# Patient Record
Sex: FEMALE | Race: BLACK OR AFRICAN AMERICAN | Employment: FULL TIME | URBAN - METROPOLITAN AREA
[De-identification: names, ages, dates, MRNs, and addresses within clinical notes are randomized per-mention and may not be internally consistent; named-entity substitution may affect disease eponyms.]

---

## 2017-03-31 ENCOUNTER — OFFICE VISIT (OUTPATIENT)
Dept: CARDIOLOGY CLINIC | Age: 72
End: 2017-03-31

## 2017-03-31 VITALS
SYSTOLIC BLOOD PRESSURE: 124 MMHG | HEIGHT: 69 IN | RESPIRATION RATE: 16 BRPM | HEART RATE: 66 BPM | DIASTOLIC BLOOD PRESSURE: 76 MMHG | WEIGHT: 208 LBS | BODY MASS INDEX: 30.81 KG/M2

## 2017-03-31 DIAGNOSIS — I10 ESSENTIAL HYPERTENSION: ICD-10-CM

## 2017-03-31 DIAGNOSIS — I36.1 NON-RHEUMATIC TRICUSPID VALVE INSUFFICIENCY: Primary | ICD-10-CM

## 2017-03-31 DIAGNOSIS — I34.0 NON-RHEUMATIC MITRAL REGURGITATION: Chronic | ICD-10-CM

## 2017-03-31 DIAGNOSIS — R06.09 DYSPNEA ON EXERTION: ICD-10-CM

## 2017-03-31 RX ORDER — AMOXICILLIN 500 MG/1
4 CAPSULE ORAL AS DIRECTED
Refills: 0 | COMMUNITY
Start: 2017-02-28 | End: 2018-03-28 | Stop reason: ALTCHOICE

## 2017-03-31 NOTE — PROGRESS NOTES
[facility]  ([id number]    HPI: Lynn Spencer is a 73y.o. year-old female who presents for follow up regarding her valve disease. She forgets to take her medication at times, but is trying to work on it. She is feeling pretty good. She is eating salads avoiding white food and fried food, no sugar. She is not exercising and knows that she needs to do that. She did check out [employer]. Not checking it at home. No headache, dyspnea  New PCP did her labs and everything was ok. She was last seen by Dr. Princess Cornejo in December 2015  Her echo 9/16 showed stable TR (moderate - severe) and mild MR, no evidence of MVP, EF 60%   She reports feeling well, dyspnea is much better, no recent LE edema, takes Lasix 20mg daily  Her Hep C is gone now, and LFT were ok but now has HERNANDEZ.  eis seeing Dr. Saskia Poole now. She denies any chest pain or palpitations  Her blood pressure is borderline low today, denies dizziness or lightheadedness  No syncope  Dr. Barbara Camacho  Lipids 7/29/16 - , , LDL 89, HDL 48, LFT ok    Since last visit, office note dated 7/2017 received from hepatology, scanned into [facility] care  Hx of Hep C with cirrhosis s/p harvoni and rivavirin  K 4.2, Cr 0.9, LFTs ok, CBC ok    Assessment/Plan:  1. Dyspnea - with exertion, likely related to valve disease, much better on Lasix, will request recent BMP and magnesium level from PCP office  2. HTN - borderline low on Benicar HCTZ   3. GERD - on PRN Dexilant  4. MVP- previously diagnosed with MVP 20 years ago, recent echo without MVP and only mild MR  5. Obesity - Body mass index is 30.72 kg/(m^2). advised her to begin exercising, talked about walking  6.   Mod-severe TR- stable by TTE, symptoms stable on Lasix at  20mg daily     Echo 9/16 (prelim) - LVEF 60%, mild MR, mod - severe TR  Echo 8/15 - EF 60%, mild MR, mod-severe TR  Nuclear stress 8/15 EF 86%, no ischemia    Soc Hx: 2 glasses of wine every other day, no tobacco, no drug use, lives alone, very busy at [employer]  Fam Hx: father passed from bleeding ulcers at age 67 and had end stage CKD and HTN, mother living at age 80 and has LE edema/possible CHF and has HTN and DM    She  has a past medical history of Gastrointestinal disorder; Hypertension; Murmur; Other ill-defined conditions(799.89); and Valvular heart disease. Cardiovascular ROS: denies chest pain or dyspnea on exertion  Respiratory ROS: no cough, shortness of breath, or wheezing  Neurological ROS: no TIA or stroke symptoms  All other systems negative except as above. PE  Vitals:    03/31/17 1422   BP: 124/76   Pulse: 66   Resp: 16   Weight: 208 lb (94.3 kg)   Height: 5' 9\" (1.753 m)    Body mass index is 30.72 kg/(m^2).   General appearance - alert, well appearing, and in no distress  Mental status - affect appropriate to mood  Eyes - sclera anicteric, moist mucous membranes  Neck - supple, no significant adenopathy  Lymphatics - not assessed  Chest - clear to auscultation, no wheezes, rales or rhonchi  Heart - normal rate, regular rhythm, normal S1, S2, 2/6 LISANDRO   Abdomen - soft, nontender, nondistended, no masses or organomegaly  Back exam - full range of motion, no tenderness  Neurological - cranial nerves II through XII grossly intact, no focal deficit  Musculoskeletal - no muscular tenderness noted, normal strength  Extremities - peripheral pulses normal, no pedal edema  Skin - normal coloration  no rashes    12 lead ECG: NSR    Recent Labs:  No results found for: CHOL  No results found for: BRENNEN  No results found for: BUN  No results found for: K  No results found for: HBA1C, MUV1DFER  No components found for: HGBI,  IHGB,  HGB,  HGBP,  WBHGB  No results found for: PLT, PLTEXT    Reviewed:  Past Medical History:   Diagnosis Date    Gastrointestinal disorder     acid reflux    Hypertension     Murmur     Other ill-defined conditions(799.89)     hep c    Valvular heart disease     mitral valve prolapse     History   Smoking Status    Former Smoker   Smokeless Tobacco    Not on file     History   Alcohol Use No     No Known Allergies    Current Outpatient Prescriptions   Medication Sig    amoxicillin (AMOXIL) 500 mg capsule Take 4 Caps by mouth as directed. 1 hour prior to dental procedures    olmesartan-hydrochlorothiazide (BENICAR HCT) 20-12.5 mg per tablet Take 1 Tab by mouth daily.  Dexlansoprazole (DEXILANT) 60 mg CpDB Take 60 mg by mouth every other day.  furosemide (LASIX) 20 mg tablet Take 1 Tab by mouth daily.  omeprazole (PRILOSEC OTC) 20 mg tablet Take 20 mg by mouth daily.  aspirin delayed-release 81 mg tablet Take  by mouth daily. No current facility-administered medications for this visit.         Ron Velasquez MD  [facility] [place]  [address] [facility] [address]  [address]

## 2017-03-31 NOTE — MR AVS SNAPSHOT
Visit Information Date & Time Provider Department Dept. Phone Encounter #  
 3/31/2017  2:00 PM Walt Hernandez MD [facility] Devi Sadler [phone] [id number] Your Appointments 9/27/2017 11:00 AM  
ECHO CARDIOGRAMS 2D with Lesli Shetty [facility] ([facility] SCHEDULING) Appt Note: Per Dr. Neil Garcia, [address] after  
 [address] [place] [address]  
[address] [facility]  
  
    
 9/27/2017 11:40 AM  
ESTABLISHED PATIENT with Walt Hernandez MD  
[facility] ([address]) Appt Note: Per Dr. Neil Garcia, [address] after  
 [address] [place] [address]  
[address] [phone] Upcoming Health Maintenance Date Due Hepatitis C Screening 1945 DTaP/Tdap/Td series (1 - Tdap) 7/12/1968 FOBT Q 1 YEAR AGE 50-75 7/12/1997 ZOSTER VACCINE AGE 60> 7/12/2007 GLAUCOMA SCREENING Q2Y 7/12/2012 OSTEOPOROSIS SCREENING (DEXA) 7/12/2012 Pneumococcal 65+ Low/Medium Risk (1 of 2 - PCV13) 7/12/2012 MEDICARE YEARLY EXAM 7/12/2012 INFLUENZA AGE 9 TO ADULT 8/1/2016 BREAST CANCER SCRN MAMMOGRAM 6/27/2018 Allergies as of 3/31/2017  Review Complete On: 3/31/2017 By: Sven De Leon No Known Allergies Current Immunizations  Never Reviewed No immunizations on file. Not reviewed this visit You Were Diagnosed With   
  
 Codes Comments Non-rheumatic tricuspid valve insufficiency    -  Primary ICD-10-CM: I36.1 ICD-9-CM: 424.2 Essential hypertension     ICD-10-CM: I10 
ICD-9-CM: 401.9 Dyspnea on exertion     ICD-10-CM: R06.09 
ICD-9-CM: 786.09 Non-rheumatic mitral regurgitation     ICD-10-CM: I34.0 ICD-9-CM: 424.0 Vitals BP Pulse Resp Height(growth percentile) Weight(growth percentile) BMI 124/76 (BP 1 Location: Right arm, BP Patient Position: Sitting) 66 16 5' 9\" (1.753 m) 208 lb (94.3 kg) 30.72 kg/m2 Smoking Status Former Smoker Vitals History BMI and BSA Data Body Mass Index Body Surface Area 30.72 kg/m 2 2.14 m 2 Preferred Pharmacy Pharmacy Name Phone [facility] [address], [address] [place] [address] Avenue [phone] Your Updated Medication List  
  
   
This list is accurate as of: 3/31/17  2:54 PM.  Always use your most recent med list.  
  
  
  
  
 amoxicillin 500 mg capsule Commonly known as:  AMOXIL Take 4 Caps by mouth as directed. 1 hour prior to dental procedures  
  
 aspirin delayed-release 81 mg tablet Take  by mouth daily. DEXILANT 60 mg Cpdb Generic drug:  Dexlansoprazole Take 60 mg by mouth every other day. furosemide 20 mg tablet Commonly known as:  LASIX Take 1 Tab by mouth daily. olmesartan-hydroCHLOROthiazide 20-12.5 mg per tablet Commonly known as:  BENICAR HCT Take 1 Tab by mouth daily. PriLOSEC OTC 20 mg tablet Generic drug:  omeprazole Take 20 mg by mouth daily. We Performed the Following AMB POC EKG ROUTINE W/ 12 LEADS, INTER & REP [76558 CPT(R)] Introducing [facility] SERVICES! Dear Fran Deleon: Thank you for requesting a [employer] account. Our records indicate that you already have an active [employer] account. You can access your account anytime at [email or web address] Did you know that you can access your hospital and ER discharge instructions at any time in [employer]? You can also review all of your test results from your hospital stay or ER visit. Additional Information If you have questions, please visit the Frequently Asked Questions section of the [employer] website at [email or web address] Remember, [employer] is NOT to be used for urgent needs. For medical emergencies, dial 911. Now available from your iPhone and Android! Please provide this summary of care documentation to your next provider. Your primary care clinician is listed as Miroslava Ozuna. If you have any questions after today's visit, please call [phone].

## 2017-06-28 ENCOUNTER — HOSPITAL ENCOUNTER (OUTPATIENT)
Dept: MAMMOGRAPHY | Age: 72
Discharge: HOME | End: 2017-06-28
Attending: INTERNAL MEDICINE
Payer: COMMERCIAL

## 2017-06-28 DIAGNOSIS — Z12.31 VISIT FOR SCREENING MAMMOGRAM: ICD-10-CM

## 2017-06-28 PROCEDURE — 77067 SCR MAMMO BI INCL CAD: CPT

## 2017-08-18 PROBLEM — M85.80 OSTEOPENIA: Status: ACTIVE | Noted: 2017-08-18

## 2017-08-18 PROBLEM — R53.83 FATIGUE: Status: ACTIVE | Noted: 2017-08-18

## 2017-08-18 PROBLEM — K57.92 ACUTE DIVERTICULITIS: Status: ACTIVE | Noted: 2017-08-18

## 2017-08-18 PROBLEM — H26.9 CATARACT, RIGHT: Status: ACTIVE | Noted: 2017-08-18

## 2017-08-18 PROBLEM — B19.20 HEPATITIS C: Status: ACTIVE | Noted: 2017-08-18

## 2017-08-18 PROBLEM — H26.9 CATARACT, LEFT: Status: ACTIVE | Noted: 2017-08-18

## 2017-08-18 PROBLEM — Z78.0 POST-MENOPAUSAL: Status: ACTIVE | Noted: 2017-08-18

## 2017-08-18 PROBLEM — B00.1 COLD SORE: Status: ACTIVE | Noted: 2017-08-18

## 2017-08-18 PROBLEM — M19.90 ARTHRITIS: Status: ACTIVE | Noted: 2017-08-18

## 2017-08-18 PROBLEM — B02.9 HERPES ZOSTER INFECTION OF THORACIC REGION: Status: ACTIVE | Noted: 2017-08-18

## 2017-08-18 PROBLEM — R01.1 HEART MURMUR: Status: ACTIVE | Noted: 2017-08-18

## 2017-08-18 PROBLEM — M54.6 BACK PAIN, THORACIC: Status: ACTIVE | Noted: 2017-08-18

## 2017-08-18 PROBLEM — K21.9 GERD (GASTROESOPHAGEAL REFLUX DISEASE): Status: ACTIVE | Noted: 2017-08-18

## 2017-08-18 PROBLEM — E66.9 OBESITY (BMI 30-39.9): Status: ACTIVE | Noted: 2017-08-18

## 2017-08-18 PROBLEM — Z01.818 PREOP EXAMINATION: Status: ACTIVE | Noted: 2017-08-18

## 2017-08-18 PROBLEM — R73.09 ELEVATED HEMOGLOBIN A1C: Status: ACTIVE | Noted: 2017-08-18

## 2017-08-18 PROBLEM — H81.10 VERTIGO, BENIGN POSITIONAL: Status: ACTIVE | Noted: 2017-08-18

## 2017-08-18 PROBLEM — K74.60 CIRRHOSIS (HCC): Status: ACTIVE | Noted: 2017-08-18

## 2017-08-18 PROBLEM — H92.01 POSTERIOR AURICULAR PAIN OF RIGHT EAR: Status: ACTIVE | Noted: 2017-08-18

## 2017-08-18 PROBLEM — J30.9 ALLERGIC RHINITIS: Status: ACTIVE | Noted: 2017-08-18

## 2017-08-18 PROBLEM — E78.5 HYPERLIPIDEMIA LDL GOAL <100: Status: ACTIVE | Noted: 2017-08-18

## 2017-08-18 RX ORDER — CHOLECALCIFEROL TAB 125 MCG (5000 UNIT) 125 MCG
TAB ORAL DAILY
COMMUNITY

## 2017-08-18 RX ORDER — MV/FA/DHA/EPA/FISH OIL/SAW/GNK 400MCG-200
COMBINATION PACKAGE (EA) ORAL
COMMUNITY
End: 2021-07-09

## 2017-08-18 RX ORDER — MOMETASONE FUROATE 50 UG/1
2 SPRAY, METERED NASAL DAILY
COMMUNITY
End: 2021-07-09

## 2017-08-18 RX ORDER — CHOLECALCIFEROL (VITAMIN D3) 125 MCG
1 CAPSULE ORAL DAILY
COMMUNITY

## 2017-08-18 RX ORDER — NAPROXEN SODIUM 220 MG
440 TABLET ORAL AS NEEDED
COMMUNITY
End: 2021-07-21

## 2017-08-18 RX ORDER — DOCOSANOL 100 MG/G
CREAM TOPICAL AS NEEDED
COMMUNITY
End: 2022-06-22

## 2017-09-11 ENCOUNTER — OFFICE VISIT (OUTPATIENT)
Dept: INTERNAL MEDICINE CLINIC | Age: 72
End: 2017-09-11

## 2017-09-11 VITALS
SYSTOLIC BLOOD PRESSURE: 113 MMHG | WEIGHT: 206 LBS | DIASTOLIC BLOOD PRESSURE: 76 MMHG | HEART RATE: 57 BPM | BODY MASS INDEX: 31.22 KG/M2 | HEIGHT: 68 IN

## 2017-09-11 DIAGNOSIS — Z13.39 SCREENING FOR ALCOHOLISM: ICD-10-CM

## 2017-09-11 DIAGNOSIS — K74.60 CIRRHOSIS OF LIVER WITHOUT ASCITES, UNSPECIFIED HEPATIC CIRRHOSIS TYPE (HCC): ICD-10-CM

## 2017-09-11 DIAGNOSIS — Z13.31 SCREENING FOR DEPRESSION: ICD-10-CM

## 2017-09-11 DIAGNOSIS — K21.9 GASTROESOPHAGEAL REFLUX DISEASE WITHOUT ESOPHAGITIS: ICD-10-CM

## 2017-09-11 DIAGNOSIS — Z23 ENCOUNTER FOR IMMUNIZATION: ICD-10-CM

## 2017-09-11 DIAGNOSIS — R76.8 POSITIVE HEPATITIS C ANTIBODY TEST: ICD-10-CM

## 2017-09-11 DIAGNOSIS — Z00.00 MEDICARE ANNUAL WELLNESS VISIT, SUBSEQUENT: Primary | ICD-10-CM

## 2017-09-11 DIAGNOSIS — R53.83 FATIGUE, UNSPECIFIED TYPE: ICD-10-CM

## 2017-09-11 DIAGNOSIS — Z11.59 NEED FOR HEPATITIS C SCREENING TEST: ICD-10-CM

## 2017-09-11 DIAGNOSIS — E78.5 HYPERLIPIDEMIA LDL GOAL <100: ICD-10-CM

## 2017-09-11 DIAGNOSIS — I10 ESSENTIAL HYPERTENSION: ICD-10-CM

## 2017-09-11 DIAGNOSIS — Z12.31 ENCOUNTER FOR SCREENING MAMMOGRAM FOR MALIGNANT NEOPLASM OF BREAST: ICD-10-CM

## 2017-09-11 RX ORDER — LANOLIN ALCOHOL/MO/W.PET/CERES
1000 CREAM (GRAM) TOPICAL DAILY
COMMUNITY
End: 2021-07-09

## 2017-09-11 RX ORDER — ASCORBIC ACID 500 MG
TABLET ORAL
COMMUNITY
End: 2020-10-26

## 2017-09-11 RX ORDER — GLUCOSAMINE/CHONDR SU A SOD 750-600 MG
TABLET ORAL
COMMUNITY
End: 2018-10-11

## 2017-09-11 RX ORDER — ZINC GLUCONATE 10 MG
1 LOZENGE ORAL
COMMUNITY

## 2017-09-11 NOTE — PROGRESS NOTES
Johan Palomo is a 81 y.o. female presenting for Complete Physical (rm 2 - fasting)  . 1. Have you been to the ER, urgent care clinic since your last visit? Hospitalized since your last visit? No    2. Have you seen or consulted any other health care providers outside of the [address] [place] [address] since your last visit? Include any pap smears or colon screening.  No

## 2017-09-11 NOTE — MR AVS SNAPSHOT
Visit Information Date & Time Provider Department Dept. Phone Encounter #  
 9/11/2017  9:50 AM GREGORIO Rucker MD [address] [facility] [phone] [id number] Follow-up Instructions Return in about 6 months (around 3/11/2018) for follow up. Your Appointments 9/27/2017 11:00 AM  
ECHO CARDIOGRAMS 2D with Yola Sanches [facility] ([facility]CCA SCHEDULING) Appt Note: Per Dr. Lester Coffey, [address] after  
 [address] [place] [address]  
[address] [facility]  
  
    
 9/27/2017 11:40 AM  
ESTABLISHED PATIENT with Kathi Luna MD  
[facility] ([facility]) Appt Note: Per Dr. Lester Coffey, [address] after  
 [address] [place] [address]  
[address] [address] Upcoming Health Maintenance Date Due DTaP/Tdap/Td series (1 - Tdap) 7/12/1968 FOBT Q 1 YEAR AGE 50-75 7/12/1997 ZOSTER VACCINE AGE 60> 5/12/2007 GLAUCOMA SCREENING Q2Y 7/12/2012 OSTEOPOROSIS SCREENING (DEXA) 7/12/2012 MEDICARE YEARLY EXAM 7/12/2012 INFLUENZA AGE 9 TO ADULT 8/1/2017 Pneumococcal 65+ Low/Medium Risk (2 of 2 - PPSV23) 11/1/2017 BREAST CANCER SCRN MAMMOGRAM 6/28/2019 Allergies as of 9/11/2017  Review Complete On: 9/11/2017 By: Robert Lopez MD  
 No Known Allergies Current Immunizations  Never Reviewed Name Date Influenza High Dose Vaccine PF  Incomplete Influenza Vaccine 11/1/2013 Pneumococcal Conjugate (PCV-13)  Incomplete Pneumococcal Vaccine (Unspecified Type) 11/1/2012 Not reviewed this visit You Were Diagnosed With   
  
 Codes Comments Essential hypertension    -  Primary ICD-10-CM: I10 
ICD-9-CM: 401.9 Medicare annual wellness visit, subsequent     ICD-10-CM: Z00.00 ICD-9-CM: V70.0 Screening for alcoholism     ICD-10-CM: Z13.89 ICD-9-CM: V79.1 Screening for depression     ICD-10-CM: Z13.89 ICD-9-CM: V79.0 Need for hepatitis C screening test     ICD-10-CM: Z11.59 
ICD-9-CM: V73.89 Encounter for screening mammogram for malignant neoplasm of breast     ICD-10-CM: Z12.31 
ICD-9-CM: V76.12 Encounter for immunization     ICD-10-CM: W37 ICD-9-CM: V03.89 Gastroesophageal reflux disease without esophagitis     ICD-10-CM: K21.9 ICD-9-CM: 530.81 Hyperlipidemia LDL goal <100     ICD-10-CM: E78.5 ICD-9-CM: 272.4 Fatigue, unspecified type     ICD-10-CM: R53.83 ICD-9-CM: 780.79 Cirrhosis of liver without ascites, unspecified hepatic cirrhosis type ([facility]     ICD-10-CM: K74.60 ICD-9-CM: 571.5 Vitals BP Pulse Height(growth percentile) Weight(growth percentile) BMI OB Status 113/76 (BP 1 Location: Left arm, BP Patient Position: Sitting) (!) 57 5' 8\" (1.727 m) 206 lb (93.4 kg) 31.32 kg/m2 Hysterectomy Smoking Status Former Smoker Vitals History BMI and BSA Data Body Mass Index Body Surface Area  
 31.32 kg/m 2 2.12 m 2 Preferred Pharmacy Pharmacy Name Phone [facility] [address] [place] [address] [phone] Your Updated Medication List  
  
   
This list is accurate as of: 9/11/17 11:07 AM.  Always use your most recent med list.  
  
  
  
  
 ABREVA 10 % topical cream  
Generic drug:  docosanol Apply  to affected area five (5) times daily. ALEVE 220 mg tablet Generic drug:  naproxen sodium Take 220 mg by mouth two (2) times daily (with meals). amoxicillin 500 mg capsule Commonly known as:  AMOXIL Take 4 Caps by mouth as directed. 1 hour prior to dental procedures  
  
 aspirin delayed-release 81 mg tablet Take  by mouth daily. biotin 5,000 mcg Tbdi Take  by mouth. DEXILANT 60 mg Cpdb Generic drug:  Dexlansoprazole Take 60 mg by mouth every other day. furosemide 20 mg tablet Commonly known as:  LASIX TAKE ONE TABLET BY MOUTH ONCE DAILY  
  
 glucosamine-chondroitin 750-600 mg Tab Take  by mouth.  
  
 krill oil 500 mg Cap Take  by mouth.  
  
 magnesium 250 mg Tab Take  by mouth. NASONEX 50 mcg/actuation nasal spray Generic drug:  mometasone 2 Sprays daily. Elmarie [address]  
by Does Not Apply route. olmesartan-hydroCHLOROthiazide 20-12.5 mg per tablet Commonly known as:  BENICAR HCT Take 1 Tab by mouth daily. ONE-A-DAY MENOPAUSE FORMULA PO Take  by mouth. PriLOSEC OTC 20 mg tablet Generic drug:  omeprazole Take 20 mg by mouth daily. psyllium Powd Commonly known as:  METAMUCIL Take  by mouth. YRN-E (ENTERIC COATED) 400 mg Tbec Generic drug:  S-Adenosylmethionine Take  by mouth. VITAMIN B-12 1,000 mcg tablet Generic drug:  cyanocobalamin Take 1,000 mcg by mouth daily. VITAMIN C 500 mg tablet Generic drug:  ascorbic acid (vitamin C) Take  by mouth. VITAMIN D3 5,000 unit Tab tablet Generic drug:  cholecalciferol (VITAMIN D3) Take  by mouth daily. We Performed the Following ADMIN INFLUENZA VIRUS VAC [ [facility]] ADMIN PNEUMOCOCCAL VACCINE [ [facility]] AMB POC COMPLETE CBC,AUTOMATED ENTER [id number] CPT(R)] AMB POC COMPREHENSIVE METABOLIC PANEL [99655 CPT(R)] AMB POC LIPID PROFILE [01759 CPT(R)] AMB POC URINALYSIS DIP STICK AUTO W/ MICRO  [76394 CPT(R)] [address] [id number] HEPATITIS C AB [52856 CPT(R)] INFLUENZA VIRUS VACCINE, HIGH DOSE SEASONAL, PRESERVATIVE FREE [44868 CPT(R)] PNEUMOCOCCAL CONJ VACCINE 13 VALENT IM [id number] CPT(R)] RI ANNUAL ALCOHOL SCREEN 15 MIN [id number] [facility]] Follow-up Instructions Return in about 6 months (around 3/11/2018) for follow up. To-Do List   
 09/14/2017   Imaging:  NABEEL MAMMO BI SCREENING INCL CAD   
  
  
 Patient Instructions Medicare Wellness Visit, Female The best way to live healthy is to have a healthy lifestyle by eating a well-balanced diet, exercising regularly, limiting alcohol and stopping smoking. Regular physical exams and screening tests are another way to keep healthy. Preventive exams provided by your health care provider can find health problems before they become diseases or illnesses. Preventive services including immunizations, screening tests, monitoring and exams can help you take care of your own health. All people over age 72 should have a pneumovax  and and a prevnar shot to prevent pneumonia. These are once in a lifetime unless you and your provider decide differently. All people over 65 should have a yearly flu shot and a tetanus vaccine every 10 years. A bone mass density to screen for osteoporosis or thinning of the bones should be done every 2 years after 65. Screening for diabetes mellitus with a blood sugar test should be done every year. Glaucoma is a disease of the eye due to increased ocular pressure that can lead to blindness and it should be done every year by an eye professional. 
 
Cardiovascular screening tests that check for elevated lipids (fatty part of blood) which can lead to heart disease and strokes should be done every 5 years. Colorectal screening that evaluates for blood or polyps in your colon should be done yearly as a stool test or every five years as a flexible sigmoidoscope or every 10 years as a colonoscopy up to age 76. Breast cancer screening with a mammogram is recommended biennially  for women age 54-69. Screening for cervical cancer with a pap smear and pelvic exam is recommended for women after age 72 years every 2 years up to age 79 or when the provider and patient decide to stop. If there is a history of cervical abnormalities or other increased risk for cancer then the test is recommended yearly. Hepatitis C screening is also recommended for anyone born between 80 through [address]. A shingles vaccine is also recommended once in a lifetime after age 61. Your Medicare Wellness Exam is recommended annually. Here is a list of your current Health Maintenance items with a due date: 
Health Maintenance Due Topic Date Due  
 DTaP/Tdap/Td  (1 - Tdap) 07/12/1968  Stool testing for trace blood  07/12/1997  Shingles Vaccine  05/12/2007  Glaucoma Screening   07/12/2012  Bone Density Screening  07/12/2012 [facility] Annual Well Visit  07/12/2012  Flu Vaccine  08/01/2017 Introducing [facility] SERVICES! Dear Chrissie Cranker: Thank you for requesting a [employer] account. Our records indicate that you already have an active [employer] account. You can access your account anytime at [email or web address] Did you know that you can access your hospital and ER discharge instructions at any time in Just[employer]? You can also review all of your test results from your hospital stay or ER visit. Additional Information If you have questions, please visit the Frequently Asked Questions section of the Just[employer] website at [email or web address] Remember, [employer] is NOT to be used for urgent needs. For medical emergencies, dial 911. Now available from your iPhone and Android! Please provide this summary of care documentation to your next provider. Your primary care clinician is listed as GREGORIO Coleman. If you have any questions after today's visit, please call [phone].

## 2017-09-11 NOTE — PATIENT INSTRUCTIONS

## 2017-09-11 NOTE — PROGRESS NOTES
This is a Subsequent Medicare Annual Wellness Exam (AWV) (Performed 12 months after IPPE or effective date of Medicare Part B enrollment, Once in a lifetime)    I have reviewed the patient's medical history in detail and updated the computerized patient record. History     Past Medical History:   Diagnosis Date    Acute diverticulitis 8/18/2017    Allergic rhinitis 8/18/2017    Arthritis 8/18/2017    Back pain, thoracic 8/18/2017    Cataract, left 8/18/2017    Cataract, right 8/18/2017    Cirrhosis ([address] 8/18/2017    Cold sore 8/18/2017    Elevated hemoglobin A1c 8/18/2017    Fatigue 8/18/2017    Gastrointestinal disorder     acid reflux    GERD (gastroesophageal reflux disease) 8/18/2017    Heart murmur 8/18/2017    Hepatitis C 8/18/2017    Herpes zoster infection of thoracic region 8/18/2017    Hyperlipidemia LDL goal <100 8/18/2017    Hypertension     Menopause     Hysterectomy-42years old    Murmur     Obesity (BMI 30-39. 9) 8/18/2017    Osteopenia 8/18/2017    Other ill-defined conditions     hep c    Post-menopausal 8/18/2017    Posterior auricular pain of right ear 8/18/2017    Preop examination 8/18/2017    Valvular heart disease     mitral valve prolapse    Vertigo, benign positional 8/18/2017      Past Surgical History:   Procedure Laterality Date    HX HYSTERECTOMY      42years old    HX ORTHOPAEDIC      knee, foot     Current Outpatient Prescriptions   Medication Sig Dispense Refill    cyanocobalamin (VITAMIN B-12) 1,000 mcg tablet Take 1,000 mcg by mouth daily.  ascorbic acid, vitamin C, (VITAMIN C) 500 mg tablet Take  by mouth.  psyllium (METAMUCIL) powd Take  by mouth.  magnesium 250 mg tab Take  by mouth.  GLUCOSAMINE/CHONDR LOBATO A SOD (GLUCOSAMINE-CHONDROITIN) 750-600 mg tab Take  by mouth.  mometasone (NASONEX) 50 mcg/actuation nasal spray 2 Sprays daily.       naproxen sodium (ALEVE) 220 mg tablet Take 220 mg by mouth two (2) times daily (with meals).  docosanol (ABREVA) 10 % topical cream Apply  to affected area five (5) times daily.  PEN NEEDLE, DIABETIC ([address]) by Does Not Apply route.  biotin 5,000 mcg TbDi Take  by mouth.  krill oil 500 mg cap Take  by mouth.  MVIT-MINS/FOLIC ACID/SOY ISOFL (ONE-A-DAY MENOPAUSE FORMULA PO) Take  by mouth.  S-Adenosylmethionine (YRN-E, ENTERIC COATED,) 400 mg TbEC Take  by mouth.  cholecalciferol, VITAMIN D3, (VITAMIN D3) 5,000 unit tab tablet Take  by mouth daily.  furosemide (LASIX) 20 mg tablet TAKE ONE TABLET BY MOUTH ONCE DAILY 90 Tab 3    amoxicillin (AMOXIL) 500 mg capsule Take 4 Caps by mouth as directed. 1 hour prior to dental procedures  0    olmesartan-hydrochlorothiazide (BENICAR HCT) 20-12.5 mg per tablet Take 1 Tab by mouth daily. 90 Tab 3    Dexlansoprazole (DEXILANT) 60 mg CpDB Take 60 mg by mouth every other day.  omeprazole (PRILOSEC OTC) 20 mg tablet Take 20 mg by mouth daily.  aspirin delayed-release 81 mg tablet Take  by mouth daily. No Known Allergies  Family History   Problem Relation Age of Onset    Hypertension Mother     Diabetes Mother     Heart Disease Father      Social History   Substance Use Topics    Smoking status: Former Smoker    Smokeless tobacco: Never Used    Alcohol use No     Patient Active Problem List   Diagnosis Code    Right wrist injury S69.91XA    HTN (hypertension) I10    Dyspnea on exertion R06.09    Non-rheumatic tricuspid valve insufficiency I36.1    Non-rheumatic mitral regurgitation I34.0    Obesity (BMI 30-39. 9) E66.9    Hepatitis C B19.20    GERD (gastroesophageal reflux disease) K21.9    Hyperlipidemia LDL goal <100 E78.5    Heart murmur R01.1    Cataract, left H26.9    Cataract, right H26.9    Acute diverticulitis K57.92    Allergic rhinitis J30.9    Back pain, thoracic M54.6    Elevated hemoglobin A1c R73.09    Herpes zoster infection of thoracic region B02.9    Post-menopausal Z78.0    Posterior auricular pain of right ear H92.01    Preop examination Z01.818    Vertigo, benign positional H81.10    Cirrhosis (HCC) K74.60    Fatigue R53.83    Cold sore B00.1    Arthritis M19.90    Osteopenia M85.80       Depression Risk Factor Screening:     PHQ over the last two weeks 9/11/2017   Little interest or pleasure in doing things Not at all   Feeling down, depressed or hopeless Not at all   Total Score PHQ 2 0     Alcohol Risk Factor Screening: You do not drink alcohol or very rarely. Functional Ability and Level of Safety:   Hearing Loss  Hearing is good. Activities of Daily Living  The home contains: no safety equipment  Patient does total self care    Fall Risk  Fall Risk Assessment, last 12 mths 9/11/2017   Able to walk? Yes   Fall in past 12 months? No       Abuse Screen  Patient is not abused    Cognitive Screening   Evaluation of Cognitive Function:  Has your family/caregiver stated any concerns about your memory: no  Normal    Patient Care Team   Patient Care Team:  Radha Nichols MD as PCP - General (Internal Medicine)  Paty Rader MD (Cardiology)    Assessment/Plan   Education and counseling provided:  Are appropriate based on today's review and evaluation  Pneumococcal Vaccine  Influenza Vaccine  Screening Mammography    Diagnoses and all orders for this visit:    1. Medicare annual wellness visit, subsequent    2. Screening for alcoholism  -     Annual  Alcohol Screen 15 min ()    3. Screening for depression  -     Depression Screen Annual    4. Need for hepatitis C screening test  -     HEPATITIS C AB    5. Encounter for screening mammogram for malignant neoplasm of breast  -     Bilateral Digital Screening Mammography; Future    6. Encounter for immunization  -     Influenza Admin ()  -     Influenza virus vaccine (FLUZONE HIGH DOSE) PF ([id number])  -     Pneumococcal Admin ()  -     Pneumococcal Conjugate Vaccine    7. Essential hypertension  -     AMB POC COMPREHENSIVE METABOLIC PANEL  -     AMB POC URINALYSIS DIP STICK AUTO W/ MICRO     8. Gastroesophageal reflux disease without esophagitis    9. Hyperlipidemia LDL goal <100  -     AMB POC LIPID PROFILE    10. Fatigue, unspecified type  -     AMB POC COMPLETE CBC,AUTOMATED ENTER    11. Cirrhosis of liver without ascites, unspecified hepatic cirrhosis type [facility])        Health Maintenance Due   Topic Date Due    DTaP/Tdap/Td series (1 - Tdap) 07/12/1968    FOBT Q 1 YEAR AGE 50-75  07/12/1997    ZOSTER VACCINE AGE 60>  05/12/2007    GLAUCOMA SCREENING Q2Y  07/12/2012    OSTEOPOROSIS SCREENING (DEXA)  07/12/2012    MEDICARE YEARLY EXAM  07/12/2012    INFLUENZA AGE 9 TO ADULT  08/01/2017     This note will not be viewable in MyChart. Ricardo Pham is a 81 y.o. female and presents with Complete Physical (rm 2 - fasting)  . Subjective:  Mrs. Rox Aparicio presents today for follow-up of multiple problems including hypertension, hyperlipidemia, gastroesophageal reflux disease, osteopenia. She denies any chest pain PND orthopnea or pedal edema. She has had no palpitations. She takes Dexilant for reflux but only takes it on occasion. She does note some ringing in her left ear from time to time. She denies any dizziness congestion coryza or drainage.     Past Medical History:   Diagnosis Date    Acute diverticulitis 8/18/2017    Allergic rhinitis 8/18/2017    Arthritis 8/18/2017    Back pain, thoracic 8/18/2017    Cataract, left 8/18/2017    Cataract, right 8/18/2017    Cirrhosis ([address] 8/18/2017    Cold sore 8/18/2017    Elevated hemoglobin A1c 8/18/2017    Fatigue 8/18/2017    Gastrointestinal disorder     acid reflux    GERD (gastroesophageal reflux disease) 8/18/2017    Heart murmur 8/18/2017    Hepatitis C 8/18/2017    Herpes zoster infection of thoracic region 8/18/2017    Hyperlipidemia LDL goal <100 8/18/2017    Hypertension     Menopause Hysterectomy-42years old    Murmur     Obesity (BMI 30-39. 9) 8/18/2017    Osteopenia 8/18/2017    Other ill-defined conditions     hep c    Post-menopausal 8/18/2017    Posterior auricular pain of right ear 8/18/2017    Preop examination 8/18/2017    Valvular heart disease     mitral valve prolapse    Vertigo, benign positional 8/18/2017     Past Surgical History:   Procedure Laterality Date    HX HYSTERECTOMY      42years old    HX ORTHOPAEDIC      knee, foot     No Known Allergies  Current Outpatient Prescriptions   Medication Sig Dispense Refill    cyanocobalamin (VITAMIN B-12) 1,000 mcg tablet Take 1,000 mcg by mouth daily.  ascorbic acid, vitamin C, (VITAMIN C) 500 mg tablet Take  by mouth.  psyllium (METAMUCIL) powd Take  by mouth.  magnesium 250 mg tab Take  by mouth.  GLUCOSAMINE/CHONDR LOBATO A SOD (GLUCOSAMINE-CHONDROITIN) 750-600 mg tab Take  by mouth.  mometasone (NASONEX) 50 mcg/actuation nasal spray 2 Sprays daily.  naproxen sodium (ALEVE) 220 mg tablet Take 220 mg by mouth two (2) times daily (with meals).  docosanol (ABREVA) 10 % topical cream Apply  to affected area five (5) times daily.  PEN NEEDLE, DIABETIC ([address]) by Does Not Apply route.  biotin 5,000 mcg TbDi Take  by mouth.  krill oil 500 mg cap Take  by mouth.  MVIT-MINS/FOLIC ACID/SOY ISOFL (ONE-A-DAY MENOPAUSE FORMULA PO) Take  by mouth.  S-Adenosylmethionine (YRN-E, ENTERIC COATED,) 400 mg TbEC Take  by mouth.  cholecalciferol, VITAMIN D3, (VITAMIN D3) 5,000 unit tab tablet Take  by mouth daily.  furosemide (LASIX) 20 mg tablet TAKE ONE TABLET BY MOUTH ONCE DAILY 90 Tab 3    amoxicillin (AMOXIL) 500 mg capsule Take 4 Caps by mouth as directed. 1 hour prior to dental procedures  0    olmesartan-hydrochlorothiazide (BENICAR HCT) 20-12.5 mg per tablet Take 1 Tab by mouth daily.  90 Tab 3    Dexlansoprazole (DEXILANT) 60 mg CpDB Take 60 mg by mouth every other day.  omeprazole (PRILOSEC OTC) 20 mg tablet Take 20 mg by mouth daily.  aspirin delayed-release 81 mg tablet Take  by mouth daily. Social History     Social History    Marital status: SINGLE     Spouse name: N/A    Number of children: N/A    Years of education: N/A     Social History Main Topics    Smoking status: Former Smoker    Smokeless tobacco: Never Used    Alcohol use No    Drug use: No    Sexual activity: Not Asked     Other Topics Concern    None     Social History Narrative     Family History   Problem Relation Age of Onset    Hypertension Mother     Diabetes Mother     Heart Disease Father        Review of Systems  Constitutional:  negative for fevers, chills, anorexia and weight loss  Eyes:    negative for visual disturbance and irritation  ENT:    negative for tinnitus,sore throat,nasal congestion,ear pains. hoarseness  Respiratory:     negative for cough, hemoptysis, dyspnea,wheezing  CV:    negative for chest pain, palpitations, lower extremity edema  GI:    negative for nausea, vomiting, diarrhea, abdominal pain,melena  Endo:               negative for polyuria,polydipsia,polyphagia,heat intolerance  Genitourinary : negative for frequency, dysuria and hematuria  Integumentary: negative for rash and pruritus  Hematologic:   negative for easy bruising and gum/nose bleeding  Musculoskel:  negative for myalgias, arthralgias, back pain, muscle weakness, joint pain  Neurological:   negative for headaches, dizziness, vertigo, memory problems and gait   Behavl/Psych:  negative for feelings of anxiety, depression, mood changes  ROS otherwise negative      Objective:  Visit Vitals    /76 (BP 1 Location: Left arm, BP Patient Position: Sitting)    Pulse (!) 57    Ht 5' 8\" (1.727 m)    Wt 206 lb (93.4 kg)    BMI 31.32 kg/m2     Physical Exam:   General appearance - alert, well appearing, and in no distress  Mental status - alert, oriented to person, place, and time  EYE-EMMY, EOMI, fundi normal, corneas normal, no foreign bodies  ENT-ENT exam normal, no neck nodes or sinus tenderness  Nose - normal and patent, no erythema, discharge or polyps  Mouth - mucous membranes moist, pharynx normal without lesions  Neck - supple, no significant adenopathy   Chest - clear to auscultation, no wheezes, rales or rhonchi, symmetric air entry   Heart - normal rate, regular rhythm, normal S1, S2, no murmurs, rubs, clicks or gallops   Abdomen - soft, nontender, nondistended, no masses or organomegaly  Lymph- no adenopathy palpable  Ext-peripheral pulses normal, no pedal edema, no clubbing or cyanosis  Skin-Warm and dry. no hyperpigmentation, vitiligo, or suspicious lesions  Neuro -alert, oriented, normal speech, no focal findings or movement disorder noted      Assessment/Plan:  Diagnoses and all orders for this visit:    1. Medicare annual wellness visit, subsequent    2. Screening for alcoholism  -     Annual  Alcohol Screen 15 min ()    3. Screening for depression  -     Depression Screen Annual    4. Need for hepatitis C screening test  -     HEPATITIS C AB    5. Encounter for screening mammogram for malignant neoplasm of breast  -     Bilateral Digital Screening Mammography; Future    6. Encounter for immunization  -     Influenza Admin ()  -     Influenza virus vaccine (FLUZONE HIGH DOSE) PF ([id number])  -     Pneumococcal Admin ()  -     Pneumococcal Conjugate Vaccine    7. Essential hypertension  -     AMB POC COMPREHENSIVE METABOLIC PANEL  -     AMB POC URINALYSIS DIP STICK AUTO W/ MICRO     8. Gastroesophageal reflux disease without esophagitis    9. Hyperlipidemia LDL goal <100  -     AMB POC LIPID PROFILE    10. Fatigue, unspecified type  -     AMB POC COMPLETE CBC,AUTOMATED ENTER    11. Cirrhosis of liver without ascites, unspecified hepatic cirrhosis type ([facility] [address]          ICD-10-CM ICD-9-CM    1. Medicare annual wellness visit, subsequent Z00.00 V70.0    2. Screening for alcoholism Z13.89 V79.1 [place] ANNUAL ALCOHOL SCREEN 15 MIN   3. Screening for depression Z13.89 V79.0 DEPRESSION SCREEN ANNUAL   4. Need for hepatitis C screening test Z11.59 V73.89 HEPATITIS C AB   5. Encounter for screening mammogram for malignant neoplasm of breast Z12.31 V76.12 NABEEL MAMMO BI SCREENING INCL CAD   6. Encounter for immunization Z23 V03.89 ADMIN INFLUENZA VIRUS VAC      INFLUENZA VIRUS VACCINE, HIGH DOSE SEASONAL, PRESERVATIVE FREE      ADMIN PNEUMOCOCCAL VACCINE      PNEUMOCOCCAL CONJ VACCINE 13 VALENT IM   7. Essential hypertension I10 401.9 AMB POC COMPREHENSIVE METABOLIC PANEL      AMB POC URINALYSIS DIP STICK AUTO W/ MICRO    8. Gastroesophageal reflux disease without esophagitis K21.9 530.81    9. Hyperlipidemia LDL goal <100 E78.5 272.4 AMB POC LIPID PROFILE   10. Fatigue, unspecified type R53.83 780.79 AMB POC COMPLETE CBC,AUTOMATED ENTER   11. Cirrhosis of liver without ascites, unspecified hepatic cirrhosis type (HCC) K74.60 571.5      Plan:    Continue current medical regimen as outlined above. Tinnitus is most likely secondary to mild sensorineural hearing loss. If this progresses or persists consider formal hearing exam.  Follow-up routine health maintenance otherwise as noted above. Follow-up Disposition:  Return in about 6 months (around 3/11/2018) for follow up. I have reviewed with the patient details of the assessment and plan and all questions were answered. Relevent patient education was performed. Verbal and/or written instructions (see AVS) provided. The most recent lab findings were reviewed with the patient. An After Visit Summary was printed and given to the patient.     Kenji Duenas MD

## 2017-09-12 LAB
ALBUMIN SERPL-MCNC: 4.7 G/DL (ref 3.9–5.4)
ALKALINE PHOS POC: 66 U/L (ref 38–126)
ALT SERPL-CCNC: 23 U/L (ref 9–52)
AST SERPL-CCNC: 30 U/L (ref 14–36)
BACTERIA UA POCT, BACTPOCT: NORMAL
BILIRUB UR QL STRIP: NEGATIVE
BUN BLD-MCNC: 29 MG/DL (ref 7–17)
CALCIUM BLD-MCNC: 9.7 MG/DL (ref 8.4–10.2)
CASTS UA POCT: 0
CHLORIDE BLD-SCNC: 104 MMOL/L (ref 98–107)
CHOLEST SERPL-MCNC: 158 MG/DL (ref 0–200)
CLUE CELLS, CLUEPOCT: NEGATIVE
CO2 POC: 28 MMOL/L (ref 22–32)
CREAT BLD-MCNC: 0.8 MG/DL (ref 0.7–1.2)
CRYSTALS UA POCT, CRYSPOCT: NEGATIVE
EGFR (POC): 74.7
EPITHELIAL CELLS POCT, EPITHPOCT: 0
GLUCOSE POC: 90 MG/DL (ref 65–105)
GLUCOSE UR-MCNC: NEGATIVE MG/DL
GRAN# POC: 3.1 K/UL (ref 2–7.8)
GRAN% POC: 62.1 % (ref 37–92)
HCT VFR BLD CALC: 38.2 % (ref 37–51)
HCV AB S/CO SERPL IA: >11 S/CO RATIO (ref 0–0.9)
HDLC SERPL-MCNC: 48 MG/DL (ref 35–130)
HGB BLD-MCNC: 12.6 G/DL (ref 12–18)
KETONES P FAST UR STRIP-MCNC: NEGATIVE MG/DL
LDL CHOLESTEROL POC: 94 MG/DL (ref 0–130)
LY# POC: 1.5 K/UL (ref 0.6–4.1)
LY% POC: 32.7 % (ref 10–58.5)
MCH RBC QN: 29.4 PG (ref 26–32)
MCHC RBC-ENTMCNC: 32.9 G/DL (ref 30–36)
MCV RBC: 89 FL (ref 80–97)
MID #, POC: 0.2 K/UL (ref 0–1.8)
MID% POC: 5.2 % (ref 0.1–24)
MUCUS UA POCT, MUCPOCT: NORMAL
PH UR STRIP: 5 [PH] (ref 5–7)
PLATELET # BLD: 208 K/UL (ref 140–440)
POTASSIUM SERPL-SCNC: 4.3 MMOL/L (ref 3.6–5)
PROT SERPL-MCNC: 8.7 G/DL (ref 6.3–8.2)
PROTEIN,URINE POC: NORMAL MG/DL
RBC # BLD: 4.28 M/UL (ref 4.2–6.3)
RBC UA POCT, RBCPOCT: 0
SODIUM SERPL-SCNC: 146 MMOL/L (ref 137–145)
SP GR UR STRIP: 1.02 (ref 1.01–1.02)
TCHOL/HDL RATIO (POC): 3.3 (ref 0–4)
TOTAL BILIRUBIN POC: 0.6 MG/DL (ref 0.2–1.3)
TRICH UA POCT, TRICHPOC: NEGATIVE
TRIGL SERPL-MCNC: 80 MG/DL (ref 0–200)
UA UROBILINOGEN AMB POC: NORMAL (ref 0.2–1)
URINALYSIS CLARITY POC: CLEAR
URINALYSIS COLOR POC: NORMAL
URINE BLOOD POC: NEGATIVE
URINE LEUKOCYTES POC: NEGATIVE
URINE NITRITES POC: NEGATIVE
VLDLC SERPL CALC-MCNC: 16 MG/DL
WBC # BLD: 4.8 K/UL (ref 4.1–10.9)
WBC UA POCT, WBCPOCT: 0
YEAST UA POCT, YEASTPOC: NEGATIVE

## 2017-09-21 NOTE — PROGRESS NOTES
Metabolic profile shows a normal glucose of 90. Kidney function is normal.  Sodium is just above normal range but not significant. Total protein is elevated at 8.7. Cholesterol is excellent with total cholesterol 158 and an LDL cholesterol 94. Complete blood count and urinalysis are normal.  Hepatitis C antibody is positive. Need follow-up hepatitis C viral load to see if this is active or not.

## 2017-09-25 ENCOUNTER — LAB ONLY (OUTPATIENT)
Dept: INTERNAL MEDICINE CLINIC | Age: 72
End: 2017-09-25

## 2017-09-25 DIAGNOSIS — R76.8 POSITIVE HEPATITIS C ANTIBODY TEST: ICD-10-CM

## 2017-09-25 DIAGNOSIS — B19.20 HEPATITIS C VIRUS INFECTION, UNSPECIFIED CHRONICITY: Primary | ICD-10-CM

## 2017-09-25 DIAGNOSIS — K74.60 CIRRHOSIS OF LIVER WITHOUT ASCITES, UNSPECIFIED HEPATIC CIRRHOSIS TYPE (HCC): ICD-10-CM

## 2017-09-27 ENCOUNTER — OFFICE VISIT (OUTPATIENT)
Dept: CARDIOLOGY CLINIC | Age: 72
End: 2017-09-27

## 2017-09-27 ENCOUNTER — CLINICAL SUPPORT (OUTPATIENT)
Dept: CARDIOLOGY CLINIC | Age: 72
End: 2017-09-27

## 2017-09-27 VITALS
DIASTOLIC BLOOD PRESSURE: 82 MMHG | HEART RATE: 60 BPM | SYSTOLIC BLOOD PRESSURE: 132 MMHG | WEIGHT: 208.2 LBS | BODY MASS INDEX: 31.66 KG/M2

## 2017-09-27 DIAGNOSIS — R53.83 FATIGUE, UNSPECIFIED TYPE: ICD-10-CM

## 2017-09-27 DIAGNOSIS — I10 ESSENTIAL HYPERTENSION: Primary | ICD-10-CM

## 2017-09-27 DIAGNOSIS — E78.5 HYPERLIPIDEMIA LDL GOAL <100: ICD-10-CM

## 2017-09-27 DIAGNOSIS — I10 ESSENTIAL HYPERTENSION: ICD-10-CM

## 2017-09-27 DIAGNOSIS — E66.9 OBESITY (BMI 30-39.9): ICD-10-CM

## 2017-09-27 DIAGNOSIS — R06.09 DYSPNEA ON EXERTION: Primary | ICD-10-CM

## 2017-09-27 DIAGNOSIS — I34.0 NON-RHEUMATIC MITRAL REGURGITATION: Chronic | ICD-10-CM

## 2017-09-27 DIAGNOSIS — I36.1 NON-RHEUMATIC TRICUSPID VALVE INSUFFICIENCY: ICD-10-CM

## 2017-09-27 LAB
HCV GENOTYPE: NORMAL
HCV RNA SERPL NAA+PROBE-ACNC: NORMAL IU/ML
HCV RNA SERPL NAA+PROBE-LOG IU: NORMAL LOG10 IU/ML
TEST INFORMATION: NORMAL

## 2017-09-27 NOTE — PROGRESS NOTES
[facility] [address] [place] [address]  [phone]    HPI: David Houston is a 81y.o. year-old female who presents for follow up regarding her valve disease. She forgets to take her medication at times, but is trying to work on it. She is feeling pretty good. She is eating salads avoiding white food and fried food, no sugar. She is not exercising and knows that she needs to do that. She did check out [employer]. Not checking it at home. No headache, dyspnea  New PCP did her labs and everything was ok. Her echo 9/16 showed stable TR (moderate - severe) and mild MR, no evidence of MVP, EF 60%   She reports feeling well, dyspnea is much better, no recent LE edema, takes Lasix 20mg daily  Her Hep C is gone now, and LFT were ok but now has HERNANDEZ. Sh eis seeing Dr. Art Dyson now. She denies any chest pain or palpitations  Her blood pressure is borderline low today, denies dizziness or lightheadedness  No syncope  Dr. Sumeet Cartagena  Lipids 7/29/16 - , , LDL 89, HDL 48, LFT ok    Since last visit, office note dated 7/2017 received from hepatology, scanned into [facility] care  Hx of Hep C with cirrhosis s/p harvoni and rivavirin  K 4.2, Cr 0.9, LFTs ok, CBC ok    Assessment/Plan:  1. Dyspnea - with exertion, likely related to valve disease, much better on Lasix, will request recent BMP and magnesium level from PCP office  2. HTN - borderline low on Benicar HCTZ   3. GERD - on PRN Dexilant  4. MVP- previously diagnosed with MVP 20 years ago, recent echo without MVP and only mild MR  5. Obesity - Body mass index is 31.66 kg/(m^2). advised her to begin exercising, talked about walking  6.   Mod-severe TR- stable by TTE, symptoms stable on Lasix at  20mg daily     Echo 9/16 (prelim) - LVEF 60%, mild MR, mod - severe TR  Echo 8/15 - EF 60%, mild MR, mod-severe TR  Nuclear stress 8/15 EF 86%, no ischemia    Soc Hx: 2 glasses of wine every other day, no tobacco, no drug use, lives alone, very busy at [employer], continues to work as a  for a [employer] Hx: father passed from bleeding ulcers at age 67 and had end stage CKD and HTN, mother living at age 80 and has LE edema/possible CHF and has HTN and DM    She  has a past medical history of Acute diverticulitis (8/18/2017); Allergic rhinitis (8/18/2017); Arthritis (8/18/2017); Back pain, thoracic (8/18/2017); Cataract, left (8/18/2017); Cataract, right (8/18/2017); Cirrhosis ([facility] [address] (8/18/2017); Cold sore (8/18/2017); Elevated hemoglobin A1c (8/18/2017); Fatigue (8/18/2017); Gastrointestinal disorder; GERD (gastroesophageal reflux disease) (8/18/2017); Heart murmur (8/18/2017); Hepatitis C (8/18/2017); Herpes zoster infection of thoracic region (8/18/2017); Hyperlipidemia LDL goal <100 (8/18/2017); Hypertension; Menopause; Murmur; Obesity (BMI 30-39.9) (8/18/2017); Osteopenia (8/18/2017); Other ill-defined conditions; Post-menopausal (8/18/2017); Posterior auricular pain of right ear (8/18/2017); Preop examination (8/18/2017); Valvular heart disease; and Vertigo, benign positional (8/18/2017). Cardiovascular ROS: denies chest pain or dyspnea on exertion  Respiratory ROS: no cough, shortness of breath, or wheezing  Neurological ROS: no TIA or stroke symptoms  All other systems negative except as above. PE  Vitals:    09/27/17 1155   BP: 132/82   Pulse: 60   Weight: 208 lb 3.2 oz (94.4 kg)    Body mass index is 31.66 kg/(m^2).   General appearance - alert, well appearing, and in no distress  Mental status - affect appropriate to mood  Eyes - sclera anicteric, moist mucous membranes  Neck - supple, no significant adenopathy  Lymphatics - not assessed  Chest - clear to auscultation, no wheezes, rales or rhonchi  Heart - normal rate, regular rhythm, normal S1, S2, 2/6 LISANDRO   Abdomen - soft, nontender, nondistended, no masses or organomegaly  Back exam - full range of motion, no tenderness  Neurological - cranial nerves II through XII grossly intact, no focal deficit  Musculoskeletal - no muscular tenderness noted, normal strength  Extremities - peripheral pulses normal, no pedal edema  Skin - normal coloration  no rashes    12 lead ECG: NSR    Recent Labs:  No results found for: CHOL  No results found for: BRENNEN  No results found for: BUN  No results found for: K  No results found for: HBA1C, TLL9IVVA  No components found for: HGBI,  IHGB,  HGB,  HGBP,  WBHGB  No results found for: PLT, PLTEXT    Reviewed:  Past Medical History:   Diagnosis Date    Acute diverticulitis 8/18/2017    Allergic rhinitis 8/18/2017    Arthritis 8/18/2017    Back pain, thoracic 8/18/2017    Cataract, left 8/18/2017    Cataract, right 8/18/2017    Cirrhosis ([facility] [address] 8/18/2017    Cold sore 8/18/2017    Elevated hemoglobin A1c 8/18/2017    Fatigue 8/18/2017    Gastrointestinal disorder     acid reflux    GERD (gastroesophageal reflux disease) 8/18/2017    Heart murmur 8/18/2017    Hepatitis C 8/18/2017    Herpes zoster infection of thoracic region 8/18/2017    Hyperlipidemia LDL goal <100 8/18/2017    Hypertension     Menopause     Hysterectomy-42years old    Murmur     Obesity (BMI 30-39. 9) 8/18/2017    Osteopenia 8/18/2017    Other ill-defined conditions     hep c    Post-menopausal 8/18/2017    Posterior auricular pain of right ear 8/18/2017    Preop examination 8/18/2017    Valvular heart disease     mitral valve prolapse    Vertigo, benign positional 8/18/2017     History   Smoking Status    Former Smoker   Smokeless Tobacco    Never Used     History   Alcohol Use No     No Known Allergies    Current Outpatient Prescriptions   Medication Sig    cyanocobalamin (VITAMIN B-12) 1,000 mcg tablet Take 1,000 mcg by mouth daily.  ascorbic acid, vitamin C, (VITAMIN C) 500 mg tablet Take  by mouth.  psyllium (METAMUCIL) powd Take  by mouth.  magnesium 250 mg tab Take  by mouth.     GLUCOSAMINE/CHONDR LOBATO A SOD (GLUCOSAMINE-CHONDROITIN) 750-600 mg tab Take  by mouth.  mometasone (NASONEX) 50 mcg/actuation nasal spray 2 Sprays daily.  naproxen sodium (ALEVE) 220 mg tablet Take 220 mg by mouth two (2) times daily (with meals).  docosanol (ABREVA) 10 % topical cream Apply  to affected area five (5) times daily.  biotin 5,000 mcg TbDi Take  by mouth.  krill oil 500 mg cap Take  by mouth.  MVIT-MINS/FOLIC ACID/SOY ISOFL (ONE-A-DAY MENOPAUSE FORMULA PO) Take  by mouth.  S-Adenosylmethionine (YRN-E, ENTERIC COATED,) 400 mg TbEC Take  by mouth.  cholecalciferol, VITAMIN D3, (VITAMIN D3) 5,000 unit tab tablet Take  by mouth daily.  furosemide (LASIX) 20 mg tablet TAKE ONE TABLET BY MOUTH ONCE DAILY    amoxicillin (AMOXIL) 500 mg capsule Take 4 Caps by mouth as directed. 1 hour prior to dental procedures    olmesartan-hydrochlorothiazide (BENICAR HCT) 20-12.5 mg per tablet Take 1 Tab by mouth daily.  Dexlansoprazole (DEXILANT) 60 mg CpDB Take 60 mg by mouth every other day.  omeprazole (PRILOSEC OTC) 20 mg tablet Take 20 mg by mouth daily.  aspirin delayed-release 81 mg tablet Take  by mouth daily.  PEN NEEDLE, DIABETIC ([address]) by Does Not Apply route. No current facility-administered medications for this visit.         Mela Torres MD  [facility] [[email or web address]] [facility] [place]  [address] [facility] [address]  [facility], [address] [place] [address]

## 2017-09-27 NOTE — MR AVS SNAPSHOT
Visit Information Date & Time Provider Department Dept. Phone Encounter #  
 9/27/2017 11:40 AM Lisa Huynh MD [facility] Ernesto Barrier [phone] [id number] Your Appointments 3/28/2018  9:10 AM  
Follow Up with GREGORIO Woodard MD  
[address] ([facility]) Appt Note: [address] [address] [address] [facility] [address] [address] 9/12/2018  9:00 AM  
ECHO CARDIOGRAMS 2D with ECHO, VELA [facility] ([facility]CCA SCHEDULING) Appt Note: Echo then 1 year fu per Dr. Maryjane Williamson [address]  [address] [facility] [address]  
[address] [facility] [address]  
  
    
 9/12/2018 10:00 AM  
ESTABLISHED PATIENT with Lisa Huynh MD  
[facility] ([facility]) Appt Note: Echo then 1 year fu per Dr. Maryjane Williamson [address] [place] [address]  
[address] [address] Upcoming Health Maintenance Date Due DTaP/Tdap/Td series (1 - Tdap) 7/12/1968 FOBT Q 1 YEAR AGE 50-75 7/12/1997 ZOSTER VACCINE AGE 60> 5/12/2007 GLAUCOMA SCREENING Q2Y 7/12/2012 OSTEOPOROSIS SCREENING (DEXA) 7/12/2012 INFLUENZA AGE 9 TO ADULT 8/1/2017 Pneumococcal 65+ Low/Medium Risk (2 of 2 - PPSV23) 11/1/2017 MEDICARE YEARLY EXAM 9/12/2018 BREAST CANCER SCRN MAMMOGRAM 6/28/2019 Allergies as of 9/27/2017  Review Complete On: 9/27/2017 By: Cristhian Flores No Known Allergies Current Immunizations  Reviewed on 9/11/2017 Name Date Influenza Vaccine 11/1/2013 Pneumococcal Vaccine (Unspecified Type) 11/1/2012 Not reviewed this visit Vitals BP Pulse Weight(growth percentile) BMI OB Status Smoking Status 132/82 (BP 1 Location: Left arm, BP Patient Position: Sitting) 60 208 lb 3.2 oz (94.4 kg) 31.66 kg/m2 Hysterectomy Former Smoker Vitals History BMI and BSA Data Body Mass Index Body Surface Area  
 31.66 kg/m 2 2.13 m 2 Preferred Pharmacy Pharmacy Name Phone [facility] [address], [address] [place] [address] Avenue [phone] Your Updated Medication List  
  
   
This list is accurate as of: 9/27/17 12:29 PM.  Always use your most recent med list.  
  
  
  
  
 ABREVA 10 % topical cream  
Generic drug:  docosanol Apply  to affected area five (5) times daily. ALEVE 220 mg tablet Generic drug:  naproxen sodium Take 220 mg by mouth two (2) times daily (with meals). amoxicillin 500 mg capsule Commonly known as:  AMOXIL Take 4 Caps by mouth as directed. 1 hour prior to dental procedures  
  
 aspirin delayed-release 81 mg tablet Take  by mouth daily. biotin 5,000 mcg Tbdi Take  by mouth. DEXILANT 60 mg Cpdb Generic drug:  Dexlansoprazole Take 60 mg by mouth every other day. furosemide 20 mg tablet Commonly known as:  LASIX TAKE ONE TABLET BY MOUTH ONCE DAILY  
  
 glucosamine-chondroitin 750-600 mg Tab Take  by mouth.  
  
 krill oil 500 mg Cap Take  by mouth.  
  
 magnesium 250 mg Tab Take  by mouth. NASONEX 50 mcg/actuation nasal spray Generic drug:  mometasone 2 Sprays daily. Kathryn Parkinson 32  
by Does Not Apply route. olmesartan-hydroCHLOROthiazide 20-12.5 mg per tablet Commonly known as:  BENICAR HCT Take 1 Tab by mouth daily. ONE-A-DAY MENOPAUSE FORMULA PO Take  by mouth. PriLOSEC OTC 20 mg tablet Generic drug:  omeprazole Take 20 mg by mouth daily. psyllium Powd Commonly known as:  METAMUCIL Take  by mouth. YRN-E (ENTERIC COATED) 400 mg Tbec Generic drug:  S-Adenosylmethionine Take  by mouth. VITAMIN B-12 1,000 mcg tablet Generic drug:  cyanocobalamin Take 1,000 mcg by mouth daily. VITAMIN C 500 mg tablet Generic drug:  ascorbic acid (vitamin C) Take  by mouth. VITAMIN D3 5,000 unit Tab tablet Generic drug:  cholecalciferol (VITAMIN D3) Take  by mouth daily. Introducing [facility]! Dear Bony Kumar: Thank you for requesting a [employer] account. Our records indicate that you already have an active [employer] account. You can access your account anytime at [email or web address] Did you know that you can access your hospital and ER discharge instructions at any time in [employer]? You can also review all of your test results from your hospital stay or ER visit. Additional Information If you have questions, please visit the Frequently Asked Questions section of the [employer] website at [email or web address] Remember, [employer] is NOT to be used for urgent needs. For medical emergencies, dial 911. Now available from your iPhone and Android! Please provide this summary of care documentation to your next provider. Your primary care clinician is listed as GREGORIO Urbina. If you have any questions after today's visit, please call [phone].

## 2017-10-12 NOTE — TELEPHONE ENCOUNTER
Requested Prescriptions     Pending Prescriptions Disp Refills    valACYclovir (VALTREX) 500 mg tablet 30 Tab 0     Sig: Take 1 Tab by mouth daily.        Last Refill: By Dr. Deshaun Cunningham  Next Appointment:3/28/18

## 2017-10-13 RX ORDER — VALACYCLOVIR HYDROCHLORIDE 500 MG/1
500 TABLET, FILM COATED ORAL DAILY
Qty: 30 TAB | Refills: 0 | Status: SHIPPED | OUTPATIENT
Start: 2017-10-13 | End: 2018-06-22 | Stop reason: SDUPTHER

## 2017-10-30 RX ORDER — OLMESARTAN MEDOXOMIL AND HYDROCHLOROTHIAZIDE 20/12.5 20; 12.5 MG/1; MG/1
TABLET ORAL
Qty: 90 TAB | Refills: 3 | Status: SHIPPED | OUTPATIENT
Start: 2017-10-30 | End: 2018-10-31 | Stop reason: SDUPTHER

## 2018-03-28 ENCOUNTER — OFFICE VISIT (OUTPATIENT)
Dept: INTERNAL MEDICINE CLINIC | Age: 73
End: 2018-03-28

## 2018-03-28 VITALS
DIASTOLIC BLOOD PRESSURE: 70 MMHG | RESPIRATION RATE: 16 BRPM | TEMPERATURE: 98.1 F | BODY MASS INDEX: 30.95 KG/M2 | OXYGEN SATURATION: 98 % | WEIGHT: 204.2 LBS | SYSTOLIC BLOOD PRESSURE: 103 MMHG | HEART RATE: 72 BPM | HEIGHT: 68 IN

## 2018-03-28 DIAGNOSIS — R77.9 ELEVATED SERUM PROTEIN LEVEL: ICD-10-CM

## 2018-03-28 DIAGNOSIS — K21.9 GASTROESOPHAGEAL REFLUX DISEASE WITHOUT ESOPHAGITIS: ICD-10-CM

## 2018-03-28 DIAGNOSIS — I10 ESSENTIAL HYPERTENSION: Primary | ICD-10-CM

## 2018-03-28 DIAGNOSIS — E78.5 HYPERLIPIDEMIA LDL GOAL <100: ICD-10-CM

## 2018-03-28 DIAGNOSIS — R73.09 ELEVATED HEMOGLOBIN A1C: ICD-10-CM

## 2018-03-28 LAB
ALBUMIN SERPL-MCNC: 4.6 G/DL (ref 3.9–5.4)
ALKALINE PHOS POC: 47 U/L (ref 38–126)
ALT SERPL-CCNC: 32 U/L (ref 9–52)
AST SERPL-CCNC: 24 U/L (ref 14–36)
BACTERIA UA POCT, BACTPOCT: NORMAL
BILIRUB UR QL STRIP: NEGATIVE
BUN BLD-MCNC: 23 MG/DL (ref 7–17)
CALCIUM BLD-MCNC: 10.2 MG/DL (ref 8.4–10.2)
CASTS UA POCT: 0
CHLORIDE BLD-SCNC: 104 MMOL/L (ref 98–107)
CHOLEST SERPL-MCNC: 167 MG/DL (ref 0–200)
CLUE CELLS, CLUEPOCT: NEGATIVE
CO2 POC: 30 MMOL/L (ref 22–32)
CREAT BLD-MCNC: 0.7 MG/DL (ref 0.7–1.2)
CRYSTALS UA POCT, CRYSPOCT: NEGATIVE
EGFR (POC): 87.8
EPITHELIAL CELLS POCT: NORMAL
GLUCOSE POC: 97 MG/DL (ref 65–105)
GLUCOSE UR-MCNC: NEGATIVE MG/DL
HBA1C MFR BLD HPLC: 5.7 % (ref 4.5–5.7)
HDLC SERPL-MCNC: 45 MG/DL (ref 35–130)
KETONES P FAST UR STRIP-MCNC: NEGATIVE MG/DL
LDL CHOLESTEROL POC: 104.8 MG/DL (ref 0–130)
MUCUS UA POCT, MUCPOCT: NORMAL
PH UR STRIP: 6.5 [PH] (ref 5–7)
POTASSIUM SERPL-SCNC: 4.6 MMOL/L (ref 3.6–5)
PROT SERPL-MCNC: 9.1 G/DL (ref 6.3–8.2)
PROT UR QL STRIP: NEGATIVE
RBC UA POCT, RBCPOCT: NORMAL
SODIUM SERPL-SCNC: 146 MMOL/L (ref 137–145)
SP GR UR STRIP: 1.02 (ref 1.01–1.02)
TCHOL/HDL RATIO (POC): 3.7 (ref 0–4)
TOTAL BILIRUBIN POC: 0.8 MG/DL (ref 0.2–1.3)
TRICH UA POCT, TRICHPOC: NEGATIVE
TRIGL SERPL-MCNC: 86 MG/DL (ref 0–200)
UA UROBILINOGEN AMB POC: NORMAL (ref 0.2–1)
URINALYSIS CLARITY POC: CLEAR
URINALYSIS COLOR POC: NORMAL
URINE BLOOD POC: NEGATIVE
URINE CULT COMMENT, POCT: NORMAL
URINE LEUKOCYTES POC: NEGATIVE
URINE NITRITES POC: NEGATIVE
VLDLC SERPL CALC-MCNC: 17.2 MG/DL
WBC UA POCT, WBCPOCT: 0
YEAST UA POCT, YEASTPOC: NEGATIVE

## 2018-03-28 NOTE — MR AVS SNAPSHOT
[address] [address] [phone] Patient: Tiffany Dominguez MRN: [id number] :1945 Visit Information Date & Time Provider Department Dept. Phone Encounter #  
 3/28/2018  9:10 AM GREGORIO Bertrand MD [facility] [phone] [id number] Follow-up Instructions Return in about 6 months (around 2018) for follow up, [address] Your Appointments 2018  9:00 AM  
ECHO CARDIOGRAMS 2D with ECHO, VELA [facility] (REBECCA SCHEDULING) Appt Note: Echo then 1 year fu per Dr. Jj Ramos [address] [facility] [address]  
[address] [facility] [address]  
  
    
 2018 10:00 AM  
ESTABLISHED PATIENT with Christian Cheema MD  
[facility] ([address]) Appt Note: Echo then 1 year fu per Dr. Jj Ramos [address] [place] [address]  
[address] [address] Upcoming Health Maintenance Date Due DTaP/Tdap/Td series (1 - Tdap) 1968 FOBT Q 1 YEAR AGE 50-75 1997 ZOSTER VACCINE AGE 60> 2007 GLAUCOMA SCREENING Q2Y 2012 Bone Densitometry (Dexa) Screening 2012 Influenza Age 5 to Adult 2017 Pneumococcal 65+ Low/Medium Risk (2 of 2 - PPSV23) 2017 BREAST CANCER SCRN MAMMOGRAM 2019 Allergies as of 3/28/2018  Review Complete On: 3/28/2018 By: Alexi Arias LPN No Known Allergies Current Immunizations  Reviewed on 2017 Name Date Influenza Vaccine 2013 Pneumococcal Vaccine (Unspecified Type) 2012 Not reviewed this visit You Were Diagnosed With   
  
 Codes Comments Essential hypertension    -  Primary ICD-10-CM: I10 
ICD-9-CM: 401.9 Elevated hemoglobin A1c     ICD-10-CM: R73.09 
ICD-9-CM: 790.29 Hyperlipidemia LDL goal <100     ICD-10-CM: E78.5 ICD-9-CM: 272.4 Gastroesophageal reflux disease without esophagitis     ICD-10-CM: K21.9 ICD-9-CM: 530.81 Vitals BP Pulse Temp Resp Height(growth percentile) Weight(growth percentile) 103/70 (BP 1 Location: Left arm, BP Patient Position: Sitting) 72 98.1 °F (36.7 °C) (Oral) 16 5' 8\" (1.727 m) 204 lb 3.2 oz (92.6 kg) SpO2 BMI OB Status Smoking Status 98% 31.05 kg/m2 Hysterectomy Former Smoker BMI and BSA Data Body Mass Index Body Surface Area 31.05 kg/m 2 2.11 m 2 Preferred Pharmacy Pharmacy Name Phone [facility] [address], [address] [place] [address] Avenue [phone] Your Updated Medication List  
  
   
This list is accurate as of 3/28/18 10:15 AM.  Always use your most recent med list.  
  
  
  
  
 ABREVA 10 % topical cream  
Generic drug:  docosanol Apply  to affected area five (5) times daily. ALEVE 220 mg tablet Generic drug:  naproxen sodium Take 220 mg by mouth two (2) times daily (with meals). aspirin delayed-release 81 mg tablet Take  by mouth daily. biotin 5,000 mcg Tbdi Take  by mouth. DEXILANT 60 mg Cpdb Generic drug:  Dexlansoprazole Take 60 mg by mouth every other day. furosemide 20 mg tablet Commonly known as:  LASIX TAKE ONE TABLET BY MOUTH ONCE DAILY  
  
 glucosamine-chondroitin 750-600 mg Tab Take  by mouth.  
  
 krill oil 500 mg Cap Take  by mouth.  
  
 magnesium 250 mg Tab Take  by mouth. NASONEX 50 mcg/actuation nasal spray Generic drug:  mometasone 2 Sprays daily. Surinder Heritage 32  
by Does Not Apply route. olmesartan-hydroCHLOROthiazide 20-12.5 mg per tablet Commonly known as:  BENICAR HCT  
TAKE ONE TABLET BY MOUTH ONCE DAILY  
  
 ONE-A-DAY MENOPAUSE FORMULA PO Take  by mouth. PriLOSEC OTC 20 mg tablet Generic drug:  omeprazole Take 20 mg by mouth daily. psyllium Powd Commonly known as:  METAMUCIL Take  by mouth. YRN-E (ENTERIC COATED) 400 mg Tbec Generic drug:  S-Adenosylmethionine Take  by mouth. valACYclovir 500 mg tablet Commonly known as:  VALTREX Take 1 Tab by mouth daily. VITAMIN B-12 1,000 mcg tablet Generic drug:  cyanocobalamin Take 1,000 mcg by mouth daily. VITAMIN C 500 mg tablet Generic drug:  ascorbic acid (vitamin C) Take  by mouth. VITAMIN D3 5,000 unit Tab tablet Generic drug:  cholecalciferol (VITAMIN D3) Take  by mouth daily. We Performed the Following AMB POC COMPREHENSIVE METABOLIC PANEL [60320 CPT(R)] AMB POC HEMOGLOBIN A1C [46538 CPT(R)] AMB POC LIPID PROFILE [89036 CPT(R)] AMB POC URINALYSIS DIP STICK AUTO W/ MICRO  [20504 CPT(R)] Follow-up Instructions Return in about 6 months (around 9/28/2018) for follow up, [facility]! Dear Dawit Friends: Thank you for requesting a [employer] account. Our records indicate that you already have an active [employer] account. You can access your account anytime at [email or web address]. [email or web address] Did you know that you can access your hospital and ER discharge instructions at any time in [employer]? You can also review all of your test results from your hospital stay or ER visit. Additional Information If you have questions, please visit the Frequently Asked Questions section of the [employer] website at [email or web address] Remember, [employer] is NOT to be used for urgent needs. For medical emergencies, dial 911. Now available from your iPhone and Android! Please provide this summary of care documentation to your next provider. Your primary care clinician is listed as GREGORIO Lerner. If you have any questions after today's visit, please call [phone].

## 2018-03-28 NOTE — PROGRESS NOTES
Layton Morelos is a 81 y.o. female  Chief Complaint   Patient presents with    Medication Evaluation     (room 7)  follow up     Visit Vitals    /70 (BP 1 Location: Left arm, BP Patient Position: Sitting)    Pulse 72    Temp 98.1 °F (36.7 °C) (Oral)    Resp 16    Ht 5' 8\" (1.727 m)    Wt 204 lb 3.2 oz (92.6 kg)    SpO2 98%    BMI 31.05 kg/m2     Fasting this morning    1. Have you been to the ER, urgent care clinic since your last visit? Hospitalized since your last visit?no    2. Have you seen or consulted any other health care providers outside of the [address] [place] [address] since your last visit? Include any pap smears or colon screening.  no

## 2018-04-17 ENCOUNTER — APPOINTMENT (OUTPATIENT)
Dept: INTERNAL MEDICINE CLINIC | Age: 73
End: 2018-04-17

## 2018-04-17 DIAGNOSIS — R77.9 ELEVATED SERUM PROTEIN LEVEL: ICD-10-CM

## 2018-04-19 LAB
ALBUMIN SERPL ELPH-MCNC: 3.9 G/DL (ref 2.9–4.4)
ALBUMIN/GLOB SERPL: 1 {RATIO} (ref 0.7–1.7)
ALPHA1 GLOB SERPL ELPH-MCNC: 0.2 G/DL (ref 0–0.4)
ALPHA2 GLOB SERPL ELPH-MCNC: 0.8 G/DL (ref 0.4–1)
B-GLOBULIN SERPL ELPH-MCNC: 1.2 G/DL (ref 0.7–1.3)
GAMMA GLOB SERPL ELPH-MCNC: 1.9 G/DL (ref 0.4–1.8)
GLOBULIN SER CALC-MCNC: 4.1 G/DL (ref 2.2–3.9)
M PROTEIN SERPL ELPH-MCNC: ABNORMAL G/DL
PLEASE NOTE, 011150: ABNORMAL
PROT SERPL-MCNC: 8 G/DL (ref 6–8.5)

## 2018-04-24 LAB
ALBUMIN 24H MFR UR ELPH: 29.1 %
ALPHA1 GLOB 24H MFR UR ELPH: 4 %
ALPHA2 GLOB 24H MFR UR ELPH: 19.2 %
B-GLOBULIN MFR UR ELPH: 25 %
GAMMA GLOB 24H MFR UR ELPH: 22.7 %
INTERPRETATION UR IFE-IMP: NORMAL
M PROTEIN 24H MFR UR ELPH: NORMAL %
NOTE, 149533: NORMAL
PROT 24H UR-MRATE: 116 MG/24 HR (ref 30–150)
PROT UR-MCNC: 7.7 MG/DL

## 2018-06-13 ENCOUNTER — OFFICE VISIT (OUTPATIENT)
Dept: INTERNAL MEDICINE CLINIC | Age: 73
End: 2018-06-13

## 2018-06-13 VITALS
RESPIRATION RATE: 16 BRPM | HEART RATE: 69 BPM | TEMPERATURE: 98.7 F | DIASTOLIC BLOOD PRESSURE: 60 MMHG | OXYGEN SATURATION: 97 % | WEIGHT: 209 LBS | BODY MASS INDEX: 31.67 KG/M2 | SYSTOLIC BLOOD PRESSURE: 102 MMHG | HEIGHT: 68 IN

## 2018-06-13 DIAGNOSIS — R51.9 ACUTE NONINTRACTABLE HEADACHE, UNSPECIFIED HEADACHE TYPE: Primary | ICD-10-CM

## 2018-06-13 LAB — ERYTHROCYTE [SEDIMENTATION RATE] IN BLOOD: 34 MM/HR (ref 0–20)

## 2018-06-13 NOTE — MR AVS SNAPSHOT
[address] [facility] [address] [address] [phone] Patient: Carlo Ramírez MRN: [id number] :1945 Visit Information Date & Time Provider Department Dept. Phone Encounter #  
 2018  1:10 PM MD [address] [phone] [id number] Your Appointments 2018  9:00 AM  
ECHO CARDIOGRAMS 2D with ECHO, VELA [facility] (REBECCA SCHEDULING) Appt Note: Echo then 1 year fu per Dr. Celestine Prather [address] [place]  [address] [facility] [address]  
[address] [facility] [address]  
  
    
 2018 10:00 AM  
ESTABLISHED PATIENT with Annie Delgado MD  
[facility] ([address]) Appt Note: Echo then 1 year fu per Dr. Celestine Prather [address]  [address] [place]  
[address] [facility] [address]  
  
    
 10/11/2018  9:30 AM  
Follow Up with GREGORIO Caldera MD  
[address] ([address]) Appt Note: [address] [address] [address] [facility] [address] Upcoming Health Maintenance Date Due DTaP/Tdap/Td series (1 - Tdap) 1968 FOBT Q 1 YEAR AGE 50-75 1997 ZOSTER VACCINE AGE 60> 2007 GLAUCOMA SCREENING Q2Y 2012 Bone Densitometry (Dexa) Screening 2012 Pneumococcal 65+ Low/Medium Risk (2 of 2 - PPSV23) 2017 Influenza Age 5 to Adult 2018 BREAST CANCER SCRN MAMMOGRAM 2019 Allergies as of 2018  Review Complete On: 2018 By: Mervin Vines LPN No Known Allergies Current Immunizations  Reviewed on 2017 Name Date Influenza Vaccine 2013 Pneumococcal Vaccine (Unspecified Type) 2012 Not reviewed this visit You Were Diagnosed With   
  
 Codes Comments Acute nonintractable headache, unspecified headache type    -  Primary ICD-10-CM: R51 ICD-9-CM: 919. 0 Vitals BP Pulse Temp Resp Height(growth percentile) Weight(growth percentile) 102/60 (BP 1 Location: Left arm, BP Patient Position: Sitting) 69 98.7 °F (37.1 °C) (Oral) 16 5' 8\" (1.727 m) 209 lb (94.8 kg) SpO2 BMI OB Status Smoking Status 97% 31.78 kg/m2 Hysterectomy Former Smoker Vitals History BMI and BSA Data Body Mass Index Body Surface Area 31.78 kg/m 2 2.13 m 2 Preferred Pharmacy Pharmacy Name Phone [facility] [address], [address] [phone] Your Updated Medication List  
  
   
This list is accurate as of 6/13/18  2:32 PM.  Always use your most recent med list.  
  
  
  
  
 ABREVA 10 % topical cream  
Generic drug:  docosanol Apply  to affected area five (5) times daily. ALEVE 220 mg tablet Generic drug:  naproxen sodium Take 220 mg by mouth two (2) times daily (with meals). aspirin delayed-release 81 mg tablet Take  by mouth daily. biotin 5,000 mcg Tbdi Take  by mouth. DEXILANT 60 mg Cpdb Generic drug:  Dexlansoprazole Take 60 mg by mouth every other day. furosemide 20 mg tablet Commonly known as:  LASIX TAKE ONE TABLET BY MOUTH ONCE DAILY  
  
 glucosamine-chondroitin 750-600 mg Tab Take  by mouth.  
  
 krill oil 500 mg Cap Take  by mouth.  
  
 magnesium 250 mg Tab Take  by mouth. MEMORY COMPLEX PO Take  by mouth daily. NASONEX 50 mcg/actuation nasal spray Generic drug:  mometasone 2 Sprays daily. Fabi Bars 32  
by Does Not Apply route. olmesartan-hydroCHLOROthiazide 20-12.5 mg per tablet Commonly known as:  BENICAR HCT  
TAKE ONE TABLET BY MOUTH ONCE DAILY  
  
 ONE-A-DAY MENOPAUSE FORMULA PO Take  by mouth. PriLOSEC OTC 20 mg tablet Generic drug:  omeprazole Take 20 mg by mouth daily. psyllium Powd Commonly known as:  METAMUCIL Take  by mouth. YRN-E (ENTERIC COATED) 400 mg Tbec Generic drug:  S-Adenosylmethionine Take  by mouth. TURMERIC ROOT EXTRACT PO Take 200 mg by mouth. valACYclovir 500 mg tablet Commonly known as:  VALTREX Take 1 Tab by mouth daily. VITAMIN B-12 1,000 mcg tablet Generic drug:  cyanocobalamin Take 1,000 mcg by mouth daily. VITAMIN C 500 mg tablet Generic drug:  ascorbic acid (vitamin C) Take  by mouth. VITAMIN D3 5,000 unit Tab tablet Generic drug:  cholecalciferol (VITAMIN D3) Take  by mouth daily. We Performed the Following AMB POC SEDIMENTATION RATE, ERYTHROCYTE; NON AUTO [59374 CPT(R)] TAMIKA W/REFLEX [62761 CPT(R)] To-Do List   
 06/13/2018 Imaging:  XR SPINE CERV PA LAT ODONT 3 V MAX Introducing [facility]! Dear Hilda Sims: Thank you for requesting a Kik account. Our records indicate that you already have an active Kik account. You can access your account anytime at [email or web address] Did you know that you can access your hospital and ER discharge instructions at any time in Kik? You can also review all of your test results from your hospital stay or ER visit. Additional Information If you have questions, please visit the Frequently Asked Questions section of the Kik website at [email or web address] Remember, Kik is NOT to be used for urgent needs. For medical emergencies, dial 911. Now available from your iPhone and Android! Please provide this summary of care documentation to your next provider. Your primary care clinician is listed as GREGORIO Coffman. If you have any questions after today's visit, please call [phone].

## 2018-06-13 NOTE — PROGRESS NOTES
Chief Complaint   Patient presents with    Head Pain     room 2    Numbness     1. Have you been to the ER, urgent care clinic since your last visit? NO  Hospitalized since your last visit? NO    2. Have you seen or consulted any other health care providers outside of the [address] [place] [address] since your last visit? Include any pap smears or colon screening. YES, [facility], LEFT KNEE INJURY (6/2018)        PT STATES SHE HAS HAD A SHARP/SHOOTING PAIN IN HER HEAD. SHE STATES THE PAIN IS SUDDEN AND SHE \"FREEZEZS. \"  SHE ALSO STATES SHE HAS A TINGLING SENSATION THROUGH OUT HER UPPER AND LOWER EXTREMITIES.

## 2018-06-13 NOTE — PROGRESS NOTES
This note will not be viewable in [address] Fiordaliza Michel is a 81 y.o. female and presents with Head Pain (room 2) and Numbness  . Subjective:  Mrs. Kamar Burger presents today with complaint of headache and numbness. She complains of some neck discomfort that is particularly worse in the left side of her neck and trapezius. She also has intermittent pain on the side of her head which comes and goes intermittently. This will last for up to several minutes and then will subside. He does not appear to be exacerbated by activity or movement. She has not taken medication for this. She states that may occur once or twice a week and sometimes not at all. Nasal congestion, cough, visual changes, focal neurological deficits. The symptoms are not exacerbated by chewing. Past Medical History:   Diagnosis Date    Acute diverticulitis 8/18/2017    Allergic rhinitis 8/18/2017    Arthritis 8/18/2017    Back pain, thoracic 8/18/2017    Cataract, left 8/18/2017    Cataract, right 8/18/2017    Cirrhosis ([address] 8/18/2017    Cold sore 8/18/2017    Elevated hemoglobin A1c 8/18/2017    Fatigue 8/18/2017    Gastrointestinal disorder     acid reflux    GERD (gastroesophageal reflux disease) 8/18/2017    Heart murmur 8/18/2017    Hepatitis C 8/18/2017    Herpes zoster infection of thoracic region 8/18/2017    Hyperlipidemia LDL goal <100 8/18/2017    Hypertension     Menopause     Hysterectomy-42years old    Murmur     Obesity (BMI 30-39. 9) 8/18/2017    Osteopenia 8/18/2017    Other ill-defined conditions(799.89)     hep c    Post-menopausal 8/18/2017    Posterior auricular pain of right ear 8/18/2017    Preop examination 8/18/2017    Valvular heart disease     mitral valve prolapse    Vertigo, benign positional 8/18/2017     Past Surgical History:   Procedure Laterality Date    HX HYSTERECTOMY      42years old    HX ORTHOPAEDIC      knee, foot     No Known Allergies  Current Outpatient Prescriptions Medication Sig Dispense Refill    TURMERIC ROOT EXTRACT PO Take 200 mg by mouth.  vit C/vit E ac/selenium/ginkgo (MEMORY COMPLEX PO) Take  by mouth daily.  olmesartan-hydroCHLOROthiazide (BENICAR HCT) 20-12.5 mg per tablet TAKE ONE TABLET BY MOUTH ONCE DAILY 90 Tab 3    valACYclovir (VALTREX) 500 mg tablet Take 1 Tab by mouth daily. 30 Tab 0    cyanocobalamin (VITAMIN B-12) 1,000 mcg tablet Take 1,000 mcg by mouth daily.  ascorbic acid, vitamin C, (VITAMIN C) 500 mg tablet Take  by mouth.  psyllium (METAMUCIL) powd Take  by mouth.  magnesium 250 mg tab Take  by mouth.  GLUCOSAMINE/CHONDR LOBATO A SOD (GLUCOSAMINE-CHONDROITIN) 750-600 mg tab Take  by mouth.  mometasone (NASONEX) 50 mcg/actuation nasal spray 2 Sprays daily.  naproxen sodium (ALEVE) 220 mg tablet Take 220 mg by mouth two (2) times daily (with meals).  docosanol (ABREVA) 10 % topical cream Apply  to affected area five (5) times daily.  biotin 5,000 mcg TbDi Take  by mouth.  krill oil 500 mg cap Take  by mouth.  MVIT-MINS/FOLIC ACID/SOY ISOFL (ONE-A-DAY MENOPAUSE FORMULA PO) Take  by mouth.  S-Adenosylmethionine (YRN-E, ENTERIC COATED,) 400 mg TbEC Take  by mouth.  cholecalciferol, VITAMIN D3, (VITAMIN D3) 5,000 unit tab tablet Take  by mouth daily.  furosemide (LASIX) 20 mg tablet TAKE ONE TABLET BY MOUTH ONCE DAILY 90 Tab 3    Dexlansoprazole (DEXILANT) 60 mg CpDB Take 60 mg by mouth every other day.  omeprazole (PRILOSEC OTC) 20 mg tablet Take 20 mg by mouth daily.  aspirin delayed-release 81 mg tablet Take  by mouth daily.  PEN NEEDLE, DIABETIC ([address]) by Does Not Apply route.        Social History     Social History    Marital status: SINGLE     Spouse name: N/A    Number of children: N/A    Years of education: N/A     Social History Main Topics    Smoking status: Former Smoker    Smokeless tobacco: Never Used    Alcohol use No    Drug use: No    Sexual activity: Not Asked     Other Topics Concern    None     Social History Narrative     Family History   Problem Relation Age of Onset    Hypertension Mother     Diabetes Mother     Heart Disease Father        Review of Systems  Constitutional:  negative for fevers, chills, anorexia and weight loss  Eyes:    negative for visual disturbance and irritation  ENT:    negative for tinnitus,sore throat,nasal congestion,ear pains. hoarseness  Respiratory:     negative for cough, hemoptysis, dyspnea,wheezing  CV:    negative for chest pain, palpitations, lower extremity edema  GI:    negative for nausea, vomiting, diarrhea, abdominal pain,melena  Endo:               negative for polyuria,polydipsia,polyphagia,heat intolerance  Genitourinary : negative for frequency, dysuria and hematuria  Integumentary: negative for rash and pruritus  Hematologic:   negative for easy bruising and gum/nose bleeding  Musculoskel:  negative for myalgias, arthralgias, back pain, muscle weakness, joint pain  Neurological:   negative for  dizziness, vertigo, memory problems and gait   Behavl/Psych:  negative for feelings of anxiety, depression, mood changes  ROS otherwise negative      Objective:  Visit Vitals    /60 (BP 1 Location: Left arm, BP Patient Position: Sitting)    Pulse 69    Temp 98.7 °F (37.1 °C) (Oral)    Resp 16    Ht 5' 8\" (1.727 m)    Wt 209 lb (94.8 kg)    SpO2 97%    BMI 31.78 kg/m2     Physical Exam:   General appearance - alert, well appearing, and in no distress  Mental status - alert, oriented to person, place, and time  EYE-EMMY, EOMI, fundi normal, corneas normal, no foreign bodies  ENT-ENT exam normal, no neck nodes or sinus tenderness  Nose - normal and patent, no erythema, discharge or polyps  Mouth - mucous membranes moist, pharynx normal without lesions  Neck - supple, no significant adenopathy   Chest - clear to auscultation, no wheezes, rales or rhonchi, symmetric air entry   Heart - normal rate, regular rhythm, normal S1, S2, no murmurs, rubs, clicks or gallops   Abdomen - soft, nontender, nondistended, no masses or organomegaly  Lymph- no adenopathy palpable  Ext-peripheral pulses normal, no pedal edema, no clubbing or cyanosis  Skin-Warm and dry. no hyperpigmentation, vitiligo, or suspicious lesions  Neuro -alert, oriented, normal speech, no focal findings or movement disorder noted      Assessment/Plan:  Diagnoses and all orders for this visit:    1. Acute nonintractable headache, unspecified headache type  -     XR SPINE CERV PA LAT ODONT 3 V MAX; Future  -     TAMIKA W/REFLEX  -     AMB POC SEDIMENTATION RATE, ERYTHROCYTE; NON AUTO          ICD-10-CM ICD-9-CM    1. Acute nonintractable headache, unspecified headache type R51 784.0 XR SPINE CERV PA LAT ODONT 3 V MAX      TAMIKA W/REFLEX      AMB POC SEDIMENTATION RATE, ERYTHROCYTE; NON AUTO   And:    X-ray films of the cervical spine demonstrate some degenerative changes which were reviewed with the patient today. She will have follow-up labs to rule out vasculitis including an TAMIKA and sed rate. Her symptoms could be related to muscular pain, trigeminal neuralgia, temporal arteritis, TMJ syndrome, or referred pain from cervical degenerative arthritis. May consider physical therapy if symptoms persist or progress. Medications based on lab results. Follow-up Disposition: Not on File    I have reviewed with the patient details of the assessment and plan and all questions were answered. Relevent patient education was performed. Verbal and/or written instructions (see AVS) provided. The most recent lab findings were reviewed with the patient. Plan was discussed with patient who verbally expressed understanding. An After Visit Summary was printed and given to the patient.     Jc Pepper MD

## 2018-06-14 LAB — ANA SER QL: NEGATIVE

## 2018-06-22 RX ORDER — VALACYCLOVIR HYDROCHLORIDE 500 MG/1
TABLET, FILM COATED ORAL
Qty: 30 TAB | Refills: 6 | Status: SHIPPED | OUTPATIENT
Start: 2018-06-22 | End: 2020-06-19

## 2018-09-13 ENCOUNTER — OFFICE VISIT (OUTPATIENT)
Dept: CARDIOLOGY CLINIC | Age: 73
End: 2018-09-13

## 2018-09-13 ENCOUNTER — CLINICAL SUPPORT (OUTPATIENT)
Dept: CARDIOLOGY CLINIC | Age: 73
End: 2018-09-13

## 2018-09-13 VITALS
RESPIRATION RATE: 16 BRPM | SYSTOLIC BLOOD PRESSURE: 110 MMHG | WEIGHT: 215 LBS | DIASTOLIC BLOOD PRESSURE: 64 MMHG | BODY MASS INDEX: 32.58 KG/M2 | HEIGHT: 68 IN | HEART RATE: 62 BPM

## 2018-09-13 DIAGNOSIS — E78.5 HYPERLIPIDEMIA LDL GOAL <100: ICD-10-CM

## 2018-09-13 DIAGNOSIS — I34.0 NON-RHEUMATIC MITRAL REGURGITATION: Chronic | ICD-10-CM

## 2018-09-13 DIAGNOSIS — R01.1 HEART MURMUR: ICD-10-CM

## 2018-09-13 DIAGNOSIS — I36.1 NON-RHEUMATIC TRICUSPID VALVE INSUFFICIENCY: Primary | ICD-10-CM

## 2018-09-13 DIAGNOSIS — R06.09 DOE (DYSPNEA ON EXERTION): Primary | ICD-10-CM

## 2018-09-13 DIAGNOSIS — I10 ESSENTIAL HYPERTENSION: ICD-10-CM

## 2018-09-13 DIAGNOSIS — R06.09 DYSPNEA ON EXERTION: ICD-10-CM

## 2018-09-13 DIAGNOSIS — I36.1 NON-RHEUMATIC TRICUSPID VALVE INSUFFICIENCY: ICD-10-CM

## 2018-09-13 NOTE — PROGRESS NOTES
[facility]  [phone]    HPI: Obie Ruvalcaba is a 72y.o. year-old female who presents for follow up regarding her valve disease. Preliminary echo today shows moderate TR and RVSP 32mmHg  We discussed this and I told her Dr. Casi Diez would read her echo on her return next week and we would call her if she didn't agree with the above interpretation  She is feeling well, denies any chest pain or palpitations  Has mild dyspnea with moderate exertion but it is stable, no change  Denies any PND or orthopnea  No dizziness or syncope  No LE edema   She admits that she is not exercising, advised her to begin regular exercise regimen    Assessment/Plan:  1. Dyspnea - with exertion, likely related to valve disease, much better on Lasix  2. HTN - well controlled on Benicar HCTZ   -Labs 7/17 - K 4.2, Cr 0.9, LFTs ok, CBC ok  3. GERD - on PRN Dexilant  4. MVP- previously diagnosed with MVP 20 years ago, echo 9/17 without MVP and only mild MR  5. Obesity - Body mass index is 32.69 kg/(m^2). advised her to begin exercising  6. Moderate TR by prelim TTE today - symptoms stable on Lasix at  20mg daily, she will follow up with Dr. Casi Diez in 1 year with an TTE to reassess    7. Hepatitis C s/p treatment with harvoni and rivavirin - now has HERNANDEZ, followed by hepatology  8.  in 3/18, on [address]   9.   Vitamin D deficiency - on 5000 units daily     Echo 9/18 (prelim) - LVEF 55-60%, mod TR, RVSP 32mmhg  Echo 9/17 - LVEF 60 % to 65 %, no WMA, mod-severe TR, RVSP 38mmHg/PASP 25mmHg   Echo 9/16 - LVEF 60 % to 65 %, no WMA, mod-severe TR, mild to mod PAHTN (PASP 40mmHg  Echo 8/15 - EF 60%, mild MR, mod-severe TR  Nuclear stress 8/15 EF 86%, no ischemia    Soc Hx: 2 glasses of wine every other day, no tobacco, no drug use, lives alone, very busy at [employer], continues to work as a  for a nonprofit  Fam Hx: father passed from bleeding ulcers at age 67 and had end stage CKD and HTN, mother DM and ESRD and living at age 80     She  has a past medical history of Acute diverticulitis (8/18/2017); Allergic rhinitis (8/18/2017); Arthritis (8/18/2017); Back pain, thoracic (8/18/2017); Cataract, left (8/18/2017); Cataract, right (8/18/2017); Cirrhosis ([address] (8/18/2017); Cold sore (8/18/2017); Elevated hemoglobin A1c (8/18/2017); Fatigue (8/18/2017); Gastrointestinal disorder; GERD (gastroesophageal reflux disease) (8/18/2017); Heart murmur (8/18/2017); Hepatitis C (8/18/2017); Herpes zoster infection of thoracic region (8/18/2017); Hyperlipidemia LDL goal <100 (8/18/2017); Hypertension; Menopause; Murmur; Obesity (BMI 30-39.9) (8/18/2017); Osteopenia (8/18/2017); Other ill-defined conditions(799.89); Post-menopausal (8/18/2017); Posterior auricular pain of right ear (8/18/2017); Preop examination (8/18/2017); Valvular heart disease; and Vertigo, benign positional (8/18/2017). Cardiovascular ROS: denies chest pain or increase in dyspnea on exertion  Respiratory ROS: no cough or wheezing  Neurological ROS: no TIA or stroke symptoms  All other systems negative except as above. PE  Vitals:    09/13/18 0931   BP: 110/64   Pulse: 62   Resp: 16   Weight: 215 lb (97.5 kg)   Height: 5' 8\" (1.727 m)    Body mass index is 32.69 kg/(m^2).   General appearance - alert, well appearing, and in no distress  Mental status - affect appropriate to mood  Eyes - sclera anicteric, moist mucous membranes  Neck - supple, no carotid bruits   Lymphatics - not assessed  Chest - clear to auscultation, no wheezes, rales or rhonchi  Heart - normal rate, regular rhythm, normal S1, S2, 2/6 LISANDRO   Abdomen - soft, nontender, nondistended  Back exam - full range of motion, no tenderness  Neurological - cranial nerves II through XII grossly intact, no focal deficit  Musculoskeletal - no muscular tenderness noted, normal strength  Extremities - peripheral pulses normal, no pedal edema  Skin - normal coloration  no rashes    12 lead ECG: NSR with non-specific ST-T wave changes    Recent Labs:  No results found for: CHOL  No results found for: BRENNEN  No results found for: BUN  No results found for: K  No results found for: HBA1C, CUA7YVNQ  No components found for: HGBI,  IHGB,  HGB,  HGBP,  WBHGB  No results found for: PLT, PLTEXT    Reviewed:  Past Medical History:   Diagnosis Date    Acute diverticulitis 8/18/2017    Allergic rhinitis 8/18/2017    Arthritis 8/18/2017    Back pain, thoracic 8/18/2017    Cataract, left 8/18/2017    Cataract, right 8/18/2017    Cirrhosis ([address] 8/18/2017    Cold sore 8/18/2017    Elevated hemoglobin A1c 8/18/2017    Fatigue 8/18/2017    Gastrointestinal disorder     acid reflux    GERD (gastroesophageal reflux disease) 8/18/2017    Heart murmur 8/18/2017    Hepatitis C 8/18/2017    Herpes zoster infection of thoracic region 8/18/2017    Hyperlipidemia LDL goal <100 8/18/2017    Hypertension     Menopause     Hysterectomy-42years old    Murmur     Obesity (BMI 30-39. 9) 8/18/2017    Osteopenia 8/18/2017    Other ill-defined conditions(799.89)     hep c    Post-menopausal 8/18/2017    Posterior auricular pain of right ear 8/18/2017    Preop examination 8/18/2017    Valvular heart disease     mitral valve prolapse    Vertigo, benign positional 8/18/2017     History   Smoking Status    Former Smoker   Smokeless Tobacco    Never Used     History   Alcohol Use No     No Known Allergies    Current Outpatient Prescriptions   Medication Sig    valACYclovir (VALTREX) 500 mg tablet TAKE ONE TABLET BY MOUTH ONCE DAILY    TURMERIC ROOT EXTRACT PO Take 200 mg by mouth.  vit C/vit E ac/selenium/ginkgo (MEMORY COMPLEX PO) Take  by mouth daily.  olmesartan-hydroCHLOROthiazide (BENICAR HCT) 20-12.5 mg per tablet TAKE ONE TABLET BY MOUTH ONCE DAILY    cyanocobalamin (VITAMIN B-12) 1,000 mcg tablet Take 1,000 mcg by mouth daily.     ascorbic acid, vitamin C, (VITAMIN C) 500 mg tablet Take  by mouth.  psyllium (METAMUCIL) powd Take  by mouth.  magnesium 250 mg tab Take  by mouth.  GLUCOSAMINE/CHONDR LOBATO A SOD (GLUCOSAMINE-CHONDROITIN) 750-600 mg tab Take  by mouth.  mometasone (NASONEX) 50 mcg/actuation nasal spray 2 Sprays daily.  naproxen sodium (ALEVE) 220 mg tablet Take 220 mg by mouth two (2) times daily (with meals).  docosanol (ABREVA) 10 % topical cream Apply  to affected area five (5) times daily.  PEN NEEDLE, DIABETIC ([address]) by Does Not Apply route.  biotin 5,000 mcg TbDi Take  by mouth.  krill oil 500 mg cap Take  by mouth.  MVIT-MINS/FOLIC ACID/SOY ISOFL (ONE-A-DAY MENOPAUSE FORMULA PO) Take  by mouth.  S-Adenosylmethionine (YRN-E, ENTERIC COATED,) 400 mg TbEC Take  by mouth.  cholecalciferol, VITAMIN D3, (VITAMIN D3) 5,000 unit tab tablet Take  by mouth daily.  furosemide (LASIX) 20 mg tablet TAKE ONE TABLET BY MOUTH ONCE DAILY    Dexlansoprazole (DEXILANT) 60 mg CpDB Take 60 mg by mouth every other day.  omeprazole (PRILOSEC OTC) 20 mg tablet Take 20 mg by mouth daily.  aspirin delayed-release 81 mg tablet Take  by mouth daily. No current facility-administered medications for this visit.         Blaise Orr NP  [facility] [place]  [address], [address]

## 2018-09-13 NOTE — MR AVS SNAPSHOT
[address] [facility] [address] [place] 
[phone] Patient: Latasha Correa MRN: [id number] Visit Information Date & Time Provider Department Dept. Phone Encounter #  
 2018 10:00 AM Roula Luna NP [facility] Cora Freeman [phone] [id number] Your Appointments 10/11/2018  9:30 AM  
Follow Up with MD HANSA Pope [facility] (Eddie Zak) Appt Note: [address] [phone] [address] [address] 2019  3:00 PM  
ECHO CARDIOGRAMS 2D with ECHO, [facility] (REBECCA IRENA) Appt Note: echo for TR 3:00 Dr. Sierra Pacheco 4:00 per Pepper Mejía, NP [address] [place] [address]  
[address] [facility]  
  
    
 2019  4:00 PM  
ESTABLISHED PATIENT with Marian Olsen MD  
[facility] (Eddie Zak) Appt Note: echo for TR 3:00 Dr. Sierra Pacheco 4:00 per Pepper Mejía, NP [address]  [address] [place] [address]  
[address] [address] Upcoming Health Maintenance Date Due DTaP/Tdap/Td series (1 - Tdap) 1968 FOBT Q 1 YEAR AGE 50-75 1997 ZOSTER VACCINE AGE 60> 2007 GLAUCOMA SCREENING Q2Y 2012 Bone Densitometry (Dexa) Screening 2012 Pneumococcal 65+ Low/Medium Risk (2 of 2 - PPSV23) 2017 Influenza Age 5 to Adult 2018 BREAST CANCER SCRN MAMMOGRAM 2020 Allergies as of 2018  Review Complete On: 2018 By: Stella Mcmahan No Known Allergies Current Immunizations  Reviewed on 2017 Name Date Influenza Vaccine 2013 Pneumococcal Vaccine (Unspecified Type) 11/1/2012 Not reviewed this visit You Were Diagnosed With   
  
 Codes Comments Non-rheumatic tricuspid valve insufficiency    -  Primary ICD-10-CM: I36.1 ICD-9-CM: 424.2 Hyperlipidemia LDL goal <100     ICD-10-CM: E78.5 ICD-9-CM: 272.4 Essential hypertension     ICD-10-CM: I10 
ICD-9-CM: 401.9 Vitals BP Pulse Resp Height(growth percentile) Weight(growth percentile) BMI  
 110/64 (BP 1 Location: Left arm, BP Patient Position: Sitting) 62 16 5' 8\" (1.727 m) 215 lb (97.5 kg) 32.69 kg/m2 OB Status Smoking Status Hysterectomy Former Smoker Vitals History BMI and BSA Data Body Mass Index Body Surface Area  
 32.69 kg/m 2 2.16 m 2 Preferred Pharmacy Pharmacy Name Phone [facility] [address], [address] [place] [address] Avenue [phone] Your Updated Medication List  
  
   
This list is accurate as of 9/13/18 10:09 AM.  Always use your most recent med list.  
  
  
  
  
 ABREVA 10 % topical cream  
Generic drug:  docosanol Apply  to affected area five (5) times daily. ALEVE 220 mg tablet Generic drug:  naproxen sodium Take 220 mg by mouth two (2) times daily (with meals). aspirin delayed-release 81 mg tablet Take  by mouth daily. biotin 5,000 mcg Tbdi Take  by mouth. DEXILANT 60 mg Cpdb Generic drug:  Dexlansoprazole Take 60 mg by mouth every other day. furosemide 20 mg tablet Commonly known as:  LASIX TAKE ONE TABLET BY MOUTH ONCE DAILY  
  
 glucosamine-chondroitin 750-600 mg Tab Take  by mouth.  
  
 krill oil 500 mg Cap Take  by mouth.  
  
 magnesium 250 mg Tab Take  by mouth. MEMORY COMPLEX PO Take  by mouth daily. NASONEX 50 mcg/actuation nasal spray Generic drug:  mometasone 2 Sprays daily. [address]  
by Does Not Apply route. olmesartan-hydroCHLOROthiazide 20-12.5 mg per tablet Commonly known as:  BENICAR HCT  
TAKE ONE TABLET BY MOUTH ONCE DAILY  
  
 ONE-A-DAY MENOPAUSE FORMULA PO Take  by mouth. PriLOSEC OTC 20 mg tablet Generic drug:  omeprazole Take 20 mg by mouth daily. psyllium Powd Commonly known as:  METAMUCIL Take  by mouth. YRN-E (ENTERIC COATED) 400 mg Tbec Generic drug:  S-Adenosylmethionine Take  by mouth. TURMERIC ROOT EXTRACT PO Take 200 mg by mouth. valACYclovir 500 mg tablet Commonly known as:  VALTREX  
TAKE ONE TABLET BY MOUTH ONCE DAILY  
  
 VITAMIN B-12 1,000 mcg tablet Generic drug:  cyanocobalamin Take 1,000 mcg by mouth daily. VITAMIN C 500 mg tablet Generic drug:  ascorbic acid (vitamin C) Take  by mouth. VITAMIN D3 5,000 unit Tab tablet Generic drug:  cholecalciferol (VITAMIN D3) Take  by mouth daily. We Performed the Following AMB POC EKG ROUTINE W/ 12 LEADS, INTER & REP [01280 CPT(R)] Introducing [facility]! Dear Sunny Slaughter: Thank you for requesting a [facility] account. Our records indicate that you already have an active [facility] account. You can access your account anytime at [email or web address] Did you know that you can access your hospital and ER discharge instructions at any time in [facility]? You can also review all of your test results from your hospital stay or ER visit. Additional Information If you have questions, please visit the Frequently Asked Questions section of the [facility] website at [email or web address] Remember, [facility] is NOT to be used for urgent needs. For medical emergencies, dial 911. Now available from your iPhone and Android! Please provide this summary of care documentation to your next provider. Your primary care clinician is listed as GREGORIO Valera. If you have any questions after today's visit, please call [phone].

## 2018-09-20 RX ORDER — FUROSEMIDE 20 MG/1
TABLET ORAL
Qty: 90 TAB | Refills: 3 | Status: SHIPPED | OUTPATIENT
Start: 2018-09-20 | End: 2019-10-18 | Stop reason: SDUPTHER

## 2018-09-27 ENCOUNTER — TELEPHONE (OUTPATIENT)
Dept: CARDIOLOGY CLINIC | Age: 73
End: 2018-09-27

## 2018-09-27 NOTE — TELEPHONE ENCOUNTER
----- Message from Felicity Lombardo MD sent at 9/26/2018 12:20 PM EDT -----  Looks ok      Echo results as stated above given to patient.   2 pt identifiers used

## 2018-10-11 ENCOUNTER — OFFICE VISIT (OUTPATIENT)
Dept: INTERNAL MEDICINE CLINIC | Age: 73
End: 2018-10-11

## 2018-10-11 VITALS
RESPIRATION RATE: 16 BRPM | BODY MASS INDEX: 32.43 KG/M2 | WEIGHT: 214 LBS | HEART RATE: 78 BPM | SYSTOLIC BLOOD PRESSURE: 90 MMHG | DIASTOLIC BLOOD PRESSURE: 60 MMHG | HEIGHT: 68 IN | OXYGEN SATURATION: 96 % | TEMPERATURE: 98.6 F

## 2018-10-11 DIAGNOSIS — M85.88 OSTEOPENIA OF LUMBAR SPINE: ICD-10-CM

## 2018-10-11 DIAGNOSIS — I10 ESSENTIAL HYPERTENSION: Primary | ICD-10-CM

## 2018-10-11 LAB
BACTERIA UA POCT, BACTPOCT: NORMAL
BILIRUB UR QL STRIP: NEGATIVE
CASTS UA POCT: NORMAL
CLUE CELLS, CLUEPOCT: NEGATIVE
CRYSTALS UA POCT, CRYSPOCT: NEGATIVE
EPITHELIAL CELLS POCT: NORMAL
GLUCOSE UR-MCNC: NEGATIVE MG/DL
KETONES P FAST UR STRIP-MCNC: NEGATIVE MG/DL
MUCUS UA POCT, MUCPOCT: NORMAL
PH UR STRIP: 6.5 [PH] (ref 5–7)
PROT UR QL STRIP: NEGATIVE
RBC UA POCT, RBCPOCT: NORMAL
SP GR UR STRIP: 1.01 (ref 1.01–1.02)
TRICH UA POCT, TRICHPOC: NEGATIVE
UA UROBILINOGEN AMB POC: NORMAL (ref 0.2–1)
URINALYSIS CLARITY POC: CLEAR
URINALYSIS COLOR POC: NORMAL
URINE BLOOD POC: NEGATIVE
URINE CULT COMMENT, POCT: NORMAL
URINE LEUKOCYTES POC: NORMAL
URINE NITRITES POC: NEGATIVE
WBC UA POCT, WBCPOCT: NORMAL
YEAST UA POCT, YEASTPOC: NEGATIVE

## 2018-10-11 NOTE — MR AVS SNAPSHOT
[address] [facility] 70 [address] [address] [phone] Patient: Sy Pereyra MRN: [id number] :1945 Visit Information Date & Time Provider Department Dept. Phone Encounter #  
 10/11/2018  9:30 AM GREGORIO Sebastian MD [facility] [phone] [id number] Follow-up Instructions Return in about 6 months (around 2019) for [address] Your Appointments 2019  3:00 PM  
ECHO CARDIOGRAMS 2D with ECHO, [facility] ([facility]BECCA SCHEDULING) Appt Note: echo for TR 3:00 Dr. Negrita Shannon 4:00 per Brian Amaya, NP kmr [address] [facility] [address]  
[facility]  
  
    
 2019  4:00 PM  
ESTABLISHED PATIENT with Dolly Ovalle MD  
[facility] ([facility]) Appt Note: echo for TR 3:00 Dr. Negrita Shannon 4:00 per Brian Amaya, NP km[facility] [address] [place] [address]  
[address] [id number] Upcoming Health Maintenance Date Due DTaP/Tdap/Td series (1 - Tdap) 1968 Shingrix Vaccine Age 50> (1 of 2) 1997 FOBT Q 1 YEAR AGE 50-75 1997 GLAUCOMA SCREENING Q2Y 2012 Bone Densitometry (Dexa) Screening 2012 Pneumococcal 65+ Low/Medium Risk (2 of 2 - PPSV23) 2017 Influenza Age 5 to Adult 2018 BREAST CANCER SCRN MAMMOGRAM 2020 Allergies as of 10/11/2018  Review Complete On: 10/11/2018 By: Justine Patterson MD  
 No Known Allergies Current Immunizations  Reviewed on 2017 Name Date Influenza Vaccine 2013 Pneumococcal Vaccine (Unspecified Type) 2012 Not reviewed this visit You Were Diagnosed With   
  
 Codes Comments Essential hypertension    -  Primary ICD-10-CM: I10 
ICD-9-CM: 401.9 Osteopenia of lumbar spine     ICD-10-CM: M85.88 ICD-9-CM: 733.90 Vitals BP Pulse Temp Resp Height(growth percentile) Weight(growth percentile) 90/60 (BP 1 Location: Left arm, BP Patient Position: Sitting) 78 98.6 °F (37 °C) (Oral) 16 5' 8\" (1.727 m) 214 lb (97.1 kg) SpO2 BMI OB Status Smoking Status 96% 32.54 kg/m2 Hysterectomy Former Smoker Vitals History BMI and BSA Data Body Mass Index Body Surface Area 32.54 kg/m 2 2.16 m 2 Preferred Pharmacy Pharmacy Name Phone [facility] [address] [phone] Your Updated Medication List  
  
   
This list is accurate as of 10/11/18 10:23 AM.  Always use your most recent med list.  
  
  
  
  
 ABREVA 10 % topical cream  
Generic drug:  docosanol Apply  to affected area five (5) times daily. ALEVE 220 mg tablet Generic drug:  naproxen sodium Take 220 mg by mouth two (2) times daily (with meals). aspirin delayed-release 81 mg tablet Take  by mouth daily. biotin 5,000 mcg Tbdi Take  by mouth. DEXILANT 60 mg Cpdb Generic drug:  Dexlansoprazole Take 60 mg by mouth every other day. furosemide 20 mg tablet Commonly known as:  LASIX TAKE ONE TABLET BY MOUTH ONCE DAILY  
  
 krill oil 500 mg Cap Take  by mouth.  
  
 magnesium 250 mg Tab Take  by mouth. MEMORY COMPLEX PO Take  by mouth daily. NASONEX 50 mcg/actuation nasal spray Generic drug:  mometasone 2 Sprays daily. olmesartan-hydroCHLOROthiazide 20-12.5 mg per tablet Commonly known as:  BENICAR HCT  
TAKE ONE TABLET BY MOUTH ONCE DAILY  
  
 ONE-A-DAY MENOPAUSE FORMULA PO Take  by mouth. PriLOSEC OTC 20 mg tablet Generic drug:  omeprazole Take 20 mg by mouth daily. psyllium Powd Commonly known as:  METAMUCIL Take  by mouth. YRN-E (ENTERIC COATED) 400 mg Tbec Generic drug:  S-Adenosylmethionine Take  by mouth. TURMERIC ROOT EXTRACT PO Take 200 mg by mouth. valACYclovir 500 mg tablet Commonly known as:  VALTREX  
TAKE ONE TABLET BY MOUTH ONCE DAILY  
  
 VITAMIN B-12 1,000 mcg tablet Generic drug:  cyanocobalamin Take 1,000 mcg by mouth daily. VITAMIN C 500 mg tablet Generic drug:  ascorbic acid (vitamin C) Take  by mouth. VITAMIN D3 5,000 unit Tab tablet Generic drug:  cholecalciferol (VITAMIN D3) Take  by mouth daily. We Performed the Following AMB POC URINALYSIS DIP STICK AUTO W/ MICRO  [47874 CPT(R)] METABOLIC PANEL, COMPREHENSIVE [54879 CPT(R)] Follow-up Instructions Return in about 6 months (around 4/11/2019) for [facility]! Dear Clyde Lipscomb: Thank you for requesting a [employer] account. Our records indicate that you already have an active [employer] account. You can access your account anytime at [email or web address] Did you know that you can access your hospital and ER discharge instructions at any time in [employer]? You can also review all of your test results from your hospital stay or ER visit. Additional Information If you have questions, please visit the Frequently Asked Questions section of the [employer] website at [email or web address] Remember, [employer] is NOT to be used for urgent needs. For medical emergencies, dial 911. Now available from your iPhone and Android! Please provide this summary of care documentation to your next provider. Your primary care clinician is listed as GREGORIO Lundberg. If you have any questions after today's visit, please call [phone].

## 2018-10-11 NOTE — PROGRESS NOTES
Chief Complaint   Patient presents with    Hypertension     room 1     1. Have you been to the ER, urgent care clinic since your last visit? NO  Hospitalized since your last visit? NO  2. Have you seen or consulted any other health care providers outside of the [address] [place] [address] since your last visit? Include any pap smears or colon screening. ORTHO-RIGHT KNEE      PT IS HERE FOR ROUTINE CHECK-UP, PT IS FASTING. ABUSE SCREEN DEFERRED, PT LIVES ALONE.

## 2018-10-11 NOTE — PROGRESS NOTES
This note will not be viewable in [address] Carlene Rosado is a 72 y.o. female and presents with Hypertension (room 1)  . Subjective:  Mrs. Neal Flores presents today for follow-up of hypertension. She is doing well on her current medical regimen. Her blood pressure is a little low today. She denies any dizziness, shortness of breath, weakness, syncopal episodes. She denies any shortness of breath PND orthopnea or pedal edema. She had a echocardiogram recently for tricuspid regurgitation which is stable. She seen orthopedics for some right knee discomfort and has had hyaluronic injections. We discussed her previous test results with an elevated protein level with a normal SPEP. Past Medical History:   Diagnosis Date    Acute diverticulitis 8/18/2017    Allergic rhinitis 8/18/2017    Arthritis 8/18/2017    Back pain, thoracic 8/18/2017    Cataract, left 8/18/2017    Cataract, right 8/18/2017    Cirrhosis ([address] 8/18/2017    Cold sore 8/18/2017    Elevated hemoglobin A1c 8/18/2017    Fatigue 8/18/2017    Gastrointestinal disorder     acid reflux    GERD (gastroesophageal reflux disease) 8/18/2017    Heart murmur 8/18/2017    Hepatitis C 8/18/2017    Herpes zoster infection of thoracic region 8/18/2017    Hyperlipidemia LDL goal <100 8/18/2017    Hypertension     Menopause     Hysterectomy-42years old    Murmur     Obesity (BMI 30-39. 9) 8/18/2017    Osteopenia 8/18/2017    Other ill-defined conditions(799.89)     hep c    Post-menopausal 8/18/2017    Posterior auricular pain of right ear 8/18/2017    Preop examination 8/18/2017    Valvular heart disease     mitral valve prolapse    Vertigo, benign positional 8/18/2017     Past Surgical History:   Procedure Laterality Date    HX HYSTERECTOMY      42years old    HX ORTHOPAEDIC      knee, foot     No Known Allergies  Current Outpatient Prescriptions   Medication Sig Dispense Refill    furosemide (LASIX) 20 mg tablet TAKE ONE TABLET BY MOUTH ONCE DAILY 90 Tab 3    valACYclovir (VALTREX) 500 mg tablet TAKE ONE TABLET BY MOUTH ONCE DAILY 30 Tab 6    TURMERIC ROOT EXTRACT PO Take 200 mg by mouth.  vit C/vit E ac/selenium/ginkgo (MEMORY COMPLEX PO) Take  by mouth daily.  olmesartan-hydroCHLOROthiazide (BENICAR HCT) 20-12.5 mg per tablet TAKE ONE TABLET BY MOUTH ONCE DAILY 90 Tab 3    cyanocobalamin (VITAMIN B-12) 1,000 mcg tablet Take 1,000 mcg by mouth daily.  ascorbic acid, vitamin C, (VITAMIN C) 500 mg tablet Take  by mouth.  psyllium (METAMUCIL) powd Take  by mouth.  magnesium 250 mg tab Take  by mouth.  mometasone (NASONEX) 50 mcg/actuation nasal spray 2 Sprays daily.  naproxen sodium (ALEVE) 220 mg tablet Take 220 mg by mouth two (2) times daily (with meals).  docosanol (ABREVA) 10 % topical cream Apply  to affected area five (5) times daily.  biotin 5,000 mcg TbDi Take  by mouth.  krill oil 500 mg cap Take  by mouth.  MVIT-MINS/FOLIC ACID/SOY ISOFL (ONE-A-DAY MENOPAUSE FORMULA PO) Take  by mouth.  S-Adenosylmethionine (YRN-E, ENTERIC COATED,) 400 mg TbEC Take  by mouth.  cholecalciferol, VITAMIN D3, (VITAMIN D3) 5,000 unit tab tablet Take  by mouth daily.  Dexlansoprazole (DEXILANT) 60 mg CpDB Take 60 mg by mouth every other day.  omeprazole (PRILOSEC OTC) 20 mg tablet Take 20 mg by mouth daily.  aspirin delayed-release 81 mg tablet Take  by mouth daily.        Social History     Social History    Marital status: SINGLE     Spouse name: N/A    Number of children: N/A    Years of education: N/A     Social History Main Topics    Smoking status: Former Smoker    Smokeless tobacco: Never Used    Alcohol use No    Drug use: No    Sexual activity: Not Asked     Other Topics Concern    None     Social History Narrative     Family History   Problem Relation Age of Onset    Hypertension Mother     Diabetes Mother     Heart Disease Father        Review of Systems  Constitutional:  negative for fevers, chills, anorexia and weight loss  Eyes:    negative for visual disturbance and irritation  ENT:    negative for tinnitus,sore throat,nasal congestion,ear pains. hoarseness  Respiratory:     negative for cough, hemoptysis, dyspnea,wheezing  CV:    negative for chest pain, palpitations, lower extremity edema  GI:    negative for nausea, vomiting, diarrhea, abdominal pain,melena  Endo:               negative for polyuria,polydipsia,polyphagia,heat intolerance  Genitourinary : negative for frequency, dysuria and hematuria  Integumentary: negative for rash and pruritus  Hematologic:   negative for easy bruising and gum/nose bleeding  Musculoskel:  negative for myalgias, arthralgias, back pain, muscle weakness, joint pain  Neurological:   negative for headaches, dizziness, vertigo, memory problems and gait   Behavl/Psych:  negative for feelings of anxiety, depression, mood changes  ROS otherwise negative      Objective:  Visit Vitals    BP 90/60 (BP 1 Location: Left arm, BP Patient Position: Sitting)    Pulse 78    Temp 98.6 °F (37 °C) (Oral)    Resp 16    Ht 5' 8\" (1.727 m)    Wt 214 lb (97.1 kg)    SpO2 96%    BMI 32.54 kg/m2     Physical Exam:   General appearance - alert, well appearing, and in no distress  Mental status - alert, oriented to person, place, and time  EYE-EMMY, EOMI, fundi normal, corneas normal, no foreign bodies  ENT-ENT exam normal, no neck nodes or sinus tenderness  Nose - normal and patent, no erythema, discharge or polyps  Mouth - mucous membranes moist, pharynx normal without lesions  Neck - supple, no significant adenopathy   Chest - clear to auscultation, no wheezes, rales or rhonchi, symmetric air entry   Heart - normal rate, regular rhythm, normal S1, S2, no murmurs, rubs, clicks or gallops   Abdomen - soft, nontender, nondistended, no masses or organomegaly  Lymph- no adenopathy palpable  Ext-peripheral pulses normal, no pedal edema, no clubbing or cyanosis  Skin-Warm and dry. no hyperpigmentation, vitiligo, or suspicious lesions  Neuro -alert, oriented, normal speech, no focal findings or movement disorder noted      Assessment/Plan:  Diagnoses and all orders for this visit:    1. Essential hypertension  -     METABOLIC PANEL, COMPREHENSIVE  -     AMB POC URINALYSIS DIP STICK AUTO W/ MICRO     2. Osteopenia of lumbar spine          ICD-10-CM ICD-9-CM    1. Essential hypertension Q87 027.1 METABOLIC PANEL, COMPREHENSIVE      AMB POC URINALYSIS DIP STICK AUTO W/ MICRO       CANCELED: AMB POC COMPREHENSIVE METABOLIC PANEL      CANCELED: AMB POC URINALYSIS DIP STICK AUTO W/ MICRO    2. Osteopenia of lumbar spine M85.88 733.90      Plan:    Continue current medical regimen. May decrease furosemide to every other day and see if her blood pressure is improved. Her repeat blood pressure by me was 100/60. If she does develop any symptoms she should return to clinic for evaluation. Otherwise we will continue to monitor and follow-up in 6 months. Follow-up Disposition:  Return in about 6 months (around 4/11/2019) for [address] [place] [address]. I have reviewed with the patient details of the assessment and plan and all questions were answered. Relevent patient education was performed. Verbal and/or written instructions (see AVS) provided. The most recent lab findings were reviewed with the patient. Plan was discussed with patient who verbally expressed understanding. An After Visit Summary was printed and given to the patient.     Aziza [place] MD

## 2018-10-12 LAB
ALBUMIN SERPL-MCNC: 4.8 G/DL (ref 3.5–4.8)
ALBUMIN/GLOB SERPL: 1.4 {RATIO} (ref 1.2–2.2)
ALP SERPL-CCNC: 51 IU/L (ref 39–117)
ALT SERPL-CCNC: 16 IU/L (ref 0–32)
AST SERPL-CCNC: 21 IU/L (ref 0–40)
BILIRUB SERPL-MCNC: 0.5 MG/DL (ref 0–1.2)
BUN SERPL-MCNC: 21 MG/DL (ref 8–27)
BUN/CREAT SERPL: 27 (ref 12–28)
CALCIUM SERPL-MCNC: 9.8 MG/DL (ref 8.7–10.3)
CHLORIDE SERPL-SCNC: 96 MMOL/L (ref 96–106)
CO2 SERPL-SCNC: 28 MMOL/L (ref 20–29)
CREAT SERPL-MCNC: 0.77 MG/DL (ref 0.57–1)
GLOBULIN SER CALC-MCNC: 3.4 G/DL (ref 1.5–4.5)
GLUCOSE SERPL-MCNC: 92 MG/DL (ref 65–99)
POTASSIUM SERPL-SCNC: 4.2 MMOL/L (ref 3.5–5.2)
PROT SERPL-MCNC: 8.2 G/DL (ref 6–8.5)
SODIUM SERPL-SCNC: 137 MMOL/L (ref 134–144)

## 2018-10-13 NOTE — PROGRESS NOTES
Urinalysis and complete blood count are within normal limits.   Glucose was normal.  Liver and kidney function are normal.

## 2018-11-02 RX ORDER — OLMESARTAN MEDOXOMIL AND HYDROCHLOROTHIAZIDE 20/12.5 20; 12.5 MG/1; MG/1
TABLET ORAL
Qty: 90 TAB | Refills: 3 | Status: SHIPPED | OUTPATIENT
Start: 2018-11-02 | End: 2020-01-29

## 2018-11-02 NOTE — TELEPHONE ENCOUNTER
Requested Prescriptions     Pending Prescriptions Disp Refills    olmesartan-hydroCHLOROthiazide (BENICAR HCT) 20-12.5 mg per tablet 90 Tab 3     Sig: Take 1 Tab by mouth daily.        Last Refill:   Next Appointment:4/22/19

## 2018-11-05 RX ORDER — OLMESARTAN MEDOXOMIL AND HYDROCHLOROTHIAZIDE 20/12.5 20; 12.5 MG/1; MG/1
1 TABLET ORAL DAILY
Qty: 90 TAB | Refills: 3 | Status: SHIPPED | OUTPATIENT
Start: 2018-11-05 | End: 2019-04-22 | Stop reason: SDUPTHER

## 2019-04-22 ENCOUNTER — OFFICE VISIT (OUTPATIENT)
Dept: INTERNAL MEDICINE CLINIC | Age: 74
End: 2019-04-22

## 2019-04-22 VITALS
TEMPERATURE: 98.6 F | HEART RATE: 58 BPM | OXYGEN SATURATION: 98 % | DIASTOLIC BLOOD PRESSURE: 68 MMHG | RESPIRATION RATE: 16 BRPM | WEIGHT: 214 LBS | HEIGHT: 68 IN | BODY MASS INDEX: 32.43 KG/M2 | SYSTOLIC BLOOD PRESSURE: 118 MMHG

## 2019-04-22 DIAGNOSIS — I10 ESSENTIAL HYPERTENSION: ICD-10-CM

## 2019-04-22 DIAGNOSIS — R73.09 ELEVATED HEMOGLOBIN A1C: ICD-10-CM

## 2019-04-22 DIAGNOSIS — E78.5 HYPERLIPIDEMIA LDL GOAL <100: ICD-10-CM

## 2019-04-22 DIAGNOSIS — M85.88 OSTEOPENIA OF LUMBAR SPINE: ICD-10-CM

## 2019-04-22 DIAGNOSIS — Z00.00 ANNUAL PHYSICAL EXAM: Primary | ICD-10-CM

## 2019-04-22 DIAGNOSIS — K21.9 GASTROESOPHAGEAL REFLUX DISEASE WITHOUT ESOPHAGITIS: ICD-10-CM

## 2019-04-22 DIAGNOSIS — R53.83 FATIGUE, UNSPECIFIED TYPE: ICD-10-CM

## 2019-04-22 LAB
A-G RATIO,AGRAT: 1.2 RATIO
ALBUMIN SERPL-MCNC: 4.6 G/DL (ref 3.9–5.4)
ALP SERPL-CCNC: 52 U/L (ref 38–126)
ALT SERPL-CCNC: 13 U/L (ref 9–52)
ANION GAP SERPL CALC-SCNC: 11 MMOL/L
AST SERPL W P-5'-P-CCNC: 25 U/L (ref 14–36)
BILIRUB SERPL-MCNC: 0.5 MG/DL (ref 0.2–1.3)
BILIRUB UR QL: NEGATIVE
BUN SERPL-MCNC: 20 MG/DL (ref 7–17)
BUN/CREATININE RATIO,BUCR: 29 RATIO
CALCIUM SERPL-MCNC: 9.7 MG/DL (ref 8.4–10.2)
CHLORIDE SERPL-SCNC: 104 MMOL/L (ref 98–107)
CHOL/HDL RATIO,CHHD: 3 RATIO (ref 0–4)
CHOLEST SERPL-MCNC: 157 MG/DL (ref 0–200)
CK SERPL-CCNC: 105 U/L (ref 30–135)
CLARITY: CLEAR
CO2 SERPL-SCNC: 32 MMOL/L (ref 22–32)
COLOR UR: NORMAL
CREAT SERPL-MCNC: 0.7 MG/DL (ref 0.7–1.2)
ERYTHROCYTE [DISTWIDTH] IN BLOOD BY AUTOMATED COUNT: 13.3 %
GLOBULIN,GLOB: 3.8
GLUCOSE 24H UR-MRATE: NEGATIVE G/(24.H)
GLUCOSE SERPL-MCNC: 97 MG/DL (ref 65–105)
HCT VFR BLD AUTO: 40.3 % (ref 37–51)
HDLC SERPL-MCNC: 46 MG/DL (ref 35–130)
HGB BLD-MCNC: 13.5 G/DL (ref 12–18)
HGB UR QL STRIP: NEGATIVE
KETONES UR QL STRIP.AUTO: NEGATIVE
LDL/HDL RATIO,LDHD: 2 RATIO
LDLC SERPL CALC-MCNC: 98 MG/DL (ref 0–130)
LEUKOCYTE ESTERASE: NEGATIVE
LYMPHOCYTES ABSOLUTE: 1.9 K/UL (ref 0.6–4.1)
LYMPHOCYTES NFR BLD: 33.4 % (ref 10–58.5)
MCH RBC QN AUTO: 30.8 PG (ref 26–32)
MCHC RBC AUTO-ENTMCNC: 33.5 G/DL (ref 30–36)
MCV RBC AUTO: 91.9 FL (ref 80–97)
MONOCYTES ABS-DIF,2141: 0.5 K/UL (ref 0–1.8)
MONOCYTES NFR BLD: 9 % (ref 0.1–24)
NEUTROPHILS # BLD: 57.6 % (ref 37–92)
NEUTROPHILS ABS,2156: 3.2 K/UL (ref 2–7.8)
NITRITE UR QL STRIP.AUTO: NEGATIVE
PH UR STRIP: 6.5 [PH] (ref 5–7)
PLATELET # BLD AUTO: 246 K/UL (ref 140–440)
PMV BLD AUTO: 9 FL
POTASSIUM SERPL-SCNC: 4.5 MMOL/L (ref 3.6–5)
PROT SERPL-MCNC: 8.4 G/DL (ref 6.3–8.2)
PROT UR STRIP-MCNC: NEGATIVE MG/DL
RBC # BLD AUTO: 4.39 M/UL (ref 4.2–6.3)
SODIUM SERPL-SCNC: 147 MMOL/L (ref 137–145)
SP GR UR REFRACTOMETRY: 1.02 (ref 1–1.03)
TRIGL SERPL-MCNC: 64 MG/DL (ref 0–200)
UROBILINOGEN UR QL STRIP.AUTO: NEGATIVE
VLDLC SERPL CALC-MCNC: 13 MG/DL
WBC # BLD AUTO: 5.6 K/UL (ref 4.1–10.9)

## 2019-04-22 NOTE — PROGRESS NOTES
This note will not be viewable in [address] Bigg Leon is a 72 y.o. female and presents with Annual Wellness Visit; Hypertension (follow up); and Cholesterol Problem (follow up)  . Subjective:    Mrs. Triny Lopez presents today for a annual wellness visit, and follow-up of hyperlipidemia and hypertension. She is doing well on her current medical regimen. She denies any side effects from her medicine. She has no shortness of breath, chest pain, palpitations, PND, orthopnea, or pedal edema. Review of Systems  Constitutional: negative for fevers, chills, anorexia and weight loss  Eyes:   negative for visual disturbance and irritation  ENT:   negative for tinnitus,sore throat,nasal congestion,ear pains. hoarseness  Respiratory:  negative for cough, hemoptysis, dyspnea,wheezing  CV:   negative for chest pain, palpitations, lower extremity edema, positive for some mild left breast tenderness without noted swelling or abnormality with direct palpation occurring for past 2 months with no apparent injury and the skin changes noted  GI:   negative for nausea, vomiting, diarrhea, abdominal pain,melena  Endo:               negative for polyuria,polydipsia,polyphagia,heat intolerance  Genitourinary: negative for frequency, dysuria and hematuria  Integumentary: negative for rash and pruritus  Hematologic:  negative for easy bruising and gum/nose bleeding  Musculoskel: negative for myalgias, arthralgias, back pain, muscle weakness, joint pain  Neurological:  negative for headaches, dizziness, vertigo, memory problems and gait   Behavl/Psych: negative for feelings of anxiety, depression, mood changes    Past Medical History:   Diagnosis Date    Acute diverticulitis 8/18/2017    Allergic rhinitis 8/18/2017    Arthritis 8/18/2017    Back pain, thoracic 8/18/2017    Cataract, left 8/18/2017    Cataract, right 8/18/2017    Cirrhosis ([address] 8/18/2017    Cold sore 8/18/2017    Elevated hemoglobin A1c 8/18/2017    Fatigue 8/18/2017    Gastrointestinal disorder     acid reflux    GERD (gastroesophageal reflux disease) 8/18/2017    Heart murmur 8/18/2017    Hepatitis C 8/18/2017    Herpes zoster infection of thoracic region 8/18/2017    Hyperlipidemia LDL goal <100 8/18/2017    Hypertension     Menopause     Hysterectomy-42years old    Murmur     Obesity (BMI 30-39. 9) 8/18/2017    Osteopenia 8/18/2017    Other ill-defined conditions(799.89)     hep c    Post-menopausal 8/18/2017    Posterior auricular pain of right ear 8/18/2017    Preop examination 8/18/2017    Valvular heart disease     mitral valve prolapse    Vertigo, benign positional 8/18/2017     Past Surgical History:   Procedure Laterality Date    HX HYSTERECTOMY      42years old    HX ORTHOPAEDIC      knee, foot     Social History     Socioeconomic History    Marital status: SINGLE     Spouse name: Not on file    Number of children: Not on file    Years of education: Not on file    Highest education level: Not on file   Tobacco Use    Smoking status: Former Smoker    Smokeless tobacco: Never Used   Substance and Sexual Activity    Alcohol use: No     Alcohol/week: 0.0 oz    Drug use: No     Family History   Problem Relation Age of Onset    Hypertension Mother     Diabetes Mother     Heart Disease Father      Current Outpatient Medications   Medication Sig Dispense Refill    olmesartan-hydroCHLOROthiazide (BENICAR HCT) 20-12.5 mg per tablet TAKE ONE TABLET BY MOUTH ONCE DAILY 90 Tab 3    furosemide (LASIX) 20 mg tablet TAKE ONE TABLET BY MOUTH ONCE DAILY 90 Tab 3    valACYclovir (VALTREX) 500 mg tablet TAKE ONE TABLET BY MOUTH ONCE DAILY 30 Tab 6    TURMERIC ROOT EXTRACT PO Take 200 mg by mouth.  vit C/vit E ac/selenium/ginkgo (MEMORY COMPLEX PO) Take  by mouth daily.  cyanocobalamin (VITAMIN B-12) 1,000 mcg tablet Take 1,000 mcg by mouth daily.  ascorbic acid, vitamin C, (VITAMIN C) 500 mg tablet Take  by mouth.       psyllium (METAMUCIL) powd Take  by mouth daily.  magnesium 250 mg tab Take  by mouth.  mometasone (NASONEX) 50 mcg/actuation nasal spray 2 Sprays daily.  naproxen sodium (ALEVE) 220 mg tablet Take 220 mg by mouth two (2) times daily (with meals).  docosanol (ABREVA) 10 % topical cream Apply  to affected area five (5) times daily.  biotin 5,000 mcg TbDi Take  by mouth.  krill oil 500 mg cap Take  by mouth.  MVIT-MINS/FOLIC ACID/SOY ISOFL (ONE-A-DAY MENOPAUSE FORMULA PO) Take  by mouth two (2) times a day.  S-Adenosylmethionine (YRN-E, ENTERIC COATED,) 400 mg TbEC Take  by mouth.  cholecalciferol, VITAMIN D3, (VITAMIN D3) 5,000 unit tab tablet Take  by mouth daily.  Dexlansoprazole (DEXILANT) 60 mg CpDB Take 60 mg by mouth every other day.  omeprazole (PRILOSEC OTC) 20 mg tablet Take 20 mg by mouth daily.  aspirin delayed-release 81 mg tablet Take  by mouth daily.        No Known Allergies    Objective:  Visit Vitals  /68 (BP 1 Location: Right arm, BP Patient Position: Sitting)   Pulse (!) 58   Temp 98.6 °F (37 °C) (Oral)   Resp 16   Ht 5' 8\" (1.727 m)   Wt 214 lb (97.1 kg)   SpO2 98%   BMI 32.54 kg/m²     Physical Exam:   General appearance - alert, well appearing, and in no distress  Mental status - alert, oriented to person, place, and time  EYE-EMMY, EOMI, fundi normal, corneas normal, no foreign bodies  ENT-ENT exam normal, no neck nodes or sinus tenderness  Nose - normal and patent, no erythema, discharge or polyps  Mouth - mucous membranes moist, pharynx normal without lesions  Neck - supple, no significant adenopathy   Chest - clear to auscultation, no wheezes, rales or rhonchi, symmetric air entry   Heart - normal rate, regular rhythm, normal S1, S2, no murmurs, rubs, clicks or gallops   Abdomen - soft, nontender, nondistended, no masses or organomegaly  Lymph- no adenopathy palpable  Ext-peripheral pulses normal, no pedal edema, no clubbing or cyanosis  Skin-Warm and dry. no hyperpigmentation, vitiligo, or suspicious lesions  Neuro -alert, oriented, normal speech, no focal findings or movement disorder noted  Musculoskeletal- FROM, no bony abnormalities, no point tenderness  Breast -deferred  Pelvic -deferred    Results for orders placed or performed in visit on 04/22/19   CBC WITH AUTOMATED DIFF   Result Value Ref Range    WBC 5.6 4.1 - 10.9 K/uL    RBC 4.39 4.20 - 6.30 M/uL    HGB 13.5 12.0 - 18.0 g/dL    HCT 40.3 37.0 - 51.0 %    MCH 30.8 26.0 - 32.0 pg    MCHC 33.5 30.0 - 36.0 g/dL    MCV 91.9 80.0 - 97.0 fL    RDW 13.3 %    PLATELET 585.4 509.3 - 440.0 K/uL    MEAN PLATELET VOLUME 9.0 fL    Neutrophils abs 3.2 2.0 - 7.8 K/uL    Neutrophils 57.6 37.0 - 92.0 %    Monocytes abs-DIF 0.5 0.0 - 1.8 K/uL    MONOCYTES 9.0 0.1 - 24.0 %    Lymphocytes Absolute 1.9 0.6 - 4.1 K/uL    LYMPHOCYTES 33.4 10.0 - 58.5 %     All results for lab orders may not have been returned by the time this encountered was closed.    Assessment/Plan:    Orders Placed This Encounter    DEXA BONE DENSITY STUDY AXIAL     Standing Status:   Future     Standing Expiration Date:   5/22/2020     Scheduling Instructions:      PCAM     Order Specific Question:   Reason for Exam     Answer:   osteopenia    CBC WITH AUTOMATED DIFF ([facility])    CK ([facility])    LIPID PANEL ([facility])    METABOLIC PANEL, COMPREHENSIVE ([facility])    URINALYSIS W/O MICRO ([facility])    HEMOGLOBIN A1C WITH EAG       Problem List Items Addressed This Visit     HTN (hypertension)    Relevant Orders    LIPID PANEL    METABOLIC PANEL, COMPREHENSIVE    URINALYSIS W/O MICRO    GERD (gastroesophageal reflux disease)    Hyperlipidemia LDL goal <100    Relevant Orders    CK    LIPID PANEL    Elevated hemoglobin A1c    Relevant Orders    HEMOGLOBIN A1C WITH EAG    Fatigue    Relevant Orders    CBC WITH AUTOMATED DIFF (Completed)    Osteopenia    Relevant Orders    DEXA BONE DENSITY STUDY AXIAL      Other Visit Diagnoses     Annual physical exam    -  Primary          There are no Patient Instructions on file for this visit. Follow-up and Dispositions    · Return in about 6 months (around 10/22/2019) for follow up. Plan:    Normal routine health maintenance exam.  Hypertension appears to be controlled. Cholesterol results are pending. Bone density to be scheduled. Patient had a colonoscopy with Dr. Vida Leventhal within the past 5 years. Breast exam and mammography scheduled for June. I have reviewed with the patient details of the assessment and plan and all questions were answered. Relevent patient education was performed. The most recent lab findings were reviewed with the patient. An After Visit Summary was printed and given to the patient.       Mario Burgess MD

## 2019-04-22 NOTE — PROGRESS NOTES
Chief Complaint   Patient presents with    Annual Wellness Visit    Hypertension     follow up    Cholesterol Problem     follow up       Depression Risk Factor Screening:     3 most recent PHQ Screens 4/22/2019   Little interest or pleasure in doing things Not at all   Feeling down, depressed, irritable, or hopeless Not at all   Total Score PHQ 2 0       Functional Ability and Level of Safety:     Activities of Daily Living  ADL Assessment 4/22/2019   Feeding yourself No Help Needed   Getting from bed to chair No Help Needed   Getting dressed No Help Needed   Bathing or showering No Help Needed   Walk across the room (includes cane/walker) No Help Needed   Using the telphone No Help Needed   Taking your medications No Help Needed   Preparing meals No Help Needed   Managing money (expenses/bills) No Help Needed   Moderately strenuous housework (laundry) No Help Needed   Shopping for personal items (toiletries/medicines) No Help Needed   Shopping for groceries No Help Needed   Driving No Help Needed   Climbing a flight of stairs No Help Needed   Getting to places beyond walking distances No Help Needed       Fall Risk  Fall Risk Assessment, last 12 mths 4/22/2019   Able to walk? Yes   Fall in past 12 months? No       Abuse Screen  Abuse Screening Questionnaire 4/22/2019   Do you ever feel afraid of your partner? N   Are you in a relationship with someone who physically or mentally threatens you? N   Is it safe for you to go home?  Y         Patient Care Team   Patient Care Team:  Linda Knox MD as PCP - General (Internal Medicine)  Suzi Schultz MD (Cardiology)  Jt Pacheco MD (Orthopedic Surgery)

## 2019-04-23 LAB
EST. AVERAGE GLUCOSE BLD GHB EST-MCNC: 111 MG/DL
HBA1C MFR BLD: 5.5 % (ref 4.8–5.6)

## 2019-04-23 NOTE — PROGRESS NOTES
Lab results overall look great. Cholesterol profile is excellent. Glucose is normal.  Liver and kidney function are normal.  Total protein is just above normal but not significantly changed. Complete blood count is normal.  A1c which is an average of your blood sugar is in normal range of 5.5%.

## 2019-06-04 ENCOUNTER — HOSPITAL ENCOUNTER (OUTPATIENT)
Dept: MAMMOGRAPHY | Age: 74
Discharge: HOME | End: 2019-06-04
Attending: INTERNAL MEDICINE
Payer: COMMERCIAL

## 2019-06-04 DIAGNOSIS — Z12.39 BREAST SCREENING, UNSPECIFIED: ICD-10-CM

## 2019-06-04 PROCEDURE — 77063 BREAST TOMOSYNTHESIS BI: CPT

## 2019-09-16 ENCOUNTER — OFFICE VISIT (OUTPATIENT)
Dept: CARDIOLOGY CLINIC | Age: 74
End: 2019-09-16

## 2019-09-16 VITALS
OXYGEN SATURATION: 98 % | DIASTOLIC BLOOD PRESSURE: 68 MMHG | HEART RATE: 73 BPM | BODY MASS INDEX: 33.19 KG/M2 | WEIGHT: 219 LBS | SYSTOLIC BLOOD PRESSURE: 116 MMHG | HEIGHT: 68 IN | RESPIRATION RATE: 14 BRPM

## 2019-09-16 DIAGNOSIS — R01.1 HEART MURMUR: ICD-10-CM

## 2019-09-16 DIAGNOSIS — R53.83 FATIGUE, UNSPECIFIED TYPE: ICD-10-CM

## 2019-09-16 DIAGNOSIS — I36.1 NON-RHEUMATIC TRICUSPID VALVE INSUFFICIENCY: Primary | ICD-10-CM

## 2019-09-16 DIAGNOSIS — I27.20 PULMONARY HTN (HCC): ICD-10-CM

## 2019-09-16 DIAGNOSIS — I34.0 NON-RHEUMATIC MITRAL REGURGITATION: ICD-10-CM

## 2019-09-16 DIAGNOSIS — I10 ESSENTIAL HYPERTENSION: ICD-10-CM

## 2019-09-16 DIAGNOSIS — E66.9 OBESITY (BMI 30-39.9): ICD-10-CM

## 2019-09-16 NOTE — PROGRESS NOTES
[facility]  ([phone]    HPI: Gina Clay is a 69y.o. year-old female who presents for follow up regarding her valve disease. Mod TR by TTE today  Takes lasix daily  She is feeling well, denies any chest pain   Has had a few flutters but very brief  Has mild dyspnea with moderate exertion but it is stable, no change  Denies any PND or orthopnea  No dizziness or syncope, does have some vertigo from time to time   No LE edema   Doing well, reviwed her echo results, sx what to expect, etc. Watch for now. Assessment/Plan:  1. Dyspnea with exertion - none, doing better on Lasix  2. HTN - well controlled on Benicar HCTZ   3. GERD - on Dexilant every other day   4. MVP- previously diagnosed with MVP 20 years ago  5. Obesity - Body mass index is 33.3 kg/m². 6.  Moderate TR, mild PAHTN by TTE today - symptoms stable on Lasix at  20mg daily, she will follow up in 1 year with an TTE to reassess    7. Hepatitis C s/p treatment with harvoni and rivavirin - has HERNANDEZ, followed by hepatology  8. LDL 98 in 4/19 on Krill Oil   9.   Vitamin D deficiency - on 5000 units daily     Echo 9/19 (prelim) - mod TR, EF normal, PASP 45mmhg  Echo 9/18 - LVEF 55-60%, mod TR, RVSP 32mmhg  Echo 9/17 - LVEF 60 % to 65 %, no WMA, mod-severe TR, RVSP 38mmHg/PASP 25mmHg   Echo 9/16 - LVEF 60 % to 65 %, no WMA, mod-severe TR, mild to mod PAHTN (PASP 40mmHg  Echo 8/15 - EF 60%, mild MR, mod-severe TR  Nuclear stress 8/15 EF 86%, no ischemia    Soc Hx: 2 glasses of wine every other day, no tobacco, no drug use, lives alone, very busy at [employer], continues to work as a  for a nonprofit  [employer] Hx: father passed from bleeding ulcers at age 67 and had end stage CKD and HTN, mother DM and ESRD and living at age 80     She  has a past medical history of Acute diverticulitis (8/18/2017), Allergic rhinitis (8/18/2017), Arthritis (8/18/2017), Back pain, thoracic (8/18/2017), Cataract, left (8/18/2017), Cataract, right (8/18/2017), Cirrhosis ([facility] [address] (8/18/2017), Cold sore (8/18/2017), Elevated hemoglobin A1c (8/18/2017), Fatigue (8/18/2017), Gastrointestinal disorder, GERD (gastroesophageal reflux disease) (8/18/2017), Heart murmur (8/18/2017), Hepatitis C (8/18/2017), Herpes zoster infection of thoracic region (8/18/2017), Hyperlipidemia LDL goal <100 (8/18/2017), Hypertension, Menopause, Murmur, Obesity (BMI 30-39.9) (8/18/2017), Osteopenia (8/18/2017), Other ill-defined conditions(799.89), Post-menopausal (8/18/2017), Posterior auricular pain of right ear (8/18/2017), Preop examination (8/18/2017), Valvular heart disease, and Vertigo, benign positional (8/18/2017). Cardiovascular ROS: denies chest pain or increase in dyspnea on exertion  Respiratory ROS: no cough or wheezing  Neurological ROS: no TIA or stroke symptoms  All other systems negative except as above. PE  Vitals:    09/16/19 1529   BP: 116/68   Pulse: 73   Resp: 14   SpO2: 98%   Weight: 219 lb (99.3 kg)   Height: 5' 8\" (1.727 m)    Body mass index is 33.3 kg/m².   General appearance - alert, well appearing, and in no distress  Mental status - affect appropriate to mood  Eyes - sclera anicteric, moist mucous membranes  Neck - supple, no carotid bruits   Lymphatics - not assessed  Chest - clear to auscultation, no wheezes, rales or rhonchi  Heart - normal rate, slightly irregular rhythm, normal S1, S2, 2/6 LISANDRO   Abdomen - soft, nontender, nondistended  Back exam - full range of motion, no tenderness  Neurological - cranial nerves II through XII grossly intact, no focal deficit  Musculoskeletal - no muscular tenderness noted, normal strength  Extremities - peripheral pulses normal, trace LE edema  Skin - normal coloration  no rashes    12 lead ECG: NSR, anteroseptal Q waves (unchanged)    Recent Labs:  Lab Results   Component Value Date/Time    Cholesterol, total 157 04/22/2019 10:38 AM     No results found for: BRENNEN  Lab Results Component Value Date/Time    BUN 20.0 (H) 04/22/2019 10:38 AM     Lab Results   Component Value Date/Time    Potassium 4.5 04/22/2019 10:38 AM     Lab Results   Component Value Date/Time    Hemoglobin A1c 5.5 04/22/2019 10:34 AM     No components found for: HGBI,  IHGB,  HGB,  HGBP,  WBHGB  Lab Results   Component Value Date/Time    PLATELET 768.9 67/17/0192 10:38 AM       Reviewed:  Past Medical History:   Diagnosis Date    Acute diverticulitis 8/18/2017    Allergic rhinitis 8/18/2017    Arthritis 8/18/2017    Back pain, thoracic 8/18/2017    Cataract, left 8/18/2017    Cataract, right 8/18/2017    Cirrhosis ([facility] [address] 8/18/2017    Cold sore 8/18/2017    Elevated hemoglobin A1c 8/18/2017    Fatigue 8/18/2017    Gastrointestinal disorder     acid reflux    GERD (gastroesophageal reflux disease) 8/18/2017    Heart murmur 8/18/2017    Hepatitis C 8/18/2017    Herpes zoster infection of thoracic region 8/18/2017    Hyperlipidemia LDL goal <100 8/18/2017    Hypertension     Menopause     Hysterectomy-42years old    Murmur     Obesity (BMI 30-39. 9) 8/18/2017    Osteopenia 8/18/2017    Other ill-defined conditions(799.89)     hep c    Post-menopausal 8/18/2017    Posterior auricular pain of right ear 8/18/2017    Preop examination 8/18/2017    Valvular heart disease     mitral valve prolapse    Vertigo, benign positional 8/18/2017     Social History     Tobacco Use   Smoking Status Former Smoker   Smokeless Tobacco Never Used     Social History     Substance and Sexual Activity   Alcohol Use No    Alcohol/week: 0.0 standard drinks     No Known Allergies    Current Outpatient Medications   Medication Sig    olmesartan-hydroCHLOROthiazide (BENICAR HCT) 20-12.5 mg per tablet TAKE ONE TABLET BY MOUTH ONCE DAILY    furosemide (LASIX) 20 mg tablet TAKE ONE TABLET BY MOUTH ONCE DAILY    valACYclovir (VALTREX) 500 mg tablet TAKE ONE TABLET BY MOUTH ONCE DAILY    TURMERIC ROOT EXTRACT PO Take 200 mg by mouth.  vit C/vit E ac/selenium/ginkgo (MEMORY COMPLEX PO) Take  by mouth daily.  cyanocobalamin (VITAMIN B-12) 1,000 mcg tablet Take 1,000 mcg by mouth daily.  ascorbic acid, vitamin C, (VITAMIN C) 500 mg tablet Take  by mouth.  psyllium (METAMUCIL) powd Take  by mouth daily.  magnesium 250 mg tab Take  by mouth.  mometasone (NASONEX) 50 mcg/actuation nasal spray 2 Sprays daily.  naproxen sodium (ALEVE) 220 mg tablet Take 220 mg by mouth two (2) times daily (with meals).  docosanol (ABREVA) 10 % topical cream Apply  to affected area five (5) times daily.  biotin 5,000 mcg TbDi Take  by mouth.  krill oil 500 mg cap Take  by mouth.  MVIT-MINS/FOLIC ACID/SOY ISOFL (ONE-A-DAY MENOPAUSE FORMULA PO) Take  by mouth two (2) times a day.  S-Adenosylmethionine (YRN-E, ENTERIC COATED,) 400 mg TbEC Take  by mouth.  cholecalciferol, VITAMIN D3, (VITAMIN D3) 5,000 unit tab tablet Take  by mouth daily.  Dexlansoprazole (DEXILANT) 60 mg CpDB Take 60 mg by mouth every other day.  omeprazole (PRILOSEC OTC) 20 mg tablet Take 20 mg by mouth daily.  aspirin delayed-release 81 mg tablet Take  by mouth daily. No current facility-administered medications for this visit.         Rhett Leong NP  [facility] [place]  [address]  [facility], [address] [place] [address]

## 2019-09-25 PROBLEM — Z01.818 PREOP EXAMINATION: Status: RESOLVED | Noted: 2017-08-18 | Resolved: 2019-09-25

## 2019-10-20 RX ORDER — FUROSEMIDE 20 MG/1
TABLET ORAL
Qty: 90 TAB | Refills: 3 | Status: SHIPPED | OUTPATIENT
Start: 2019-10-20 | End: 2020-09-15 | Stop reason: SDUPTHER

## 2019-11-25 ENCOUNTER — OFFICE VISIT (OUTPATIENT)
Dept: INTERNAL MEDICINE CLINIC | Age: 74
End: 2019-11-25

## 2019-11-25 VITALS
WEIGHT: 212.8 LBS | DIASTOLIC BLOOD PRESSURE: 68 MMHG | HEIGHT: 68 IN | TEMPERATURE: 97.5 F | OXYGEN SATURATION: 99 % | BODY MASS INDEX: 32.25 KG/M2 | HEART RATE: 52 BPM | RESPIRATION RATE: 16 BRPM | SYSTOLIC BLOOD PRESSURE: 126 MMHG

## 2019-11-25 DIAGNOSIS — E78.5 HYPERLIPIDEMIA LDL GOAL <100: ICD-10-CM

## 2019-11-25 DIAGNOSIS — K21.9 GASTROESOPHAGEAL REFLUX DISEASE WITHOUT ESOPHAGITIS: ICD-10-CM

## 2019-11-25 DIAGNOSIS — I10 ESSENTIAL HYPERTENSION: Primary | ICD-10-CM

## 2019-11-25 DIAGNOSIS — M17.11 ARTHRITIS OF RIGHT KNEE: ICD-10-CM

## 2019-11-25 DIAGNOSIS — M19.90 ARTHRITIS: ICD-10-CM

## 2019-11-25 LAB
A-G RATIO,AGRAT: 1 RATIO
ALBUMIN SERPL-MCNC: 4.4 G/DL (ref 3.9–5.4)
ALP SERPL-CCNC: 60 U/L (ref 38–126)
ALT SERPL-CCNC: 20 U/L (ref 0–35)
ANION GAP SERPL CALC-SCNC: 7 MMOL/L
AST SERPL W P-5'-P-CCNC: 25 U/L (ref 14–36)
BILIRUB SERPL-MCNC: 0.7 MG/DL (ref 0.2–1.3)
BUN SERPL-MCNC: 26 MG/DL (ref 7–17)
BUN/CREATININE RATIO,BUCR: 33 RATIO
CALCIUM SERPL-MCNC: 10 MG/DL (ref 8.4–10.2)
CHLORIDE SERPL-SCNC: 105 MMOL/L (ref 98–107)
CHOL/HDL RATIO,CHHD: 4 RATIO (ref 0–4)
CHOLEST SERPL-MCNC: 158 MG/DL (ref 0–200)
CK SERPL-CCNC: 85 U/L (ref 30–135)
CO2 SERPL-SCNC: 33 MMOL/L (ref 22–32)
CREAT SERPL-MCNC: 0.8 MG/DL (ref 0.7–1.2)
GLOBULIN,GLOB: 4.5
GLUCOSE SERPL-MCNC: 105 MG/DL (ref 65–105)
HDLC SERPL-MCNC: 44 MG/DL (ref 35–130)
LDL/HDL RATIO,LDHD: 2 RATIO
LDLC SERPL CALC-MCNC: 98 MG/DL (ref 0–130)
POTASSIUM SERPL-SCNC: 4.7 MMOL/L (ref 3.6–5)
PROT SERPL-MCNC: 8.9 G/DL (ref 6.3–8.2)
SODIUM SERPL-SCNC: 145 MMOL/L (ref 137–145)
TRIGL SERPL-MCNC: 78 MG/DL (ref 0–200)
VLDLC SERPL CALC-MCNC: 16 MG/DL

## 2019-11-25 NOTE — PROGRESS NOTES
This note will not be viewable in [address] Coco Suarez is a 69 y.o. female and presents with Hypertension and Cholesterol Problem  . Subjective:    Mrs. Britt Dempsey presents today for follow-up of hypertension, hyperlipidemia, monitoring of statin therapy. She is doing very well on her current medical regimen. She denies any side effects from medication. She has no shortness of breath, chest pain, palpitations, PND, orthopnea, or pedal edema. She does have significant arthritis especially involving her right knee this limits her ability to ambulate and interferes with her ability to get to and from places when she is walking. She has a handicap parking placard and asks for a renewal.       Review of Systems  Constitutional:   Eyes:   negative for visual disturbance and irritation  ENT:   negative for tinnitus,sore throat,nasal congestion,ear pains. hoarseness  Respiratory:  negative for cough, hemoptysis, dyspnea,wheezing  CV:   negative for chest pain, palpitations, lower extremity edema  GI:   negative for nausea, vomiting, diarrhea, abdominal pain,melena  Endo:               negative for polyuria,polydipsia,polyphagia,heat intolerance  Genitourinary: negative for frequency, dysuria and hematuria  Integumentary: negative for rash and pruritus  Hematologic:  negative for easy bruising and gum/nose bleeding  Musculoskel: negative for myalgias, arthralgias, back pain, muscle weakness  Neurological:  negative for headaches, dizziness, vertigo, memory problems and gait   Behavl/Psych: negative for feelings of anxiety, depression, mood changes    Past Medical History:   Diagnosis Date    Acute diverticulitis 8/18/2017    Allergic rhinitis 8/18/2017    Arthritis 8/18/2017    Arthritis of right knee 11/25/2019    Back pain, thoracic 8/18/2017    Cataract, left 8/18/2017    Cataract, right 8/18/2017    Cirrhosis ([facility] [address] 8/18/2017    Cold sore 8/18/2017    Elevated hemoglobin A1c 8/18/2017    Fatigue 8/18/2017    Gastrointestinal disorder     acid reflux    GERD (gastroesophageal reflux disease) 8/18/2017    Heart murmur 8/18/2017    Hepatitis C 8/18/2017    Herpes zoster infection of thoracic region 8/18/2017    Hyperlipidemia LDL goal <100 8/18/2017    Hypertension     Menopause     Hysterectomy-42years old    Murmur     Obesity (BMI 30-39. 9) 8/18/2017    Osteopenia 8/18/2017    Other ill-defined conditions(799.89)     hep c    Post-menopausal 8/18/2017    Posterior auricular pain of right ear 8/18/2017    Preop examination 8/18/2017    Valvular heart disease     mitral valve prolapse    Vertigo, benign positional 8/18/2017     Past Surgical History:   Procedure Laterality Date    HX HYSTERECTOMY      42years old    HX ORTHOPAEDIC      knee, foot     Social History     Socioeconomic History    Marital status: SINGLE     Spouse name: Not on file    Number of children: Not on file    Years of education: Not on file    Highest education level: Not on file   Tobacco Use    Smoking status: Former Smoker    Smokeless tobacco: Never Used   Substance and Sexual Activity    Alcohol use: No     Alcohol/week: 0.0 standard drinks    Drug use: No     Family History   Problem Relation Age of Onset    Hypertension Mother     Diabetes Mother     Heart Disease Father      Current Outpatient Medications   Medication Sig Dispense Refill    furosemide (LASIX) 20 mg tablet TAKE 1 TABLET BY MOUTH ONCE DAILY 90 Tab 3    olmesartan-hydroCHLOROthiazide (BENICAR HCT) 20-12.5 mg per tablet TAKE ONE TABLET BY MOUTH ONCE DAILY 90 Tab 3    valACYclovir (VALTREX) 500 mg tablet TAKE ONE TABLET BY MOUTH ONCE DAILY 30 Tab 6    TURMERIC ROOT EXTRACT PO Take 200 mg by mouth.  vit C/vit E ac/selenium/ginkgo (MEMORY COMPLEX PO) Take  by mouth daily.  cyanocobalamin (VITAMIN B-12) 1,000 mcg tablet Take 1,000 mcg by mouth daily.  psyllium (METAMUCIL) powd Take  by mouth daily.       magnesium 250 mg tab Take  by mouth.  mometasone (NASONEX) 50 mcg/actuation nasal spray 2 Sprays daily.  naproxen sodium (ALEVE) 220 mg tablet Take 220 mg by mouth two (2) times daily (with meals).  docosanol (ABREVA) 10 % topical cream Apply  to affected area five (5) times daily.  biotin 5,000 mcg TbDi Take  by mouth.  krill oil 500 mg cap Take  by mouth.  MVIT-MINS/FOLIC ACID/SOY ISOFL (ONE-A-DAY MENOPAUSE FORMULA PO) Take  by mouth two (2) times a day.  S-Adenosylmethionine (YRN-E, ENTERIC COATED,) 400 mg TbEC Take  by mouth.  cholecalciferol, VITAMIN D3, (VITAMIN D3) 5,000 unit tab tablet Take  by mouth daily.  Dexlansoprazole (DEXILANT) 60 mg CpDB Take 60 mg by mouth every other day.  omeprazole (PRILOSEC OTC) 20 mg tablet Take 20 mg by mouth daily.  ascorbic acid, vitamin C, (VITAMIN C) 500 mg tablet Take  by mouth. No Known Allergies    Objective:  Visit Vitals  /68 (BP 1 Location: Left arm, BP Patient Position: Sitting)   Pulse (!) 52   Temp 97.5 °F (36.4 °C) (Oral)   Resp 16   Ht 5' 8\" (1.727 m)   Wt 212 lb 12.8 oz (96.5 kg)   SpO2 99%   BMI 32.36 kg/m²     Physical Exam:   General appearance - alert, well appearing, and in no distress  Mental status - alert, oriented to person, place, and time  EYE-EMMY, EOMI, fundi normal, corneas normal, no foreign bodies  ENT-ENT exam normal, no neck nodes or sinus tenderness  Nose - normal and patent, no erythema, discharge or polyps  Mouth - mucous membranes moist, pharynx normal without lesions  Neck - supple, no significant adenopathy   Chest - clear to auscultation, no wheezes, rales or rhonchi, symmetric air entry   Heart - normal rate, regular rhythm, normal S1, S2, no murmurs, rubs, clicks or gallops   Abdomen - soft, nontender, nondistended, no masses or organomegaly  Lymph- no adenopathy palpable  Ext-peripheral pulses normal, no pedal edema, no clubbing or cyanosis  Skin-Warm and dry.  no hyperpigmentation, vitiligo, or suspicious lesions  Neuro -alert, oriented, normal speech, no focal findings or movement disorder noted  Musculoskeletal- FROM, no effusion or calor, positive crepitus, mild bony changes right knee, no point tenderness    No results found for this visit on 11/25/19. All results for lab orders may not have been returned by the time this encountered was closed. Assessment/Plan:       ICD-10-CM ICD-9-CM    1. Essential hypertension P27 723.2 METABOLIC PANEL, COMPREHENSIVE   2. Gastroesophageal reflux disease without esophagitis K21.9 530.81    3. Hyperlipidemia LDL goal <100 E78.5 272.4 CK      LIPID PANEL   4. Arthritis M19.90 716.90    5. Arthritis of right knee M17.11 716.96        Orders Placed This Encounter    CK ([facility])    LIPID PANEL ([facility])    METABOLIC PANEL, COMPREHENSIVE ([facility])     Plan:    Continue current medical regimen as outlined above. Further recommendations based on labs as ordered. Return to clinic in 6 months unless otherwise indicated. Renewed handicap parking placard for degenerative arthritis of the right knee. I have reviewed with the patient details of the assessment and plan and all questions were answered. Relevent patient education was performed. Verbal and/or written instructions (see AVS) provided. The most recent lab findings were reviewed with the patient. Plan was discussed with patient who verbal expressed understanding. An After Visit Summary was printed and given to the patient.       Yamila Gabriel MD

## 2019-11-25 NOTE — PROGRESS NOTES
Reviewed record in preparation for visit and have obtained necessary documentation. Identified pt with two pt identifiers(name and ). Chief Complaint   Patient presents with    Hypertension    Cholesterol Problem        Coordination of Care Questionnaire:  :     1) Have you been to an emergency room, urgent care clinic since your last visit? Yes [facility]     Hospitalized since your last visit? No               2) Have you seen or consulted any other health care providers outside of [address] [place] [address] since your last visit?  No

## 2019-11-27 NOTE — PROGRESS NOTES
Your cholesterol profile is excellent. Your glucose is normal.  Your liver and kidney function are normal.  Your protein is mildly elevated as noted previously. Further testing done last spring did not demonstrate any significant cause for your elevated protein level. We will continue to monitor this and repeat your test on follow-up in 6 months.

## 2020-05-14 ENCOUNTER — TELEPHONE (OUTPATIENT)
Dept: INTERNAL MEDICINE CLINIC | Age: 75
End: 2020-05-14

## 2020-05-14 NOTE — TELEPHONE ENCOUNTER
Patient called stating that she tested positive for the coronavirus. Not having any symptoms, feeling fine. Went to get tested because she was sick at the end of February and wanted to know if she could have had the virus. Wants to know if she should get tested again for a second opinion.

## 2020-06-04 ENCOUNTER — OFFICE VISIT (OUTPATIENT)
Dept: INTERNAL MEDICINE CLINIC | Age: 75
End: 2020-06-04

## 2020-06-04 VITALS
WEIGHT: 207.4 LBS | SYSTOLIC BLOOD PRESSURE: 110 MMHG | HEIGHT: 68 IN | TEMPERATURE: 98.3 F | BODY MASS INDEX: 31.43 KG/M2 | DIASTOLIC BLOOD PRESSURE: 62 MMHG | RESPIRATION RATE: 16 BRPM | HEART RATE: 73 BPM | OXYGEN SATURATION: 97 %

## 2020-06-04 DIAGNOSIS — M85.88 OSTEOPENIA OF LUMBAR SPINE: ICD-10-CM

## 2020-06-04 DIAGNOSIS — N39.0 URINARY TRACT INFECTION WITHOUT HEMATURIA, SITE UNSPECIFIED: ICD-10-CM

## 2020-06-04 DIAGNOSIS — I10 ESSENTIAL HYPERTENSION: Primary | ICD-10-CM

## 2020-06-04 DIAGNOSIS — R73.09 ELEVATED HEMOGLOBIN A1C: ICD-10-CM

## 2020-06-04 DIAGNOSIS — E78.5 HYPERLIPIDEMIA LDL GOAL <100: ICD-10-CM

## 2020-06-04 DIAGNOSIS — R53.83 FATIGUE, UNSPECIFIED TYPE: ICD-10-CM

## 2020-06-04 LAB
A-G RATIO,AGRAT: 1 RATIO
ALBUMIN SERPL-MCNC: 4.5 G/DL (ref 3.9–5.4)
ALP SERPL-CCNC: 56 U/L (ref 38–126)
ALT SERPL-CCNC: 16 U/L (ref 0–35)
ANION GAP SERPL CALC-SCNC: 13 MMOL/L
AST SERPL W P-5'-P-CCNC: 22 U/L (ref 14–36)
BILIRUB SERPL-MCNC: 0.8 MG/DL (ref 0.2–1.3)
BILIRUB UR QL: NEGATIVE
BUN SERPL-MCNC: 28 MG/DL (ref 7–17)
BUN/CREATININE RATIO,BUCR: 28 RATIO
CALCIUM SERPL-MCNC: 9.8 MG/DL (ref 8.4–10.2)
CHLORIDE SERPL-SCNC: 100 MMOL/L (ref 98–107)
CHOL/HDL RATIO,CHHD: 4 RATIO (ref 0–4)
CHOLEST SERPL-MCNC: 187 MG/DL (ref 0–200)
CLARITY: CLEAR
CO2 SERPL-SCNC: 29 MMOL/L (ref 22–32)
COLOR UR: ABNORMAL
CREAT SERPL-MCNC: 1 MG/DL (ref 0.7–1.2)
ERYTHROCYTE [DISTWIDTH] IN BLOOD BY AUTOMATED COUNT: 13.6 %
GLOBULIN,GLOB: 4.3
GLUCOSE 24H UR-MRATE: NEGATIVE G/(24.H)
GLUCOSE SERPL-MCNC: 91 MG/DL (ref 65–105)
HBA1C MFR BLD HPLC: 5.1 % (ref 4–5.7)
HCT VFR BLD AUTO: 40.1 % (ref 37–51)
HDLC SERPL-MCNC: 53 MG/DL (ref 35–130)
HGB BLD-MCNC: 13.1 G/DL (ref 12–18)
HGB UR QL STRIP: NEGATIVE
KETONES UR QL STRIP.AUTO: NEGATIVE
LDL/HDL RATIO,LDHD: 2 RATIO
LDLC SERPL CALC-MCNC: 116 MG/DL (ref 0–130)
LEUKOCYTE ESTERASE: ABNORMAL
LYMPHOCYTES ABSOLUTE: 1.7 K/UL (ref 0.6–4.1)
LYMPHOCYTES NFR BLD: 35.4 % (ref 10–58.5)
MCH RBC QN AUTO: 29.5 PG (ref 26–32)
MCHC RBC AUTO-ENTMCNC: 32.7 G/DL (ref 30–36)
MCV RBC AUTO: 90.3 FL (ref 80–97)
MONOCYTES ABS-DIF,2141: 0.5 K/UL (ref 0–1.8)
MONOCYTES NFR BLD: 10.9 % (ref 0.1–24)
NEUTROPHILS # BLD: 53.7 % (ref 37–92)
NEUTROPHILS ABS,2156: 2.5 K/UL (ref 2–7.8)
NITRITE UR QL STRIP.AUTO: NEGATIVE
PH UR STRIP: 6 [PH] (ref 5–7)
PLATELET # BLD AUTO: 225 K/UL (ref 140–440)
POTASSIUM SERPL-SCNC: 4.1 MMOL/L (ref 3.6–5)
PROT SERPL-MCNC: 8.8 G/DL (ref 6.3–8.2)
PROT UR STRIP-MCNC: ABNORMAL MG/DL
RBC # BLD AUTO: 4.44 M/UL (ref 4.2–6.3)
RBC #/AREA URNS HPF: ABNORMAL #/HPF
SODIUM SERPL-SCNC: 142 MMOL/L (ref 137–145)
SP GR UR REFRACTOMETRY: 1.02 (ref 1–1.03)
TRIGL SERPL-MCNC: 89 MG/DL (ref 0–200)
UROBILINOGEN UR QL STRIP.AUTO: NEGATIVE
VLDLC SERPL CALC-MCNC: 18 MG/DL
WBC # BLD AUTO: 4.7 K/UL (ref 4.1–10.9)
WBC URNS QL MICRO: ABNORMAL #/HPF

## 2020-06-04 NOTE — PROGRESS NOTES
Reviewed record in preparation for visit and have obtained necessary documentation. Identified pt with two pt identifiers(name and ). Chief Complaint   Patient presents with    Hypertension    Cholesterol Problem        Coordination of Care Questionnaire:  :     1) Have you been to an emergency room, urgent care clinic since your last visit? No   Hospitalized since your last visit? No               2) Have you seen or consulted any other health care providers outside of [address] [place] [address] since your last visit?   No

## 2020-06-04 NOTE — PROGRESS NOTES
This note will not be viewable in [address] Amarilys Guerrero is a 69 y.o. female and presents with Hypertension and Cholesterol Problem  . Subjective:    Mrs. Giovanni Vail presents today for follow-up of hypertension, hyperlipidemia, impaired glucose tolerance, and reflux. She is doing well on her current medical regimen. She does have follow-up with her cardiologist and her hepatologist on a regular basis. Her liver scans have been stable. She denies any shortness of breath, chest pain, palpitations, PND, orthopnea, or pedal edema. She had some upper respiratory symptoms and March and was tested for COVID-19 which was negative. Review of Systems  Constitutional:   Eyes:   negative for visual disturbance and irritation  ENT:   negative for tinnitus,sore throat,nasal congestion,ear pains. hoarseness  Respiratory:  negative for cough, hemoptysis, dyspnea,wheezing  CV:   negative for chest pain, palpitations, lower extremity edema  GI:   negative for nausea, vomiting, diarrhea, abdominal pain,melena  Endo:               negative for polyuria,polydipsia,polyphagia,heat intolerance  Genitourinary: negative for frequency, dysuria and hematuria  Integumentary: negative for rash and pruritus  Hematologic:  negative for easy bruising and gum/nose bleeding  Musculoskel: negative for myalgias, arthralgias, back pain, muscle weakness, joint pain  Neurological:  negative for headaches, dizziness, vertigo, memory problems and gait   Behavl/Psych: negative for feelings of anxiety, depression, mood changes    Past Medical History:   Diagnosis Date    Acute diverticulitis 8/18/2017    Allergic rhinitis 8/18/2017    Arthritis 8/18/2017    Arthritis of right knee 11/25/2019    Back pain, thoracic 8/18/2017    Cataract, left 8/18/2017    Cataract, right 8/18/2017    Cirrhosis ([facility] [address] 8/18/2017    Cold sore 8/18/2017    Elevated hemoglobin A1c 8/18/2017    Fatigue 8/18/2017    Gastrointestinal disorder     acid reflux  GERD (gastroesophageal reflux disease) 2017    Heart murmur 2017    Hepatitis C 2017    Herpes zoster infection of thoracic region 2017    Hyperlipidemia LDL goal <100 2017    Hypertension     Menopause     Hysterectomy-42years old    Murmur     Obesity (BMI 30-39. 9) 2017    Osteopenia 2017    Other ill-defined conditions(799.89)     hep c    Post-menopausal 2017    Posterior auricular pain of right ear 2017    Preop examination 2017    Valvular heart disease     mitral valve prolapse    Vertigo, benign positional 2017     Past Surgical History:   Procedure Laterality Date    HX HYSTERECTOMY      42years old    HX ORTHOPAEDIC      knee, foot     Social History     Socioeconomic History    Marital status: SINGLE     Spouse name: Not on file    Number of children: Not on file    Years of education: Not on file    Highest education level: Not on file   Tobacco Use    Smoking status: Former Smoker     Packs/day: 0.25     Years: 25.00     Pack years: 6.25     Types: Cigarettes     Last attempt to quit: 1990     Years since quittin.4    Smokeless tobacco: Never Used   Substance and Sexual Activity    Alcohol use: No     Alcohol/week: 0.0 standard drinks    Drug use: No     Family History   Problem Relation Age of Onset    Hypertension Mother     Diabetes Mother     Heart Disease Father      Current Outpatient Medications   Medication Sig Dispense Refill    olmesartan-hydroCHLOROthiazide (BENICAR HCT) 20-12.5 mg per tablet TAKE 1 TABLET BY MOUTH ONCE DAILY 90 Tab 3    furosemide (LASIX) 20 mg tablet TAKE 1 TABLET BY MOUTH ONCE DAILY 90 Tab 3    valACYclovir (VALTREX) 500 mg tablet TAKE ONE TABLET BY MOUTH ONCE DAILY 30 Tab 6    TURMERIC ROOT EXTRACT PO Take 200 mg by mouth.  vit C/vit E ac/selenium/ginkgo (MEMORY COMPLEX PO) Take  by mouth daily.       cyanocobalamin (VITAMIN B-12) 1,000 mcg tablet Take 1,000 mcg by mouth daily.  psyllium (METAMUCIL) powd Take  by mouth daily.  magnesium 250 mg tab Take  by mouth.  mometasone (NASONEX) 50 mcg/actuation nasal spray 2 Sprays as needed.  naproxen sodium (ALEVE) 220 mg tablet Take 220 mg by mouth two (2) times daily (with meals).  biotin 5,000 mcg TbDi Take  by mouth.  krill oil 500 mg cap Take  by mouth.  MVIT-MINS/FOLIC ACID/SOY ISOFL (ONE-A-DAY MENOPAUSE FORMULA PO) Take  by mouth two (2) times a day.  S-Adenosylmethionine (YRN-E, ENTERIC COATED,) 400 mg TbEC Take  by mouth.  cholecalciferol, VITAMIN D3, (VITAMIN D3) 5,000 unit tab tablet Take  by mouth daily.  Dexlansoprazole (DEXILANT) 60 mg CpDB Take 60 mg by mouth every other day.  omeprazole (PRILOSEC OTC) 20 mg tablet Take 20 mg by mouth as needed.  ascorbic acid, vitamin C, (VITAMIN C) 500 mg tablet Take  by mouth.  docosanol (ABREVA) 10 % topical cream Apply  to affected area five (5) times daily.        No Known Allergies    Objective:  Visit Vitals  /62 (BP 1 Location: Left arm, BP Patient Position: Sitting)   Pulse 73   Temp 98.3 °F (36.8 °C) (Oral)   Resp 16   Ht 5' 8\" (1.727 m)   Wt 207 lb 6.4 oz (94.1 kg)   SpO2 97%   BMI 31.54 kg/m²     Physical Exam:   General appearance - alert, well appearing, and in no distress  Mental status - alert, oriented to person, place, and time  EYE-EMMY, EOMI, fundi normal, corneas normal, no foreign bodies  ENT-ENT exam normal, no neck nodes or sinus tenderness  Nose - normal and patent, no erythema, discharge or polyps  Mouth - mucous membranes moist, pharynx normal without lesions  Neck - supple, no significant adenopathy   Chest - clear to auscultation, no wheezes, rales or rhonchi, symmetric air entry   Heart - normal rate, regular rhythm, normal S1, S2, no murmurs, rubs, clicks or gallops   Abdomen - soft, nontender, nondistended, no masses or organomegaly  Lymph- no adenopathy palpable  Ext-peripheral pulses normal, no pedal edema, no clubbing or cyanosis  Skin-Warm and dry. no hyperpigmentation, vitiligo, or suspicious lesions  Neuro -alert, oriented, normal speech, no focal findings or movement disorder noted  Musculoskeletal- FROM, no bony abnormalities, no point tenderness    No results found for this visit on 06/04/20. All results for lab orders may not have been returned by the time this encountered was closed. Assessment/Plan:       ICD-10-CM ICD-9-CM    1. Essential hypertension P21 475.0 METABOLIC PANEL, COMPREHENSIVE      LIPID PANEL      URINALYSIS W/O MICRO   2. Osteopenia of lumbar spine M85.88 733.90    3. Hyperlipidemia LDL goal <100 E78.5 272.4 LIPID PANEL   4. Elevated hemoglobin A1c R73.09 790.29 HEMOGLOBIN A1C W/O EAG   5. Fatigue, unspecified type R53.83 780.79 CBC WITH AUTOMATED DIFF       Orders Placed This Encounter    METABOLIC PANEL, COMPREHENSIVE ([facility])    LIPID PANEL ([facility])    URINALYSIS W/O MICRO ([facility])    CBC WITH AUTOMATED DIFF ([facility])    HEMOGLOBIN A1C W/O EAG ([facility])     Plan:    Continue current medical regimen as outlined above. Further recommendations based on labs as ordered. Return to clinic in 6 months unless otherwise indicated. I have reviewed with the patient details of the assessment and plan and all questions were answered. Relevent patient education was performed. Verbal and/or written instructions (see AVS) provided. The most recent lab findings were reviewed with the patient. Plan was discussed with patient who verbal expressed understanding. An After Visit Summary was printed and given to the patient.       George Messina MD

## 2020-06-07 LAB — BACTERIA UR CULT: NORMAL

## 2020-06-19 RX ORDER — VALACYCLOVIR HYDROCHLORIDE 500 MG/1
TABLET, FILM COATED ORAL
Qty: 30 TAB | Refills: 0 | Status: SHIPPED | OUTPATIENT
Start: 2020-06-19 | End: 2022-03-24 | Stop reason: SDUPTHER

## 2020-06-23 NOTE — PROGRESS NOTES
Lab results overall look great. Glucose is normal.  Liver and kidney function are normal.  There was trace urinary sediment but a urine culture was negative. Your total protein is just above normal range as noted previously. There has been no significant change. Your cholesterol profile is excellent.

## 2020-07-23 ENCOUNTER — HOSPITAL ENCOUNTER (OUTPATIENT)
Dept: MAMMOGRAPHY | Age: 75
Discharge: HOME | End: 2020-07-23
Attending: INTERNAL MEDICINE
Payer: COMMERCIAL

## 2020-07-23 DIAGNOSIS — Z12.31 VISIT FOR SCREENING MAMMOGRAM: ICD-10-CM

## 2020-07-23 PROCEDURE — 77063 BREAST TOMOSYNTHESIS BI: CPT

## 2020-07-31 ENCOUNTER — HOSPITAL ENCOUNTER (OUTPATIENT)
Dept: MAMMOGRAPHY | Age: 75
Discharge: HOME | End: 2020-07-31
Attending: INTERNAL MEDICINE
Payer: COMMERCIAL

## 2020-07-31 DIAGNOSIS — R92.8 ABNORMAL MAMMOGRAM OF LEFT BREAST: ICD-10-CM

## 2020-07-31 PROCEDURE — 77065 DX MAMMO INCL CAD UNI: CPT

## 2020-07-31 PROCEDURE — 76642 ULTRASOUND BREAST LIMITED: CPT

## 2020-08-04 ENCOUNTER — HOSPITAL ENCOUNTER (OUTPATIENT)
Dept: MAMMOGRAPHY | Age: 75
Discharge: HOME | End: 2020-08-04
Attending: INTERNAL MEDICINE
Payer: COMMERCIAL

## 2020-08-04 DIAGNOSIS — R92.8 ABNORMAL MAMMOGRAM OF LEFT BREAST: ICD-10-CM

## 2020-08-04 PROCEDURE — 88305 TISSUE EXAM BY PATHOLOGIST: CPT

## 2020-08-04 PROCEDURE — 88360 TUMOR IMMUNOHISTOCHEM/MANUAL: CPT

## 2020-08-04 PROCEDURE — 77065 DX MAMMO INCL CAD UNI: CPT

## 2020-08-04 PROCEDURE — 19083 BX BREAST 1ST LESION US IMAG: CPT

## 2020-08-04 PROCEDURE — 74011000250 HC RX REV CODE- 250: Performed by: RADIOLOGY

## 2020-08-04 RX ORDER — LIDOCAINE HYDROCHLORIDE AND EPINEPHRINE 10; 10 MG/ML; UG/ML
20 INJECTION, SOLUTION INFILTRATION; PERINEURAL ONCE
Status: COMPLETED | OUTPATIENT
Start: 2020-08-04 | End: 2020-08-04

## 2020-08-04 RX ORDER — LIDOCAINE HYDROCHLORIDE 10 MG/ML
5 INJECTION INFILTRATION; PERINEURAL
Status: COMPLETED | OUTPATIENT
Start: 2020-08-04 | End: 2020-08-04

## 2020-08-04 RX ADMIN — LIDOCAINE HYDROCHLORIDE,EPINEPHRINE BITARTRATE 200 MG: 10; .01 INJECTION, SOLUTION INFILTRATION; PERINEURAL at 08:30

## 2020-08-04 RX ADMIN — LIDOCAINE HYDROCHLORIDE 5 ML: 10 INJECTION, SOLUTION INFILTRATION; PERINEURAL at 08:30

## 2020-08-07 NOTE — PROGRESS NOTES
Dr. Josefina Gallardo called patient and provided pathology. Called patient with appointment information, Dr. Huang Pulliam 4/89 at 1pm. Patient states that the biopsy site is healing well and she has no concerns.

## 2020-08-21 ENCOUNTER — DOCUMENTATION ONLY (OUTPATIENT)
Dept: SURGERY | Age: 75
End: 2020-08-21

## 2020-08-21 ENCOUNTER — OFFICE VISIT (OUTPATIENT)
Dept: SURGERY | Age: 75
End: 2020-08-21
Payer: COMMERCIAL

## 2020-08-21 VITALS
WEIGHT: 207 LBS | DIASTOLIC BLOOD PRESSURE: 62 MMHG | TEMPERATURE: 97 F | HEART RATE: 87 BPM | BODY MASS INDEX: 31.37 KG/M2 | HEIGHT: 68 IN | SYSTOLIC BLOOD PRESSURE: 128 MMHG

## 2020-08-21 DIAGNOSIS — C50.912 INVASIVE DUCTAL CARCINOMA OF LEFT BREAST (HCC): ICD-10-CM

## 2020-08-21 DIAGNOSIS — N63.20 BREAST MASS, LEFT: Primary | ICD-10-CM

## 2020-08-21 PROCEDURE — G9899 SCRN MAM PERF RSLTS DOC: HCPCS | Performed by: SURGERY

## 2020-08-21 PROCEDURE — G8417 CALC BMI ABV UP PARAM F/U: HCPCS | Performed by: SURGERY

## 2020-08-21 PROCEDURE — G8752 SYS BP LESS 140: HCPCS | Performed by: SURGERY

## 2020-08-21 PROCEDURE — G8432 DEP SCR NOT DOC, RNG: HCPCS | Performed by: SURGERY

## 2020-08-21 PROCEDURE — 1090F PRES/ABSN URINE INCON ASSESS: CPT | Performed by: SURGERY

## 2020-08-21 PROCEDURE — 76642 ULTRASOUND BREAST LIMITED: CPT | Performed by: SURGERY

## 2020-08-21 PROCEDURE — 99205 OFFICE O/P NEW HI 60 MIN: CPT | Performed by: SURGERY

## 2020-08-21 PROCEDURE — G8536 NO DOC ELDER MAL SCRN: HCPCS | Performed by: SURGERY

## 2020-08-21 PROCEDURE — G8427 DOCREV CUR MEDS BY ELIG CLIN: HCPCS | Performed by: SURGERY

## 2020-08-21 PROCEDURE — 1101F PT FALLS ASSESS-DOCD LE1/YR: CPT | Performed by: SURGERY

## 2020-08-21 PROCEDURE — G8399 PT W/DXA RESULTS DOCUMENT: HCPCS | Performed by: SURGERY

## 2020-08-21 PROCEDURE — 3017F COLORECTAL CA SCREEN DOC REV: CPT | Performed by: SURGERY

## 2020-08-21 PROCEDURE — G8754 DIAS BP LESS 90: HCPCS | Performed by: SURGERY

## 2020-08-21 RX ORDER — ALPRAZOLAM 0.5 MG/1
0.5 TABLET ORAL
Qty: 12 TAB | Refills: 0 | Status: SHIPPED | OUTPATIENT
Start: 2020-08-21 | End: 2020-09-15

## 2020-08-21 NOTE — LETTER
8/24/2020 9:24 AM 
 
Patient:  Cadence Quiñones YOB: 1945 Date of Visit: 8/21/2020 Dear Dr. Dillon Arrington: Thank you for referring Ms. Vila Prior to me for evaluation/treatment. Below are the relevant portions of my assessment and plan of care. If you have questions, please do not hesitate to call me. I look forward to following Ms. Clemente Doreen along with you.  
 
 
 
Sincerely, 
 
 
Khoi Rivera MD

## 2020-08-21 NOTE — PROGRESS NOTES
HISTORY OF PRESENT ILLNESS  Delaine Bamberger is a 70 y.o. female. HPI  NEW Patient presents for consultation at the request of Dr. Feroz Dietrich for newly diagnosed LEFT breast cancer. She had no abnormal breast symptoms to report. This was detected from screening mammogram which led to diagnostic imaging and subsequent biopsy. Biopsy site healing well. No prior history of breast biopsies. Her son with here with her today. 08/04/20: LEFT breast bx. PATH: IDC, 5 mm in greatest linear dimension extending to core biopsy tip, histologic grade 2, ER+(%)/MS+(%)/HER2-. Clinical stage 1.     Family history-  Maternal aunt had colon cancer.     Breast imaging-  [facility] Results (most recent):       Results from Hospital Encounter encounter on 08/04/20   [facility] POST BX IMAGING LT INCL CAD     Addendum Addendum: Addendum to the left breast biopsy:  Findings: Pathology of the left breast mass is consistent with invasive  mammary carcinoma, ductal . The imaging findings are concordant with the histology results. Recommend surgical consultation and excision . The patient was contacted by Dr. Rachel Ocampo at 3:00 PM 8/6/2020 . We will inform the  office of the referring physician and assist in scheduling a breast surgical  appointment. Salome Nails MD 8/6/2020  3:00 PM           Narrative STUDY:  ULTRASOUND-GUIDED CORE NEEDLE BIOPSY OF THE LEFT BREAST     INDICATION: 72-year-old female with a left breast mass, presenting for biopsy.     PROCEDURE:     The risks and benefits of the procedure were explained to the patient, questions  were answered, and informed consent was obtained.     The mass at 6 o'clock in the left breast was localized with ultrasound. The  breast was prepped in the usual sterile manner. Following the administration of  a local anesthetic and dermatotomy, and using ultrasound guidance,  a 12 gauge  vacuum-assisted Atec needle was advanced to the lesion, and 5 cores were  obtained.  Pre and post-fire images confirmed accurate needle trajectory. A  metallic clip was placed at the biopsy site. Post-biopsy digital mammogram  confirms the presence of the clip at the intended biopsy site. The patient  tolerated the procedure well without complication.      Final pathology is pending.        Impression IMPRESSION:      Successful ultrasound-guided biopsy yielding cores submitted for histologic  analysis. A clip was placed at the biopsy site. Post-biopsy digital mammogram  confirms the presence of the clip at the intended biopsy site. Further  recommendations will be based on final pathology results.                        Past Medical History:   Diagnosis Date    Acute diverticulitis 8/18/2017    Allergic rhinitis 8/18/2017    Arthritis 8/18/2017    Arthritis of right knee 11/25/2019    Back pain, thoracic 8/18/2017    Cataract, left 8/18/2017    Cataract, right 8/18/2017    Cirrhosis ([address] 8/18/2017    Cold sore 8/18/2017    Elevated hemoglobin A1c 8/18/2017    Fatigue 8/18/2017    Gastrointestinal disorder     acid reflux    GERD (gastroesophageal reflux disease) 8/18/2017    Heart murmur 8/18/2017    Hepatitis C 8/18/2017    Herpes zoster infection of thoracic region 8/18/2017    Hyperlipidemia LDL goal <100 8/18/2017    Hypertension     Menopause     Hysterectomy-42years old    Murmur     Obesity (BMI 30-39. 9) 8/18/2017    Osteopenia 8/18/2017    Other ill-defined conditions(799.89)     hep c    Post-menopausal 8/18/2017    Posterior auricular pain of right ear 8/18/2017    Preop examination 8/18/2017    Valvular heart disease     mitral valve prolapse    Vertigo, benign positional 8/18/2017       Past Surgical History:   Procedure Laterality Date    HX HYSTERECTOMY      42years old    HX ORTHOPAEDIC      knee, foot       Social History     Socioeconomic History    Marital status: SINGLE     Spouse name: Not on file    Number of children: Not on file    Years of education: Not on file    Highest education level: Not on file   Occupational History    Not on file   Social Needs    Financial resource strain: Not on file    Food insecurity     Worry: Not on file     Inability: Not on file    Transportation needs     Medical: Not on file     Non-medical: Not on file   Tobacco Use    Smoking status: Former Smoker     Packs/day: 0.25     Years: 25.00     Pack years: 6.25     Types: Cigarettes     Last attempt to quit: 1990     Years since quittin.6    Smokeless tobacco: Never Used   Substance and Sexual Activity    Alcohol use: No     Alcohol/week: 0.0 standard drinks    Drug use: No    Sexual activity: Not on file   Lifestyle    Physical activity     Days per week: Not on file     Minutes per session: Not on file    Stress: Not on file   Relationships    Social connections     Talks on phone: Not on file     Gets together: Not on file     Attends Hinduism service: Not on file     Active member of club or organization: Not on file     Attends meetings of clubs or organizations: Not on file     Relationship status: Not on file    Intimate partner violence     Fear of current or ex partner: Not on file     Emotionally abused: Not on file     Physically abused: Not on file     Forced sexual activity: Not on file   Other Topics Concern    Not on file   Social History Narrative    Not on file       Current Outpatient Medications on File Prior to Visit   Medication Sig Dispense Refill    valACYclovir (VALTREX) 500 mg tablet Take 1 tablet by mouth once daily 30 Tab 0    olmesartan-hydroCHLOROthiazide (BENICAR HCT) 20-12.5 mg per tablet TAKE 1 TABLET BY MOUTH ONCE DAILY 90 Tab 3    furosemide (LASIX) 20 mg tablet TAKE 1 TABLET BY MOUTH ONCE DAILY 90 Tab 3    TURMERIC ROOT EXTRACT PO Take 200 mg by mouth.  vit C/vit E ac/selenium/ginkgo (MEMORY COMPLEX PO) Take  by mouth daily.  cyanocobalamin (VITAMIN B-12) 1,000 mcg tablet Take 1,000 mcg by mouth daily.       ascorbic acid, vitamin C, (VITAMIN C) 500 mg tablet Take  by mouth.  psyllium (METAMUCIL) powd Take  by mouth daily.  magnesium 250 mg tab Take  by mouth.  mometasone (NASONEX) 50 mcg/actuation nasal spray 2 Sprays as needed.  naproxen sodium (ALEVE) 220 mg tablet Take 220 mg by mouth two (2) times daily (with meals).  docosanol (ABREVA) 10 % topical cream Apply  to affected area five (5) times daily.  biotin 5,000 mcg TbDi Take  by mouth.  krill oil 500 mg cap Take  by mouth.  MVIT-MINS/FOLIC ACID/SOY ISOFL (ONE-A-DAY MENOPAUSE FORMULA PO) Take  by mouth two (2) times a day.  S-Adenosylmethionine (YRN-E, ENTERIC COATED,) 400 mg TbEC Take  by mouth.  cholecalciferol, VITAMIN D3, (VITAMIN D3) 5,000 unit tab tablet Take  by mouth daily.  Dexlansoprazole (DEXILANT) 60 mg CpDB Take 60 mg by mouth every other day.  omeprazole (PRILOSEC OTC) 20 mg tablet Take 20 mg by mouth as needed. No current facility-administered medications on file prior to visit. No Known Allergies    OB History    No obstetric history on file. Obstetric Comments   Menarche 17, LMP age 42, # of children 1, age of 4st delivery 23, Hysterectomy/oophorectomy yes partial/yes, Breast bx no, history of breast feeding no, BCP yes, Hormone therapy yes             ROS  Constitutional: Negative. HENT: Negative. Eyes: Negative. Respiratory: Negative. Cardiovascular: Negative. Gastrointestinal: Negative. Genitourinary: Negative. Musculoskeletal: Negative. Skin: Negative. Neurological: Negative. Endo/Heme/Allergies: Negative. Psychiatric/Behavioral: Negative. Physical Exam  Exam conducted with a chaperone present. Cardiovascular:      Rate and Rhythm: Normal rate and regular rhythm. Heart sounds: Normal heart sounds. Pulmonary:      Breath sounds: Normal breath sounds.    Chest:      Breasts: Breasts are symmetrical.         Right: Normal. No swelling, bleeding, inverted nipple, mass, nipple discharge, skin change or tenderness. Left: Mass present. No swelling, bleeding, inverted nipple, nipple discharge, skin change or tenderness. Lymphadenopathy:      Cervical:      Right cervical: No superficial, deep or posterior cervical adenopathy. Left cervical: No superficial, deep or posterior cervical adenopathy. Upper Body:      Right upper body: No supraclavicular or axillary adenopathy. Left upper body: No supraclavicular or axillary adenopathy. BREAST ULTRASOUND, Pre-op Planning  Indication: Pre-op planning, LEFT breast 6:00, 4cm from the nipple  Technique: The area was scanned using a high-frequency linear-array near-field transducer  Findings: Mass and bx clip visible  Impression: Breast cancer  Disposition: Surgery      ASSESSMENT and PLAN    ICD-10-CM ICD-9-CM    1. Breast mass, left  N63.20 611.72    2. 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 Small palpable mass on exam at LEFT breast 6:00, 4cm from the nipple. US visualizes mass and bx clip. We had a long discussion of options for treatment. A total of 80 minutes were spent face-to-face with the patient, accompanied by her son, during this encounter, and over half of that time was spent on counseling and coordination of care. We discussed in depth the pathology results, and the need for treatment following MRI for extent of disease and surgical planning. The goals of treatment are to treat the breast, and to reduce risk of local or distant recurrence. Discussed treatment options with risks, complications, benefits, and limitations, including lumpectomy with XRT, and mastectomy with reconstruction, both having the same cure rate. Explained the importance of negative margins. Also discussed benefit and technique of SLNBx of 3-4 LNs. Pt has considered breast reduction, so we also discussed option for oncoplastic reduction and contralateral symmetry mastopexy in conjunction with plastic surgeon. This will be an outpatient operation. We also covered risk reduction strategies including XRT, chemotherapy, and hormonal therapy with estrogen blocker. Discussed that in women over 79 with lumpectomy (with negative LNs) and AI, and without XRT, there is a slightly increased risk of recurrence, but that this does not affect overall cure rate. Will order MammaPrint to help determine whether pt will benefit from chemotherapy. If low risk, chemo may not be recommended. If high risk, will further evaluate need for chemo based on final tumor size and LN involvement. Discussed estrogen blocking pill for 5-10 years for further risk reduction for ER+ disease. Will order MRI (prescription given for Xanax) and plan further from there - radiology will call pt to schedule. This plan was reviewed with the patient and patient agrees. All questions were answered.     Written by Kennedy Antunez, as dictated by Dr. Eulalia Bravo MD.

## 2020-08-21 NOTE — PROGRESS NOTES
HISTORY OF PRESENT ILLNESS Delaine Bamberger is a 70 y.o. female. HPI NEW Patient presents for consultation at the request of Dr. Feroz Dietrich for newly diagnosed LEFT breast cancer. She had no abnormal breast symptoms to report. This was detected from screening mammogram which led to diagnostic imaging and subsequent biopsy. Biopsy site healing well. No prior history of breast biopsies. Her son with here with her today. 08/04/20: LEFT breast bx. PATH: IDC, 5 mm in greatest linear dimension extending to core biopsy tip, histologic grade 2, ER+(%)/ND+(%)/HER2-. Family history- 
Maternal aunt had colon cancer. Breast imaging- 
[facility] Results (most recent): 
Results from Hospital Encounter encounter on 08/04/20 [facility] POST BX IMAGING LT INCL CAD Addendum Addendum: Addendum to the left breast biopsy:  Findings: Pathology of the left breast mass is consistent with invasive  mammary carcinoma, ductal . The imaging findings are concordant with the histology results. Recommend surgical consultation and excision . The patient was contacted by Dr. Rachel Ocampo at 3:00 PM 8/6/2020 . We will inform the  office of the referring physician and assist in scheduling a breast surgical  appointment. Salome Nails MD 8/6/2020  3:00 PM        
 Narrative STUDY:  ULTRASOUND-GUIDED CORE NEEDLE BIOPSY OF THE LEFT BREAST INDICATION: 28-year-old female with a left breast mass, presenting for biopsy. PROCEDURE: The risks and benefits of the procedure were explained to the patient, questions 
were answered, and informed consent was obtained. The mass at 6 o'clock in the left breast was localized with ultrasound. The 
breast was prepped in the usual sterile manner. Following the administration of 
a local anesthetic and dermatotomy, and using ultrasound guidance,  a 12 gauge 
vacuum-assisted Atec needle was advanced to the lesion, and 5 cores were obtained. Pre and post-fire images confirmed accurate needle trajectory. A 
metallic clip was placed at the biopsy site. Post-biopsy digital mammogram 
confirms the presence of the clip at the intended biopsy site. The patient 
tolerated the procedure well without complication. Final pathology is pending. Impression IMPRESSION:  
 
Successful ultrasound-guided biopsy yielding cores submitted for histologic 
analysis. A clip was placed at the biopsy site. Post-biopsy digital mammogram 
confirms the presence of the clip at the intended biopsy site. Further 
recommendations will be based on final pathology results. Review of Systems Constitutional: Negative. HENT: Negative. Eyes: Negative. Respiratory: Negative. Cardiovascular: Negative. Gastrointestinal: Negative. Genitourinary: Negative. Musculoskeletal: Negative. Skin: Negative. Neurological: Negative. Endo/Heme/Allergies: Negative. Psychiatric/Behavioral: Negative. Physical Exam 
 
ASSESSMENT and PLAN 
{ASSESSMENT/PLAN:[id number]}

## 2020-08-21 NOTE — PATIENT INSTRUCTIONS
Learning About Breast Cancer Surgery  What is breast cancer surgery? Surgery is a key part of treatment for breast cancer. The type of surgery you have depends on the size, location, and type of the cancer. It also depends on your health and what is important to you. Your doctor may combine treatments. This is a common way to treat breast cancer. You may have surgery to remove all the cancer that can be seen. After surgery you may also need radiation, chemotherapy, or hormone therapy. These treatments get rid of any cancer cells that may be left. During the surgery, the doctor may remove lymph nodes from the armpit. The lymph nodes will be looked at under a microscope. This is used to check if cancer has spread from the breast into the lymph nodes. What is a lumpectomy? Lumpectomy   Lumpectomy removes the tumor in the breast. The incision is made close to the tumor. This shows common places where the cut is made. Simple mastectomy   Simple mastectomy removes the whole breast, including nipple and skin. This shows where the cut is often made. Nipple-sparing mastectomy with inframammary cut   Nipple-sparing mastectomy removes the whole breast but leaves the skin and nipple. This shows the cut in the curve under the breast (inframammary). Nipple-sparing mastectomy with lateral cut   Nipple-sparing mastectomy removes the whole breast but leaves the skin and nipple. This shows the cut from the nipple along the side of the breast toward the armpit (lateral). Nipple-sparing mastectomy with periareolar cut   Nipple-sparing mastectomy removes the whole breast but leaves the skin and nipple. This shows the cut around the top of the nipple and along the side of the breast toward the armpit (periareolar).     Skin-sparing mastectomy with periareolar cut   Skin-sparing mastectomy removes the whole breast and the nipple, but it keeps the skin that covers the breast. This shows the cut around the nipple (periareolar). Skin-sparing mastectomy with teardrop cut   Skin-sparing mastectomy removes the whole breast and the nipple, but it keeps the skin that covers the breast. This shows the cut around the nipple in a teardrop shape. Skin-sparing mastectomy with tennis racket cut   Skin-sparing mastectomy removes the whole breast and the nipple, but it keeps the skin that covers the breast. This shows the cut around the nipple and along the side of the breast toward the armpit (tennis racket shape). What can you expect after surgery? Your doctor will send the breast tissue to a lab for testing. This will help the doctor know more about the type of cancer you have. It may take up to a week or more to get the results back. Your doctor will discuss the results with you. You may meet with a doctor who specializes in cancer treatment (an oncologist). This doctor can help you decide about any other treatment you may need. Your needs and values are important when choosing a treatment that is right for you. The amount of time you will need to recover depends on the type of surgery you had. It also depends on whether you need any more treatment. If you had a lumpectomy, you will probably look the same in a bra. But your breasts may not match in size or shape after surgery. This depends on the size of your breasts and how much tissue was removed. Surgery to create a new breast is called reconstruction. This may be done at the same time as a mastectomy. Or it may be done later. Some women choose not to do reconstruction at all. You choose what feels right for you. No matter what kind of surgery you have, you will get information about your treatment. This includes how to prepare, what to expect, and what to do afterward. Follow-up care is a key part of your treatment and safety. Be sure to make and go to all appointments, and call your doctor if you are having problems.  It's also a good idea to know your test results and keep a list of the medicines you take. Where can you learn more? Go to http://gautam-raleigh.info/  Enter P020 in the search box to learn more about \"Learning About Breast Cancer Surgery. \"  Current as of: August 22, 2019               Content Version: 12.5  © 9949-9740 [employer]. Care instructions adapted under license by [employer] (which disclaims liability or warranty for this information). If you have questions about a medical condition or this instruction, always ask your healthcare professional. [place] [address] any warranty or liability for your use of this information. Learning About Breast Cancer Treatments  Your Care Instructions     Breast cancer means abnormal cells grow out of control in one or both breasts. These cancer cells can spread from the breast to nearby lymph nodes and other tissues. They can also spread to other parts of the body. The type and stage of cancer you have is based on:  · Where in the breast the cancer started. · The genetics of those cancer cells. · How far cancer has spread within the breast, to nearby tissues, or to other organs. · What the cancer cells look like under a microscope. · Whether there is cancer in the nearby lymph nodes. Tests that help find the stage of your cancer can help choose the right treatment for you. These tests can include a breast biopsy, lymph node biopsy, blood tests, and X-rays. You may need other tests as well, such as a bone, CT, or MRI scan. Whether you have more tests depends on your symptoms and the stage of the cancer. When you find out that you have cancer, you may feel many emotions and may need some help coping. Seek out family, friends, and counselors for support. You also can do things at home to make yourself feel better while you go through treatment. Call the cartmigwendolyn Boucher ([phone]) or visit its website at [address]. [employer] for more information. How is breast cancer treated? Your doctor may combine treatments. This is a common way to treat breast cancer. Treatment depends on what type and stage of cancer you have. You may have:  · Surgery to remove the cancer. · Radiation. This uses high-dose X-rays to kill cancer cells and shrink tumors. · Chemotherapy. This uses medicine to kill cancer cells. · Hormone therapy. This uses medicines such as tamoxifen. It limits the effect of the hormone estrogen. This hormone can help some types of breast cancer cells to grow. · Targeted therapy. This uses medicines such as trastuzumab (Herceptin) to help your immune system fight the cancer. What surgeries are done for breast cancer? Surgery is a key part of treatment for breast cancer. The main types of surgeries are:  · Breast-conserving surgery, such as lumpectomy. It removes the cancer in the breast and just enough tissue to make sure that all of the cancer was removed. · Surgery to remove the breast. This includes:  ? Total mastectomy. This removes the whole breast.  ? Nipple-sparing mastectomy. This removes the whole breast but leaves the nipple. ? Modified radical mastectomy. This removes the whole breast and the lymph nodes under the arm (axillary lymph nodes). ? Radical mastectomy. This removes the whole breast, the chest muscles, and all the lymph nodes under the arm. If lymph nodes under the arm are removed, they are looked at under the microscope to check for cancer. There are two types of lymph node removal:  · [place] lymph node biopsy removes the lymph node that is the first to receive lymph fluid from the tumor. If cancer has spread from the breast to the lymph nodes, cancer cells most often show up in the sentinel node first.  · Axillary lymph node dissection removes most of the lymph nodes in the armpit. The type of surgery you have depends on the size, location, and type of the cancer.  It also depends on your overall health and personal preferences. Even if your doctor removes all the cancer that can be seen at the time of your surgery, you may still need more treatment. You may get radiation, chemotherapy, or hormone therapy after surgery to try to kill any cancer cells that may be left. No matter what kind of surgery you have, you will get information about why you are having it, what its risks are, how to prepare, and what to do after surgery. Follow-up care is a key part of your treatment and safety. Be sure to make and go to all appointments, and call your doctor if you are having problems. It's also a good idea to know your test results and keep a list of the medicines you take. Where can you learn more? Go to http://www.gray.com/  Enter X810 in the search box to learn more about \"Learning About Breast Cancer Treatments. \"  Current as of: August 22, 2019               Content Version: 12.5  © 3088-9703 [employer]. Care instructions adapted under license by [employer] (which disclaims liability or warranty for this information). If you have questions about a medical condition or this instruction, always ask your healthcare professional. [address] any warranty or liability for your use of this information.

## 2020-08-21 NOTE — PROGRESS NOTES
mammaprint TRF faxed to [facility].. Confirmation fax received. Insurance letter mailed to patient. never

## 2020-08-26 ENCOUNTER — HOSPITAL ENCOUNTER (OUTPATIENT)
Dept: MRI IMAGING | Age: 75
Discharge: HOME | End: 2020-08-26
Attending: SURGERY

## 2020-08-26 DIAGNOSIS — C50.912 INVASIVE DUCTAL CARCINOMA OF LEFT BREAST (HCC): ICD-10-CM

## 2020-08-28 ENCOUNTER — TELEPHONE (OUTPATIENT)
Dept: CARDIOLOGY CLINIC | Age: 75
End: 2020-08-28

## 2020-08-28 ENCOUNTER — HOSPITAL ENCOUNTER (OUTPATIENT)
Dept: MRI IMAGING | Age: 75
Discharge: HOME | End: 2020-08-28
Attending: SURGERY
Payer: COMMERCIAL

## 2020-08-28 PROCEDURE — A9585 GADOBUTROL INJECTION: HCPCS | Performed by: SURGERY

## 2020-08-28 PROCEDURE — 74011250636 HC RX REV CODE- 250/636: Performed by: SURGERY

## 2020-08-28 PROCEDURE — 74011000258 HC RX REV CODE- 258: Performed by: SURGERY

## 2020-08-28 PROCEDURE — 77049 MRI BREAST C-+ W/CAD BI: CPT

## 2020-08-28 RX ORDER — SODIUM CHLORIDE 0.9 % (FLUSH) 0.9 %
10 SYRINGE (ML) INJECTION
Status: COMPLETED | OUTPATIENT
Start: 2020-08-28 | End: 2020-08-28

## 2020-08-28 RX ADMIN — GADOBUTROL 10 ML: 604.72 INJECTION INTRAVENOUS at 09:00

## 2020-08-28 RX ADMIN — SODIUM CHLORIDE 100 ML: 900 INJECTION, SOLUTION INTRAVENOUS at 09:00

## 2020-08-28 RX ADMIN — Medication 10 ML: at 09:00

## 2020-08-28 NOTE — TELEPHONE ENCOUNTER
8/28/2020 at 2:32 left message mobile call to reschedule 9/14/2020 3:00 with Dr. Bekah Rivas - Dr. Bekah Rivas virtual visits only on 9/14 - leave echo as scheduled on 9/14 at 2:00 - reschedule to next available virtual or in office with Dr. Bekah Rivas or Kevyn Saldaña

## 2020-08-31 ENCOUNTER — TELEPHONE (OUTPATIENT)
Dept: SURGERY | Age: 75
End: 2020-08-31

## 2020-08-31 ENCOUNTER — DOCUMENTATION ONLY (OUTPATIENT)
Dept: SURGERY | Age: 75
End: 2020-08-31

## 2020-08-31 NOTE — TELEPHONE ENCOUNTER
Future Appointments   Date Time Provider Bhavesh Cross   9/14/2020  2:00 PM DANE KEARNS BS AMB   9/15/2020 10:40 AM MD GONZALES Lyle BS AMB   12/14/2020 10:15 AM MD NINOSKA Roberson BS AMB     9/15/2020 virtual visit with Dr. Jocelyn Logan

## 2020-08-31 NOTE — PROGRESS NOTES
MammaPrint returned high risk, -[phone]. Will inform the patient so Dr. Arianne Gonzalez can call her. Copy scanned into patient's chart.

## 2020-09-01 DIAGNOSIS — C50.912 MALIGNANT NEOPLASM OF LEFT FEMALE BREAST, UNSPECIFIED ESTROGEN RECEPTOR STATUS, UNSPECIFIED SITE OF BREAST (HCC): Primary | ICD-10-CM

## 2020-09-14 ENCOUNTER — ANCILLARY PROCEDURE (OUTPATIENT)
Dept: CARDIOLOGY CLINIC | Age: 75
End: 2020-09-14
Payer: COMMERCIAL

## 2020-09-14 VITALS
SYSTOLIC BLOOD PRESSURE: 128 MMHG | WEIGHT: 207 LBS | HEIGHT: 68 IN | BODY MASS INDEX: 31.37 KG/M2 | DIASTOLIC BLOOD PRESSURE: 62 MMHG

## 2020-09-14 DIAGNOSIS — I36.1 NON-RHEUMATIC TRICUSPID VALVE INSUFFICIENCY: ICD-10-CM

## 2020-09-14 DIAGNOSIS — I27.20 PULMONARY HTN (HCC): ICD-10-CM

## 2020-09-14 PROCEDURE — 93306 TTE W/DOPPLER COMPLETE: CPT | Performed by: INTERNAL MEDICINE

## 2020-09-15 ENCOUNTER — VIRTUAL VISIT (OUTPATIENT)
Dept: CARDIOLOGY CLINIC | Age: 75
End: 2020-09-15
Payer: COMMERCIAL

## 2020-09-15 ENCOUNTER — TELEPHONE (OUTPATIENT)
Dept: CARDIOLOGY CLINIC | Age: 75
End: 2020-09-15

## 2020-09-15 DIAGNOSIS — I36.1 NON-RHEUMATIC TRICUSPID VALVE INSUFFICIENCY: Primary | ICD-10-CM

## 2020-09-15 DIAGNOSIS — I27.20 PULMONARY HTN (HCC): ICD-10-CM

## 2020-09-15 DIAGNOSIS — E66.9 OBESITY (BMI 30-39.9): ICD-10-CM

## 2020-09-15 DIAGNOSIS — I10 ESSENTIAL HYPERTENSION: ICD-10-CM

## 2020-09-15 LAB
ECHO AO ASC DIAM: 2.68 CM
ECHO AO ROOT DIAM: 2.81 CM
ECHO AV AREA PEAK VELOCITY: 2.05 CM2
ECHO AV AREA VTI: 2.16 CM2
ECHO AV AREA/BSA PEAK VELOCITY: 1 CM2/M2
ECHO AV AREA/BSA VTI: 1 CM2/M2
ECHO AV MEAN GRADIENT: 4.87 MMHG
ECHO AV MEAN VELOCITY: 101.13 CM/S
ECHO AV PEAK GRADIENT: 11.2 MMHG
ECHO AV PEAK VELOCITY: 167.3 CM/S
ECHO AV VTI: 30.6 CM
ECHO EST RA PRESSURE: 8 MMHG
ECHO IVC PROX: 2.18 CM
ECHO LA AREA 4C: 13.66 CM2
ECHO LA MAJOR AXIS: 3.16 CM
ECHO LA MINOR AXIS: 1.52 CM
ECHO LA VOL 2C: 31.51 ML (ref 22–52)
ECHO LA VOL 4C: 30.91 ML (ref 22–52)
ECHO LA VOL BP: 35.48 ML (ref 22–52)
ECHO LA VOL/BSA BIPLANE: 17.11 ML/M2 (ref 16–28)
ECHO LA VOLUME INDEX A2C: 15.19 ML/M2 (ref 16–28)
ECHO LA VOLUME INDEX A4C: 14.9 ML/M2 (ref 16–28)
ECHO LV E' LATERAL VELOCITY: 9.09 CM/S
ECHO LV E' SEPTAL VELOCITY: 5.73 CM/S
ECHO LV EDV A2C: 58.81 ML
ECHO LV EDV A4C: 77.97 ML
ECHO LV EDV INDEX A4C: 37.6 ML/M2
ECHO LV EDV NDEX A2C: 28.4 ML/M2
ECHO LV EJECTION FRACTION A4C: 68 PERCENT
ECHO LV ESV A4C: 25.31 ML
ECHO LV ESV INDEX A4C: 12.2 ML/M2
ECHO LV INTERNAL DIMENSION DIASTOLIC: 3.41 CM (ref 3.9–5.3)
ECHO LV INTERNAL DIMENSION SYSTOLIC: 2.42 CM
ECHO LV IVSD: 1.3 CM (ref 0.6–0.9)
ECHO LV MASS 2D: 138.5 G (ref 67–162)
ECHO LV MASS INDEX 2D: 66.8 G/M2 (ref 43–95)
ECHO LV POSTERIOR WALL DIASTOLIC: 1.19 CM (ref 0.6–0.9)
ECHO LVOT DIAM: 1.92 CM
ECHO LVOT PEAK GRADIENT: 5.65 MMHG
ECHO LVOT PEAK VELOCITY: 118.9 CM/S
ECHO LVOT SV: 66.1 ML
ECHO LVOT VTI: 22.87 CM
ECHO MV A VELOCITY: 100.19 CM/S
ECHO MV AREA PHT: 2.86 CM2
ECHO MV E DECELERATION TIME (DT): 0.27 S
ECHO MV E VELOCITY: 78.56 CM/S
ECHO MV E/A RATIO: 0.78
ECHO MV E/E' LATERAL: 8.64
ECHO MV E/E' RATIO (AVERAGED): 11.18
ECHO MV E/E' SEPTAL: 13.71
ECHO MV PRESSURE HALF TIME (PHT): 0.08 S
ECHO RA MAJOR AXIS: 3.17 CM
ECHO RIGHT VENTRICULAR SYSTOLIC PRESSURE (RVSP): 33.36 MMHG
ECHO RV INTERNAL DIMENSION: 2.66 CM
ECHO RV TAPSE: 2.12 CM (ref 1.5–2)
ECHO TV REGURGITANT MAX VELOCITY: 251.77 CM/S
ECHO TV REGURGITANT PEAK GRADIENT: 25.36 MMHG
LA VOL DISK BP: 32.79 ML (ref 22–52)
LVOT MG: 2.84 MMHG

## 2020-09-15 PROCEDURE — G8427 DOCREV CUR MEDS BY ELIG CLIN: HCPCS | Performed by: INTERNAL MEDICINE

## 2020-09-15 PROCEDURE — 99214 OFFICE O/P EST MOD 30 MIN: CPT | Performed by: INTERNAL MEDICINE

## 2020-09-15 PROCEDURE — G8432 DEP SCR NOT DOC, RNG: HCPCS | Performed by: INTERNAL MEDICINE

## 2020-09-15 PROCEDURE — 1090F PRES/ABSN URINE INCON ASSESS: CPT | Performed by: INTERNAL MEDICINE

## 2020-09-15 PROCEDURE — G8756 NO BP MEASURE DOC: HCPCS | Performed by: INTERNAL MEDICINE

## 2020-09-15 PROCEDURE — G8399 PT W/DXA RESULTS DOCUMENT: HCPCS | Performed by: INTERNAL MEDICINE

## 2020-09-15 PROCEDURE — G9899 SCRN MAM PERF RSLTS DOC: HCPCS | Performed by: INTERNAL MEDICINE

## 2020-09-15 PROCEDURE — 3017F COLORECTAL CA SCREEN DOC REV: CPT | Performed by: INTERNAL MEDICINE

## 2020-09-15 PROCEDURE — 1101F PT FALLS ASSESS-DOCD LE1/YR: CPT | Performed by: INTERNAL MEDICINE

## 2020-09-15 RX ORDER — OLMESARTAN MEDOXOMIL AND HYDROCHLOROTHIAZIDE 20/12.5 20; 12.5 MG/1; MG/1
TABLET ORAL
Qty: 90 TAB | Refills: 3 | Status: SHIPPED | OUTPATIENT
Start: 2020-09-15 | End: 2020-10-26

## 2020-09-15 RX ORDER — FUROSEMIDE 20 MG/1
TABLET ORAL
Qty: 90 TAB | Refills: 3 | Status: SHIPPED | OUTPATIENT
Start: 2020-09-15 | End: 2021-10-22 | Stop reason: SDUPTHER

## 2020-09-15 NOTE — TELEPHONE ENCOUNTER
Dr. Hemant Root,     Does Ms. Shala Sterling need follow up to today's visit with you?     Thank you, Nakita Guthrie

## 2020-09-15 NOTE — PROGRESS NOTES
[facility]  [id number] who was evaluated through a synchronous (real-time) audio-video encounter, and/or her healthcare decision maker, is aware that it is a billable service, with coverage as determined by her insurance carrier. She provided verbal consent to proceed: Yes, and patient identification was verified. It was conducted pursuant to the emergency declaration under the [facility], [address] [place] [address] and the [employer] and [employer] Act. A caregiver was present when appropriate. Ability to conduct physical exam was limited. I was at the office. The patient was at home. HPI: Dionne Wiley is a 70y.o. year-old female who presents for follow up regarding her valve disease. Mod TR by TTE today  Takes lasix daily  She is feeling well, denies any chest pain   Has had a few flutters but very brief  Has mild dyspnea with moderate exertion but it is stable, no change  Denies any PND or orthopnea  No dizziness or syncope, does have some vertigo from time to time   No LE edema   Doing well, reviwed her echo results, sx what to expect, etc. Watch for now. Lost her mom last week and is stressed about that. Getting gout and has a big backyard and hopes to get back to walking. Occasionally chcking her BP and it is doing ok. She had to change her batteries and has been getting better numbers since then 136/78. Takes her lasix every day and is doing fine with that. And she is doing fine not peeing at night, Working from home and doing ok with that.      I encouraged her to keep up her efforts at healthy lifestyle with walking and increasing her exercise and to let me know if she has any new symptoms for now she seems to be doing pretty well, and although the stress of losing her mom has been a new addition to her stressors in the last couple of weeks she seems to be handling it teddy.    Assessment/Plan:  1. Dyspnea with exertion - none, doing better on Lasix  2. HTN - well controlled on Benicar HCTZ   3. GERD - on Dexilant every other day   4. MVP- previously diagnosed with MVP 20 years ago  5. Obesity - There is no height or weight on file to calculate BMI. 6.  Moderate TR, mild PAHTN by TTE today - symptoms stable on Lasix at  20mg daily   7. Hepatitis C s/p treatment with harvoni and rivavirin - has HERNANDEZ, followed by hepatology  8. LDL 98 in 4/19 on Krill Oil   9.   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 Hx: father passed from bleeding ulcers at age 67 and had end stage CKD and HTN, mother DM and ESRD and living at age 80     She  has a past medical history of Acute diverticulitis (8/18/2017), Allergic rhinitis (8/18/2017), Arthritis (8/18/2017), Arthritis of right knee (11/25/2019), Back pain, thoracic (8/18/2017), Cataract, left (8/18/2017), Cataract, right (8/18/2017), Cirrhosis ([facility] [address] (8/18/2017), Cold sore (8/18/2017), Elevated hemoglobin A1c (8/18/2017), Fatigue (8/18/2017), Gastrointestinal disorder, GERD (gastroesophageal reflux disease) (8/18/2017), Heart murmur (8/18/2017), Hepatitis C (8/18/2017), Herpes zoster infection of thoracic region (8/18/2017), Hyperlipidemia LDL goal <100 (8/18/2017), Hypertension, Menopause, Murmur, Obesity (BMI 30-39.9) (8/18/2017), Osteopenia (8/18/2017), Other ill-defined conditions(799.89), Post-menopausal (8/18/2017), Posterior auricular pain of right ear (8/18/2017), Preop examination (8/18/2017), Valvular heart disease, and Vertigo, benign positional (8/18/2017). Cardiovascular ROS: denies chest pain or increase in dyspnea on exertion  Respiratory ROS: no cough or wheezing  Neurological ROS: no TIA or stroke symptoms  All other systems negative except as above. PE  There were no vitals filed for this visit. There is no height or weight on file to calculate BMI.   gen alert NAD  Mental normal affect normal speech pattern logical thought process  resp no labored breathing wheezing or coughing  Neuro no speech slurring or difficulty with word finding    12 lead ECG: NSR, anteroseptal Q waves (unchanged) last    Recent Labs:  Lab Results   Component Value Date/Time    Cholesterol, total 187 06/04/2020 11:49 AM     No results found for: BRENNEN  Lab Results   Component Value Date/Time    BUN 28.0 (H) 06/04/2020 11:49 AM     Lab Results   Component Value Date/Time    Potassium 4.1 06/04/2020 11:49 AM     Lab Results   Component Value Date/Time    Hemoglobin A1c 5.1 06/04/2020 11:49 AM     No components found for: HGBI,  IHGB,  HGB,  HGBP,  WBHGB  Lab Results   Component Value Date/Time    PLATELET 049.0 22/25/9492 11:49 AM       Reviewed:  Past Medical History:   Diagnosis Date    Acute diverticulitis 8/18/2017    Allergic rhinitis 8/18/2017    Arthritis 8/18/2017    Arthritis of right knee 11/25/2019    Back pain, thoracic 8/18/2017    Cataract, left 8/18/2017    Cataract, right 8/18/2017    Cirrhosis ([facility] [address] 8/18/2017    Cold sore 8/18/2017    Elevated hemoglobin A1c 8/18/2017    Fatigue 8/18/2017    Gastrointestinal disorder     acid reflux    GERD (gastroesophageal reflux disease) 8/18/2017    Heart murmur 8/18/2017    Hepatitis C 8/18/2017    Herpes zoster infection of thoracic region 8/18/2017    Hyperlipidemia LDL goal <100 8/18/2017    Hypertension     Menopause     Hysterectomy-42years old  Murmur     Obesity (BMI 30-39. 9) 2017    Osteopenia 2017    Other ill-defined conditions(799.89)     hep c    Post-menopausal 2017    Posterior auricular pain of right ear 2017    Preop examination 2017    Valvular heart disease     mitral valve prolapse    Vertigo, benign positional 2017     Social History     Tobacco Use   Smoking Status Former Smoker    Packs/day: 0.25    Years: 25.00    Pack years: 6.25    Types: Cigarettes    Last attempt to quit: 1990    Years since quittin.7   Smokeless Tobacco Never Used     Social History     Substance and Sexual Activity   Alcohol Use No    Alcohol/week: 0.0 standard drinks     No Known Allergies    Current Outpatient Medications   Medication Sig    valACYclovir (VALTREX) 500 mg tablet Take 1 tablet by mouth once daily    olmesartan-hydroCHLOROthiazide (BENICAR HCT) 20-12.5 mg per tablet TAKE 1 TABLET BY MOUTH ONCE DAILY    furosemide (LASIX) 20 mg tablet TAKE 1 TABLET BY MOUTH ONCE DAILY    TURMERIC ROOT EXTRACT PO Take 200 mg by mouth.  vit C/vit E ac/selenium/ginkgo (MEMORY COMPLEX PO) Take  by mouth daily.  cyanocobalamin (VITAMIN B-12) 1,000 mcg tablet Take 1,000 mcg by mouth daily.  ascorbic acid, vitamin C, (VITAMIN C) 500 mg tablet Take  by mouth.  psyllium (METAMUCIL) powd Take  by mouth daily.  magnesium 250 mg tab Take  by mouth.  mometasone (NASONEX) 50 mcg/actuation nasal spray 2 Sprays as needed.  naproxen sodium (ALEVE) 220 mg tablet Take 220 mg by mouth two (2) times daily (with meals).  docosanol (ABREVA) 10 % topical cream Apply  to affected area five (5) times daily.  biotin 5,000 mcg TbDi Take  by mouth.  krill oil 500 mg cap Take  by mouth.  MVIT-MINS/FOLIC ACID/SOY ISOFL (ONE-A-DAY MENOPAUSE FORMULA PO) Take  by mouth two (2) times a day.  S-Adenosylmethionine (YRN-E, ENTERIC COATED,) 400 mg TbEC Take  by mouth.     cholecalciferol, VITAMIN D3, (VITAMIN D3) 5,000 unit tab tablet Take  by mouth daily.  Dexlansoprazole (DEXILANT) 60 mg CpDB Take 60 mg by mouth every other day.  omeprazole (PRILOSEC OTC) 20 mg tablet Take 20 mg by mouth as needed. No current facility-administered medications for this visit.         Emilia Chavez MD  [facility] heart and [facility] [place]  [facility] [facility] [address]  [address]

## 2020-10-21 DIAGNOSIS — C50.912 MALIGNANT NEOPLASM OF LEFT FEMALE BREAST, UNSPECIFIED ESTROGEN RECEPTOR STATUS, UNSPECIFIED SITE OF BREAST (HCC): Primary | ICD-10-CM

## 2020-10-26 ENCOUNTER — HOSPITAL ENCOUNTER (OUTPATIENT)
Dept: PREADMISSION TESTING | Age: 75
Discharge: HOME | End: 2020-10-26
Payer: COMMERCIAL

## 2020-10-26 VITALS
HEART RATE: 69 BPM | DIASTOLIC BLOOD PRESSURE: 67 MMHG | HEIGHT: 68 IN | BODY MASS INDEX: 32.44 KG/M2 | SYSTOLIC BLOOD PRESSURE: 101 MMHG | TEMPERATURE: 98.4 F | WEIGHT: 214.07 LBS

## 2020-10-26 LAB
ALBUMIN SERPL-MCNC: 4.1 G/DL (ref 3.5–5)
ALBUMIN/GLOB SERPL: 1 {RATIO} (ref 1.1–2.2)
ALP SERPL-CCNC: 58 U/L (ref 45–117)
ALT SERPL-CCNC: 19 U/L (ref 12–78)
ANION GAP SERPL CALC-SCNC: 6 MMOL/L (ref 5–15)
APTT PPP: 26.7 SEC (ref 22.1–32)
AST SERPL-CCNC: 19 U/L (ref 15–37)
ATRIAL RATE: 55 BPM
BASOPHILS # BLD: 0.1 K/UL (ref 0–0.1)
BASOPHILS NFR BLD: 1 % (ref 0–1)
BILIRUB SERPL-MCNC: 0.5 MG/DL (ref 0.2–1)
BUN SERPL-MCNC: 22 MG/DL (ref 6–20)
BUN/CREAT SERPL: 20 (ref 12–20)
CALCIUM SERPL-MCNC: 9.5 MG/DL (ref 8.5–10.1)
CALCULATED P AXIS, ECG09: 60 DEGREES
CALCULATED R AXIS, ECG10: -9 DEGREES
CALCULATED T AXIS, ECG11: 33 DEGREES
CHLORIDE SERPL-SCNC: 105 MMOL/L (ref 97–108)
CO2 SERPL-SCNC: 29 MMOL/L (ref 21–32)
CREAT SERPL-MCNC: 1.08 MG/DL (ref 0.55–1.02)
DIAGNOSIS, 93000: NORMAL
DIFFERENTIAL METHOD BLD: ABNORMAL
EOSINOPHIL # BLD: 0.1 K/UL (ref 0–0.4)
EOSINOPHIL NFR BLD: 3 % (ref 0–7)
ERYTHROCYTE [DISTWIDTH] IN BLOOD BY AUTOMATED COUNT: 13.3 % (ref 11.5–14.5)
GLOBULIN SER CALC-MCNC: 4.2 G/DL (ref 2–4)
GLUCOSE SERPL-MCNC: 84 MG/DL (ref 65–100)
HCT VFR BLD AUTO: 40.5 % (ref 35–47)
HGB BLD-MCNC: 13.1 G/DL (ref 11.5–16)
IMM GRANULOCYTES # BLD AUTO: 0 K/UL (ref 0–0.04)
IMM GRANULOCYTES NFR BLD AUTO: 1 % (ref 0–0.5)
INR PPP: 1 (ref 0.9–1.1)
LYMPHOCYTES # BLD: 1.5 K/UL (ref 0.8–3.5)
LYMPHOCYTES NFR BLD: 35 % (ref 12–49)
MCH RBC QN AUTO: 29.2 PG (ref 26–34)
MCHC RBC AUTO-ENTMCNC: 32.3 G/DL (ref 30–36.5)
MCV RBC AUTO: 90.4 FL (ref 80–99)
MONOCYTES # BLD: 0.5 K/UL (ref 0–1)
MONOCYTES NFR BLD: 12 % (ref 5–13)
NEUTS SEG # BLD: 2.2 K/UL (ref 1.8–8)
NEUTS SEG NFR BLD: 48 % (ref 32–75)
NRBC # BLD: 0 K/UL (ref 0–0.01)
NRBC BLD-RTO: 0 PER 100 WBC
P-R INTERVAL, ECG05: 162 MS
PLATELET # BLD AUTO: 232 K/UL (ref 150–400)
PMV BLD AUTO: 11.4 FL (ref 8.9–12.9)
POTASSIUM SERPL-SCNC: 3.6 MMOL/L (ref 3.5–5.1)
PROT SERPL-MCNC: 8.3 G/DL (ref 6.4–8.2)
PROTHROMBIN TIME: 10.9 SEC (ref 9–11.1)
Q-T INTERVAL, ECG07: 408 MS
QRS DURATION, ECG06: 82 MS
QTC CALCULATION (BEZET), ECG08: 390 MS
RBC # BLD AUTO: 4.48 M/UL (ref 3.8–5.2)
SODIUM SERPL-SCNC: 140 MMOL/L (ref 136–145)
THERAPEUTIC RANGE,PTTT: NORMAL SECS (ref 58–77)
VENTRICULAR RATE, ECG03: 55 BPM
WBC # BLD AUTO: 4.4 K/UL (ref 3.6–11)

## 2020-10-26 PROCEDURE — 36415 COLL VENOUS BLD VENIPUNCTURE: CPT

## 2020-10-26 PROCEDURE — 85730 THROMBOPLASTIN TIME PARTIAL: CPT

## 2020-10-26 PROCEDURE — 93005 ELECTROCARDIOGRAM TRACING: CPT

## 2020-10-26 PROCEDURE — 85025 COMPLETE CBC W/AUTO DIFF WBC: CPT

## 2020-10-26 PROCEDURE — 85610 PROTHROMBIN TIME: CPT

## 2020-10-26 PROCEDURE — 80053 COMPREHEN METABOLIC PANEL: CPT

## 2020-10-26 RX ORDER — OLMESARTAN MEDOXOMIL AND HYDROCHLOROTHIAZIDE 40/12.5 40; 12.5 MG/1; MG/1
1 TABLET ORAL EVERY EVENING
COMMUNITY
End: 2020-12-14 | Stop reason: SDUPTHER

## 2020-10-30 ENCOUNTER — HOSPITAL ENCOUNTER (OUTPATIENT)
Dept: PREADMISSION TESTING | Age: 75
Discharge: HOME | End: 2020-10-30
Payer: COMMERCIAL

## 2020-10-30 ENCOUNTER — TRANSCRIBE ORDER (OUTPATIENT)
Dept: REGISTRATION | Age: 75
End: 2020-10-30

## 2020-10-30 DIAGNOSIS — Z01.812 PRE-PROCEDURE LAB EXAM: Primary | ICD-10-CM

## 2020-10-30 DIAGNOSIS — Z01.812 PRE-PROCEDURE LAB EXAM: ICD-10-CM

## 2020-10-30 PROCEDURE — 87635 SARS-COV-2 COVID-19 AMP PRB: CPT

## 2020-11-01 LAB — SARS-COV-2, COV2NT: NOT DETECTED

## 2020-11-02 ENCOUNTER — ANESTHESIA EVENT (OUTPATIENT)
Dept: MEDSURG UNIT | Age: 75
End: 2020-11-02
Payer: COMMERCIAL

## 2020-11-02 ENCOUNTER — HOSPITAL ENCOUNTER (OUTPATIENT)
Dept: NUCLEAR MEDICINE | Age: 75
Discharge: HOME | End: 2020-11-02
Attending: SURGERY
Payer: COMMERCIAL

## 2020-11-02 DIAGNOSIS — C50.912 MALIGNANT NEOPLASM OF LEFT FEMALE BREAST, UNSPECIFIED ESTROGEN RECEPTOR STATUS, UNSPECIFIED SITE OF BREAST (HCC): ICD-10-CM

## 2020-11-02 PROCEDURE — 78195 LYMPH SYSTEM IMAGING: CPT

## 2020-11-03 ENCOUNTER — HOSPITAL ENCOUNTER (OUTPATIENT)
Age: 75
Setting detail: OUTPATIENT SURGERY
Discharge: HOME | End: 2020-11-03
Attending: SURGERY | Admitting: SURGERY
Payer: COMMERCIAL

## 2020-11-03 ENCOUNTER — ANESTHESIA (OUTPATIENT)
Dept: MEDSURG UNIT | Age: 75
End: 2020-11-03
Payer: COMMERCIAL

## 2020-11-03 VITALS
SYSTOLIC BLOOD PRESSURE: 132 MMHG | HEART RATE: 56 BPM | WEIGHT: 214.07 LBS | DIASTOLIC BLOOD PRESSURE: 67 MMHG | RESPIRATION RATE: 16 BRPM | HEIGHT: 68 IN | BODY MASS INDEX: 32.44 KG/M2 | TEMPERATURE: 97.7 F | OXYGEN SATURATION: 100 %

## 2020-11-03 DIAGNOSIS — C50.912 MALIGNANT NEOPLASM OF LEFT FEMALE BREAST, UNSPECIFIED ESTROGEN RECEPTOR STATUS, UNSPECIFIED SITE OF BREAST (HCC): ICD-10-CM

## 2020-11-03 PROCEDURE — 77030018846 HC SOL IRR STRL H20 ICUM -A: Performed by: SURGERY

## 2020-11-03 PROCEDURE — 74011000250 HC RX REV CODE- 250: Performed by: PLASTIC SURGERY

## 2020-11-03 PROCEDURE — 77030040508 HC RESVR DRN WND MDII -A: Performed by: SURGERY

## 2020-11-03 PROCEDURE — 74011250636 HC RX REV CODE- 250/636: Performed by: PLASTIC SURGERY

## 2020-11-03 PROCEDURE — 77030002933 HC SUT MCRYL J&J -A: Performed by: SURGERY

## 2020-11-03 PROCEDURE — 74011000250 HC RX REV CODE- 250: Performed by: NURSE ANESTHETIST, CERTIFIED REGISTERED

## 2020-11-03 PROCEDURE — 76030000006 HC AMB SURG OR TIME 3 TO 3.5: Performed by: SURGERY

## 2020-11-03 PROCEDURE — 77030031139 HC SUT VCRL2 J&J -A: Performed by: SURGERY

## 2020-11-03 PROCEDURE — 19301 PARTIAL MASTECTOMY: CPT | Performed by: SURGERY

## 2020-11-03 PROCEDURE — 77030010507 HC ADH SKN DERMBND J&J -B: Performed by: SURGERY

## 2020-11-03 PROCEDURE — 77030040361 HC SLV COMPR DVT MDII -B: Performed by: SURGERY

## 2020-11-03 PROCEDURE — 77030011267 HC ELECTRD BLD COVD -A: Performed by: SURGERY

## 2020-11-03 PROCEDURE — 77030018836 HC SOL IRR NACL ICUM -A: Performed by: SURGERY

## 2020-11-03 PROCEDURE — 88307 TISSUE EXAM BY PATHOLOGIST: CPT

## 2020-11-03 PROCEDURE — 77030008459 HC STPLR SKN COOP -B: Performed by: SURGERY

## 2020-11-03 PROCEDURE — 2709999900 HC NON-CHARGEABLE SUPPLY: Performed by: SURGERY

## 2020-11-03 PROCEDURE — 77030026438 HC STYL ET INTUB CARD -A: Performed by: NURSE ANESTHETIST, CERTIFIED REGISTERED

## 2020-11-03 PROCEDURE — 77030040506 HC DRN WND MDII -A: Performed by: SURGERY

## 2020-11-03 PROCEDURE — 88305 TISSUE EXAM BY PATHOLOGIST: CPT

## 2020-11-03 PROCEDURE — 76210000038 HC AMBSU PH I REC 2.5 TO 3 HR: Performed by: SURGERY

## 2020-11-03 PROCEDURE — 77030008684 HC TU ET CUF COVD -B: Performed by: NURSE ANESTHETIST, CERTIFIED REGISTERED

## 2020-11-03 PROCEDURE — 77030002966 HC SUT PDS J&J -A: Performed by: SURGERY

## 2020-11-03 PROCEDURE — 77030034626 HC LIGASURE SM JAW SEAL OPN SURG COVD -E: Performed by: SURGERY

## 2020-11-03 PROCEDURE — 38525 BIOPSY/REMOVAL LYMPH NODES: CPT | Performed by: SURGERY

## 2020-11-03 PROCEDURE — 77030012406 HC DRN WND PENRS BARD -A: Performed by: SURGERY

## 2020-11-03 PROCEDURE — 77030038213 HC SUPP BRA COMPRSS PSTOP COND -B: Performed by: SURGERY

## 2020-11-03 PROCEDURE — 77030002996 HC SUT SLK J&J -A: Performed by: SURGERY

## 2020-11-03 PROCEDURE — 74011250636 HC RX REV CODE- 250/636: Performed by: ANESTHESIOLOGY

## 2020-11-03 PROCEDURE — 88342 IMHCHEM/IMCYTCHM 1ST ANTB: CPT

## 2020-11-03 PROCEDURE — 76060000066 HC AMB SURG ANES 3 TO 3.5 HR: Performed by: SURGERY

## 2020-11-03 PROCEDURE — 77030008552 HC TBNG SMK EVAC BFLF -A: Performed by: SURGERY

## 2020-11-03 PROCEDURE — 77030008462 HC STPLR SKN PROX J&J -A: Performed by: SURGERY

## 2020-11-03 PROCEDURE — 74011250636 HC RX REV CODE- 250/636: Performed by: NURSE ANESTHETIST, CERTIFIED REGISTERED

## 2020-11-03 PROCEDURE — 77030008534 HC TBNG LIPOSUC BYRO -B: Performed by: SURGERY

## 2020-11-03 PROCEDURE — 77030040922 HC BLNKT HYPOTHRM STRY -A

## 2020-11-03 PROCEDURE — 77030041680 HC PNCL ELECSURG SMK EVAC CNMD -B: Performed by: SURGERY

## 2020-11-03 PROCEDURE — 77030020263 HC SOL INJ SOD CL0.9% LFCR 1000ML: Performed by: SURGERY

## 2020-11-03 PROCEDURE — 77030039266 HC ADH SKN EXOFIN S2SG -A: Performed by: SURGERY

## 2020-11-03 PROCEDURE — 74011250637 HC RX REV CODE- 250/637: Performed by: ANESTHESIOLOGY

## 2020-11-03 PROCEDURE — 88341 IMHCHEM/IMCYTCHM EA ADD ANTB: CPT

## 2020-11-03 PROCEDURE — 2709999900 HC NON-CHARGEABLE SUPPLY

## 2020-11-03 PROCEDURE — 77030005513 HC CATH URETH FOL11 MDII -B: Performed by: SURGERY

## 2020-11-03 RX ORDER — HYDROMORPHONE HYDROCHLORIDE 1 MG/ML
0.2 INJECTION, SOLUTION INTRAMUSCULAR; INTRAVENOUS; SUBCUTANEOUS
Status: DISCONTINUED | OUTPATIENT
Start: 2020-11-03 | End: 2020-11-03 | Stop reason: HOSPADM

## 2020-11-03 RX ORDER — DEXAMETHASONE SODIUM PHOSPHATE 4 MG/ML
INJECTION, SOLUTION INTRA-ARTICULAR; INTRALESIONAL; INTRAMUSCULAR; INTRAVENOUS; SOFT TISSUE AS NEEDED
Status: DISCONTINUED | OUTPATIENT
Start: 2020-11-03 | End: 2020-11-03 | Stop reason: HOSPADM

## 2020-11-03 RX ORDER — MORPHINE SULFATE 10 MG/ML
2 INJECTION, SOLUTION INTRAMUSCULAR; INTRAVENOUS
Status: DISCONTINUED | OUTPATIENT
Start: 2020-11-03 | End: 2020-11-03 | Stop reason: HOSPADM

## 2020-11-03 RX ORDER — OXYCODONE AND ACETAMINOPHEN 5; 325 MG/1; MG/1
1 TABLET ORAL
Qty: 30 TAB | Refills: 0 | Status: SHIPPED | OUTPATIENT
Start: 2020-11-03 | End: 2020-11-08

## 2020-11-03 RX ORDER — MIDAZOLAM HYDROCHLORIDE 1 MG/ML
0.5 INJECTION, SOLUTION INTRAMUSCULAR; INTRAVENOUS
Status: DISCONTINUED | OUTPATIENT
Start: 2020-11-03 | End: 2020-11-03 | Stop reason: HOSPADM

## 2020-11-03 RX ORDER — ONDANSETRON 8 MG/1
8 TABLET, ORALLY DISINTEGRATING ORAL
Qty: 10 TAB | Refills: 2 | Status: SHIPPED | OUTPATIENT
Start: 2020-11-03 | End: 2021-01-06 | Stop reason: SDUPTHER

## 2020-11-03 RX ORDER — SODIUM CHLORIDE 0.9 % (FLUSH) 0.9 %
5-40 SYRINGE (ML) INJECTION AS NEEDED
Status: DISCONTINUED | OUTPATIENT
Start: 2020-11-03 | End: 2020-11-03 | Stop reason: HOSPADM

## 2020-11-03 RX ORDER — MIDAZOLAM HYDROCHLORIDE 1 MG/ML
INJECTION, SOLUTION INTRAMUSCULAR; INTRAVENOUS AS NEEDED
Status: DISCONTINUED | OUTPATIENT
Start: 2020-11-03 | End: 2020-11-03 | Stop reason: HOSPADM

## 2020-11-03 RX ORDER — ONDANSETRON 2 MG/ML
INJECTION INTRAMUSCULAR; INTRAVENOUS AS NEEDED
Status: DISCONTINUED | OUTPATIENT
Start: 2020-11-03 | End: 2020-11-03 | Stop reason: HOSPADM

## 2020-11-03 RX ORDER — LIDOCAINE HYDROCHLORIDE 10 MG/ML
0.1 INJECTION, SOLUTION EPIDURAL; INFILTRATION; INTRACAUDAL; PERINEURAL AS NEEDED
Status: DISCONTINUED | OUTPATIENT
Start: 2020-11-03 | End: 2020-11-03 | Stop reason: HOSPADM

## 2020-11-03 RX ORDER — CEFAZOLIN SODIUM/WATER 2 G/20 ML
2 SYRINGE (ML) INTRAVENOUS ONCE
Status: COMPLETED | OUTPATIENT
Start: 2020-11-03 | End: 2020-11-03

## 2020-11-03 RX ORDER — SODIUM CHLORIDE 0.9 % (FLUSH) 0.9 %
5-40 SYRINGE (ML) INJECTION EVERY 8 HOURS
Status: DISCONTINUED | OUTPATIENT
Start: 2020-11-03 | End: 2020-11-03 | Stop reason: HOSPADM

## 2020-11-03 RX ORDER — LIDOCAINE HYDROCHLORIDE 20 MG/ML
INJECTION, SOLUTION EPIDURAL; INFILTRATION; INTRACAUDAL; PERINEURAL AS NEEDED
Status: DISCONTINUED | OUTPATIENT
Start: 2020-11-03 | End: 2020-11-03 | Stop reason: HOSPADM

## 2020-11-03 RX ORDER — DOCUSATE SODIUM 100 MG/1
100 CAPSULE, LIQUID FILLED ORAL 2 TIMES DAILY
Qty: 50 CAP | Refills: 1 | Status: SHIPPED | OUTPATIENT
Start: 2020-11-03 | End: 2021-01-06 | Stop reason: ALTCHOICE

## 2020-11-03 RX ORDER — FENTANYL CITRATE 50 UG/ML
25 INJECTION, SOLUTION INTRAMUSCULAR; INTRAVENOUS
Status: COMPLETED | OUTPATIENT
Start: 2020-11-03 | End: 2020-11-03

## 2020-11-03 RX ORDER — OXYCODONE HYDROCHLORIDE 5 MG/1
5 TABLET ORAL
Status: COMPLETED | OUTPATIENT
Start: 2020-11-03 | End: 2020-11-03

## 2020-11-03 RX ORDER — MIDAZOLAM HYDROCHLORIDE 1 MG/ML
1 INJECTION, SOLUTION INTRAMUSCULAR; INTRAVENOUS AS NEEDED
Status: DISCONTINUED | OUTPATIENT
Start: 2020-11-03 | End: 2020-11-03 | Stop reason: HOSPADM

## 2020-11-03 RX ORDER — FENTANYL CITRATE 50 UG/ML
INJECTION, SOLUTION INTRAMUSCULAR; INTRAVENOUS AS NEEDED
Status: DISCONTINUED | OUTPATIENT
Start: 2020-11-03 | End: 2020-11-03 | Stop reason: HOSPADM

## 2020-11-03 RX ORDER — FENTANYL CITRATE 50 UG/ML
50 INJECTION, SOLUTION INTRAMUSCULAR; INTRAVENOUS AS NEEDED
Status: DISCONTINUED | OUTPATIENT
Start: 2020-11-03 | End: 2020-11-03 | Stop reason: HOSPADM

## 2020-11-03 RX ORDER — SUCCINYLCHOLINE CHLORIDE 20 MG/ML
INJECTION INTRAMUSCULAR; INTRAVENOUS AS NEEDED
Status: DISCONTINUED | OUTPATIENT
Start: 2020-11-03 | End: 2020-11-03 | Stop reason: HOSPADM

## 2020-11-03 RX ORDER — SCOLOPAMINE TRANSDERMAL SYSTEM 1 MG/1
1 PATCH, EXTENDED RELEASE TRANSDERMAL ONCE
Status: DISCONTINUED | OUTPATIENT
Start: 2020-11-03 | End: 2020-11-03 | Stop reason: HOSPADM

## 2020-11-03 RX ORDER — PROPOFOL 10 MG/ML
INJECTION, EMULSION INTRAVENOUS AS NEEDED
Status: DISCONTINUED | OUTPATIENT
Start: 2020-11-03 | End: 2020-11-03 | Stop reason: HOSPADM

## 2020-11-03 RX ORDER — ROCURONIUM BROMIDE 10 MG/ML
INJECTION, SOLUTION INTRAVENOUS AS NEEDED
Status: DISCONTINUED | OUTPATIENT
Start: 2020-11-03 | End: 2020-11-03 | Stop reason: HOSPADM

## 2020-11-03 RX ORDER — ACETAMINOPHEN 500 MG
1000 TABLET ORAL ONCE
Status: COMPLETED | OUTPATIENT
Start: 2020-11-03 | End: 2020-11-03

## 2020-11-03 RX ORDER — ONDANSETRON 2 MG/ML
4 INJECTION INTRAMUSCULAR; INTRAVENOUS AS NEEDED
Status: DISCONTINUED | OUTPATIENT
Start: 2020-11-03 | End: 2020-11-03 | Stop reason: HOSPADM

## 2020-11-03 RX ORDER — SODIUM CHLORIDE, SODIUM LACTATE, POTASSIUM CHLORIDE, CALCIUM CHLORIDE 600; 310; 30; 20 MG/100ML; MG/100ML; MG/100ML; MG/100ML
50 INJECTION, SOLUTION INTRAVENOUS CONTINUOUS
Status: DISCONTINUED | OUTPATIENT
Start: 2020-11-03 | End: 2020-11-03 | Stop reason: HOSPADM

## 2020-11-03 RX ADMIN — PROPOFOL 100 MG: 10 INJECTION, EMULSION INTRAVENOUS at 07:39

## 2020-11-03 RX ADMIN — LIDOCAINE HYDROCHLORIDE 60 MG: 20 INJECTION, SOLUTION EPIDURAL; INFILTRATION; INTRACAUDAL; PERINEURAL at 07:39

## 2020-11-03 RX ADMIN — HYDROMORPHONE HYDROCHLORIDE 0.2 MG: 1 INJECTION, SOLUTION INTRAMUSCULAR; INTRAVENOUS; SUBCUTANEOUS at 11:30

## 2020-11-03 RX ADMIN — SODIUM CHLORIDE, SODIUM LACTATE, POTASSIUM CHLORIDE, AND CALCIUM CHLORIDE 50 ML/HR: 600; 310; 30; 20 INJECTION, SOLUTION INTRAVENOUS at 06:56

## 2020-11-03 RX ADMIN — Medication 2 G: at 07:52

## 2020-11-03 RX ADMIN — FENTANYL CITRATE 25 MCG: 50 INJECTION, SOLUTION INTRAMUSCULAR; INTRAVENOUS at 11:27

## 2020-11-03 RX ADMIN — FENTANYL CITRATE 25 MCG: 50 INJECTION, SOLUTION INTRAMUSCULAR; INTRAVENOUS at 11:21

## 2020-11-03 RX ADMIN — ONDANSETRON HYDROCHLORIDE 4 MG: 2 INJECTION, SOLUTION INTRAMUSCULAR; INTRAVENOUS at 08:03

## 2020-11-03 RX ADMIN — SUCCINYLCHOLINE CHLORIDE 160 MG: 20 INJECTION, SOLUTION INTRAMUSCULAR; INTRAVENOUS at 07:40

## 2020-11-03 RX ADMIN — OXYCODONE HYDROCHLORIDE 5 MG: 5 TABLET ORAL at 12:13

## 2020-11-03 RX ADMIN — FENTANYL CITRATE 150 MCG: 50 INJECTION, SOLUTION INTRAMUSCULAR; INTRAVENOUS at 07:39

## 2020-11-03 RX ADMIN — ACETAMINOPHEN 975 MG: 325 TABLET ORAL at 06:57

## 2020-11-03 RX ADMIN — PHENYLEPHRINE HYDROCHLORIDE 40 MCG/MIN: 10 INJECTION INTRAVENOUS at 07:56

## 2020-11-03 RX ADMIN — PROPOFOL 40 MG: 10 INJECTION, EMULSION INTRAVENOUS at 07:40

## 2020-11-03 RX ADMIN — DEXAMETHASONE SODIUM PHOSPHATE 4 MG: 4 INJECTION, SOLUTION INTRAMUSCULAR; INTRAVENOUS at 08:03

## 2020-11-03 RX ADMIN — ROCURONIUM BROMIDE 10 MG: 10 SOLUTION INTRAVENOUS at 07:53

## 2020-11-03 RX ADMIN — HYDROMORPHONE HYDROCHLORIDE 0.2 MG: 1 INJECTION, SOLUTION INTRAMUSCULAR; INTRAVENOUS; SUBCUTANEOUS at 12:15

## 2020-11-03 RX ADMIN — ROCURONIUM BROMIDE 10 MG: 10 SOLUTION INTRAVENOUS at 08:37

## 2020-11-03 RX ADMIN — ROCURONIUM BROMIDE 10 MG: 10 SOLUTION INTRAVENOUS at 08:40

## 2020-11-03 RX ADMIN — FENTANYL CITRATE 25 MCG: 50 INJECTION, SOLUTION INTRAMUSCULAR; INTRAVENOUS at 11:16

## 2020-11-03 RX ADMIN — FENTANYL CITRATE 100 MCG: 50 INJECTION, SOLUTION INTRAMUSCULAR; INTRAVENOUS at 09:01

## 2020-11-03 RX ADMIN — MIDAZOLAM 2 MG: 1 INJECTION INTRAMUSCULAR; INTRAVENOUS at 07:27

## 2020-11-03 RX ADMIN — ROCURONIUM BROMIDE 5 MG: 10 SOLUTION INTRAVENOUS at 07:39

## 2020-11-03 RX ADMIN — HYDROMORPHONE HYDROCHLORIDE 0.2 MG: 1 INJECTION, SOLUTION INTRAMUSCULAR; INTRAVENOUS; SUBCUTANEOUS at 11:46

## 2020-11-03 RX ADMIN — FENTANYL CITRATE 25 MCG: 50 INJECTION, SOLUTION INTRAMUSCULAR; INTRAVENOUS at 11:11

## 2020-11-03 RX ADMIN — ROCURONIUM BROMIDE 25 MG: 10 SOLUTION INTRAVENOUS at 07:43

## 2020-11-03 NOTE — DISCHARGE INSTRUCTIONS
Empty and record drain output twice daily or as needed. May remove bra and dressings in 24 - 48hrs and shower and get incisions wet. Wear sports bra unless showering. No heavy lifting or vigorous activity for 1 week. You were given a pain pill at 1200 pm today. You may take another percocet at 4 pm today. Please take medication with food. DISCHARGE SUMMARY from Nurse    PATIENT INSTRUCTIONS:    After general anesthesia or intravenous sedation, for 24 hours or while taking prescription Narcotics:  · Limit your activities  · Do not drive and operate hazardous machinery  · Do not make important personal or business decisions  · Do  not drink alcoholic beverages  · If you have not urinated within 8 hours after discharge, please contact your surgeon on call. Report the following to your surgeon:  · Excessive pain, swelling, redness or odor of or around the surgical area  · Temperature over 100.5  · Nausea and vomiting lasting longer than 4 hours or if unable to take medications  · Any signs of decreased circulation or nerve impairment to extremity: change in color, persistent  numbness, tingling, coldness or increase pain  · Any questions    What to do at Home:  Recommended activity: as instructed,     If you experience any of the following symptoms as instructed, please follow up with Dr Kathleen Coelho. *  Please give a list of your current medications to your Primary Care Provider. *  Please update this list whenever your medications are discontinued, doses are      changed, or new medications (including over-the-counter products) are added. *  Please carry medication information at all times in case of emergency situations. These are general instructions for a healthy lifestyle:    No smoking/ No tobacco products/ Avoid exposure to second hand smoke  Surgeon General's Warning:  Quitting smoking now greatly reduces serious risk to your health.     Obesity, smoking, and sedentary lifestyle greatly increases your risk for illness    A healthy diet, regular physical exercise & weight monitoring are important for maintaining a healthy lifestyle    You may be retaining fluid if you have a history of heart failure or if you experience any of the following symptoms:  Weight gain of 3 pounds or more overnight or 5 pounds in a week, increased swelling in our hands or feet or shortness of breath while lying flat in bed. Please call your doctor as soon as you notice any of these symptoms; do not wait until your next office visit. The discharge information has been reviewed with the patient. The patient verbalized understanding. Discharge medications reviewed with the patient and appropriate educational materials and side effects teaching were provided.   ___________________________________________________________________________________________________________________________________

## 2020-11-03 NOTE — OP NOTES
[address] [facility]  OPERATIVE REPORT    Name:  Sherice James  MR#:  [id number]  :  1945  ACCOUNT #:  [de-identified]  DATE OF SERVICE:  2020    PREOPERATIVE DIAGNOSES:  1. Left breast cancer. 2.  Acquired partial absence of left breast.  3.  Bilateral breasts hypertrophy. 4.  Breast disproportion after reconstruction. POSTOPERATIVE DIAGNOSES:  1. Left breast cancer. 2.  Acquired partial absence of left breast.  3.  Bilateral breasts hypertrophy. 4.  Breast disproportion after reconstruction. PROCEDURES PERFORMED:  1. Left breast reconstruction after lumpectomy using oncoplastic reduction techniques (86 g lumpectomy plus 730 g reduction). 2.  Right breast reduction (850 g). SURGEON:  Yamile Mcclure MD    ASSISTANT:  Odilia Watson. ANESTHESIA:  General endotracheal.    COMPLICATIONS:  None. SPECIMENS REMOVED:  Right and left breast tissue. IMPLANTS:  None. ESTIMATED BLOOD LOSS:  Approximately 50 mL. INDICATIONS:  This 82-year-old female presented with left breast cancer. She just thought about breast conservation and included lumpectomy. Because of her breast size and shape, this would leave a large defect along the lower pole of the left breast, and she requested breast reconstruction. This in term would lay part with breast asymmetry due to her large, ptotic right breast and necessitate right breast reduction. Risks, benefits, and alternatives were reviewed in detail, and she agreed to proceed. PROCEDURE:  After informed consent was obtained, the patient was marked in the preoperative holding area. She was then taken to the operating room, where Dr. Tate Murrieta performed left breast lumpectomy and sentinel node biopsy. The patient was re-draped. Each breast was then infiltrated with an approximately 500 mL of wetting solution consisting of 1 amp of epinephrine, 30 mL of 1% plain lidocaine, and 30 mL of 0.25% bupivacaine.   After a 5-minute wait, attention was turned to the left breast, where the lower pole defect was identified. A superomedially based nipple pedicle was designed and deepithelialized. Breast parenchyma within the previously drawn vertical pattern was sharply excised. A small amount of the lateral flap was advanced into the defect along the lower pole to fill this area. The bulk of the reduction came from the lateral breast in the lower pole. Meticulous hemostasis was obtained. The nipple was transposed superiorly and secured with 3-0 Vicryl. The vertical pillars were approximated with 2-0 PDS, effectively coning the breast.  Remaining incisions were closed in layers with absorbable staples and sutures. Attention was then turned to the right breast, right vertical type breast reduction was performed, again utilizing a superomedially based nipple pedicle that was deepithelialized. Breast parenchyma within the vertical marking was excised. The bulk of the reduction again was along the lower pole and lateral breast.  Hemostasis was obtained, the wound was irrigated. The nipple was transposed and secured. The vertical pillars were approximated, and remaining incisions were closed in layers with absorbable staples and sutures. 7-mm drains were inserted into each breast before final closure. The patient was placed in the seated position, and shape and symmetry were found to be satisfactory. The left side was slightly larger to compensate for future radiation therapy. Dermabond and dry dressings were applied. She was extubated and transferred to the recovery room in good condition.         Antonietta Jeans, MD [id number]/  D:  11/03/2020 10:38  T:  11/03/2020 16:18  JOB #:  [id number]  CC:  MD Stephanie Samaniego MD

## 2020-11-03 NOTE — PERIOP NOTES
LEFT BREAST LUMPECTOMY TISSUE:  86 GRAMS  LEFT BREAST REDUCTION TISSUE:  730 GRAMS  RIGHT BREAST REDUCTION TISSUE:  850 GRAMS

## 2020-11-03 NOTE — ANESTHESIA POSTPROCEDURE EVALUATION
Post-Anesthesia Evaluation and Assessment    Patient: Jona Angeles MRN: [id number]  SSN: [id number]    YOB: 1945  Age: 70 y.o. Sex: female      I have evaluated the patient and they are stable and ready for discharge from the PACU. Cardiovascular Function/Vital Signs  Visit Vitals  BP (!) 118/59   Pulse (!) 52   Temp 36.5 °C (97.7 °F)   Resp 11   Ht 5' 8\" (1.727 m)   Wt 97.1 kg (214 lb 1.1 oz)   SpO2 100%   BMI 32.55 kg/m²       Patient is status post General anesthesia for Procedure(s):  LEFT BREAST REDUCTION LUMPECTOMY WITH ULTRASOUND, LEFT BREAST SENTINEL NODE BIOPSY, LEFT BREAST RECONSTRUCTION, RIGHT BREAST REDUCTION  SENTINEL NODE BIOPSY. Nausea/Vomiting: None    Postoperative hydration reviewed and adequate. Pain:  Pain Scale 1: Numeric (0 - 10) (11/03/20 1117)  Pain Intensity 1: 10 (11/03/20 1117)   Managed    Neurological Status:   Neuro (WDL): Within Defined Limits (11/03/20 1040)  Neuro  LUE Motor Response: Purposeful (11/03/20 1040)  LLE Motor Response: Purposeful (11/03/20 1040)  RUE Motor Response: Purposeful (11/03/20 1040)  RLE Motor Response: Purposeful (11/03/20 1040)   At baseline    Mental Status, Level of Consciousness: Alert and  oriented to person, place, and time    Pulmonary Status:   O2 Device: Nasal cannula (11/03/20 1111)   Adequate oxygenation and airway patent    Complications related to anesthesia: None    Post-anesthesia assessment completed. No concerns    Signed By: Marlon Dennis MD     November 3, 2020              Procedure(s):  LEFT BREAST REDUCTION LUMPECTOMY WITH ULTRASOUND, LEFT BREAST SENTINEL NODE BIOPSY, LEFT BREAST RECONSTRUCTION, RIGHT BREAST REDUCTION  SENTINEL NODE BIOPSY.     general    <BSHSIANPOST>    INITIAL Post-op Vital signs:   Vitals Value Taken Time   /59 11/3/2020 11:15 AM   Temp 36.5 °C (97.7 °F) 11/3/2020 10:40 AM   Pulse 54 11/3/2020 11:20 AM   Resp 13 11/3/2020 11:20 AM   SpO2 100 % 11/3/2020 11:20 AM   Vitals shown include unvalidated device data.

## 2020-11-03 NOTE — BRIEF OP NOTE
Brief Postoperative Note    Patient: Panfilo Avila  YOB: 1945  MRN: [id number]    Date of Procedure: 11/3/2020     Pre-Op Diagnosis: LEFT BREAST CANCER    Post-Op Diagnosis: Same as preoperative diagnosis.       Procedure(s):  LEFT BREAST REDUCTION LUMPECTOMY WITH ULTRASOUND, LEFT BREAST SENTINEL NODE BIOPSY, LEFT BREAST RECONSTRUCTION, RIGHT BREAST REDUCTION  SENTINEL NODE BIOPSY  LEFT BREAST RECONSTRUCTION, RIGHT BREAST REDUCTION    Surgeon(s):  MD Neda Montalvo MD    Surgical Assistant: Surg Asst-1: Nina TRINH    Anesthesia: General     Estimated Blood Loss (mL): Minimal    Complications: None    Specimens:   ID Type Source Tests Collected by Time Destination   1 : LEFT AXILLARY SENTINEL NODE BIOPSY #1 Fresh Axillary  Aftab Hsieh MD 11/3/2020 1394 Pathology   2 : LEFT AXILLARY SENNTINEL NODE BIOPSY #2 Fresh Axillary  Neda Verduzco MD 11/3/2020 2472 Pathology   3 : LEFT BREAST LUMPECTOMY Fresh Breast  Neda Verduzco MD 11/3/2020 2337 Pathology   4 : DEEP MARGIN LEFT LUMPECTOMY Fresh Breast  Neda Verduzco MD 11/3/2020 2619 Pathology        Implants: * No implants in log *    Drains: * No LDAs found *    Findings: spec sono pos mass and clip, deep margin obtained, sent nodes x 2    Electronically Signed by Marybel Gonzalez MD on 48/3/2150 at 8:40 AM

## 2020-11-03 NOTE — OP NOTES
[facility]  OPERATIVE REPORT    Name:  Rachel Chung  MR#:  [id number]  :  1945  ACCOUNT #:  [de-identified]  DATE OF SERVICE:  2020    PREOPERATIVE DIAGNOSIS:  Carcinoma of the left breast.    POSTOPERATIVE DIAGNOSIS:  Carcinoma of the left breast.    PROCEDURE PERFORMED:  Left lumpectomy with intraoperative ultrasound and left sentinel node biopsy. SURGEON:  Laisha Hernandez MD    ASSISTANT:  Mary Dominguez    ANESTHESIA:  General.    COMPLICATIONS:  None. SPECIMENS REMOVED:  Left breast tissue with margins and left axillary sentinel lymph nodes x2. IMPLANTS:  None. ESTIMATED BLOOD LOSS:  Minimal.    INDICATIONS:  The patient is a 79-year-old female with a biopsy-proven adenocarcinoma of the left breast, who was opted for ANCA positive reduction lumpectomy. She was admitted at this time for lumpectomy, sentinel node biopsy with reconstructive lumpectomy and right breast reduction by Dr. Litzy Brown. PROCEDURE:  After lymphoscintigraphy in Radiology,  the patient was brought to the operating room. After the satisfactory induction of general LMA anesthesia, the patient was prepped and draped in sterile fashion. An axillary incision was made and deepened through subcutaneous tissue with Bovie cautery. Utilizing a Neoprobe, 2 sentinel nodes were found in the deep axilla. They were removed and sent for permanent section. All dissection planes were hemostatic. The wound was closed with interrupted 3-0 Vicryl and running subcuticular 4-0 Monocryl on the skin. Attention was then turned to the breast where the lower pole incision was opened. It was deepened through the subcutaneous tissue with Bovie cautery. Skin flap was raised superiorly to the area of the tumor, which was excised with a standard lumpectomy. The specimen was oriented and the specimen ultrasound was performed which revealed the presence of the mass and the clip within the specimen.   Additional deep margin was obtained, which was oriented and sent to Pathology. All dissection planes were hemostatic. At this point, Dr. Sophie Gomez entered the room to complete the reconstructive lumpectomy.       Mago Mane MD JP/[email or web address]/[email or web address]  D:  11/03/2020 8:40  T:  11/03/2020 14:31  JOB #:  [id number]  CC:  MD Jayant Landrum MD

## 2020-11-03 NOTE — BRIEF OP NOTE
Brief Postoperative Note    Patient: Nay Conner  YOB: 1945  MRN: [id number]    Date of Procedure: 11/3/2020     Pre-Op Diagnosis: LEFT BREAST CANCER    Post-Op Diagnosis: Same as preoperative diagnosis. Procedure(s):   LEFT BREAST RECONSTRUCTION after lumpectomy/ oncoplastic reduction (86g lumpectomy plus 730g reduction), RIGHT BREAST REDUCTION (850g)    Surgeon(s):  Leslie Hsieh MD Rocky Burgess, MD    Surgical Assistant: Surg Asst-1: Nica TRINH    Anesthesia: General     Estimated Blood Loss (mL): less than 50     Complications: none    Specimens:   ID Type Source Tests Collected by Time Destination   1 : LEFT AXILLARY SENTINEL NODE BIOPSY #1 Fresh Axillary  Leslie Hsieh MD 11/3/2020 1075 Pathology   2 : LEFT AXILLARY SENNTINEL NODE BIOPSY #2 Fresh Axillary  Leslie Hsieh MD 11/3/2020 0345 Pathology   3 : LEFT BREAST LUMPECTOMY Fresh Breast  Rudy Briggs MD 11/3/2020 0830 Pathology   4 : DEEP MARGIN LEFT LUMPECTOMY Fresh Breast  Rudy Briggs MD 11/3/2020 0831 Pathology   5 : LEFT BREAST REDUCTION TISSUE Inderjit Morrison MD 11/3/2020 2029 Pathology   6 : RIGHT BREAST REDUCTION TISSUE Inderjit Morrison MD 11/3/2020 0762 Pathology        Implants: * No implants in log *    Drains:   Lui-Blevins Drain 11/03/20 Left Breast (Active)   Site Assessment Clean, dry, & intact 11/03/20 1010   Dressing Status Clean, dry, & intact 11/03/20 1010   Status Patent; Charged;Draining 11/03/20 1010   Drainage Color Serosanguinous 11/03/20 1010       Lui-Blevins Drain 11/03/20 Right Breast (Active)   Site Assessment Clean, dry, & intact 11/03/20 1010   Dressing Status Clean, dry, & intact 11/03/20 1010   Status Patent; Charged;Draining 11/03/20 1010   Drainage Color Serosanguinous 11/03/20 1010       Findings: see note    Electronically Signed by Alyssia Rose MD on 11/3/2020 at 10:28 AM

## 2020-11-03 NOTE — H&P
HISTORY OF PRESENT ILLNESS   Naz Talamantes is a 70 y.o. female. HPI   NEW Patient presents for consultation at the request of Dr. Ilsa Shah for newly diagnosed LEFT breast cancer. She had no abnormal breast symptoms to report. This was detected from screening mammogram which led to diagnostic imaging and subsequent biopsy. Biopsy site healing well. No prior history of breast biopsies. Her son with here with her today. 08/04/20: LEFT breast bx. PATH: IDC, 5 mm in greatest linear dimension extending to core biopsy tip, histologic grade 2, ER+(%)/ND+(%)/HER2-. Clinical stage 1. Family history-   Maternal aunt had colon cancer. Breast imaging-   NABEEL Results (most recent):        Results from Hospital Encounter encounter on 08/04/20   NABEEL POST BX IMAGING LT INCL CAD    Addendum Addendum: Addendum to the left breast biopsy: Findings: Pathology of the left breast mass is consistent with invasive mammary carcinoma, ductal . The imaging findings are concordant with the histology results. Recommend surgical consultation and excision . The patient was contacted by Dr. Kourtney Cox at 3:00 PM 8/6/2020 . We will inform the office of the referring physician and assist in scheduling a breast surgical appointment. Natalee Adamson MD 8/6/2020 3:00 PM     Narrative STUDY: ULTRASOUND-GUIDED CORE NEEDLE BIOPSY OF THE LEFT BREAST   INDICATION: 77-year-old female with a left breast mass, presenting for biopsy. PROCEDURE:   The risks and benefits of the procedure were explained to the patient, questions   were answered, and informed consent was obtained. The mass at 6 o'clock in the left breast was localized with ultrasound. The   breast was prepped in the usual sterile manner. Following the administration of   a local anesthetic and dermatotomy, and using ultrasound guidance, a 12 gauge   vacuum-assisted Atec needle was advanced to the lesion, and 5 cores were   obtained.  Pre and post-fire images confirmed accurate needle trajectory. A   metallic clip was placed at the biopsy site. Post-biopsy digital mammogram   confirms the presence of the clip at the intended biopsy site. The patient   tolerated the procedure well without complication. Final pathology is pending. Impression IMPRESSION:   Successful ultrasound-guided biopsy yielding cores submitted for histologic   analysis. A clip was placed at the biopsy site. Post-biopsy digital mammogram   confirms the presence of the clip at the intended biopsy site. Further   recommendations will be based on final pathology results. Past Medical History:   Diagnosis Date    Acute diverticulitis 8/18/2017    Allergic rhinitis 8/18/2017    Arthritis 8/18/2017    Arthritis of right knee 11/25/2019    Back pain, thoracic 8/18/2017    Cataract, left 8/18/2017    Cataract, right 8/18/2017    Cirrhosis ([facility] [address] 8/18/2017    Cold sore 8/18/2017    Elevated hemoglobin A1c 8/18/2017    Fatigue 8/18/2017    Gastrointestinal disorder     acid reflux    GERD (gastroesophageal reflux disease) 8/18/2017    Heart murmur 8/18/2017    Hepatitis C 8/18/2017    Herpes zoster infection of thoracic region 8/18/2017    Hyperlipidemia LDL goal <100 8/18/2017    Hypertension     Menopause     Hysterectomy-42years old    Murmur     Obesity (BMI 30-39. 9) 8/18/2017    Osteopenia 8/18/2017    Other ill-defined conditions(799.89)     hep c    Post-menopausal 8/18/2017    Posterior auricular pain of right ear 8/18/2017    Preop examination 8/18/2017    Valvular heart disease     mitral valve prolapse    Vertigo, benign positional 8/18/2017           Past Surgical History:   Procedure Laterality Date    HX HYSTERECTOMY      42years old    HX ORTHOPAEDIC      knee, foot     Social History           Socioeconomic History    Marital status: SINGLE     Spouse name: Not on file    Number of children: Not on file    Years of education: Not on file    Highest education level: Not on file   Occupational History    Not on file   Social Needs    Financial resource strain: Not on file    Food insecurity     Worry: Not on file     Inability: Not on file    Transportation needs     Medical: Not on file     Non-medical: Not on file   Tobacco Use    Smoking status: Former Smoker     Packs/day: 0.25     Years: 25.00     Pack years: 6.25     Types: Cigarettes     Last attempt to quit: 1990     Years since quittin.6    Smokeless tobacco: Never Used   Substance and Sexual Activity    Alcohol use: No     Alcohol/week: 0.0 standard drinks    Drug use: No    Sexual activity: Not on file   Lifestyle    Physical activity     Days per week: Not on file     Minutes per session: Not on file    Stress: Not on file   Relationships    Social connections     Talks on phone: Not on file     Gets together: Not on file     Attends Orthodoxy service: Not on file     Active member of club or organization: Not on file     Attends meetings of clubs or organizations: Not on file     Relationship status: Not on file    Intimate partner violence     Fear of current or ex partner: Not on file     Emotionally abused: Not on file     Physically abused: Not on file     Forced sexual activity: Not on file   Other Topics Concern    Not on file   Social History Narrative    Not on file            Current Outpatient Medications on File Prior to Visit   Medication Sig Dispense Refill    valACYclovir (VALTREX) 500 mg tablet Take 1 tablet by mouth once daily 30 Tab 0    olmesartan-hydroCHLOROthiazide (BENICAR HCT) 20-12.5 mg per tablet TAKE 1 TABLET BY MOUTH ONCE DAILY 90 Tab 3    furosemide (LASIX) 20 mg tablet TAKE 1 TABLET BY MOUTH ONCE DAILY 90 Tab 3    TURMERIC ROOT EXTRACT PO Take 200 mg by mouth.  vit C/vit E ac/selenium/ginkgo (MEMORY COMPLEX PO) Take by mouth daily.  cyanocobalamin (VITAMIN B-12) 1,000 mcg tablet Take 1,000 mcg by mouth daily.       ascorbic acid, vitamin C, (VITAMIN C) 500 mg tablet Take by mouth.  psyllium (METAMUCIL) powd Take by mouth daily.  magnesium 250 mg tab Take by mouth.  mometasone (NASONEX) 50 mcg/actuation nasal spray 2 Sprays as needed.  naproxen sodium (ALEVE) 220 mg tablet Take 220 mg by mouth two (2) times daily (with meals).  docosanol (ABREVA) 10 % topical cream Apply to affected area five (5) times daily.  biotin 5,000 mcg TbDi Take by mouth.  krill oil 500 mg cap Take by mouth.  MVIT-MINS/FOLIC ACID/SOY ISOFL (ONE-A-DAY MENOPAUSE FORMULA PO) Take by mouth two (2) times a day.  S-Adenosylmethionine (YRN-E, ENTERIC COATED,) 400 mg TbEC Take by mouth.  cholecalciferol, VITAMIN D3, (VITAMIN D3) 5,000 unit tab tablet Take by mouth daily.  Dexlansoprazole (DEXILANT) 60 mg CpDB Take 60 mg by mouth every other day.  omeprazole (PRILOSEC OTC) 20 mg tablet Take 20 mg by mouth as needed. No current facility-administered medications on file prior to visit. No Known Allergies   OB History    No obstetric history on file. Obstetric Comments    Menarche 17, LMP age 42, # of children 1, age of 4st delivery 23, Hysterectomy/oophorectomy yes partial/yes, Breast bx no, history of breast feeding no, BCP yes, Hormone therapy yes        ROS   Constitutional: Negative. HENT: Negative. Eyes: Negative. Respiratory: Negative. Cardiovascular: Negative. Gastrointestinal: Negative. Genitourinary: Negative. Musculoskeletal: Negative. Skin: Negative. Neurological: Negative. Endo/Heme/Allergies: Negative. Psychiatric/Behavioral: Negative. Physical Exam   Exam conducted with a chaperone present. Cardiovascular:   Rate and Rhythm: Normal rate and regular rhythm. Heart sounds: Normal heart sounds. Pulmonary:   Breath sounds: Normal breath sounds.    Chest:   Breasts: Breasts are symmetrical.   Right: Normal. No swelling, bleeding, inverted nipple, mass, nipple discharge, skin change or tenderness. Left: Mass present. No swelling, bleeding, inverted nipple, nipple discharge, skin change or tenderness. Lymphadenopathy:   Cervical:   Right cervical: No superficial, deep or posterior cervical adenopathy. Left cervical: No superficial, deep or posterior cervical adenopathy. Upper Body:   Right upper body: No supraclavicular or axillary adenopathy. Left upper body: No supraclavicular or axillary adenopathy. BREAST ULTRASOUND, Pre-op Planning   Indication: Pre-op planning, LEFT breast 6:00, 4cm from the nipple   Technique: The area was scanned using a high-frequency linear-array near-field transducer   Findings: Mass and bx clip visible   Impression: Breast cancer   Disposition: Surgery   ASSESSMENT and PLAN     ICD-10-CM ICD-9-CM    1. Breast mass, left  N63.20 611.72    2. Invasive ductal carcinoma of left breast (HCC)  C50.912 174.9 MRI BREAST BI W WO CONT      ALPRAZolam (XANAX) 0.5 mg tablet   Pt presents for consultation for treatment of LEFT breast IDC, ER+/GA+/HER2-, clinical stage 1. Small palpable mass on exam at LEFT breast 6:00, 4cm from the nipple. US visualizes mass and bx clip. We had a long discussion of options for treatment. A total of 80 minutes were spent face-to-face with the patient, accompanied by her son, during this encounter, and over half of that time was spent on counseling and coordination of care. We discussed in depth the pathology results, and the need for treatment following MRI for extent of disease and surgical planning. The goals of treatment are to treat the breast, and to reduce risk of local or distant recurrence. Discussed treatment options with risks, complications, benefits, and limitations, including lumpectomy with XRT, and mastectomy with reconstruction, both having the same cure rate. Explained the importance of negative margins. Also discussed benefit and technique of SLNBx of 3-4 LNs.  Pt has considered breast reduction, so we also discussed option for oncoplastic reduction and contralateral symmetry mastopexy in conjunction with plastic surgeon. This will be an outpatient operation. We also covered risk reduction strategies including XRT, chemotherapy, and hormonal therapy with estrogen blocker. Discussed that in women over 79 with lumpectomy (with negative LNs) and AI, and without XRT, there is a slightly increased risk of recurrence, but that this does not affect overall cure rate. Will order MammaPrint to help determine whether pt will benefit from chemotherapy. If low risk, chemo may not be recommended. If high risk, will further evaluate need for chemo based on final tumor size and LN involvement. Discussed estrogen blocking pill for 5-10 years for further risk reduction for ER+ disease. Will order MRI (prescription given for Xanax) and plan further from there - radiology will call pt to schedule. This plan was reviewed with the patient and patient agrees. All questions were answered.

## 2020-11-03 NOTE — ANESTHESIA PREPROCEDURE EVALUATION
Relevant Problems   No relevant active problems       Anesthetic History   No history of anesthetic complications            Review of Systems / Medical History  Patient summary reviewed, nursing notes reviewed and pertinent labs reviewed    Pulmonary    COPD: mild               Neuro/Psych   Within defined limits           Cardiovascular    Hypertension: well controlled              Exercise tolerance: >4 METS     GI/Hepatic/Renal     GERD      Liver disease    Comments: cirrohosis Endo/Other        Obesity and arthritis  Pertinent negatives: No morbid obesity   Other Findings            Physical Exam    Airway  Mallampati: II  TM Distance: > 6 cm  Neck ROM: normal range of motion   Mouth opening: Normal     Cardiovascular  Regular rate and rhythm,  S1 and S2 normal,  no murmur, click, rub, or gallop  Rhythm: regular  Rate: normal         Dental  No notable dental hx       Pulmonary  Breath sounds clear to auscultation               Abdominal  GI exam deferred       Other Findings            Anesthetic Plan    ASA: 3  Anesthesia type: general          Induction: Intravenous  Anesthetic plan and risks discussed with: Patient

## 2020-11-06 ENCOUNTER — TELEPHONE (OUTPATIENT)
Dept: SURGERY | Age: 75
End: 2020-11-06

## 2020-11-10 DIAGNOSIS — C50.912 MALIGNANT NEOPLASM OF LEFT FEMALE BREAST, UNSPECIFIED ESTROGEN RECEPTOR STATUS, UNSPECIFIED SITE OF BREAST (HCC): Primary | ICD-10-CM

## 2020-11-22 NOTE — PROGRESS NOTES
[facility] [place] at [address], [address] Belt[place]: [phone]  F: [phone]    Reason for Visit:   Naz Talamantes is a 70 y.o. female who is seen in consultation at the request of Dr. Estephania Estrada for evaluation of breast cancer. Treatment History:   Lumpectomy with b/l reduction 11/3/20. STAGE: T1cN0 ER+ HEr2 negative mammaprint high risk    History of Present Illness:     Pt seen today for office consult for new breast cancer ER+ HER2 negative post lumpectomy/ reduction 11/3/20. Initially abnormal screening mammo 7/20 on LEFT with small mass. Biopsy IDC ER+ HER2 negative. Breast MRI 8/20 12mm mass otherwise negative. mammaprint high risk luminal B. Surgical path lumpectomy 14mm IDC with negative LNs and negative margins. Did well with surgery. Still healing. Here today for discussion of adjuvant therapy. To see rad/onc. Has heart problems and sees cardio/ liver team for Hep C cured. Has HTN. Works from home. Feels well today. Unsure about doing chemo. Fam Hx aunt colon cancer, uncle throat. Past Medical History:   Diagnosis Date    Acute diverticulitis 8/18/2017    Allergic rhinitis 8/18/2017    Arthritis 8/18/2017    Arthritis of right knee 11/25/2019    Back pain, thoracic 8/18/2017    Cancer ([facility] [address] 08/2020    LEFT BREAST     Cataract, left 8/18/2017    Cataract, right 8/18/2017    Chronic obstructive pulmonary disease (HCC)     Cirrhosis ([address] 8/18/2017    Cold sore 8/18/2017    Elevated hemoglobin A1c 8/18/2017    Fatigue 8/18/2017    Gastrointestinal disorder     acid reflux    GERD (gastroesophageal reflux disease) 8/18/2017    Heart murmur 8/18/2017    Hepatitis C 8/18/2017    Herpes zoster infection of thoracic region 8/18/2017    Hyperlipidemia LDL goal <100 8/18/2017    Hypertension     Menopause     Hysterectomy-42years old    Murmur     Obesity (BMI 30-39. 9) 8/18/2017    Osteopenia 2017    Other ill-defined conditions(799.89)     hep c    Post-menopausal 2017    Posterior auricular pain of right ear 2017    Preop examination 2017    Valvular heart disease     mitral valve prolapse    Vertigo, benign positional 2017      Past Surgical History:   Procedure Laterality Date    HX BREAST LUMPECTOMY Bilateral 11/3/2020    LEFT BREAST REDUCTION LUMPECTOMY WITH ULTRASOUND, LEFT BREAST SENTINEL NODE BIOPSY, LEFT BREAST RECONSTRUCTION, RIGHT BREAST REDUCTION performed by Nicky Hand MD at [address] HX COLONOSCOPY      HX HYSTERECTOMY      42years old   [place] HX ORTHOPAEDIC Right     knee, foot      Social History     Tobacco Use    Smoking status: Former Smoker     Packs/day: 0.25     Years: 25.00     Pack years: 6.25     Types: Cigarettes     Last attempt to quit: 1990     Years since quittin.9    Smokeless tobacco: Never Used   Substance Use Topics    Alcohol use: Yes     Alcohol/week: 0.0 standard drinks     Comment: OCCASIONALLY      Family History   Problem Relation Age of Onset    Hypertension Mother     Diabetes Mother     Kidney Disease Mother     Heart Disease Father     Colon Cancer Maternal Grandmother     No Known Problems Sister     Anesth Problems Neg Hx      Current Outpatient Medications   Medication Sig    docusate sodium (Colace) 100 mg capsule Take 1 Cap by mouth two (2) times a day.  ondansetron (ZOFRAN ODT) 8 mg disintegrating tablet Take 1 Tab by mouth every eight (8) hours as needed for Nausea.  olmesartan-hydroCHLOROthiazide (BENICAR HCT) 40-12.5 mg per tablet Take 1 Tab by mouth every evening.  MILK THISTLE PO Take 175 mg by mouth daily.  OTHER Indications: AM/PM MENOPAUSE FORMULA 1 TAB QHS    furosemide (LASIX) 20 mg tablet TAKE 1 TABLET BY MOUTH ONCE DAILY (Patient taking differently: Take 20 mg by mouth every evening.  TAKE 1 TABLET BY MOUTH ONCE DAILY)    valACYclovir (VALTREX) 500 mg tablet Take 1 tablet by mouth once daily (Patient taking differently: as needed.)    TURMERIC ROOT EXTRACT PO Take 200 mg by mouth.  cyanocobalamin (VITAMIN B-12) 1,000 mcg tablet Take 1,000 mcg by mouth daily.  psyllium (METAMUCIL) powd Take  by mouth daily.  magnesium 250 mg tab Take  by mouth.  mometasone (NASONEX) 50 mcg/actuation nasal spray 2 Sprays as needed.  naproxen sodium (ALEVE) 220 mg tablet Take 220 mg by mouth two (2) times daily (with meals).  biotin 5,000 mcg TbDi Take  by mouth.  S-Adenosylmethionine (YRN-E, ENTERIC COATED,) 400 mg TbEC Take  by mouth.  cholecalciferol, VITAMIN D3, (VITAMIN D3) 5,000 unit tab tablet Take  by mouth daily.  Dexlansoprazole (DEXILANT) 60 mg CpDB Take 60 mg by mouth as needed.  omeprazole (PRILOSEC OTC) 20 mg tablet Take 20 mg by mouth as needed.  docosanol (ABREVA) 10 % topical cream Apply  to affected area five (5) times daily.  krill oil 500 mg cap Take  by mouth. No current facility-administered medications for this visit. No Known Allergies     A complete review of systems was obtained, negative except as described above and as reported on ROS sheet scanned into system. Physical Exam:     Visit Vitals  /69   Pulse 92   Temp (!) 96.3 °F (35.7 °C)   Ht 5' 8\" (1.727 m)   Wt 213 lb (96.6 kg)   SpO2 97%   BMI 32.39 kg/m²     ECOG PS: 1  General: No distress  Eyes: PERRLA, anicteric sclerae  HENT: Atraumatic, mask on   Neck: Supple  Respiratory: CTAB, normal respiratory effort  CV: Normal rate, regular rhythm, no murmurs, no peripheral edema  GI: Soft, nontender, nondistended, no masses  MS: Normal gait and station. Digits without clubbing or cyanosis. Skin: No rashes, ecchymoses, or petechiae. Normal temperature, turgor, and texture. Psych: Alert, oriented, appropriate affect, normal judgment/insight  Breast b/l reduction healing, left lumpectomy scar healing well.      Results:     Lab Results   Component Value Date/Time    WBC 4.4 10/26/2020 02:30 PM    HGB 13.1 10/26/2020 02:30 PM    HCT 40.5 10/26/2020 02:30 PM    PLATELET 947 43/76/0400 02:30 PM    MCV 90.4 10/26/2020 02:30 PM    ABS. NEUTROPHILS 2.2 10/26/2020 02:30 PM    HGB (POC) 12.6 09/11/2017 11:03 AM    HCT (POC) 38.2 09/11/2017 11:03 AM     Lab Results   Component Value Date/Time    Sodium 140 10/26/2020 02:30 PM    Potassium 3.6 10/26/2020 02:30 PM    Chloride 105 10/26/2020 02:30 PM    CHLORIDE 104.0 03/28/2018 10:08 AM    CO2 29 10/26/2020 02:30 PM    Glucose 84 10/26/2020 02:30 PM    BUN 22 (H) 10/26/2020 02:30 PM    Creatinine 1.08 (H) 10/26/2020 02:30 PM    GFR est AA >60 10/26/2020 02:30 PM    GFR est non-AA 50 (L) 10/26/2020 02:30 PM    Calcium 9.5 10/26/2020 02:30 PM    Sodium (POC) 146.0 (A) 03/28/2018 10:08 AM    Potassium (POC) 4.6 03/28/2018 10:08 AM    Creatinine (POC) 0.7 03/28/2018 10:08 AM     Lab Results   Component Value Date/Time    Bilirubin, total 0.5 10/26/2020 02:30 PM    ALT (SGPT) 19 10/26/2020 02:30 PM    Alk. phosphatase 58 10/26/2020 02:30 PM    Protein, total 8.3 (H) 10/26/2020 02:30 PM    Albumin 4.1 10/26/2020 02:30 PM    Globulin 4.2 (H) 10/26/2020 02:30 PM         Records reviewed and summarized above. Pathology report(s) reviewed above. Radiology report(s) reviewed above. Assessment/PLAN:     1) stage 1 T1cN0 ER+ HER2 negative mammaprint high risk luminal B post lumpectomy/ reduction b/l 11/3/20. Records reviewed. Reviewed path and data today. Discussed dx, stage and treatment of breast cancer. Discussed high risk mammaprint. Discussed adjuvant chemo and hormonal therapy. Discussed TC chemo specifically. Pt is still healing from surgery. Will monitor and f/u with plastics. Wants to think about chemo. Will f/u again in 2 weeks for further discussion. Is open to hormonal therapy. To see rad/onc. Clinically pt is stable today. 2) hx of Hep C. Cured per pt.   Sees Liver team.    3) heart valve problems/ HTN. Per cardio. 4) COPD/ GERD. Per PCP. 5) psychosocial. Mood good. Coping well. Lives alone. Works during [employer]. SW support today. F/u here in 2 weeks  Call if questions  Time 60 min face to face with greater than 50% of time spent in discussion/ counseling about assessment and treatment planning and care coordination. I appreciate the opportunity to participate in Ms. Danielle Jones's care.     Signed By: Sushma Glez,

## 2020-11-23 ENCOUNTER — DOCUMENTATION ONLY (OUTPATIENT)
Dept: ONCOLOGY | Age: 75
End: 2020-11-23

## 2020-11-23 ENCOUNTER — OFFICE VISIT (OUTPATIENT)
Dept: ONCOLOGY | Age: 75
End: 2020-11-23
Payer: COMMERCIAL

## 2020-11-23 VITALS
SYSTOLIC BLOOD PRESSURE: 110 MMHG | WEIGHT: 213 LBS | HEIGHT: 68 IN | OXYGEN SATURATION: 97 % | BODY MASS INDEX: 32.28 KG/M2 | HEART RATE: 92 BPM | DIASTOLIC BLOOD PRESSURE: 69 MMHG | TEMPERATURE: 96.3 F

## 2020-11-23 DIAGNOSIS — B19.20 HEPATITIS C VIRUS INFECTION WITHOUT HEPATIC COMA, UNSPECIFIED CHRONICITY: ICD-10-CM

## 2020-11-23 DIAGNOSIS — I36.1 NON-RHEUMATIC TRICUSPID VALVE INSUFFICIENCY: ICD-10-CM

## 2020-11-23 DIAGNOSIS — C50.912 INVASIVE DUCTAL CARCINOMA OF LEFT BREAST (HCC): Primary | ICD-10-CM

## 2020-11-23 DIAGNOSIS — I10 ESSENTIAL HYPERTENSION: ICD-10-CM

## 2020-11-23 PROCEDURE — 99205 OFFICE O/P NEW HI 60 MIN: CPT | Performed by: INTERNAL MEDICINE

## 2020-11-23 NOTE — PROGRESS NOTES
Eulalia  is a 70 y.o. female  Chief Complaint   Patient presents with    New Patient    Breast Cancer   1. Have you been to the ER, urgent care clinic since your last visit? Hospitalized since your last visit? No    2. Have you seen or consulted any other health care providers outside of the [address] [place] [address] since your last visit?   Include any pap smears or colon screening.2/2020

## 2020-11-23 NOTE — PROGRESS NOTES
Pharmacy Note- Chemotherapy Education    Mary Ann Blake is a  70 y. o.female  diagnosed with breast cancer here today for  chemotherapy counseling. Ms. Maisha Syed is considering being treated with TC. Provided education on DOCEtaxel and cyclophosphamide. Provided Ms. Maisha Syed with a copy of Chemotherapy and You. Provided a brief overview of possible side effects of chemotherapy to included s/s infection, anemia, appetite changes, thromboyctopenia, fatigue, hair loss/alopecia, bone pain, skin and nail changes, diarrhea/constipation, infusion reactions and peripheral neuropathy. Will review in greater detail if patient decides to move forward with treatment. Patient given ways to manage these side effects and when to contact office. [address] Handout of medications provided to patient. Ms. Maisha Syed verbalized understanding of the information presented and all of the patient's questions were answered.     Teresa Manriquez, PharmD, BCPS, BCOP    CLINICAL PHARMACY CONSULT: MED RECONCILIATION/REVIEW ADDENDUM    For Pharmacy Admin Tracking Only    PHSO: PHSO Patient?: Yes  Total # of Interventions Recommended: Count: 1    Total Interventions Accepted: 1  Time Spent (min): 15

## 2020-11-23 NOTE — PROGRESS NOTES
Oncology Social Work  Psychosocial Assessment    Reason for Assessment:      [] Social Work Referral [x] Initial Assessment [] New Diagnosis [] Other    [facility] [address] 10/26/2020   Confirm Advance Directive None   Does the patient have other document types (No Data)   Patient does not have an advanced medical directive, and did not express interest in completing one today. Sources of Information:    [x]Patient  []Family  []Staff  []Medical Record    Mental Status:    [x]Alert  []Lethargic  []Unresponsive   [] Unable to assess   Oriented to:  [x]Person  [x]Place  [x]Time  [x]Situation      Relationship Status:  [x]Single  []  []Significant Other/Life Partner  []  []  []    Living Circumstances:  [x]Lives Alone  []Family/Significant Other in Household  []Roommates  []Children in the Home  []Paid Caregivers  []Assisted Living Facility/Group Home  []Skilled [address]  []Homeless  []Incarcerated  []Environmental/Care Concerns  []Other:    Employment Status:  [x]Employed Full-time []Employed Part-time []Homemaker  [] Retired [] Short-Term Disability [] [facility]  [] Unemployed     Barriers to Learning:    []Language  []Developmental  []Cognitive  []Altered Mental Status  []Visual/Hearing Impairment  []Unable to Read/Write  []Motivational  [] Challenges Understanding Medical Jargon [x]No Barriers Identified      Financial/Legal Concerns:    []Uninsured  []Limited Income/Resources  []Non-Citizen  []Food Insecurity [x]No Concerns Identified   []Other:    Christianity/Spiritual/Existential:  Does patient have any spiritual or Methodist beliefs? [x] Yes [] No  Is the patient involved in a spiritual, karin or Methodist community?  [x] Yes [] No  Patient expressing spiritual/existential angst? [] Yes [x] No  Notes: Patient attends  [address]:    Identified Support Person/Group:  [x]Strong  []Fair  []Limited    Coping with Illness:   [x]  Coping Well  [] Challenges Coping with Serious Illness [] Situational Depression [] Situational Anxiety [] Anticipatory Grief  [] Recent Loss [] Caregiver [place]            Narrative:   Met with the patient during her initial appointment today. Patient is being seen for breast cancer. Patient is status post lumpectomy with bilateral reduction 11/3/2020. She states that she is healing well from this surgery. Patient lives alone with her [place]nes [place] dog, Piotr Reagan. Patient has an adult son who lives in [place], [place]. Patient works as an  for the [employer] for [employer], and has been working from home since March 2020. Patient has a PCP - Dr. Eliza Tan. Invited the patient to the upcoming Virtual General Cancer Support Group, led by this , on December 14, 2020 at [address] [place] [address].  Patient asked that her e-mail address be added to the support group list [email or web address]. Patient politely declined information about where to obtain wigs or head scarves, as she already wears a wig due to thinning hair. Provided this 's contact information as well as information regarding the complementary services provided by the [facility], explaining that the center is currently closed due to COVID-19 restrictions. Explained that meditation and music therapy are both being offered virtually. Offered continued support to the patient as needed. Plan:   1. Introduced self and role of this  in the [employer]. 2.  Informed the patient of the [facility] and available resources there. 3.  Continue to meet with the patient when she returns to the clinic for ongoing assessment of the patients adjustment to her diagnosis and treatment. 4.  Ongoing psychosocial support as desired by patient.       Referral:   Complementary therapies referral  Support Groups referral  Al Layne LCSW

## 2020-11-24 ENCOUNTER — DOCUMENTATION ONLY (OUTPATIENT)
Dept: ONCOLOGY | Age: 75
End: 2020-11-24

## 2020-12-07 ENCOUNTER — DOCUMENTATION ONLY (OUTPATIENT)
Dept: ONCOLOGY | Age: 75
End: 2020-12-07

## 2020-12-07 ENCOUNTER — OFFICE VISIT (OUTPATIENT)
Dept: ONCOLOGY | Age: 75
End: 2020-12-07
Payer: COMMERCIAL

## 2020-12-07 VITALS
HEART RATE: 77 BPM | SYSTOLIC BLOOD PRESSURE: 140 MMHG | OXYGEN SATURATION: 98 % | HEIGHT: 68 IN | WEIGHT: 214 LBS | DIASTOLIC BLOOD PRESSURE: 60 MMHG | BODY MASS INDEX: 32.43 KG/M2 | TEMPERATURE: 96.3 F

## 2020-12-07 DIAGNOSIS — I36.1 NON-RHEUMATIC TRICUSPID VALVE INSUFFICIENCY: ICD-10-CM

## 2020-12-07 DIAGNOSIS — C50.912 INVASIVE DUCTAL CARCINOMA OF LEFT BREAST (HCC): Primary | ICD-10-CM

## 2020-12-07 DIAGNOSIS — I10 ESSENTIAL HYPERTENSION: ICD-10-CM

## 2020-12-07 PROCEDURE — 99214 OFFICE O/P EST MOD 30 MIN: CPT | Performed by: INTERNAL MEDICINE

## 2020-12-07 RX ORDER — PROCHLORPERAZINE MALEATE 5 MG
TABLET ORAL
Qty: 30 TAB | Refills: 1 | Status: SHIPPED | OUTPATIENT
Start: 2020-12-07 | End: 2021-07-09

## 2020-12-07 RX ORDER — DEXAMETHASONE 4 MG/1
TABLET ORAL
Qty: 32 TAB | Refills: 0 | Status: SHIPPED | OUTPATIENT
Start: 2020-12-07 | End: 2021-07-09

## 2020-12-07 NOTE — PROGRESS NOTES
Cancer [place] at [address] [place] [address] Leah Lazar, [id number] [place] [address] Road, [place]: [phone]  F: [phone]    Reason for Visit:   Caryle Armor is a 70 y.o. female who is seen for fu of breast cancer. Treatment History:   Lumpectomy with b/l reduction 11/3/20. STAGE: T1cN0 ER+ HEr2 negative mammaprint high risk    History of Present Illness:     Pt seen today for office fu for breast cancer post b/l reduction. Saw plastics today. Saw Rad/onc also. Still healing. Pt wants to do TC chemo. Wants to start after xmas. Feeling fine overall. Last visit:  consult for new breast cancer ER+ HER2 negative post lumpectomy/ reduction 11/3/20. Initially abnormal screening mammo 7/20 on LEFT with small mass. Biopsy IDC ER+ HER2 negative. Breast MRI 8/20 12mm mass otherwise negative. mammaprint high risk luminal B. Surgical path lumpectomy 14mm IDC with negative LNs and negative margins. Did well with surgery. Still healing. Here today for discussion of adjuvant therapy. To see rad/onc. Has heart problems and sees cardio/ liver team for Hep C cured. Has HTN. Works from home. Feels well today. Unsure about doing chemo. Fam Hx aunt colon cancer, uncle throat.        Past Medical History:   Diagnosis Date    Acute diverticulitis 8/18/2017    Allergic rhinitis 8/18/2017    Arthritis 8/18/2017    Arthritis of right knee 11/25/2019    Back pain, thoracic 8/18/2017    Cancer ([facility] [address] 08/2020    LEFT BREAST     Cataract, left 8/18/2017    Cataract, right 8/18/2017    Chronic obstructive pulmonary disease (HCC)     Cirrhosis ([address] 8/18/2017    Cold sore 8/18/2017    Elevated hemoglobin A1c 8/18/2017    Fatigue 8/18/2017    Gastrointestinal disorder     acid reflux    GERD (gastroesophageal reflux disease) 8/18/2017    Heart murmur 8/18/2017    Hepatitis C 8/18/2017    Herpes zoster infection of thoracic region 8/18/2017    Hyperlipidemia LDL goal <100 2017    Hypertension     Menopause     Hysterectomy-42years old    Murmur     Obesity (BMI 30-39. 9) 2017    Osteopenia 2017    Other ill-defined conditions(799.89)     hep c    Post-menopausal 2017    Posterior auricular pain of right ear 2017    Preop examination 2017    Valvular heart disease     mitral valve prolapse    Vertigo, benign positional 2017      Past Surgical History:   Procedure Laterality Date    HX BREAST LUMPECTOMY Bilateral 11/3/2020    LEFT BREAST REDUCTION LUMPECTOMY WITH ULTRASOUND, LEFT BREAST SENTINEL NODE BIOPSY, LEFT BREAST RECONSTRUCTION, RIGHT BREAST REDUCTION performed by Frank Sinha MD at [address] HX COLONOSCOPY      HX HYSTERECTOMY      42years old   [facility] HX ORTHOPAEDIC Right     knee, foot      Social History     Tobacco Use    Smoking status: Former Smoker     Packs/day: 0.25     Years: 25.00     Pack years: 6.25     Types: Cigarettes     Last attempt to quit: 1990     Years since quittin.9    Smokeless tobacco: Never Used   Substance Use Topics    Alcohol use: Yes     Alcohol/week: 0.0 standard drinks     Comment: OCCASIONALLY      Family History   Problem Relation Age of Onset    Hypertension Mother     Diabetes Mother     Kidney Disease Mother     Heart Disease Father     Colon Cancer Maternal Grandmother     No Known Problems Sister     Anesth Problems Neg Hx      Current Outpatient Medications   Medication Sig    docusate sodium (Colace) 100 mg capsule Take 1 Cap by mouth two (2) times a day.  ondansetron (ZOFRAN ODT) 8 mg disintegrating tablet Take 1 Tab by mouth every eight (8) hours as needed for Nausea.  olmesartan-hydroCHLOROthiazide (BENICAR HCT) 40-12.5 mg per tablet Take 1 Tab by mouth every evening.  MILK THISTLE PO Take 175 mg by mouth daily.     OTHER Indications: AM/PM MENOPAUSE FORMULA 1 TAB QHS    furosemide (LASIX) 20 mg tablet TAKE 1 TABLET BY MOUTH ONCE DAILY (Patient taking differently: Take 20 mg by mouth every evening. TAKE 1 TABLET BY MOUTH ONCE DAILY)    valACYclovir (VALTREX) 500 mg tablet Take 1 tablet by mouth once daily (Patient taking differently: as needed.)    TURMERIC ROOT EXTRACT PO Take 200 mg by mouth.  cyanocobalamin (VITAMIN B-12) 1,000 mcg tablet Take 1,000 mcg by mouth daily.  psyllium (METAMUCIL) powd Take  by mouth daily.  magnesium 250 mg tab Take  by mouth.  mometasone (NASONEX) 50 mcg/actuation nasal spray 2 Sprays as needed.  naproxen sodium (ALEVE) 220 mg tablet Take 220 mg by mouth two (2) times daily (with meals).  biotin 5,000 mcg TbDi Take  by mouth.  S-Adenosylmethionine (YRN-E, ENTERIC COATED,) 400 mg TbEC Take  by mouth.  cholecalciferol, VITAMIN D3, (VITAMIN D3) 5,000 unit tab tablet Take  by mouth daily.  Dexlansoprazole (DEXILANT) 60 mg CpDB Take 60 mg by mouth as needed.  omeprazole (PRILOSEC OTC) 20 mg tablet Take 20 mg by mouth as needed.  docosanol (ABREVA) 10 % topical cream Apply  to affected area five (5) times daily.  krill oil 500 mg cap Take  by mouth. No current facility-administered medications for this visit. No Known Allergies     A complete review of systems was obtained, negative except as described above and as reported on ROS sheet scanned into system. Physical Exam:     Visit Vitals  BP (!) 140/60 (BP 1 Location: Left arm, BP Patient Position: Sitting)   Pulse 77   Temp (!) 96.3 °F (35.7 °C) (Temporal)   Ht 5' 8\" (1.727 m)   Wt 214 lb (97.1 kg)   SpO2 98%   BMI 32.54 kg/m²     ECOG PS: 1  General: No distress  Eyes:  anicteric sclerae  HENT: Atraumatic, mask on   Neck: Supple  Respiratory:  normal respiratory effort  MS: Normal gait and station. Digits without clubbing or cyanosis. Skin: No rashes, ecchymoses, or petechiae. Normal temperature, turgor, and texture.   Psych: Alert, oriented, appropriate affect, normal judgment/insight      Results:     Lab Results Component Value Date/Time    WBC 4.4 10/26/2020 02:30 PM    HGB 13.1 10/26/2020 02:30 PM    HCT 40.5 10/26/2020 02:30 PM    PLATELET 564 74/44/0434 02:30 PM    MCV 90.4 10/26/2020 02:30 PM    ABS. NEUTROPHILS 2.2 10/26/2020 02:30 PM    HGB (POC) 12.6 09/11/2017 11:03 AM    HCT (POC) 38.2 09/11/2017 11:03 AM     Lab Results   Component Value Date/Time    Sodium 140 10/26/2020 02:30 PM    Potassium 3.6 10/26/2020 02:30 PM    Chloride 105 10/26/2020 02:30 PM    CHLORIDE 104.0 03/28/2018 10:08 AM    CO2 29 10/26/2020 02:30 PM    Glucose 84 10/26/2020 02:30 PM    BUN 22 (H) 10/26/2020 02:30 PM    Creatinine 1.08 (H) 10/26/2020 02:30 PM    GFR est AA >60 10/26/2020 02:30 PM    GFR est non-AA 50 (L) 10/26/2020 02:30 PM    Calcium 9.5 10/26/2020 02:30 PM    Sodium (POC) 146.0 (A) 03/28/2018 10:08 AM    Potassium (POC) 4.6 03/28/2018 10:08 AM    Creatinine (POC) 0.7 03/28/2018 10:08 AM     Lab Results   Component Value Date/Time    Bilirubin, total 0.5 10/26/2020 02:30 PM    ALT (SGPT) 19 10/26/2020 02:30 PM    Alk. phosphatase 58 10/26/2020 02:30 PM    Protein, total 8.3 (H) 10/26/2020 02:30 PM    Albumin 4.1 10/26/2020 02:30 PM    Globulin 4.2 (H) 10/26/2020 02:30 PM         Records reviewed and summarized above. Pathology report(s) reviewed above. Radiology report(s) reviewed above. Assessment/PLAN:     1) stage 1 T1cN0 ER+ HER2 negative mammaprint high risk luminal B post lumpectomy/ reduction b/l 11/3/20. Discussed adjuvant chemo and hormonal therapy. Discussed TC chemo specifically. Seen today in office for f/u breast cancer. Healing well. Saw plastics today. Pt wants to do TC adjuvant chemo. We discussed the risks and benefits of TC chemotherapy.  Potential side effects include, but are not limited to: nausea, vomiting, diarrhea, constipation, taste changes, flu-like symptoms, increased risk of infection, cytopenias, mucositis, arthralgias, myalgias, allergic reaction, alopecia, fatigue, neuropathy, fluid retention, infertility, and rarely, death. Additionally, there is a very small risk of developing acute leukemia in the future. The patient has consented to beginning chemotherapy. Set up on 1/6/21. Does not need a port. Will do labs at chemo. Is open to hormonal therapy after. Seeing Rad/onc. Clinically pt is stable today. 2) hx of Hep C. Cured per pt. Sees Liver team.    3) heart valve problems/ HTN. Per cardio. 4) COPD/ GERD. Per PCP. 5) psychosocial. Mood good. Coping well. Lives alone. Works during [employer]. SW support today. F/u here 1/6//21 for chemo. Call if questions     I appreciate the opportunity to participate in Ms. Danielle Jones's care.     Signed By: Scott Solorzano,

## 2020-12-07 NOTE — PROGRESS NOTES
Esdras Forbes is a 70 y.o. female  Chief Complaint   Patient presents with    Follow-up    Breast Cancer     1. Have you been to the ER, urgent care clinic since your last visit? Hospitalized since your last visit? No.    2. Have you seen or consulted any other health care providers outside of the [address] [place] [address] since your last visit? Include any pap smears or colon screening.  No.

## 2020-12-07 NOTE — PROGRESS NOTES
Adjuvant TC chemo orders entered into [facility] using [facility] to start on 1/6/21. Patient already has script for Zofran on file. Compazine and Decadron sent to pharmacy on file.

## 2020-12-09 ENCOUNTER — OFFICE VISIT (OUTPATIENT)
Dept: SURGERY | Age: 75
End: 2020-12-09
Payer: COMMERCIAL

## 2020-12-09 VITALS — HEART RATE: 75 BPM | DIASTOLIC BLOOD PRESSURE: 59 MMHG | SYSTOLIC BLOOD PRESSURE: 140 MMHG | TEMPERATURE: 95.6 F

## 2020-12-09 DIAGNOSIS — Z85.3 HISTORY OF LEFT BREAST CANCER: ICD-10-CM

## 2020-12-09 DIAGNOSIS — C50.912 INVASIVE DUCTAL CARCINOMA OF LEFT BREAST (HCC): Primary | ICD-10-CM

## 2020-12-09 PROCEDURE — 99024 POSTOP FOLLOW-UP VISIT: CPT | Performed by: SURGERY

## 2020-12-09 NOTE — PROGRESS NOTES
HISTORY OF PRESENT ILLNESS Abbie Ortiz is a 70 y.o. female. HPI  ESTABLISHED patient here for post op follow up, s/p LEFT breast lumpectomy and bilateral reduction. Saw plastic surgeon yesterday. Healing slowly. The breast are very sensitive and itching. No pain. 08/04/20: LEFT breast bx. PATH: IDC, 5 mm in greatest linear dimension extending to core biopsy tip, histologic grade 2, ER+(%)/OH+(%)/HER2-. Clinical stage 1. 
- MammaPrint: High risk, luminal type B, -0.123. 
 
11/03/20: LEFT oncoplastic reduction and SLNBx, and RIGHT breast reduction, with Dr. Yazmin Cowart. PATH: 
- LEFT: IDC, 14 x 13 x 12mm, overall grade 2, negative margins. DCIS present with negative margins. Pathologic stage: pT1c, pN0. 
- RIGHT: Breast tissue with stromal fibrosis and focal papillary apocrine metaplasia. Benign skin and subcutaneous soft tissue. No in-situ or invasive carcinoma identified. ROS Physical Exam 
 
ASSESSMENT and PLAN 
{ASSESSMENT/PLAN:29001}

## 2020-12-09 NOTE — PROGRESS NOTES
HISTORY OF PRESENT ILLNESS  Ginna Camacho is a 70 y.o. female. HPI  STABLISHED patient here for post op follow up, s/p LEFT breast lumpectomy and bilateral reduction. Saw plastic surgeon yesterday. Healing slowly. The breasts are very sensitive and itching. No pain.       08/04/20: LEFT breast bx. PATH: IDC, 5 mm in greatest linear dimension extending to core biopsy tip, histologic grade 2, ER+(%)/NV+(%)/HER2-. Clinical stage 1.  · MammaPrint: High risk, luminal type B, -0.123.     11/03/20: LEFT oncoplastic reduction and SLNBx, and RIGHT breast reduction, with Dr. Lisbet Bergman. PATH:  · LEFT: IDC, 14 x 13 x 12mm, overall grade 2, negative margins. DCIS present with negative margins. Pathologic stage: pT1c, pN0.  · RIGHT: Breast tissue with stromal fibrosis and focal papillary apocrine metaplasia. Benign skin and subcutaneous soft tissue. No in-situ or invasive carcinoma identified. Past Medical History:   Diagnosis Date    Acute diverticulitis 8/18/2017    Allergic rhinitis 8/18/2017    Arthritis 8/18/2017    Arthritis of right knee 11/25/2019    Back pain, thoracic 8/18/2017    Cancer ([facility] [address] 08/2020    LEFT BREAST     Cataract, left 8/18/2017    Cataract, right 8/18/2017    Chronic obstructive pulmonary disease (HCC)     Cirrhosis ([facility] [address] 8/18/2017    Cold sore 8/18/2017    Elevated hemoglobin A1c 8/18/2017    Fatigue 8/18/2017    Gastrointestinal disorder     acid reflux    GERD (gastroesophageal reflux disease) 8/18/2017    Heart murmur 8/18/2017    Hepatitis C 8/18/2017    Herpes zoster infection of thoracic region 8/18/2017    Hyperlipidemia LDL goal <100 8/18/2017    Hypertension     Menopause     Hysterectomy-42years old    Murmur     Obesity (BMI 30-39. 9) 8/18/2017    Osteopenia 8/18/2017    Other ill-defined conditions(799.89)     hep c    Post-menopausal 8/18/2017    Posterior auricular pain of right ear 8/18/2017    Preop examination 8/18/2017    Valvular heart disease     mitral valve prolapse    Vertigo, benign positional 2017       Past Surgical History:   Procedure Laterality Date    HX BREAST LUMPECTOMY Bilateral 11/3/2020    LEFT BREAST REDUCTION LUMPECTOMY WITH ULTRASOUND, LEFT BREAST SENTINEL NODE BIOPSY, LEFT BREAST RECONSTRUCTION, RIGHT BREAST REDUCTION performed by Manjula Santos [facility] at [facility] AM[facility]UL[facility]    HX COLONOSCOPY      HX HYSTERECTOMY      42years old   [facility] HX ORTHOPAEDIC Right     knee, foot       Social History     Socioeconomic History    Marital status: SINGLE     Spouse name: Not on file    Number of children: Not on file    Years of education: Not on file    Highest education level: Not on file   Occupational History    Not on file   Social Needs    Financial resource strain: Not on file    Food insecurity     Worry: Not on file     Inability: Not on file    Transportation needs     Medical: Not on file     Non-medical: Not on file   Tobacco Use    Smoking status: Former Smoker     Packs/day: 0.25     Years: 25.00     Pack years: 6.25     Types: Cigarettes     Last attempt to quit: 1990     Years since quittin.9    Smokeless tobacco: Never Used   Substance and Sexual Activity    Alcohol use:  Yes     Alcohol/week: 0.0 standard drinks     Comment: OCCASIONALLY    Drug use: No    Sexual activity: Not on file   Lifestyle    Physical activity     Days per week: Not on file     Minutes per session: Not on file    Stress: Not on file   Relationships    Social connections     Talks on phone: Not on file     Gets together: Not on file     Attends Buddhism service: Not on file     Active member of club or organization: Not on file     Attends meetings of clubs or organizations: Not on file     Relationship status: Not on file    Intimate partner violence     Fear of current or ex partner: Not on file     Emotionally abused: Not on file     Physically abused: Not on file     Forced sexual activity: Not on file Other Topics Concern    Not on file   Social History Narrative    Not on file       Current Outpatient Medications on File Prior to Visit   Medication Sig Dispense Refill    prochlorperazine (Compazine) 5 mg tablet Take 1 tab by mouth every 6 hours as needed for nausea or vomiting 30 Tab 1    dexAMETHasone (DECADRON) 4 mg tablet Take 2 tabs (8mg) by mouth twice daily the day before chemotherapy and the day after chemotherapy 32 Tab 0    docusate sodium (Colace) 100 mg capsule Take 1 Cap by mouth two (2) times a day. 50 Cap 1    ondansetron (ZOFRAN ODT) 8 mg disintegrating tablet Take 1 Tab by mouth every eight (8) hours as needed for Nausea. 10 Tab 2    olmesartan-hydroCHLOROthiazide (BENICAR HCT) 40-12.5 mg per tablet Take 1 Tab by mouth every evening.  MILK THISTLE PO Take 175 mg by mouth daily.  OTHER Indications: AM/PM MENOPAUSE FORMULA 1 TAB QHS      furosemide (LASIX) 20 mg tablet TAKE 1 TABLET BY MOUTH ONCE DAILY (Patient taking differently: Take 20 mg by mouth every evening. TAKE 1 TABLET BY MOUTH ONCE DAILY) 90 Tab 3    valACYclovir (VALTREX) 500 mg tablet Take 1 tablet by mouth once daily (Patient taking differently: as needed.) 30 Tab 0    TURMERIC ROOT EXTRACT PO Take 200 mg by mouth.  cyanocobalamin (VITAMIN B-12) 1,000 mcg tablet Take 1,000 mcg by mouth daily.  psyllium (METAMUCIL) powd Take  by mouth daily.  magnesium 250 mg tab Take  by mouth.  mometasone (NASONEX) 50 mcg/actuation nasal spray 2 Sprays as needed.  naproxen sodium (ALEVE) 220 mg tablet Take 220 mg by mouth two (2) times daily (with meals).  docosanol (ABREVA) 10 % topical cream Apply  to affected area five (5) times daily.  biotin 5,000 mcg TbDi Take  by mouth.  krill oil 500 mg cap Take  by mouth.  S-Adenosylmethionine (YRN-E, ENTERIC COATED,) 400 mg TbEC Take  by mouth.  cholecalciferol, VITAMIN D3, (VITAMIN D3) 5,000 unit tab tablet Take  by mouth daily.       Dexlansoprazole (DEXILANT) 60 mg CpDB Take 60 mg by mouth as needed.  omeprazole (PRILOSEC OTC) 20 mg tablet Take 20 mg by mouth as needed. No current facility-administered medications on file prior to visit. No Known Allergies    OB History    No obstetric history on file. Obstetric Comments   Menarche 17, LMP age 42, # of children 1, age of 4st delivery 23, Hysterectomy/oophorectomy yes partial/yes, Breast bx no, history of breast feeding no, BCP yes, Hormone therapy yes             ROS    Physical Exam  Exam conducted with a chaperone present. Cardiovascular:      Rate and Rhythm: Normal rate and regular rhythm. Heart sounds: Normal heart sounds. Pulmonary:      Breath sounds: Normal breath sounds. Chest:      Breasts: Breasts are symmetrical.         Right: Normal. No swelling, bleeding, inverted nipple, mass, nipple discharge, skin change or tenderness. Left: Normal. No swelling, bleeding, inverted nipple, mass, nipple discharge, skin change or tenderness. Lymphadenopathy:      Cervical:      Right cervical: No superficial, deep or posterior cervical adenopathy. Left cervical: No superficial, deep or posterior cervical adenopathy. Upper Body:      Right upper body: No supraclavicular or axillary adenopathy. Left upper body: No supraclavicular or axillary adenopathy. ASSESSMENT and PLAN    ICD-10-CM ICD-9-CM    1. 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 History of left breast cancer  Z85.3 V10.3       Patient presents for f/u s/p LEFT oncoplastic reduction and SLNBx, and RIGHT breast reduction on 11/03/20, and is doing well overall. Small areas of incisional opening at base of BL reduction incisions, but healing. No infection, and otherwise healing nicely. Reviewed plan to start chemo in January, which will be followed by XRT. F/U in late June 2021 with Laura Fisher NP.  This plan was reviewed with the patient and patient agrees. All questions were answered.     Written by Hudson Castillo, as dictated by Dr. Meek Gonsales MD.

## 2020-12-14 ENCOUNTER — OFFICE VISIT (OUTPATIENT)
Dept: INTERNAL MEDICINE CLINIC | Age: 75
End: 2020-12-14
Payer: COMMERCIAL

## 2020-12-14 VITALS
HEIGHT: 68 IN | BODY MASS INDEX: 32.58 KG/M2 | SYSTOLIC BLOOD PRESSURE: 110 MMHG | WEIGHT: 215 LBS | DIASTOLIC BLOOD PRESSURE: 68 MMHG | OXYGEN SATURATION: 100 % | TEMPERATURE: 98.9 F | HEART RATE: 64 BPM | RESPIRATION RATE: 16 BRPM

## 2020-12-14 DIAGNOSIS — I10 ESSENTIAL HYPERTENSION: ICD-10-CM

## 2020-12-14 DIAGNOSIS — Z13.1 SCREENING FOR DIABETES MELLITUS: ICD-10-CM

## 2020-12-14 DIAGNOSIS — Z00.00 MEDICARE ANNUAL WELLNESS VISIT, SUBSEQUENT: Primary | ICD-10-CM

## 2020-12-14 DIAGNOSIS — Z13.6 SCREENING FOR ISCHEMIC HEART DISEASE: ICD-10-CM

## 2020-12-14 DIAGNOSIS — R73.09 ELEVATED HEMOGLOBIN A1C: ICD-10-CM

## 2020-12-14 DIAGNOSIS — Z13.31 SCREENING FOR DEPRESSION: ICD-10-CM

## 2020-12-14 LAB
A-G RATIO,AGRAT: 1.1 RATIO
ALBUMIN SERPL-MCNC: 4.6 G/DL (ref 3.9–5.4)
ALP SERPL-CCNC: 58 U/L (ref 38–126)
ALT SERPL-CCNC: 15 U/L (ref 0–35)
ANION GAP SERPL CALC-SCNC: 8 MMOL/L
AST SERPL W P-5'-P-CCNC: 26 U/L (ref 14–36)
BILIRUB SERPL-MCNC: 0.5 MG/DL (ref 0.2–1.3)
BILIRUB UR QL: NEGATIVE
BUN SERPL-MCNC: 22 MG/DL (ref 7–17)
BUN/CREATININE RATIO,BUCR: 24 RATIO
CALCIUM SERPL-MCNC: 10.1 MG/DL (ref 8.4–10.2)
CHLORIDE SERPL-SCNC: 101 MMOL/L (ref 98–107)
CLARITY: CLEAR
CO2 SERPL-SCNC: 34 MMOL/L (ref 22–32)
COLOR UR: NORMAL
CREAT SERPL-MCNC: 0.9 MG/DL (ref 0.7–1.2)
GLOBULIN,GLOB: 4.1
GLUCOSE 24H UR-MRATE: NEGATIVE G/(24.H)
GLUCOSE SERPL-MCNC: 105 MG/DL (ref 65–105)
HBA1C MFR BLD HPLC: 4.9 % (ref 4–5.7)
HGB UR QL STRIP: NEGATIVE
KETONES UR QL STRIP.AUTO: NEGATIVE
LEUKOCYTE ESTERASE: NEGATIVE
NITRITE UR QL STRIP.AUTO: NEGATIVE
PH UR STRIP: 7 [PH] (ref 5–7)
POTASSIUM SERPL-SCNC: 4.3 MMOL/L (ref 3.6–5)
PROT SERPL-MCNC: 8.7 G/DL (ref 6.3–8.2)
PROT UR STRIP-MCNC: NEGATIVE MG/DL
SODIUM SERPL-SCNC: 143 MMOL/L (ref 137–145)
SP GR UR REFRACTOMETRY: 1.01 (ref 1–1.03)
UROBILINOGEN UR QL STRIP.AUTO: NEGATIVE

## 2020-12-14 PROCEDURE — 81003 URINALYSIS AUTO W/O SCOPE: CPT | Performed by: INTERNAL MEDICINE

## 2020-12-14 PROCEDURE — 99214 OFFICE O/P EST MOD 30 MIN: CPT | Performed by: INTERNAL MEDICINE

## 2020-12-14 PROCEDURE — 80053 COMPREHEN METABOLIC PANEL: CPT | Performed by: INTERNAL MEDICINE

## 2020-12-14 PROCEDURE — G0439 PPPS, SUBSEQ VISIT: HCPCS | Performed by: INTERNAL MEDICINE

## 2020-12-14 PROCEDURE — 83036 HEMOGLOBIN GLYCOSYLATED A1C: CPT | Performed by: INTERNAL MEDICINE

## 2020-12-14 RX ORDER — OLMESARTAN MEDOXOMIL AND HYDROCHLOROTHIAZIDE 40/12.5 40; 12.5 MG/1; MG/1
1 TABLET ORAL EVERY EVENING
Qty: 90 TAB | Refills: 3 | Status: SHIPPED | OUTPATIENT
Start: 2020-12-14 | End: 2020-12-21 | Stop reason: DRUGHIGH

## 2020-12-14 NOTE — PROGRESS NOTES
This note will not be viewable in [address] Jona Angeles is a 70 y.o. female and presents with Hypertension (follow up) and GERD (follow up)  . Subjective:  Mrs. Yandy Funez presents today for follow-up of hypertension, impaired glucose tolerance and GERD. She remains on olmesartan/HCT 40/12.5 mg daily for hypertension with good results. She remains on Lasix 20 mg daily. She is diagnosed with breast cancer and has had surgery/lumpectomy and is due for follow-up chemo and radiation after the first of the year. She denies any shortness of breath, chest pain, palpitations, PND, orthopnea, or pedal edema. Past Medical History:   Diagnosis Date    Acute diverticulitis 8/18/2017    Allergic rhinitis 8/18/2017    Arthritis 8/18/2017    Arthritis of right knee 11/25/2019    Back pain, thoracic 8/18/2017    Cancer ([facility] [address] 08/2020    LEFT BREAST     Cataract, left 8/18/2017    Cataract, right 8/18/2017    Chronic obstructive pulmonary disease (HCC)     Cirrhosis ([facility] [address] 8/18/2017    Cold sore 8/18/2017    Elevated hemoglobin A1c 8/18/2017    Fatigue 8/18/2017    Gastrointestinal disorder     acid reflux    GERD (gastroesophageal reflux disease) 8/18/2017    Heart murmur 8/18/2017    Hepatitis C 8/18/2017    Herpes zoster infection of thoracic region 8/18/2017    Hyperlipidemia LDL goal <100 8/18/2017    Hypertension     Menopause     Hysterectomy-42years old    Murmur     Obesity (BMI 30-39. 9) 8/18/2017    Osteopenia 8/18/2017    Other ill-defined conditions(799.89)     hep c    Post-menopausal 8/18/2017    Posterior auricular pain of right ear 8/18/2017    Preop examination 8/18/2017    Valvular heart disease     mitral valve prolapse    Vertigo, benign positional 8/18/2017     Past Surgical History:   Procedure Laterality Date    HX BREAST LUMPECTOMY Bilateral 11/3/2020    LEFT BREAST REDUCTION LUMPECTOMY WITH ULTRASOUND, LEFT BREAST SENTINEL NODE BIOPSY, LEFT BREAST RECONSTRUCTION, RIGHT BREAST REDUCTION performed by Feng Mejia MD at [facility]ORY OR    HX COLONOSCOPY      HX HYSTERECTOMY      42years old    HX ORTHOPAEDIC Right     knee, foot     No Known Allergies  Current Outpatient Medications   Medication Sig Dispense Refill    olmesartan-hydroCHLOROthiazide (BENICAR HCT) 40-12.5 mg per tablet Take 1 Tab by mouth every evening. 90 Tab 3    prochlorperazine (Compazine) 5 mg tablet Take 1 tab by mouth every 6 hours as needed for nausea or vomiting 30 Tab 1    dexAMETHasone (DECADRON) 4 mg tablet Take 2 tabs (8mg) by mouth twice daily the day before chemotherapy and the day after chemotherapy 32 Tab 0    docusate sodium (Colace) 100 mg capsule Take 1 Cap by mouth two (2) times a day. 50 Cap 1    ondansetron (ZOFRAN ODT) 8 mg disintegrating tablet Take 1 Tab by mouth every eight (8) hours as needed for Nausea. 10 Tab 2    MILK THISTLE PO Take 175 mg by mouth daily.  OTHER Indications: AM/PM MENOPAUSE FORMULA 1 TAB QHS      furosemide (LASIX) 20 mg tablet TAKE 1 TABLET BY MOUTH ONCE DAILY (Patient taking differently: Take 20 mg by mouth every evening. TAKE 1 TABLET BY MOUTH ONCE DAILY) 90 Tab 3    valACYclovir (VALTREX) 500 mg tablet Take 1 tablet by mouth once daily (Patient taking differently: as needed.) 30 Tab 0    TURMERIC ROOT EXTRACT PO Take 200 mg by mouth.  cyanocobalamin (VITAMIN B-12) 1,000 mcg tablet Take 1,000 mcg by mouth daily.  psyllium (METAMUCIL) powd Take  by mouth daily.  magnesium 250 mg tab Take  by mouth.  mometasone (NASONEX) 50 mcg/actuation nasal spray 2 Sprays as needed.  naproxen sodium (ALEVE) 220 mg tablet Take 220 mg by mouth two (2) times daily (with meals).  biotin 5,000 mcg TbDi Take  by mouth.  S-Adenosylmethionine (YRN-E, ENTERIC COATED,) 400 mg TbEC Take  by mouth.  cholecalciferol, VITAMIN D3, (VITAMIN D3) 5,000 unit tab tablet Take  by mouth daily.       Dexlansoprazole (DEXILANT) 60 mg CpDB Take 60 mg by mouth as needed.  omeprazole (PRILOSEC OTC) 20 mg tablet Take 20 mg by mouth as needed.  docosanol (ABREVA) 10 % topical cream Apply  to affected area five (5) times daily.  krill oil 500 mg cap Take  by mouth. Social History     Socioeconomic History    Marital status: SINGLE     Spouse name: Not on file    Number of children: Not on file    Years of education: Not on file    Highest education level: Not on file   Tobacco Use    Smoking status: Former Smoker     Packs/day: 0.25     Years: 25.00     Pack years: 6.25     Types: Cigarettes     Last attempt to quit: 1990     Years since quittin.9    Smokeless tobacco: Never Used   Substance and Sexual Activity    Alcohol use: Yes     Alcohol/week: 0.0 standard drinks     Comment: OCCASIONALLY    Drug use: No     Family History   Problem Relation Age of Onset    Hypertension Mother     Diabetes Mother     Kidney Disease Mother     Heart Disease Father     Colon Cancer Maternal Grandmother     No Known Problems Sister     Anesth Problems Neg Hx        Review of Systems  Constitutional:  negative for fevers, chills, anorexia and weight loss  Eyes:    negative for visual disturbance and irritation  ENT:    negative for tinnitus,sore throat,nasal congestion,ear pains. hoarseness  Respiratory:     negative for cough, hemoptysis, dyspnea,wheezing  CV:    negative for chest pain, palpitations, lower extremity edema  GI:    negative for nausea, vomiting, diarrhea, abdominal pain,melena  Endo:               negative for polyuria,polydipsia,polyphagia,heat intolerance  Genitourinary : negative for frequency, dysuria and hematuria  Integumentary: negative for rash and pruritus  Hematologic:   negative for easy bruising and gum/nose bleeding  Musculoskel:  negative for myalgias, arthralgias, back pain, muscle weakness, joint pain  Neurological:   negative for headaches, dizziness, vertigo, memory problems and gait Behavl/Psych:  negative for feelings of anxiety, depression, mood changes  ROS otherwise negative      Objective:  Visit Vitals  /68 (BP 1 Location: Left arm, BP Patient Position: Sitting)   Pulse 64   Temp 98.9 °F (37.2 °C) (Oral)   Resp 16   Ht 5' 8\" (1.727 m)   Wt 215 lb (97.5 kg)   SpO2 100%   BMI 32.69 kg/m²     Physical Exam:   General appearance - alert, well appearing, and in no distress  Mental status - alert, oriented to person, place, and time  EYE-EMMY, EOMI, fundi normal, corneas normal, no foreign bodies  ENT-ENT exam normal, no neck nodes or sinus tenderness  Nose - normal and patent, no erythema, discharge or polyps  Mouth - mucous membranes moist, pharynx normal without lesions  Neck - supple, no significant adenopathy   Chest - clear to auscultation, no wheezes, rales or rhonchi, symmetric air entry   Heart - normal rate, regular rhythm, normal S1, S2, no murmurs, rubs, clicks or gallops   Abdomen - soft, nontender, nondistended, no masses or organomegaly  Lymph- no adenopathy palpable  Ext-peripheral pulses normal, no pedal edema, no clubbing or cyanosis  Skin-Warm and dry. no hyperpigmentation, vitiligo, or suspicious lesions  Neuro -alert, oriented, normal speech, no focal findings or movement disorder noted      Assessment/Plan:  Diagnoses and all orders for this visit:    1. Medicare annual wellness visit, subsequent    2. Essential hypertension  -     METABOLIC PANEL, COMPREHENSIVE  -     URINALYSIS W/O MICRO    3. Elevated hemoglobin A1c  -     HEMOGLOBIN A1C W/O EAG    4. Screening for depression  -     DEPRESSION SCREEN ANNUAL    5. Screening for diabetes mellitus    6. Screening for ischemic heart disease    Other orders  -     olmesartan-hydroCHLOROthiazide (BENICAR HCT) 40-12.5 mg per tablet; Take 1 Tab by mouth every evening. ICD-10-CM ICD-9-CM    1. Medicare annual wellness visit, subsequent  Z00.00 V70.0    2.  Essential hypertension  D56 291.9 METABOLIC PANEL, COMPREHENSIVE      URINALYSIS W/O MICRO   3. Elevated hemoglobin A1c  R73.09 790.29 HEMOGLOBIN A1C W/O EAG   4. Screening for depression  Z13.31 V79.0 [address]   5. Screening for diabetes mellitus  Z13.1 V77.1    6. Screening for ischemic heart disease  Z13.6 V81.0        Plan:  Continue current medical regimen. Blood pressure is well controlled on olmesartan HCT. Further recommendations based on labs as ordered. Complete course of treatment for diagnosis of breast cancer status post lumpectomy. Follow-up with oncology and surgery as scheduled. Return to clinic in 6 months unless otherwise indicated. May need to address colon cancer screening on follow-up. I have reviewed with the patient details of the assessment and plan and all questions were answered. Relevent patient education was performed. Verbal and/or written instructions (see AVS) provided. The most recent lab findings were reviewed with the patient. Plan was discussed with patient who verbally expressed understanding. An After Visit Summary was printed and given to the patient. Lisa Singleton MD        This is the Subsequent Medicare Annual Wellness Exam, performed 12 months or more after the Initial AWV or the last Subsequent AWV    I have reviewed the patient's medical history in detail and updated the computerized patient record. Depression Risk Factor Screening:     3 most recent PHQ Screens 12/7/2020   Little interest or pleasure in doing things Not at all   Feeling down, depressed, irritable, or hopeless Not at all   Total Score PHQ 2 0       Alcohol Risk Screen   Do you average more than 1 drink per night or more than 7 drinks a week:  No    On any one occasion in the past three months have you have had more than 3 drinks containing alcohol:  No        Functional Ability and Level of Safety:   Hearing: Hearing is good. Activities of Daily Living: The home contains: no safety equipment.   Patient does total self care     Ambulation: with no difficulty     Fall Risk:  Fall Risk Assessment, last 12 mths 12/7/2020   Able to walk? Yes   Fall in past 12 months? No     Abuse Screen:  Patient is not abused       Cognitive Screening   Has your family/caregiver stated any concerns about your memory: no     Cognitive Screening: Normal - Verbal Fluency Test    Assessment/Plan   Education and counseling provided:  Are appropriate based on today's review and evaluation    Diagnoses and all orders for this visit:    1. Medicare annual wellness visit, subsequent    2. Essential hypertension  -     METABOLIC PANEL, COMPREHENSIVE  -     URINALYSIS W/O MICRO    3. Elevated hemoglobin A1c  -     HEMOGLOBIN A1C W/O EAG    4. Screening for depression  -     DEPRESSION SCREEN ANNUAL    5. Screening for diabetes mellitus    6. Screening for ischemic heart disease    Other orders  -     olmesartan-hydroCHLOROthiazide (BENICAR HCT) 40-12.5 mg per tablet; Take 1 Tab by mouth every evening. Health Maintenance Due     Health Maintenance Due   Topic Date Due    DTaP/Tdap/Td series (1 - Tdap) 07/12/1968    Colorectal Cancer Screening Combo  07/12/1997    GLAUCOMA SCREENING Q2Y  07/12/2012    Shingrix Vaccine Age 50> (2 of 2) 07/07/2018    Medicare Yearly Exam  11/19/2020       Patient Care Team   Patient Care Team:  Cathryn Booth MD as PCP - General (Internal Medicine)  Cathryn Booth MD as PCP - [facility]  Concha Burt MD (Cardiology)  Cheikh Strong MD (Orthopedic Surgery)  Garth Segura MD as Physician (Breast Surgery)  Lucrecia Vega MD as Physician (Plastic Surgery)    History     Patient Active Problem List   Diagnosis Code    Right wrist injury S69.91XA    HTN (hypertension) I10    Dyspnea on exertion R06.00    Non-rheumatic tricuspid valve insufficiency I36.1    Non-rheumatic mitral regurgitation I34.0    Obesity (BMI 30-39. 9) E66.9    Hepatitis C B19.20    GERD (gastroesophageal reflux disease) K21.9    Hyperlipidemia LDL goal <100 E78.5    Heart murmur R01.1    Cataract, left H26.9    Cataract, right H26.9    Acute diverticulitis K57.92    Allergic rhinitis J30.9    Back pain, thoracic M54.6    Elevated hemoglobin A1c R73.09    Herpes zoster infection of thoracic region B02.9    Post-menopausal Z78.0    Posterior auricular pain of right ear H92.01    Vertigo, benign positional H81.10    Cirrhosis (HCC) K74.60    Fatigue R53.83    Cold sore B00.1    Arthritis M19.90    Osteopenia M85.80    Arthritis of right knee M17.11    Invasive ductal carcinoma of left breast (HCC) C50.912     Past Medical History:   Diagnosis Date    Acute diverticulitis 8/18/2017    Allergic rhinitis 8/18/2017    Arthritis 8/18/2017    Arthritis of right knee 11/25/2019    Back pain, thoracic 8/18/2017    Cancer ([address] 08/2020    LEFT BREAST     Cataract, left 8/18/2017    Cataract, right 8/18/2017    Chronic obstructive pulmonary disease (HCC)     Cirrhosis (HCC) 8/18/2017    Cold sore 8/18/2017    Elevated hemoglobin A1c 8/18/2017    Fatigue 8/18/2017    Gastrointestinal disorder     acid reflux    GERD (gastroesophageal reflux disease) 8/18/2017    Heart murmur 8/18/2017    Hepatitis C 8/18/2017    Herpes zoster infection of thoracic region 8/18/2017    Hyperlipidemia LDL goal <100 8/18/2017    Hypertension     Menopause     Hysterectomy-42years old    Murmur     Obesity (BMI 30-39. 9) 8/18/2017    Osteopenia 8/18/2017    Other ill-defined conditions(799.89)     hep c    Post-menopausal 8/18/2017    Posterior auricular pain of right ear 8/18/2017    Preop examination 8/18/2017    Valvular heart disease     mitral valve prolapse    Vertigo, benign positional 8/18/2017      Past Surgical History:   Procedure Laterality Date    HX BREAST LUMPECTOMY Bilateral 11/3/2020    LEFT BREAST REDUCTION LUMPECTOMY WITH ULTRASOUND, LEFT BREAST SENTINEL NODE BIOPSY, LEFT BREAST RECONSTRUCTION, RIGHT BREAST REDUCTION performed by Susanne Troy MD at [facility] AMBULATORY OR    HX COLONOSCOPY      HX HYSTERECTOMY      42years old    HX ORTHOPAEDIC Right     knee, foot     Current Outpatient Medications   Medication Sig Dispense Refill    olmesartan-hydroCHLOROthiazide (BENICAR HCT) 40-12.5 mg per tablet Take 1 Tab by mouth every evening. 90 Tab 3    prochlorperazine (Compazine) 5 mg tablet Take 1 tab by mouth every 6 hours as needed for nausea or vomiting 30 Tab 1    dexAMETHasone (DECADRON) 4 mg tablet Take 2 tabs (8mg) by mouth twice daily the day before chemotherapy and the day after chemotherapy 32 Tab 0    docusate sodium (Colace) 100 mg capsule Take 1 Cap by mouth two (2) times a day. 50 Cap 1    ondansetron (ZOFRAN ODT) 8 mg disintegrating tablet Take 1 Tab by mouth every eight (8) hours as needed for Nausea. 10 Tab 2    MILK THISTLE PO Take 175 mg by mouth daily.  OTHER Indications: AM/PM MENOPAUSE FORMULA 1 TAB QHS      furosemide (LASIX) 20 mg tablet TAKE 1 TABLET BY MOUTH ONCE DAILY (Patient taking differently: Take 20 mg by mouth every evening. TAKE 1 TABLET BY MOUTH ONCE DAILY) 90 Tab 3    valACYclovir (VALTREX) 500 mg tablet Take 1 tablet by mouth once daily (Patient taking differently: as needed.) 30 Tab 0    TURMERIC ROOT EXTRACT PO Take 200 mg by mouth.  cyanocobalamin (VITAMIN B-12) 1,000 mcg tablet Take 1,000 mcg by mouth daily.  psyllium (METAMUCIL) powd Take  by mouth daily.  magnesium 250 mg tab Take  by mouth.  mometasone (NASONEX) 50 mcg/actuation nasal spray 2 Sprays as needed.  naproxen sodium (ALEVE) 220 mg tablet Take 220 mg by mouth two (2) times daily (with meals).  biotin 5,000 mcg TbDi Take  by mouth.  S-Adenosylmethionine (YRN-E, ENTERIC COATED,) 400 mg TbEC Take  by mouth.  cholecalciferol, VITAMIN D3, (VITAMIN D3) 5,000 unit tab tablet Take  by mouth daily.       Dexlansoprazole (DEXILANT) 60 mg CpDB Take 60 mg by mouth as needed.  omeprazole (PRILOSEC OTC) 20 mg tablet Take 20 mg by mouth as needed.  docosanol (ABREVA) 10 % topical cream Apply  to affected area five (5) times daily.  krill oil 500 mg cap Take  by mouth. No Known Allergies    Family History   Problem Relation Age of Onset    Hypertension Mother     Diabetes Mother     Kidney Disease Mother     Heart Disease Father     Colon Cancer Maternal Grandmother     No Known Problems Sister     Anesth Problems Neg Hx      Social History     Tobacco Use    Smoking status: Former Smoker     Packs/day: 0.25     Years: 25.00     Pack years: 6.25     Types: Cigarettes     Last attempt to quit: 1990     Years since quittin.9    Smokeless tobacco: Never Used   Substance Use Topics    Alcohol use:  Yes     Alcohol/week: 0.0 standard drinks     Comment: OCCASIONALLY

## 2020-12-14 NOTE — PATIENT INSTRUCTIONS
Medicare Wellness Visit, Female     The best way to live healthy is to have a lifestyle where you eat a well-balanced diet, exercise regularly, limit alcohol use, and quit all forms of tobacco/nicotine, if applicable. Regular preventive services are another way to keep healthy. Preventive services (vaccines, screening tests, monitoring & exams) can help personalize your care plan, which helps you manage your own care. Screening tests can find health problems at the earliest stages, when they are easiest to treat. [facility] follows the current, evidence-based guidelines published by the [facility] ([facility]) when recommending preventive services for our patients. Because we follow these guidelines, sometimes recommendations change over time as research supports it. (For example, mammograms used to be recommended annually. Even though Medicare will still pay for an annual mammogram, the newer guidelines recommend a mammogram every two years for women of average risk). Of course, you and your doctor may decide to screen more often for some diseases, based on your risk and your co-morbidities (chronic disease you are already diagnosed with). Preventive services for you include:  - Medicare offers their members a free annual wellness visit, which is time for you and your primary care provider to discuss and plan for your preventive service needs. Take advantage of this benefit every year!  -All adults over the age of 72 should receive the recommended pneumonia vaccines. Current USPSTF guidelines recommend a series of two vaccines for the best pneumonia protection.   -All adults should have a flu vaccine yearly and a tetanus vaccine every 10 years.   -All adults age 48 and older should receive the shingles vaccines (series of two vaccines).       -All adults age 38-68 who are overweight should have a diabetes screening test once every three years.   -All adults born between [birth year] and 1965 should be screened once for Hepatitis C.  -Other screening tests and preventive services for persons with diabetes include: an eye exam to screen for diabetic retinopathy, a kidney function test, a foot exam, and stricter control over your cholesterol.   -Cardiovascular screening for adults with routine risk involves an electrocardiogram (ECG) at intervals determined by your doctor.   -Colorectal cancer screenings should be done for adults age 54-65 with no increased risk factors for colorectal cancer. There are a number of acceptable methods of screening for this type of cancer. Each test has its own benefits and drawbacks. Discuss with your doctor what is most appropriate for you during your annual wellness visit. The different tests include: colonoscopy (considered the best screening method), a fecal occult blood test, a fecal DNA test, and sigmoidoscopy.    -A bone mass density test is recommended when a woman turns 65 to screen for osteoporosis. This test is only recommended one time, as a screening. Some providers will use this same test as a disease monitoring tool if you already have osteoporosis. -Breast cancer screenings are recommended every other year for women of normal risk, age 54-69.  -Cervical cancer screenings for women over age 72 are only recommended with certain risk factors.      Here is a list of your current Health Maintenance items (your personalized list of preventive services) with a due date:  Health Maintenance Due   Topic Date Due    DTaP/Tdap/Td  (1 - Tdap) 07/12/1968    Colorectal Screening  07/12/1997    Glaucoma Screening   07/12/2012    Shingles Vaccine (2 of 2) 07/07/2018    Annual Well Visit  11/19/2020

## 2020-12-14 NOTE — PROGRESS NOTES
Jona Angeles presents today at the clinic for    Chief Complaint   Patient presents with    Hypertension     follow up    GERD     follow up        Wt Readings from Last 3 Encounters:   12/14/20 215 lb (97.5 kg)   12/07/20 214 lb (97.1 kg)   11/23/20 213 lb (96.6 kg)     Temp Readings from Last 3 Encounters:   12/14/20 98.9 °F (37.2 °C) (Oral)   12/09/20 (!) 95.6 °F (35.3 °C)   12/07/20 (!) 96.3 °F (35.7 °C) (Temporal)     BP Readings from Last 3 Encounters:   12/14/20 110/68   12/09/20 (!) 140/59   12/07/20 (!) 140/60     Pulse Readings from Last 3 Encounters:   12/14/20 64   12/09/20 75   12/07/20 77       Health Maintenance Due   Topic    DTaP/Tdap/Td series (1 - Tdap)    Colorectal Cancer Screening Combo     GLAUCOMA SCREENING Q2Y     Shingrix Vaccine Age 49> (2 of 2)    Medicare Yearly Exam          Learning Assessment:  :     Learning Assessment 9/27/2017 9/11/2017   PRIMARY LEARNER Patient Patient   HIGHEST LEVEL OF EDUCATION - PRIMARY LEARNER  - > 4 YEARS OF COLLEGE   BARRIERS PRIMARY LEARNER - NONE   PRIMARY LANGUAGE ENGLISH ENGLISH   LEARNER PREFERENCE PRIMARY DEMONSTRATION DEMONSTRATION   ANSWERED BY Patient patient   RELATIONSHIP SELF SELF       Depression Screening:  :     3 most recent PHQ Screens 12/7/2020   Little interest or pleasure in doing things Not at all   Feeling down, depressed, irritable, or hopeless Not at all   Total Score PHQ 2 0       Fall Risk Assessment:  :     Fall Risk Assessment, last 12 mths 12/7/2020   Able to walk? Yes   Fall in past 12 months? No       Abuse Screening:  :     Abuse Screening Questionnaire 6/4/2020 4/22/2019 9/11/2017   Do you ever feel afraid of your partner? N N N   Are you in a relationship with someone who physically or mentally threatens you? N N N   Is it safe for you to go home? Josephine Given       Coordination of Care Questionnaire:  :     1. Have you been to the ER, urgent care clinic since your last visit? Hospitalized since your last visit?  Patient is being treated for breast cancer. 2. Have you seen or consulted any other health care providers outside of the [address] [place] [address] since your last visit? Include any pap smears or colon screening. Patient is being treated for breast cancer.

## 2020-12-18 NOTE — PROGRESS NOTES
Your lab results overall are stable. Your glucose is normal.  Your liver and kidney function are normal.  Your urine analysis is clear. Your total protein was just above normal range as noted previously. There has been no significant change and we will continue to monitor this. Your A1c which is an average of your glucose is normal at 4.9%.

## 2020-12-21 RX ORDER — OLMESARTAN MEDOXOMIL AND HYDROCHLOROTHIAZIDE 20/12.5 20; 12.5 MG/1; MG/1
TABLET ORAL
Qty: 90 TAB | Refills: 3 | Status: SHIPPED | OUTPATIENT
Start: 2020-12-21 | End: 2022-01-23

## 2021-01-05 ENCOUNTER — TELEPHONE (OUTPATIENT)
Dept: ONCOLOGY | Age: 76
End: 2021-01-05

## 2021-01-05 NOTE — TELEPHONE ENCOUNTER
Patient called and left a voicemail [facility].      Call back [phone]

## 2021-01-05 NOTE — TELEPHONE ENCOUNTER
Attempted to call for \"pre-chemo call\". Left message for Pt to call the office back to discuss upcoming chemotherapy and what to expect.

## 2021-01-05 NOTE — TELEPHONE ENCOUNTER
Spoke to Pt, SAROJ verified, she reports she has picked up all her prescriptions from the pharmacy and has taken her Decadron today, she will  Imodium while she is out today. Instructed on what to expect on Day 1 of treatment. Advised to eat normal breakfast, take regular meds, arrive in [facility] for labs then come upstairs for OV. Pt instructed to wear comfortable clothes, bring activities and lunch.   Pt also instructed on what to expect days after chemo, Zofran for nausea, Imodium for diarrhea, small frequent meals, plenty of fluid and rest.  All questions answered and Pt reminded to call the office if any further questions or problems

## 2021-01-06 ENCOUNTER — DOCUMENTATION ONLY (OUTPATIENT)
Dept: ONCOLOGY | Age: 76
End: 2021-01-06

## 2021-01-06 ENCOUNTER — OFFICE VISIT (OUTPATIENT)
Dept: ONCOLOGY | Age: 76
End: 2021-01-06
Payer: COMMERCIAL

## 2021-01-06 ENCOUNTER — HOSPITAL ENCOUNTER (OUTPATIENT)
Dept: INFUSION THERAPY | Age: 76
Discharge: HOME | End: 2021-01-06
Payer: COMMERCIAL

## 2021-01-06 VITALS
DIASTOLIC BLOOD PRESSURE: 75 MMHG | TEMPERATURE: 97.3 F | WEIGHT: 212 LBS | SYSTOLIC BLOOD PRESSURE: 141 MMHG | BODY MASS INDEX: 32.13 KG/M2 | HEART RATE: 69 BPM | RESPIRATION RATE: 18 BRPM | HEIGHT: 68 IN

## 2021-01-06 VITALS
SYSTOLIC BLOOD PRESSURE: 153 MMHG | WEIGHT: 212.7 LBS | OXYGEN SATURATION: 96 % | HEART RATE: 98 BPM | BODY MASS INDEX: 32.34 KG/M2 | DIASTOLIC BLOOD PRESSURE: 81 MMHG | TEMPERATURE: 96.9 F

## 2021-01-06 DIAGNOSIS — K21.9 GASTROESOPHAGEAL REFLUX DISEASE WITHOUT ESOPHAGITIS: ICD-10-CM

## 2021-01-06 DIAGNOSIS — Z98.890 H/O BILATERAL BREAST REDUCTION SURGERY: ICD-10-CM

## 2021-01-06 DIAGNOSIS — I10 ESSENTIAL HYPERTENSION: ICD-10-CM

## 2021-01-06 DIAGNOSIS — C50.912 INVASIVE DUCTAL CARCINOMA OF LEFT BREAST (HCC): Primary | ICD-10-CM

## 2021-01-06 DIAGNOSIS — J44.9 CHRONIC OBSTRUCTIVE PULMONARY DISEASE, UNSPECIFIED COPD TYPE (HCC): ICD-10-CM

## 2021-01-06 DIAGNOSIS — Z86.19 HISTORY OF HEPATITIS C: ICD-10-CM

## 2021-01-06 DIAGNOSIS — Z98.890 HISTORY OF LUMPECTOMY: ICD-10-CM

## 2021-01-06 DIAGNOSIS — I36.1 NON-RHEUMATIC TRICUSPID VALVE INSUFFICIENCY: ICD-10-CM

## 2021-01-06 LAB
ALBUMIN SERPL-MCNC: 4.2 G/DL (ref 3.5–5)
ALBUMIN/GLOB SERPL: 0.9 {RATIO} (ref 1.1–2.2)
ALP SERPL-CCNC: 64 U/L (ref 45–117)
ALT SERPL-CCNC: 22 U/L (ref 12–78)
ANION GAP SERPL CALC-SCNC: 6 MMOL/L (ref 5–15)
AST SERPL-CCNC: 14 U/L (ref 15–37)
BASOPHILS # BLD: 0 K/UL (ref 0–0.1)
BASOPHILS NFR BLD: 0 % (ref 0–1)
BILIRUB SERPL-MCNC: 0.4 MG/DL (ref 0.2–1)
BUN SERPL-MCNC: 25 MG/DL (ref 6–20)
BUN/CREAT SERPL: 22 (ref 12–20)
CALCIUM SERPL-MCNC: 10.1 MG/DL (ref 8.5–10.1)
CHLORIDE SERPL-SCNC: 104 MMOL/L (ref 97–108)
CO2 SERPL-SCNC: 24 MMOL/L (ref 21–32)
CREAT SERPL-MCNC: 1.12 MG/DL (ref 0.55–1.02)
DIFFERENTIAL METHOD BLD: ABNORMAL
EOSINOPHIL # BLD: 0 K/UL (ref 0–0.4)
EOSINOPHIL NFR BLD: 0 % (ref 0–7)
ERYTHROCYTE [DISTWIDTH] IN BLOOD BY AUTOMATED COUNT: 13.1 % (ref 11.5–14.5)
GLOBULIN SER CALC-MCNC: 4.8 G/DL (ref 2–4)
GLUCOSE SERPL-MCNC: 150 MG/DL (ref 65–100)
HCT VFR BLD AUTO: 40.1 % (ref 35–47)
HGB BLD-MCNC: 13 G/DL (ref 11.5–16)
IMM GRANULOCYTES # BLD AUTO: 0.1 K/UL (ref 0–0.04)
IMM GRANULOCYTES NFR BLD AUTO: 0 % (ref 0–0.5)
LYMPHOCYTES # BLD: 0.9 K/UL (ref 0.8–3.5)
LYMPHOCYTES NFR BLD: 8 % (ref 12–49)
MCH RBC QN AUTO: 28.6 PG (ref 26–34)
MCHC RBC AUTO-ENTMCNC: 32.4 G/DL (ref 30–36.5)
MCV RBC AUTO: 88.1 FL (ref 80–99)
MONOCYTES # BLD: 0.4 K/UL (ref 0–1)
MONOCYTES NFR BLD: 4 % (ref 5–13)
NEUTS SEG # BLD: 10 K/UL (ref 1.8–8)
NEUTS SEG NFR BLD: 88 % (ref 32–75)
NRBC # BLD: 0 K/UL (ref 0–0.01)
NRBC BLD-RTO: 0 PER 100 WBC
PLATELET # BLD AUTO: 262 K/UL (ref 150–400)
PMV BLD AUTO: 12 FL (ref 8.9–12.9)
POTASSIUM SERPL-SCNC: 3.6 MMOL/L (ref 3.5–5.1)
PROT SERPL-MCNC: 9 G/DL (ref 6.4–8.2)
RBC # BLD AUTO: 4.55 M/UL (ref 3.8–5.2)
SODIUM SERPL-SCNC: 134 MMOL/L (ref 136–145)
WBC # BLD AUTO: 11.4 K/UL (ref 3.6–11)

## 2021-01-06 PROCEDURE — 96375 TX/PRO/DX INJ NEW DRUG ADDON: CPT

## 2021-01-06 PROCEDURE — 80053 COMPREHEN METABOLIC PANEL: CPT

## 2021-01-06 PROCEDURE — 96413 CHEMO IV INFUSION 1 HR: CPT

## 2021-01-06 PROCEDURE — 74011250636 HC RX REV CODE- 250/636: Performed by: INTERNAL MEDICINE

## 2021-01-06 PROCEDURE — 96415 CHEMO IV INFUSION ADDL HR: CPT

## 2021-01-06 PROCEDURE — 96360 HYDRATION IV INFUSION INIT: CPT

## 2021-01-06 PROCEDURE — 85025 COMPLETE CBC W/AUTO DIFF WBC: CPT

## 2021-01-06 PROCEDURE — 36415 COLL VENOUS BLD VENIPUNCTURE: CPT

## 2021-01-06 PROCEDURE — 99214 OFFICE O/P EST MOD 30 MIN: CPT | Performed by: INTERNAL MEDICINE

## 2021-01-06 PROCEDURE — 96377 APPLICATON ON-BODY INJECTOR: CPT

## 2021-01-06 RX ORDER — SODIUM CHLORIDE 9 MG/ML
25 INJECTION, SOLUTION INTRAVENOUS CONTINUOUS
Status: CANCELLED | OUTPATIENT
Start: 2021-01-06

## 2021-01-06 RX ORDER — EPINEPHRINE 1 MG/ML
0.3 INJECTION, SOLUTION, CONCENTRATE INTRAVENOUS AS NEEDED
Status: CANCELLED | OUTPATIENT
Start: 2021-01-06

## 2021-01-06 RX ORDER — DEXAMETHASONE SODIUM PHOSPHATE 100 MG/10ML
10 INJECTION INTRAMUSCULAR; INTRAVENOUS ONCE
Status: CANCELLED | OUTPATIENT
Start: 2021-01-06 | End: 2021-01-06

## 2021-01-06 RX ORDER — HYDROCORTISONE SODIUM SUCCINATE 100 MG/2ML
100 INJECTION, POWDER, FOR SOLUTION INTRAMUSCULAR; INTRAVENOUS AS NEEDED
Status: CANCELLED | OUTPATIENT
Start: 2021-01-06

## 2021-01-06 RX ORDER — DIPHENHYDRAMINE HYDROCHLORIDE 50 MG/ML
50 INJECTION, SOLUTION INTRAMUSCULAR; INTRAVENOUS AS NEEDED
Status: CANCELLED
Start: 2021-01-06

## 2021-01-06 RX ORDER — DEXAMETHASONE SODIUM PHOSPHATE 4 MG/ML
10 INJECTION, SOLUTION INTRA-ARTICULAR; INTRALESIONAL; INTRAMUSCULAR; INTRAVENOUS; SOFT TISSUE ONCE
Status: COMPLETED | OUTPATIENT
Start: 2021-01-06 | End: 2021-01-06

## 2021-01-06 RX ORDER — DIPHENHYDRAMINE HYDROCHLORIDE 50 MG/ML
25 INJECTION, SOLUTION INTRAMUSCULAR; INTRAVENOUS AS NEEDED
Status: CANCELLED
Start: 2021-01-06

## 2021-01-06 RX ORDER — SODIUM CHLORIDE 9 MG/ML
10 INJECTION INTRAMUSCULAR; INTRAVENOUS; SUBCUTANEOUS AS NEEDED
Status: CANCELLED | OUTPATIENT
Start: 2021-01-06

## 2021-01-06 RX ORDER — ONDANSETRON 8 MG/1
8 TABLET, ORALLY DISINTEGRATING ORAL
Qty: 60 TAB | Refills: 0 | Status: SHIPPED | OUTPATIENT
Start: 2021-01-06 | End: 2021-07-09

## 2021-01-06 RX ORDER — SODIUM CHLORIDE 0.9 % (FLUSH) 0.9 %
10 SYRINGE (ML) INJECTION AS NEEDED
Status: CANCELLED | OUTPATIENT
Start: 2021-01-06

## 2021-01-06 RX ORDER — PALONOSETRON 0.05 MG/ML
0.25 INJECTION, SOLUTION INTRAVENOUS ONCE
Status: COMPLETED | OUTPATIENT
Start: 2021-01-06 | End: 2021-01-06

## 2021-01-06 RX ORDER — ALBUTEROL SULFATE 0.83 MG/ML
2.5 SOLUTION RESPIRATORY (INHALATION) AS NEEDED
Status: CANCELLED
Start: 2021-01-06

## 2021-01-06 RX ORDER — ACETAMINOPHEN 325 MG/1
650 TABLET ORAL AS NEEDED
Status: CANCELLED
Start: 2021-01-06

## 2021-01-06 RX ORDER — SODIUM CHLORIDE 9 MG/ML
25 INJECTION, SOLUTION INTRAVENOUS CONTINUOUS
Status: DISCONTINUED | OUTPATIENT
Start: 2021-01-06 | End: 2021-01-07 | Stop reason: HOSPADM

## 2021-01-06 RX ORDER — HEPARIN 100 UNIT/ML
300-500 SYRINGE INTRAVENOUS AS NEEDED
Status: CANCELLED
Start: 2021-01-06

## 2021-01-06 RX ORDER — ONDANSETRON 2 MG/ML
8 INJECTION INTRAMUSCULAR; INTRAVENOUS AS NEEDED
Status: CANCELLED | OUTPATIENT
Start: 2021-01-06

## 2021-01-06 RX ORDER — PALONOSETRON 0.05 MG/ML
0.25 INJECTION, SOLUTION INTRAVENOUS ONCE
Status: CANCELLED | OUTPATIENT
Start: 2021-01-06 | End: 2021-01-06

## 2021-01-06 RX ADMIN — DEXAMETHASONE SODIUM PHOSPHATE 10 MG: 4 INJECTION, SOLUTION INTRA-ARTICULAR; INTRALESIONAL; INTRAMUSCULAR; INTRAVENOUS; SOFT TISSUE at 15:16

## 2021-01-06 RX ADMIN — SODIUM CHLORIDE 25 ML/HR: 900 INJECTION, SOLUTION INTRAVENOUS at 14:00

## 2021-01-06 RX ADMIN — PEGFILGRASTIM 6 MG: KIT SUBCUTANEOUS at 17:35

## 2021-01-06 RX ADMIN — DOCETAXEL ANHYDROUS 130 MG: 10 INJECTION, SOLUTION INTRAVENOUS at 15:41

## 2021-01-06 RX ADMIN — PALONOSETRON 0.25 MG: 0.05 INJECTION, SOLUTION INTRAVENOUS at 15:14

## 2021-01-06 RX ADMIN — CYCLOPHOSPHAMIDE 1296 MG: 1 INJECTION, POWDER, FOR SOLUTION INTRAVENOUS; ORAL at 16:56

## 2021-01-06 RX ADMIN — SODIUM CHLORIDE 500 ML: 900 INJECTION, SOLUTION INTRAVENOUS at 14:00

## 2021-01-06 NOTE — PROGRESS NOTES
Pharmacy Note- Chemotherapy Education     Danice Ormond is a 70 y. o.female  diagnosed with breast cancer here today for Cycle 1, Day 1 chemotherapy counseling and consent. Ms. David Alejandre is being treated with TC. Provided education on DOCEtaxel and cyclophosphamide and premedication - palonosetron and dexamethasone.     Provided Ms. David Alejandre with a handout and demonstration of Neulasta On Pro. Provided Ms. David Alejandre with a handout and reviewed home anti-nausea regimen.      Reviewed of possible side effects of chemotherapy to include s/s infection, anemia, appetite changes, thromboyctopenia, fatigue, hair loss/alopecia, bone pain, skin and nail changes, diarrhea/constipation, infusion reactions and peripheral neuropathy.         Patient given ways to manage these side effects and when to contact office.      Ms. David Alejandre verbalized understanding of the information presented and all of the patient's questions were answered.     Jacy Falcon, PharmD, BCPS, BCOP    CLINICAL PHARMACY CONSULT: MED RECONCILIATION/REVIEW ADDENDUM    For Pharmacy Admin Tracking Only    PHSO: PHSO Patient?: Yes  Total # of Interventions Recommended: Count: 1    Total Interventions Accepted: 1  Time Spent (min): 20

## 2021-01-06 NOTE — PROGRESS NOTES
[facility] at [address], [id number] [place] [address] Road, [facility] [phone]  F: [phone]    Reason for Visit:   Luis Manuel Cuevas is a 70 y.o. female who is seen today for follow up of left breast cancer to start adjuvant chemotherapy with TC. Treatment History:   Per Surgery Note:  · Initially abnormal screening mammo 7/20 on LEFT with small mass  · 08/04/20: LEFT breast bx. PATH: IDC, 5 mm in greatest linear dimension extending to core biopsy tip, histologic grade 2, ER+(%)/VT+(%)/HER2-. Clinical stage 1  · MammaPrint: High risk, luminal type B, -0.123  · Breast MRI 8/28/20: 12 mm mass otherwise negative. · 11/03/20: LEFT oncoplastic reduction and SLNBx, and RIGHT breast reduction, with Dr. Kendrick Graham. PATH:  · LEFT: IDC, 14 x 13 x 12 mm, overall grade 2, negative margins. DCIS present with negative margins. Pathologic stage: pT1c, pN0.  · RIGHT: Breast tissue with stromal fibrosis and focal papillary apocrine metaplasia. Benign skin and subcutaneous soft tissue. No in-situ or invasive carcinoma identified  · Adjuvant TC chemotherapy 1/6/21 -    STAGE: T1cN0 ER+ HEr2 negative mammaprint high risk luminal B    History of Present Illness:   Luis Manuel Cuevas is a 70 y.o. female seen today in office for follow up of left breast cancer ER+ HER2 negative s/p lumpectomy and bilateral breast reduction on 11/3/20. She is here today for Cycle 1 of adjuvant TC chemotherapy. She reports that she feels well overall today. She denies pain today other than mild post op discomfort. She denies fever, chills, cough, SOB, CP, nausea, vomiting, diarrhea, and constipation. CBC and CMP are still pending today. She is ready to start treatment today. She is here alone today. Fam Hx:  · Aunt colon cancer, uncle throat.      Past Medical History:   Diagnosis Date    Acute diverticulitis 8/18/2017    Allergic rhinitis 8/18/2017    Arthritis 8/18/2017    Arthritis of right knee 2019    Back pain, thoracic 2017    Cancer ([facility] 2020    LEFT BREAST     Cataract, left 2017    Cataract, right 2017    Chronic obstructive pulmonary disease (HCC)     Cirrhosis ([facility] 2017    Cold sore 2017    Elevated hemoglobin A1c 2017    Fatigue 2017    Gastrointestinal disorder     acid reflux    GERD (gastroesophageal reflux disease) 2017    Heart murmur 2017    Hepatitis C 2017    Herpes zoster infection of thoracic region 2017    Hyperlipidemia LDL goal <100 2017    Hypertension     Menopause     Hysterectomy-42years old    Murmur     Obesity (BMI 30-39. 9) 2017    Osteopenia 2017    Other ill-defined conditions(799.89)     hep c    Post-menopausal 2017    Posterior auricular pain of right ear 2017    Preop examination 2017    Valvular heart disease     mitral valve prolapse    Vertigo, benign positional 2017      Past Surgical History:   Procedure Laterality Date    HX BREAST LUMPECTOMY Bilateral 11/3/2020    LEFT BREAST REDUCTION LUMPECTOMY WITH ULTRASOUND, LEFT BREAST SENTINEL NODE BIOPSY, LEFT BREAST RECONSTRUCTION, RIGHT BREAST REDUCTION performed by Eliza Hope MD at [address] HX COLONOSCOPY      HX HYSTERECTOMY      42years old   Carlotta Curl HX ORTHOPAEDIC Right     knee, foot      Social History     Tobacco Use    Smoking status: Former Smoker     Packs/day: 0.25     Years: 25.00     Pack years: 6.25     Types: Cigarettes     Quit date: 1990     Years since quittin.0    Smokeless tobacco: Never Used   Substance Use Topics    Alcohol use:  Yes     Alcohol/week: 0.0 standard drinks     Comment: OCCASIONALLY      Family History   Problem Relation Age of Onset    Hypertension Mother     Diabetes Mother     Kidney Disease Mother     Heart Disease Father     Colon Cancer Maternal Grandmother     No Known Problems Sister     Anesth Problems Neg Hx Current Outpatient Medications   Medication Sig    ondansetron (ZOFRAN ODT) 8 mg disintegrating tablet Take 1 Tab by mouth every eight (8) hours as needed for Nausea.  olmesartan-hydroCHLOROthiazide (BENICAR HCT) 20-12.5 mg per tablet TAKE 1 TABLET BY MOUTH ONCE DAILY    prochlorperazine (Compazine) 5 mg tablet Take 1 tab by mouth every 6 hours as needed for nausea or vomiting    dexAMETHasone (DECADRON) 4 mg tablet Take 2 tabs (8mg) by mouth twice daily the day before chemotherapy and the day after chemotherapy    MILK THISTLE PO Take 175 mg by mouth daily.  OTHER Indications: AM/PM MENOPAUSE FORMULA 1 TAB QHS    furosemide (LASIX) 20 mg tablet TAKE 1 TABLET BY MOUTH ONCE DAILY (Patient taking differently: Take 20 mg by mouth every evening. TAKE 1 TABLET BY MOUTH ONCE DAILY)    valACYclovir (VALTREX) 500 mg tablet Take 1 tablet by mouth once daily (Patient taking differently: as needed.)    TURMERIC ROOT EXTRACT PO Take 200 mg by mouth.  cyanocobalamin (VITAMIN B-12) 1,000 mcg tablet Take 1,000 mcg by mouth daily.  psyllium (METAMUCIL) powd Take  by mouth daily.  magnesium 250 mg tab Take  by mouth.  mometasone (NASONEX) 50 mcg/actuation nasal spray 2 Sprays as needed.  naproxen sodium (ALEVE) 220 mg tablet Take 220 mg by mouth two (2) times daily (with meals).  biotin 5,000 mcg TbDi Take  by mouth.  S-Adenosylmethionine (YRN-E, ENTERIC COATED,) 400 mg TbEC Take  by mouth.  cholecalciferol, VITAMIN D3, (VITAMIN D3) 5,000 unit tab tablet Take  by mouth daily.  Dexlansoprazole (DEXILANT) 60 mg CpDB Take 60 mg by mouth as needed.  omeprazole (PRILOSEC OTC) 20 mg tablet Take 20 mg by mouth as needed.  docosanol (ABREVA) 10 % topical cream Apply  to affected area five (5) times daily.  krill oil 500 mg cap Take  by mouth. No current facility-administered medications for this visit.        No Known Allergies     A complete review of systems was obtained, negative except as described above and as reported on ROS sheet scanned into system. Physical Exam:     Visit Vitals  BP (!) 153/81 (BP 1 Location: Left arm, BP Patient Position: Sitting)   Pulse 98   Temp 96.9 °F (36.1 °C) (Temporal)   Wt 212 lb 11.2 oz (96.5 kg)   SpO2 96%   BMI 32.34 kg/m²     ECOG PS: 1  General: No distress  Eyes:  anicteric sclerae  HENT: Atraumatic, wearing a mask  Neck: Supple  Respiratory: Normal respiratory effort  MS: Normal gait and station. Digits without clubbing or cyanosis. Skin: No rashes, ecchymoses, or petechiae. Normal temperature, turgor, and texture. Psych: Alert, oriented, appropriate affect, normal judgment/insight  Breast: Well healing bilateral breast reduction scars and left lumpectomy    Results:     Lab Results   Component Value Date/Time    WBC 11.4 (H) 01/06/2021 11:17 AM    HGB 13.0 01/06/2021 11:17 AM    HCT 40.1 01/06/2021 11:17 AM    PLATELET 632 89/67/8483 11:17 AM    MCV 88.1 01/06/2021 11:17 AM    ABS. NEUTROPHILS 10.0 (H) 01/06/2021 11:17 AM    HGB (POC) 12.6 09/11/2017 11:03 AM    HCT (POC) 38.2 09/11/2017 11:03 AM     Lab Results   Component Value Date/Time    Sodium 134 (L) 01/06/2021 11:17 AM    Potassium 3.6 01/06/2021 11:17 AM    Chloride 104 01/06/2021 11:17 AM    CHLORIDE 104.0 03/28/2018 10:08 AM    CO2 24 01/06/2021 11:17 AM    Glucose 150 (H) 01/06/2021 11:17 AM    BUN 25 (H) 01/06/2021 11:17 AM    Creatinine 1.12 (H) 01/06/2021 11:17 AM    GFR est AA 58 (L) 01/06/2021 11:17 AM    GFR est non-AA 48 (L) 01/06/2021 11:17 AM    Calcium 10.1 01/06/2021 11:17 AM    Sodium (POC) 146.0 (A) 03/28/2018 10:08 AM    Potassium (POC) 4.6 03/28/2018 10:08 AM    Creatinine (POC) 0.7 03/28/2018 10:08 AM     Lab Results   Component Value Date/Time    Bilirubin, total 0.4 01/06/2021 11:17 AM    ALT (SGPT) 22 01/06/2021 11:17 AM    Alk.  phosphatase 64 01/06/2021 11:17 AM    Protein, total 9.0 (H) 01/06/2021 11:17 AM    Albumin 4.2 01/06/2021 11:17 AM    Globulin 4.8 (H) 01/06/2021 11:17 AM     Records reviewed and summarized above. Pathology report(s) reviewed above. Radiology report(s) reviewed above. Assessment/PLAN:     1) Stage 1 T1cN0 ER+ HER2 negative mammaprint high risk luminal B post lumpectomy/bilateral reduction on 11/3/20  She has seen Rad/Onc. Discussed adjuvant hormonal therapy with Anastrozole after chemo/XRT. She is here today for Cycle 1 of adjuvant TC chemotherapy. She is doing chemo peripherally. She is clinically stable today, feels well. CBC and CMP are still pending today. She is ready for chemo today pending labs. Teaching and consent with pharmacy today. Follow up in 3 weeks with Cycle 2. Patient agrees with plan. We discussed the risks and benefits of TC chemotherapy. Potential side effects include, but are not limited to: nausea, vomiting, diarrhea, constipation, taste changes, flu-like symptoms, increased risk of infection, cytopenias, mucositis, arthralgias, myalgias, allergic reaction, alopecia, fatigue, neuropathy, fluid retention, infertility, and rarely, death. Additionally, there is a very small risk of developing acute leukemia in the future. The patient has consented to beginning chemotherapy. 2) Hx of Hep C  Cured per patient. Management per [facility]. 3) Heart Valve Problems/HTN  Management per Cardio. 4) COPD/GERD   Management per PCP. 5) Psychosocial  Mood good, coping well. She lives alone and is here alone today. She continues to work during [employer] from home. SW support as needed. Call if questions. Follow up in 3 weeks with Cycle 2. This patient was seen in conjunction with Ivonne Velasquez NP. I personally reviewed the history and all points in the assessment and plan with the patient, and performed the key points on the exam.   I appreciate the opportunity to participate in Ms. Danielle Jones's care.     Signed By: Sonia Serrano DO

## 2021-01-06 NOTE — PROGRESS NOTES
Amarilys Guerrero is a 70 y.o. female  Chief Complaint   Patient presents with    Chemotherapy    Breast Cancer     1. Have you been to the ER, urgent care clinic since your last visit? Hospitalized since your last visit? No.    2. Have you seen or consulted any other health care providers outside of the [address] [place] [address] since your last visit? Include any pap smears or colon screening.  No.

## 2021-01-06 NOTE — PROGRESS NOTES
[facility] Chemotherapy Progress Note    Date: 2021    Name: Kaia Matias    MRN: [id number]         : 1945      1100 Pt admit to [facility] for TC ambulatory in stable condition. Assessment completed. No new concerns voiced. PIV with positive blood return. Patient denies SOB, fever, cough, general not feeling well. Patient denies recent exposure to someone who has tested positive for COVID-19. Patient denies having contact with anyone who has a pending COVID test.    Patient stated had COVID in 2020 and tested positive for antibodies and she takes a COVID test monthly for her own peace of mind that patient stated. Chemotherapy Flowsheet 2021   Cycle C1D1   Date 2021   Drug / Regimen TC   Pre Meds given   Notes given         Ms. Jones's vitals were reviewed. Patient Vitals for the past 12 hrs:   Temp Pulse Resp BP   21 1740 -- 69 -- (!) 141/75   21 1100 97.3 °F (36.3 °C) 88 18 (!) 150/87         Lab results were obtained and reviewed. Recent Results (from the past 12 hour(s))   CBC WITH AUTOMATED DIFF    Collection Time: 21 11:17 AM   Result Value Ref Range    WBC 11.4 (H) 3.6 - 11.0 K/uL    RBC 4.55 3.80 - 5.20 M/uL    HGB 13.0 11.5 - 16.0 g/dL    HCT 40.1 35.0 - 47.0 %    MCV 88.1 80.0 - 99.0 FL    MCH 28.6 26.0 - 34.0 PG    MCHC 32.4 30.0 - 36.5 g/dL    RDW 13.1 11.5 - 14.5 %    PLATELET 633 321 - 980 K/uL    MPV 12.0 8.9 - 12.9 FL    NRBC 0.0 0  WBC    ABSOLUTE NRBC 0.00 0.00 - 0.01 K/uL    NEUTROPHILS 88 (H) 32 - 75 %    LYMPHOCYTES 8 (L) 12 - 49 %    MONOCYTES 4 (L) 5 - 13 %    EOSINOPHILS 0 0 - 7 %    BASOPHILS 0 0 - 1 %    IMMATURE GRANULOCYTES 0 0.0 - 0.5 %    ABS. NEUTROPHILS 10.0 (H) 1.8 - 8.0 K/UL    ABS. LYMPHOCYTES 0.9 0.8 - 3.5 K/UL    ABS. MONOCYTES 0.4 0.0 - 1.0 K/UL    ABS. EOSINOPHILS 0.0 0.0 - 0.4 K/UL    ABS. BASOPHILS 0.0 0.0 - 0.1 K/UL    ABS. IMM.  GRANS. 0.1 (H) 0.00 - 0.04 K/UL    DF AUTOMATED     METABOLIC PANEL, COMPREHENSIVE Collection Time: 01/06/21 11:17 AM   Result Value Ref Range    Sodium 134 (L) 136 - 145 mmol/L    Potassium 3.6 3.5 - 5.1 mmol/L    Chloride 104 97 - 108 mmol/L    CO2 24 21 - 32 mmol/L    Anion gap 6 5 - 15 mmol/L    Glucose 150 (H) 65 - 100 mg/dL    BUN 25 (H) 6 - 20 MG/DL    Creatinine 1.12 (H) 0.55 - 1.02 MG/DL    BUN/Creatinine ratio 22 (H) 12 - 20      GFR est AA 58 (L) >60 ml/min/1.73m2    GFR est non-AA 48 (L) >60 ml/min/1.73m2    Calcium 10.1 8.5 - 10.1 MG/DL    Bilirubin, total 0.4 0.2 - 1.0 MG/DL    ALT (SGPT) 22 12 - 78 U/L    AST (SGOT) 14 (L) 15 - 37 U/L    Alk. phosphatase 64 45 - 117 U/L    Protein, total 9.0 (H) 6.4 - 8.2 g/dL    Albumin 4.2 3.5 - 5.0 g/dL    Globulin 4.8 (H) 2.0 - 4.0 g/dL    A-G Ratio 0.9 (L) 1.1 - 2.2         Pre-medications  were administered as ordered and chemotherapy was initiated.   Medications Administered     0.9% sodium chloride infusion     Admin Date  01/06/2021 Action  New Bag Dose  25 mL/hr Rate  25 mL/hr Route  IntraVENous Administered By  Doris Bull RN          cyclophosphamide (CYTOXAN) 1,296 mg in 0.9% sodium chloride 250 mL, overfill volume 25 mL chemo infusion     Admin Date  01/06/2021 Action  New Bag Dose  1,296 mg Rate  679.6 mL/hr Route  IntraVENous Administered By  Doris Bull RN          dexamethasone (DECADRON) 4 mg/mL injection 10 mg     Admin Date  01/06/2021 Action  Given Dose  10 mg Route  IntraVENous Administered By  Doris Bull RN          DOCEtaxeL (TAXOTERE) 130 mg in 0.9% sodium chloride 250 mL, overfill volume 25 mL chemo infusion     Admin Date  01/06/2021 Action  New Bag Dose  130 mg Rate  288 mL/hr Route  IntraVENous Administered By  Doris Bull RN          palonosetron HCl (ALOXI) injection 0.25 mg     Admin Date  01/06/2021 Action  Given Dose  0.25 mg Route  IntraVENous Administered By  Doris Bull, RN          pegfilgrastim (NEULASTA) wearable SQ injector 6 mg     Admin Date  01/06/2021 Action  Given Dose  6 mg Route  SubCUTAneous Administered By  Doris Bull RN          sodium chloride 0.9 % bolus infusion 500 mL     Admin Date  01/06/2021 Action  New Bag Dose  500 mL Route  IntraVENous Administered By  Doris Bull RN                3667 Pt tolerated treatment well. PIV maintained positive blood return throughout treatment. Flushed, heparinized and de-accessed per protocol. D/c home ambulatory in no distress.    Future Appointments   Date Time Provider [place] [place]   1/27/2021 11:00 AM C1 [facility] [address] [place] [address]   1/27/2021 11:15 AM Dionna Gutierrez  [address] BS [facility]   2/17/2021 11:00 AM C1 [facility] [address] [place] [address]   3/10/2021 11:00 AM C1 [facility] [address] [place] [address]   3/16/2021  9:40 AM MD GONZALES Young BS [facility]   6/16/2021 10:15 AM MD NINOSKA Sherman BS [facility]   6/29/2021  1:45 PM Peter Hawkins NP [facility] BS AMB         Rubens Gonzalez RN  January 6, 2021

## 2021-01-07 ENCOUNTER — APPOINTMENT (OUTPATIENT)
Dept: INFUSION THERAPY | Age: 76
End: 2021-01-07

## 2021-01-12 ENCOUNTER — TELEPHONE (OUTPATIENT)
Dept: ONCOLOGY | Age: 76
End: 2021-01-12

## 2021-01-12 NOTE — TELEPHONE ENCOUNTER
Returned patient call, HIPAA verified. States approx 2-3 days ago developed hoarseness that has not abated. Denies fever, cough, SOB/CARROLL, PND, N & V.  States \"I may have a little runny nose, but really just hoarse. \"  Encouraged to try lozenges, gargling with warm salt water to see if this eases hoarseness. States HX of allergies and stopped her Allegra to take Claritin with Neulasta usage, plans to resume Allegra, and also to resume her nasal spray for allergies. Understands RN will route message to Provider for update/advice. 1104 Updated patient via phone, HIPAA verified that Provider had reviewed message and advised continued monitoring of symptoms. Patient has resumed her allergy medication regimen since earlier call, will continue comfort measures, and will notify if symptoms increase or fail to resolve.

## 2021-01-12 NOTE — TELEPHONE ENCOUNTER
Patient called and stated that she just had her first chemo treatment and is now really horse and would like to know if someone can explain if this is a side effect.     Call back [phone]

## 2021-01-14 ENCOUNTER — TELEPHONE (OUTPATIENT)
Dept: ONCOLOGY | Age: 76
End: 2021-01-14

## 2021-01-14 NOTE — TELEPHONE ENCOUNTER
Patient called and stated that she may have a UTI or another issue/ possible side effect as she is having vaginal itching. She would like to know if there is anything she can take.        Call back [phone]

## 2021-01-14 NOTE — TELEPHONE ENCOUNTER
Spoke to pt, SAROJ verified, she reports vaginal itching, no urinary symptoms, no burning, or pain with urination, no urgency or frequency, no fever. Pt does not notice any discharge, just vaginal itching. Advised to try OTC Vaginal Yeast Inf  ( Monostat or generic) cream to see if any improvement.   Pt does not have a GYN MD.  Pt advised to call the office back if no improvement or worsening of symptoms, we would try to get her set up with a gyn

## 2021-01-21 RX ORDER — ONDANSETRON 2 MG/ML
8 INJECTION INTRAMUSCULAR; INTRAVENOUS AS NEEDED
Status: CANCELLED | OUTPATIENT
Start: 2021-01-27

## 2021-01-21 RX ORDER — ALBUTEROL SULFATE 0.83 MG/ML
2.5 SOLUTION RESPIRATORY (INHALATION) AS NEEDED
Status: CANCELLED
Start: 2021-01-27

## 2021-01-21 RX ORDER — SODIUM CHLORIDE 9 MG/ML
10 INJECTION INTRAMUSCULAR; INTRAVENOUS; SUBCUTANEOUS AS NEEDED
Status: CANCELLED | OUTPATIENT
Start: 2021-02-17

## 2021-01-21 RX ORDER — SODIUM CHLORIDE 0.9 % (FLUSH) 0.9 %
10 SYRINGE (ML) INJECTION AS NEEDED
Status: CANCELLED | OUTPATIENT
Start: 2021-03-10

## 2021-01-21 RX ORDER — HEPARIN 100 UNIT/ML
300-500 SYRINGE INTRAVENOUS AS NEEDED
Status: CANCELLED
Start: 2021-03-10

## 2021-01-21 RX ORDER — DIPHENHYDRAMINE HYDROCHLORIDE 50 MG/ML
50 INJECTION, SOLUTION INTRAMUSCULAR; INTRAVENOUS AS NEEDED
Status: CANCELLED
Start: 2021-03-10

## 2021-01-21 RX ORDER — SODIUM CHLORIDE 9 MG/ML
25 INJECTION, SOLUTION INTRAVENOUS CONTINUOUS
Status: CANCELLED | OUTPATIENT
Start: 2021-01-27

## 2021-01-21 RX ORDER — EPINEPHRINE 1 MG/ML
0.3 INJECTION, SOLUTION, CONCENTRATE INTRAVENOUS AS NEEDED
Status: CANCELLED | OUTPATIENT
Start: 2021-01-27

## 2021-01-21 RX ORDER — ACETAMINOPHEN 325 MG/1
650 TABLET ORAL AS NEEDED
Status: CANCELLED
Start: 2021-02-17

## 2021-01-21 RX ORDER — HEPARIN 100 UNIT/ML
300-500 SYRINGE INTRAVENOUS AS NEEDED
Status: CANCELLED
Start: 2021-01-27

## 2021-01-21 RX ORDER — SODIUM CHLORIDE 9 MG/ML
25 INJECTION, SOLUTION INTRAVENOUS CONTINUOUS
Status: CANCELLED | OUTPATIENT
Start: 2021-03-10

## 2021-01-21 RX ORDER — HYDROCORTISONE SODIUM SUCCINATE 100 MG/2ML
100 INJECTION, POWDER, FOR SOLUTION INTRAMUSCULAR; INTRAVENOUS AS NEEDED
Status: CANCELLED | OUTPATIENT
Start: 2021-02-17

## 2021-01-21 RX ORDER — ONDANSETRON 2 MG/ML
8 INJECTION INTRAMUSCULAR; INTRAVENOUS AS NEEDED
Status: CANCELLED | OUTPATIENT
Start: 2021-03-10

## 2021-01-21 RX ORDER — DIPHENHYDRAMINE HYDROCHLORIDE 50 MG/ML
50 INJECTION, SOLUTION INTRAMUSCULAR; INTRAVENOUS AS NEEDED
Status: CANCELLED
Start: 2021-02-17

## 2021-01-21 RX ORDER — ACETAMINOPHEN 325 MG/1
650 TABLET ORAL AS NEEDED
Status: CANCELLED
Start: 2021-01-27

## 2021-01-21 RX ORDER — DIPHENHYDRAMINE HYDROCHLORIDE 50 MG/ML
25 INJECTION, SOLUTION INTRAMUSCULAR; INTRAVENOUS AS NEEDED
Status: CANCELLED
Start: 2021-01-27

## 2021-01-21 RX ORDER — EPINEPHRINE 1 MG/ML
0.3 INJECTION, SOLUTION, CONCENTRATE INTRAVENOUS AS NEEDED
Status: CANCELLED | OUTPATIENT
Start: 2021-03-10

## 2021-01-21 RX ORDER — SODIUM CHLORIDE 9 MG/ML
10 INJECTION INTRAMUSCULAR; INTRAVENOUS; SUBCUTANEOUS AS NEEDED
Status: CANCELLED | OUTPATIENT
Start: 2021-03-10

## 2021-01-21 RX ORDER — DIPHENHYDRAMINE HYDROCHLORIDE 50 MG/ML
25 INJECTION, SOLUTION INTRAMUSCULAR; INTRAVENOUS AS NEEDED
Status: CANCELLED
Start: 2021-03-10

## 2021-01-21 RX ORDER — SODIUM CHLORIDE 9 MG/ML
10 INJECTION INTRAMUSCULAR; INTRAVENOUS; SUBCUTANEOUS AS NEEDED
Status: CANCELLED | OUTPATIENT
Start: 2021-01-27

## 2021-01-21 RX ORDER — ACETAMINOPHEN 325 MG/1
650 TABLET ORAL AS NEEDED
Status: CANCELLED
Start: 2021-03-10

## 2021-01-21 RX ORDER — HYDROCORTISONE SODIUM SUCCINATE 100 MG/2ML
100 INJECTION, POWDER, FOR SOLUTION INTRAMUSCULAR; INTRAVENOUS AS NEEDED
Status: CANCELLED | OUTPATIENT
Start: 2021-01-27

## 2021-01-21 RX ORDER — DEXAMETHASONE SODIUM PHOSPHATE 100 MG/10ML
10 INJECTION INTRAMUSCULAR; INTRAVENOUS ONCE
Status: CANCELLED | OUTPATIENT
Start: 2021-02-17 | End: 2021-02-17

## 2021-01-21 RX ORDER — PALONOSETRON 0.05 MG/ML
0.25 INJECTION, SOLUTION INTRAVENOUS ONCE
Status: CANCELLED | OUTPATIENT
Start: 2021-01-27 | End: 2021-01-27

## 2021-01-21 RX ORDER — HEPARIN 100 UNIT/ML
300-500 SYRINGE INTRAVENOUS AS NEEDED
Status: CANCELLED
Start: 2021-02-17

## 2021-01-21 RX ORDER — PALONOSETRON 0.05 MG/ML
0.25 INJECTION, SOLUTION INTRAVENOUS ONCE
Status: CANCELLED | OUTPATIENT
Start: 2021-03-10 | End: 2021-03-10

## 2021-01-21 RX ORDER — ALBUTEROL SULFATE 0.83 MG/ML
2.5 SOLUTION RESPIRATORY (INHALATION) AS NEEDED
Status: CANCELLED
Start: 2021-03-10

## 2021-01-21 RX ORDER — ONDANSETRON 2 MG/ML
8 INJECTION INTRAMUSCULAR; INTRAVENOUS AS NEEDED
Status: CANCELLED | OUTPATIENT
Start: 2021-02-17

## 2021-01-21 RX ORDER — ALBUTEROL SULFATE 0.83 MG/ML
2.5 SOLUTION RESPIRATORY (INHALATION) AS NEEDED
Status: CANCELLED
Start: 2021-02-17

## 2021-01-21 RX ORDER — DEXAMETHASONE SODIUM PHOSPHATE 100 MG/10ML
10 INJECTION INTRAMUSCULAR; INTRAVENOUS ONCE
Status: CANCELLED | OUTPATIENT
Start: 2021-01-27 | End: 2021-01-27

## 2021-01-21 RX ORDER — DIPHENHYDRAMINE HYDROCHLORIDE 50 MG/ML
25 INJECTION, SOLUTION INTRAMUSCULAR; INTRAVENOUS AS NEEDED
Status: CANCELLED
Start: 2021-02-17

## 2021-01-21 RX ORDER — DEXAMETHASONE SODIUM PHOSPHATE 100 MG/10ML
10 INJECTION INTRAMUSCULAR; INTRAVENOUS ONCE
Status: CANCELLED | OUTPATIENT
Start: 2021-03-10 | End: 2021-03-10

## 2021-01-21 RX ORDER — SODIUM CHLORIDE 0.9 % (FLUSH) 0.9 %
10 SYRINGE (ML) INJECTION AS NEEDED
Status: CANCELLED | OUTPATIENT
Start: 2021-01-27

## 2021-01-21 RX ORDER — EPINEPHRINE 1 MG/ML
0.3 INJECTION, SOLUTION, CONCENTRATE INTRAVENOUS AS NEEDED
Status: CANCELLED | OUTPATIENT
Start: 2021-02-17

## 2021-01-21 RX ORDER — HYDROCORTISONE SODIUM SUCCINATE 100 MG/2ML
100 INJECTION, POWDER, FOR SOLUTION INTRAMUSCULAR; INTRAVENOUS AS NEEDED
Status: CANCELLED | OUTPATIENT
Start: 2021-03-10

## 2021-01-21 RX ORDER — PALONOSETRON 0.05 MG/ML
0.25 INJECTION, SOLUTION INTRAVENOUS ONCE
Status: CANCELLED | OUTPATIENT
Start: 2021-02-17 | End: 2021-02-17

## 2021-01-21 RX ORDER — SODIUM CHLORIDE 0.9 % (FLUSH) 0.9 %
10 SYRINGE (ML) INJECTION AS NEEDED
Status: CANCELLED | OUTPATIENT
Start: 2021-02-17

## 2021-01-21 RX ORDER — DIPHENHYDRAMINE HYDROCHLORIDE 50 MG/ML
50 INJECTION, SOLUTION INTRAMUSCULAR; INTRAVENOUS AS NEEDED
Status: CANCELLED
Start: 2021-01-27

## 2021-01-21 RX ORDER — SODIUM CHLORIDE 9 MG/ML
25 INJECTION, SOLUTION INTRAVENOUS CONTINUOUS
Status: CANCELLED | OUTPATIENT
Start: 2021-02-17

## 2021-01-26 ENCOUNTER — HOSPITAL ENCOUNTER (OUTPATIENT)
Dept: INFUSION THERAPY | Age: 76
Discharge: HOME | End: 2021-01-26
Payer: COMMERCIAL

## 2021-01-26 VITALS
DIASTOLIC BLOOD PRESSURE: 60 MMHG | RESPIRATION RATE: 16 BRPM | SYSTOLIC BLOOD PRESSURE: 86 MMHG | OXYGEN SATURATION: 99 % | HEART RATE: 81 BPM | TEMPERATURE: 96.9 F

## 2021-01-26 DIAGNOSIS — C50.912 INVASIVE DUCTAL CARCINOMA OF LEFT BREAST (HCC): ICD-10-CM

## 2021-01-26 LAB
ALBUMIN SERPL-MCNC: 3.5 G/DL (ref 3.5–5)
ALBUMIN/GLOB SERPL: 0.7 {RATIO} (ref 1.1–2.2)
ALP SERPL-CCNC: 50 U/L (ref 45–117)
ALT SERPL-CCNC: 22 U/L (ref 12–78)
ANION GAP SERPL CALC-SCNC: 7 MMOL/L (ref 5–15)
AST SERPL-CCNC: 16 U/L (ref 15–37)
BASOPHILS # BLD: 0.1 K/UL (ref 0–0.1)
BASOPHILS NFR BLD: 3 % (ref 0–1)
BILIRUB SERPL-MCNC: 0.3 MG/DL (ref 0.2–1)
BUN SERPL-MCNC: 20 MG/DL (ref 6–20)
BUN/CREAT SERPL: 19 (ref 12–20)
CALCIUM SERPL-MCNC: 9.4 MG/DL (ref 8.5–10.1)
CHLORIDE SERPL-SCNC: 105 MMOL/L (ref 97–108)
CO2 SERPL-SCNC: 26 MMOL/L (ref 21–32)
CREAT SERPL-MCNC: 1.08 MG/DL (ref 0.55–1.02)
DIFFERENTIAL METHOD BLD: ABNORMAL
EOSINOPHIL # BLD: 0 K/UL (ref 0–0.4)
EOSINOPHIL NFR BLD: 1 % (ref 0–7)
ERYTHROCYTE [DISTWIDTH] IN BLOOD BY AUTOMATED COUNT: 13.2 % (ref 11.5–14.5)
GLOBULIN SER CALC-MCNC: 5 G/DL (ref 2–4)
GLUCOSE SERPL-MCNC: 99 MG/DL (ref 65–100)
HCT VFR BLD AUTO: 33.3 % (ref 35–47)
HGB BLD-MCNC: 11 G/DL (ref 11.5–16)
IMM GRANULOCYTES # BLD AUTO: 0 K/UL (ref 0–0.04)
IMM GRANULOCYTES NFR BLD AUTO: 0 % (ref 0–0.5)
LYMPHOCYTES # BLD: 1.5 K/UL (ref 0.8–3.5)
LYMPHOCYTES NFR BLD: 33 % (ref 12–49)
MCH RBC QN AUTO: 28.5 PG (ref 26–34)
MCHC RBC AUTO-ENTMCNC: 33 G/DL (ref 30–36.5)
MCV RBC AUTO: 86.3 FL (ref 80–99)
MONOCYTES # BLD: 0.8 K/UL (ref 0–1)
MONOCYTES NFR BLD: 17 % (ref 5–13)
NEUTS SEG # BLD: 2 K/UL (ref 1.8–8)
NEUTS SEG NFR BLD: 46 % (ref 32–75)
NRBC # BLD: 0 K/UL (ref 0–0.01)
NRBC BLD-RTO: 0 PER 100 WBC
PLATELET # BLD AUTO: 376 K/UL (ref 150–400)
PMV BLD AUTO: 10.4 FL (ref 8.9–12.9)
POTASSIUM SERPL-SCNC: 3.8 MMOL/L (ref 3.5–5.1)
PROT SERPL-MCNC: 8.5 G/DL (ref 6.4–8.2)
RBC # BLD AUTO: 3.86 M/UL (ref 3.8–5.2)
SODIUM SERPL-SCNC: 138 MMOL/L (ref 136–145)
WBC # BLD AUTO: 4.4 K/UL (ref 3.6–11)

## 2021-01-26 PROCEDURE — 85025 COMPLETE CBC W/AUTO DIFF WBC: CPT

## 2021-01-26 PROCEDURE — 80053 COMPREHEN METABOLIC PANEL: CPT

## 2021-01-26 PROCEDURE — 36415 COLL VENOUS BLD VENIPUNCTURE: CPT

## 2021-01-26 NOTE — PROGRESS NOTES
[facility] Lab Visit:        4414  Pt arrived ambulatory to [facility] in stable condition, for lab draw. Labs drawn peripherally from Left AC arm and sent for processing. Pt tolerated well. Visit Vitals  BP (!) 86/60 (BP 1 Location: Right arm, BP Patient Position: Sitting)   Pulse 81   Temp 96.9 °F (36.1 °C)   Resp 16   SpO2 99%         1445 No new concerns voiced. D/C home ambulatory in no distress. Pt aware of next [facility] appointment scheduled. Labs available in CC once resulted.

## 2021-01-27 ENCOUNTER — HOSPITAL ENCOUNTER (OUTPATIENT)
Dept: INFUSION THERAPY | Age: 76
Discharge: HOME | End: 2021-01-27
Payer: COMMERCIAL

## 2021-01-27 ENCOUNTER — OFFICE VISIT (OUTPATIENT)
Dept: ONCOLOGY | Age: 76
End: 2021-01-27
Payer: COMMERCIAL

## 2021-01-27 VITALS
BODY MASS INDEX: 31.5 KG/M2 | HEART RATE: 111 BPM | SYSTOLIC BLOOD PRESSURE: 114 MMHG | DIASTOLIC BLOOD PRESSURE: 71 MMHG | WEIGHT: 207.2 LBS | TEMPERATURE: 96.6 F | OXYGEN SATURATION: 98 %

## 2021-01-27 VITALS
SYSTOLIC BLOOD PRESSURE: 128 MMHG | DIASTOLIC BLOOD PRESSURE: 72 MMHG | WEIGHT: 207.2 LBS | HEART RATE: 65 BPM | HEIGHT: 68 IN | BODY MASS INDEX: 31.4 KG/M2

## 2021-01-27 DIAGNOSIS — T45.1X5A CHEMOTHERAPY INDUCED DIARRHEA: ICD-10-CM

## 2021-01-27 DIAGNOSIS — K21.9 GASTROESOPHAGEAL REFLUX DISEASE WITHOUT ESOPHAGITIS: ICD-10-CM

## 2021-01-27 DIAGNOSIS — J44.9 CHRONIC OBSTRUCTIVE PULMONARY DISEASE, UNSPECIFIED COPD TYPE (HCC): ICD-10-CM

## 2021-01-27 DIAGNOSIS — C50.912 INVASIVE DUCTAL CARCINOMA OF LEFT BREAST (HCC): Primary | ICD-10-CM

## 2021-01-27 DIAGNOSIS — I36.1 NON-RHEUMATIC TRICUSPID VALVE INSUFFICIENCY: ICD-10-CM

## 2021-01-27 DIAGNOSIS — Z09 CHEMOTHERAPY FOLLOW-UP EXAMINATION: ICD-10-CM

## 2021-01-27 DIAGNOSIS — K52.1 CHEMOTHERAPY INDUCED DIARRHEA: ICD-10-CM

## 2021-01-27 DIAGNOSIS — Z98.890 HISTORY OF LUMPECTOMY: ICD-10-CM

## 2021-01-27 DIAGNOSIS — I10 ESSENTIAL HYPERTENSION: ICD-10-CM

## 2021-01-27 DIAGNOSIS — Z98.890 H/O BILATERAL BREAST REDUCTION SURGERY: ICD-10-CM

## 2021-01-27 DIAGNOSIS — Z86.19 HISTORY OF HEPATITIS C: ICD-10-CM

## 2021-01-27 PROCEDURE — 96413 CHEMO IV INFUSION 1 HR: CPT

## 2021-01-27 PROCEDURE — 96375 TX/PRO/DX INJ NEW DRUG ADDON: CPT

## 2021-01-27 PROCEDURE — 96361 HYDRATE IV INFUSION ADD-ON: CPT

## 2021-01-27 PROCEDURE — 74011250636 HC RX REV CODE- 250/636: Performed by: NURSE PRACTITIONER

## 2021-01-27 PROCEDURE — 96417 CHEMO IV INFUS EACH ADDL SEQ: CPT

## 2021-01-27 PROCEDURE — 99214 OFFICE O/P EST MOD 30 MIN: CPT | Performed by: NURSE PRACTITIONER

## 2021-01-27 PROCEDURE — 96377 APPLICATON ON-BODY INJECTOR: CPT

## 2021-01-27 RX ORDER — DEXAMETHASONE SODIUM PHOSPHATE 10 MG/ML
10 INJECTION INTRAMUSCULAR; INTRAVENOUS ONCE
Status: COMPLETED | OUTPATIENT
Start: 2021-01-27 | End: 2021-01-27

## 2021-01-27 RX ORDER — HEPARIN 100 UNIT/ML
300-500 SYRINGE INTRAVENOUS AS NEEDED
Status: ACTIVE | OUTPATIENT
Start: 2021-01-27 | End: 2021-01-27

## 2021-01-27 RX ORDER — SODIUM CHLORIDE 9 MG/ML
10 INJECTION INTRAMUSCULAR; INTRAVENOUS; SUBCUTANEOUS AS NEEDED
Status: ACTIVE | OUTPATIENT
Start: 2021-01-27 | End: 2021-01-27

## 2021-01-27 RX ORDER — SODIUM CHLORIDE 9 MG/ML
25 INJECTION, SOLUTION INTRAVENOUS CONTINUOUS
Status: DISPENSED | OUTPATIENT
Start: 2021-01-27 | End: 2021-01-27

## 2021-01-27 RX ORDER — SODIUM CHLORIDE 0.9 % (FLUSH) 0.9 %
10 SYRINGE (ML) INJECTION AS NEEDED
Status: DISPENSED | OUTPATIENT
Start: 2021-01-27 | End: 2021-01-27

## 2021-01-27 RX ORDER — PALONOSETRON 0.05 MG/ML
0.25 INJECTION, SOLUTION INTRAVENOUS ONCE
Status: COMPLETED | OUTPATIENT
Start: 2021-01-27 | End: 2021-01-27

## 2021-01-27 RX ADMIN — DEXAMETHASONE SODIUM PHOSPHATE 10 MG: 10 INJECTION INTRAMUSCULAR; INTRAVENOUS at 12:38

## 2021-01-27 RX ADMIN — PEGFILGRASTIM 6 MG: KIT SUBCUTANEOUS at 15:57

## 2021-01-27 RX ADMIN — PALONOSETRON 0.25 MG: 0.05 INJECTION, SOLUTION INTRAVENOUS at 12:35

## 2021-01-27 RX ADMIN — Medication 10 ML: at 12:20

## 2021-01-27 RX ADMIN — DOCETAXEL 130 MG: 10 INJECTION, SOLUTION INTRAVENOUS at 13:34

## 2021-01-27 RX ADMIN — SODIUM CHLORIDE 500 ML: 900 INJECTION, SOLUTION INTRAVENOUS at 12:25

## 2021-01-27 RX ADMIN — CYCLOPHOSPHAMIDE 1296 MG: 1 INJECTION, POWDER, FOR SOLUTION INTRAVENOUS; ORAL at 15:06

## 2021-01-27 NOTE — PROGRESS NOTES
[facility] [place] [address] [address], [address], [place]: [phone]  F: [phone]    Reason for Visit:   Tobias Can is a 70 y.o. female who is seen today for follow up of left breast cancer on adjuvant chemotherapy with TC. Treatment History:   Per Surgery Note:  · Initially abnormal screening mammo 7/20 on LEFT with small mass  · 08/04/20: LEFT breast bx. PATH: IDC, 5 mm in greatest linear dimension extending to core biopsy tip, histologic grade 2, ER+(%)/WI+(%)/HER2-. Clinical stage 1  · MammaPrint: High risk, luminal type B, -0.123  · Breast MRI 8/28/20: 12 mm mass otherwise negative. · 11/03/20: LEFT oncoplastic reduction and SLNBx, and RIGHT breast reduction, with Dr. Lizz To. PATH:  · LEFT: IDC, 14 x 13 x 12 mm, overall grade 2, negative margins. DCIS present with negative margins. Pathologic stage: pT1c, pN0.  · RIGHT: Breast tissue with stromal fibrosis and focal papillary apocrine metaplasia. Benign skin and subcutaneous soft tissue. No in-situ or invasive carcinoma identified  · Adjuvant TC chemotherapy 1/6/21 - Current     STAGE: T1cN0 ER+ HEr2 negative mammaprint high risk luminal B    History of Present Illness:   Tobias Can is a 70 y.o. female seen today in office for follow up of left breast cancer ER+ HER2 negative s/p lumpectomy and bilateral breast reduction on 11/3/20. She started adjuvant TC chemotherapy on 1/6/21. She is here today for Cycle 2 of adjuvant TC chemotherapy. She reports that she feels well overall today. She tolerated first chemo well overall with some side effects. She reports that she had some \"hoarseness\" but thinks due to allergies. She started taking Flonase and Allegra. She had mild diarrhea, did not take any anti-diarrheals. Appetite was low for about a week. Energy levels are stable. She denies fever, chills, cough, SOB, CP, nausea, vomiting, and constipation.  CBC and CMP were stable/good yesterday. She is ready for treatment today. She is here alone today. Fam Hx:  · Aunt colon cancer, uncle throat. Past Medical History:   Diagnosis Date    Acute diverticulitis 2017    Allergic rhinitis 2017    Arthritis 2017    Arthritis of right knee 2019    Back pain, thoracic 2017    Cancer ([facility] [address] 2020    LEFT BREAST     Cataract, left 2017    Cataract, right 2017    Chronic obstructive pulmonary disease (HCC)     Cirrhosis ([facility] [address] 2017    Cold sore 2017    Elevated hemoglobin A1c 2017    Fatigue 2017    Gastrointestinal disorder     acid reflux    GERD (gastroesophageal reflux disease) 2017    Heart murmur 2017    Hepatitis C 2017    Herpes zoster infection of thoracic region 2017    Hyperlipidemia LDL goal <100 2017    Hypertension     Menopause     Hysterectomy-42years old    Murmur     Obesity (BMI 30-39. 9) 2017    Osteopenia 2017    Other ill-defined conditions(799.89)     hep c    Post-menopausal 2017    Posterior auricular pain of right ear 2017    Preop examination 2017    Valvular heart disease     mitral valve prolapse    Vertigo, benign positional 2017      Past Surgical History:   Procedure Laterality Date    HX BREAST LUMPECTOMY Bilateral 11/3/2020    LEFT BREAST REDUCTION LUMPECTOMY WITH ULTRASOUND, LEFT BREAST SENTINEL NODE BIOPSY, LEFT BREAST RECONSTRUCTION, RIGHT BREAST REDUCTION performed by Trell Pearson MD at [address] HX COLONOSCOPY      HX HYSTERECTOMY      42years old   [place] [place] HX ORTHOPAEDIC Right     knee, foot      Social History     Tobacco Use    Smoking status: Former Smoker     Packs/day: 0.25     Years: 25.00     Pack years: 6.25     Types: Cigarettes     Quit date: 1990     Years since quittin.0    Smokeless tobacco: Never Used   Substance Use Topics    Alcohol use:  Yes     Alcohol/week: 0.0 standard drinks     Comment: OCCASIONALLY      Family History   Problem Relation Age of Onset    Hypertension Mother     Diabetes Mother     Kidney Disease Mother     Heart Disease Father     Colon Cancer Maternal Grandmother     No Known Problems Sister     Anesth Problems Neg Hx      Current Outpatient Medications   Medication Sig    ondansetron (ZOFRAN ODT) 8 mg disintegrating tablet Take 1 Tab by mouth every eight (8) hours as needed for Nausea.  olmesartan-hydroCHLOROthiazide (BENICAR HCT) 20-12.5 mg per tablet TAKE 1 TABLET BY MOUTH ONCE DAILY    prochlorperazine (Compazine) 5 mg tablet Take 1 tab by mouth every 6 hours as needed for nausea or vomiting    dexAMETHasone (DECADRON) 4 mg tablet Take 2 tabs (8mg) by mouth twice daily the day before chemotherapy and the day after chemotherapy    MILK THISTLE PO Take 175 mg by mouth daily.  OTHER Indications: AM/PM MENOPAUSE FORMULA 1 TAB QHS    furosemide (LASIX) 20 mg tablet TAKE 1 TABLET BY MOUTH ONCE DAILY (Patient taking differently: Take 20 mg by mouth every evening. TAKE 1 TABLET BY MOUTH ONCE DAILY)    valACYclovir (VALTREX) 500 mg tablet Take 1 tablet by mouth once daily (Patient taking differently: as needed.)    TURMERIC ROOT EXTRACT PO Take 200 mg by mouth.  cyanocobalamin (VITAMIN B-12) 1,000 mcg tablet Take 1,000 mcg by mouth daily.  psyllium (METAMUCIL) powd Take  by mouth daily.  magnesium 250 mg tab Take  by mouth.  mometasone (NASONEX) 50 mcg/actuation nasal spray 2 Sprays as needed.  naproxen sodium (ALEVE) 220 mg tablet Take 220 mg by mouth two (2) times daily (with meals).  biotin 5,000 mcg TbDi Take  by mouth.  S-Adenosylmethionine (YRN-E, ENTERIC COATED,) 400 mg TbEC Take  by mouth.  cholecalciferol, VITAMIN D3, (VITAMIN D3) 5,000 unit tab tablet Take  by mouth daily.  Dexlansoprazole (DEXILANT) 60 mg CpDB Take 60 mg by mouth as needed.     omeprazole (PRILOSEC OTC) 20 mg tablet Take 20 mg by mouth as needed.  docosanol (ABREVA) 10 % topical cream Apply  to affected area five (5) times daily.  krill oil 500 mg cap Take  by mouth. No current facility-administered medications for this visit. Facility-Administered Medications Ordered in Other Visits   Medication Dose Route Frequency    0.9% sodium chloride infusion  25 mL/hr IntraVENous CONTINUOUS    sodium chloride 0.9 % bolus infusion 500 mL  500 mL IntraVENous ONCE    palonosetron HCl (ALOXI) injection 0.25 mg  0.25 mg IntraVENous ONCE    DOCEtaxeL (TAXOTERE) 130 mg in 0.9% sodium chloride 250 mL, overfill volume 25 mL chemo infusion  60 mg/m2 (Treatment Plan Recorded) IntraVENous ONCE    cyclophosphamide (CYTOXAN) 1,296 mg in 0.9% sodium chloride 250 mL, overfill volume 25 mL chemo infusion  600 mg/m2 (Treatment Plan Recorded) IntraVENous ONCE    pegfilgrastim (NEULASTA) wearable SQ injector 6 mg  6 mg SubCUTAneous ONCE    sodium chloride (NS) flush 10 mL  10 mL IntraVENous PRN    0.9% sodium chloride injection 10 mL  10 mL IntraVENous PRN    heparin (porcine) pf 300-500 Units  300-500 Units InterCATHeter PRN    dexamethasone (PF) (DECADRON) 10 mg/mL injection 10 mg  10 mg IntraVENous ONCE      No Known Allergies     A complete review of systems was obtained, negative except as described above and as reported on ROS sheet scanned into system. Physical Exam:     Visit Vitals  /71 (BP 1 Location: Left arm, BP Patient Position: Sitting)   Pulse (!) 111   Temp (!) 96.6 °F (35.9 °C) (Temporal)   Wt 207 lb 3.2 oz (94 kg)   SpO2 98%   BMI 31.50 kg/m²     ECOG PS: 1  General: No distress  Eyes:  Anicteric sclerae  HENT: Atraumatic, wearing a mask  Neck: Supple  Respiratory: Normal respiratory effort  MS: Normal gait and station. Digits without clubbing or cyanosis. Skin: No rashes, ecchymoses, or petechiae. Normal temperature, turgor, and texture.   Psych: Alert, oriented, appropriate affect, normal judgment/insight  Breast: Well healing bilateral breast reduction scars and left lumpectomy    Results:     Lab Results   Component Value Date/Time    WBC 4.4 01/26/2021 02:40 PM    HGB 11.0 (L) 01/26/2021 02:40 PM    HCT 33.3 (L) 01/26/2021 02:40 PM    PLATELET 708 53/07/1982 02:40 PM    MCV 86.3 01/26/2021 02:40 PM    ABS. NEUTROPHILS 2.0 01/26/2021 02:40 PM    HGB (POC) 12.6 09/11/2017 11:03 AM    HCT (POC) 38.2 09/11/2017 11:03 AM     Lab Results   Component Value Date/Time    Sodium 138 01/26/2021 02:40 PM    Potassium 3.8 01/26/2021 02:40 PM    Chloride 105 01/26/2021 02:40 PM    CHLORIDE 104.0 03/28/2018 10:08 AM    CO2 26 01/26/2021 02:40 PM    Glucose 99 01/26/2021 02:40 PM    BUN 20 01/26/2021 02:40 PM    Creatinine 1.08 (H) 01/26/2021 02:40 PM    GFR est AA >60 01/26/2021 02:40 PM    GFR est non-AA 50 (L) 01/26/2021 02:40 PM    Calcium 9.4 01/26/2021 02:40 PM    Sodium (POC) 146.0 (A) 03/28/2018 10:08 AM    Potassium (POC) 4.6 03/28/2018 10:08 AM    Creatinine (POC) 0.7 03/28/2018 10:08 AM     Lab Results   Component Value Date/Time    Bilirubin, total 0.3 01/26/2021 02:40 PM    ALT (SGPT) 22 01/26/2021 02:40 PM    Alk. phosphatase 50 01/26/2021 02:40 PM    Protein, total 8.5 (H) 01/26/2021 02:40 PM    Albumin 3.5 01/26/2021 02:40 PM    Globulin 5.0 (H) 01/26/2021 02:40 PM     Records reviewed and summarized above. Pathology report(s) reviewed above. Radiology report(s) reviewed above. Assessment/PLAN:     1) Stage 1 T1cN0 ER+ HER2 negative mammaprint high risk luminal B post lumpectomy/bilateral reduction on 11/3/20  She has seen Rad/Onc. Discussed adjuvant hormonal therapy with Anastrozole after chemo/XRT. She started adjuvant TC chemotherapy on 1/6/21. She is here today for Cycle 2 of adjuvant TC chemotherapy. She tolerated first chemo well overall with mild side effects. She is doing chemo peripherally - no issues thus far. She is clinically stable today, feels well overall.    CBC and CMP were stable/good yesterday. She is ready for chemo today. Follow up in 3 weeks with Cycle 3. Patient agrees with plan. 2) Chemo Induced Diarrhea  Very mild, Grade 1. Encouraged to take Imodium as needed. 3) Hx of Hep C  Cured per patient. Management per [facility]. 4) Heart Valve Problems/HTN  Management per Cardio. 5) COPD/GERD   Management per PCP. 6) Psychosocial  Mood good, coping well. She lives alone and is here alone today. She continues to work during [employer] from home. SW support as needed. Call if questions. Follow up in 3 weeks with Cycle 3. This patient was seen in conjunction with Annette Willis NP. I personally reviewed the history and all points in the assessment and plan with the patient, and performed the key points on the exam.   I appreciate the opportunity to participate in Ms. Danielle Jones's care.     Signed By: Raissa Lee DO

## 2021-01-27 NOTE — PROGRESS NOTES
[facility] Chemotherapy Progress Note    Date: 2021    Name: Raul Lema    MRN: [id number]         : 1945      1215 Pt admit to [facility] for C2 TC ambulatory in stable condition. Assessment completed. No new concerns voiced. PIV with positive blood return. Labs drawn yesterday, within treatment parameters. Patient denies SOB, fever, cough, general not feeling well. Patient denies recent exposure to someone who has tested positive for COVID-19. Patient denies having contact with anyone who has a pending COVID test.    Patient stated had COVID in 2020 and tested positive for antibodies and she takes a COVID test monthly for her own peace of mind that patient stated. Chemotherapy Flowsheet 2021   Cycle C2   Date 2021   Drug / Regimen TC   Pre Meds given   Notes given         Ms. Jones's vitals were reviewed. Patient Vitals for the past 12 hrs:   Pulse BP   21 1548 65 128/72           Pre-medications  were administered as ordered and chemotherapy was initiated.   Medications Administered     cyclophosphamide (CYTOXAN) 1,296 mg in 0.9% sodium chloride 250 mL, overfill volume 25 mL chemo infusion     Admin Date  2021 Action  New Bag Dose  1,296 mg Rate  679.6 mL/hr Route  IntraVENous Administered By  Josh Mathews RN          dexamethasone (PF) (DECADRON) 10 mg/mL injection 10 mg     Admin Date  2021 Action  Given Dose  10 mg Route  IntraVENous Administered By  Josh Mathews RN          DOCEtaxeL (TAXOTERE) 130 mg in 0.9% sodium chloride 250 mL, overfill volume 25 mL chemo infusion     Admin Date  2021 Action  New Bag Dose  130 mg Rate  288 mL/hr Route  IntraVENous Administered By  Josh Mathews RN          palonosetron HCl (ALOXI) injection 0.25 mg     Admin Date  2021 Action  Given Dose  0.25 mg Route  IntraVENous Administered By  Josh Mathews RN          pegfilgrastim (NEULASTA) wearable SQ injector 6 mg     Admin Date  2021 Action  Given Dose  6 mg Route  SubCUTAneous Administered By  Kelly Sharp RN          sodium chloride (NS) flush 10 mL     Admin Date  01/27/2021 Action  Given Dose  10 mL Route  IntraVENous Administered By  Kelly Sharp RN          sodium chloride 0.9 % bolus infusion 500 mL     Admin Date  01/27/2021 Action  New Bag Dose  500 mL Route  IntraVENous Administered By  Kelly Sharp RN                     1600 Pt tolerated treatment well. PIV maintained positive blood return throughout treatment. Flushed and de-accessed per protocol. D/c home ambulatory in no distress.    Future Appointments   Date Time Provider Bhavesh Cross   2/16/2021  2:30 PM H3 [facility]   2/17/2021 11:00 AM C1 [facility] MED [address] [place] [facility]   2/17/2021 11:15 AM Kevin Jamison  [address] BS AMB   3/9/2021  2:30 PM H3 [facility]   3/10/2021 11:00 AM C1 PER[facility] MED [address] [place] H   3/16/2021  9:40 AM MD GONZALES Salomon BS AMB   6/16/2021 10:15 AM MD FLORENCIA Broderick BS AMB   6/29/2021  1:45 PM Lynne Zazueta NP [facility] BS AMB         Daisha Pedro RN  January 27, 2021

## 2021-01-28 ENCOUNTER — APPOINTMENT (OUTPATIENT)
Dept: INFUSION THERAPY | Age: 76
End: 2021-01-28

## 2021-02-16 ENCOUNTER — HOSPITAL ENCOUNTER (OUTPATIENT)
Dept: INFUSION THERAPY | Age: 76
Discharge: HOME | End: 2021-02-16
Payer: COMMERCIAL

## 2021-02-16 VITALS
HEART RATE: 79 BPM | DIASTOLIC BLOOD PRESSURE: 69 MMHG | SYSTOLIC BLOOD PRESSURE: 109 MMHG | RESPIRATION RATE: 16 BRPM | TEMPERATURE: 97.1 F

## 2021-02-16 LAB
BASOPHILS # BLD: 0.1 K/UL (ref 0–0.1)
BASOPHILS NFR BLD: 2 % (ref 0–1)
COMMENT, HOLDF: NORMAL
DIFFERENTIAL METHOD BLD: ABNORMAL
EOSINOPHIL # BLD: 0 K/UL (ref 0–0.4)
EOSINOPHIL NFR BLD: 1 % (ref 0–7)
ERYTHROCYTE [DISTWIDTH] IN BLOOD BY AUTOMATED COUNT: 15 % (ref 11.5–14.5)
HCT VFR BLD AUTO: 34.3 % (ref 35–47)
HGB BLD-MCNC: 11.2 G/DL (ref 11.5–16)
IMM GRANULOCYTES # BLD AUTO: 0 K/UL (ref 0–0.04)
IMM GRANULOCYTES NFR BLD AUTO: 1 % (ref 0–0.5)
LYMPHOCYTES # BLD: 1.3 K/UL (ref 0.8–3.5)
LYMPHOCYTES NFR BLD: 31 % (ref 12–49)
MCH RBC QN AUTO: 28.6 PG (ref 26–34)
MCHC RBC AUTO-ENTMCNC: 32.7 G/DL (ref 30–36.5)
MCV RBC AUTO: 87.7 FL (ref 80–99)
MONOCYTES # BLD: 0.6 K/UL (ref 0–1)
MONOCYTES NFR BLD: 14 % (ref 5–13)
NEUTS SEG # BLD: 2.2 K/UL (ref 1.8–8)
NEUTS SEG NFR BLD: 51 % (ref 32–75)
NRBC # BLD: 0 K/UL (ref 0–0.01)
NRBC BLD-RTO: 0 PER 100 WBC
PLATELET # BLD AUTO: 209 K/UL (ref 150–400)
PMV BLD AUTO: 11.3 FL (ref 8.9–12.9)
RBC # BLD AUTO: 3.91 M/UL (ref 3.8–5.2)
SAMPLES BEING HELD,HOLD: NORMAL
WBC # BLD AUTO: 4.3 K/UL (ref 3.6–11)

## 2021-02-16 PROCEDURE — 80053 COMPREHEN METABOLIC PANEL: CPT

## 2021-02-16 PROCEDURE — 85025 COMPLETE CBC W/AUTO DIFF WBC: CPT

## 2021-02-16 NOTE — PROGRESS NOTES
[facility] Chemotherapy Progress Note    4731 Pt admit to [facility] for pre-chemo lab draw ambulatory in stable condition. Assessment completed. No new concerns voiced. Labs drawn peripherally and sent for processing. Patient denies SOB, fever, cough, general not feeling well. Patient denies recent exposure to someone who has tested positive for COVID-19. Patient  denies having contact with anyone who has a pending COVID test.     Visit Vitals  /69   Pulse 79   Temp 97.1 °F (36.2 °C)   Resp 16       1445  D/c home ambulatory in no distress. Pt aware of next [facility] appointment scheduled for 02/17/2021.     Please review labs in CC once available

## 2021-02-17 ENCOUNTER — HOSPITAL ENCOUNTER (OUTPATIENT)
Dept: INFUSION THERAPY | Age: 76
Discharge: HOME | End: 2021-02-17
Payer: COMMERCIAL

## 2021-02-17 ENCOUNTER — OFFICE VISIT (OUTPATIENT)
Dept: ONCOLOGY | Age: 76
End: 2021-02-17
Payer: COMMERCIAL

## 2021-02-17 VITALS
HEART RATE: 88 BPM | SYSTOLIC BLOOD PRESSURE: 162 MMHG | BODY MASS INDEX: 31.64 KG/M2 | OXYGEN SATURATION: 98 % | DIASTOLIC BLOOD PRESSURE: 61 MMHG | WEIGHT: 208.8 LBS | HEIGHT: 68 IN | TEMPERATURE: 95.1 F

## 2021-02-17 VITALS
RESPIRATION RATE: 18 BRPM | SYSTOLIC BLOOD PRESSURE: 147 MMHG | WEIGHT: 208 LBS | HEIGHT: 68 IN | DIASTOLIC BLOOD PRESSURE: 67 MMHG | HEART RATE: 58 BPM | TEMPERATURE: 96.4 F | BODY MASS INDEX: 31.52 KG/M2

## 2021-02-17 DIAGNOSIS — J44.9 CHRONIC OBSTRUCTIVE PULMONARY DISEASE, UNSPECIFIED COPD TYPE (HCC): ICD-10-CM

## 2021-02-17 DIAGNOSIS — I10 ESSENTIAL HYPERTENSION: ICD-10-CM

## 2021-02-17 DIAGNOSIS — C50.912 INVASIVE DUCTAL CARCINOMA OF LEFT BREAST (HCC): Primary | ICD-10-CM

## 2021-02-17 DIAGNOSIS — Z98.890 HISTORY OF LUMPECTOMY: ICD-10-CM

## 2021-02-17 DIAGNOSIS — Z09 CHEMOTHERAPY FOLLOW-UP EXAMINATION: ICD-10-CM

## 2021-02-17 DIAGNOSIS — Z98.890 H/O BILATERAL BREAST REDUCTION SURGERY: ICD-10-CM

## 2021-02-17 DIAGNOSIS — Z86.19 HISTORY OF HEPATITIS C: ICD-10-CM

## 2021-02-17 DIAGNOSIS — K52.1 CHEMOTHERAPY INDUCED DIARRHEA: ICD-10-CM

## 2021-02-17 DIAGNOSIS — T45.1X5A CHEMOTHERAPY INDUCED DIARRHEA: ICD-10-CM

## 2021-02-17 LAB
ALBUMIN SERPL-MCNC: 3.8 G/DL (ref 3.5–5)
ALBUMIN/GLOB SERPL: 1 {RATIO} (ref 1.1–2.2)
ALP SERPL-CCNC: 54 U/L (ref 45–117)
ALT SERPL-CCNC: 24 U/L (ref 12–78)
ANION GAP SERPL CALC-SCNC: 5 MMOL/L (ref 5–15)
AST SERPL-CCNC: 17 U/L (ref 15–37)
BILIRUB SERPL-MCNC: 0.5 MG/DL (ref 0.2–1)
BUN SERPL-MCNC: 17 MG/DL (ref 6–20)
BUN/CREAT SERPL: 21 (ref 12–20)
CALCIUM SERPL-MCNC: 9.1 MG/DL (ref 8.5–10.1)
CHLORIDE SERPL-SCNC: 103 MMOL/L (ref 97–108)
CO2 SERPL-SCNC: 30 MMOL/L (ref 21–32)
CREAT SERPL-MCNC: 0.81 MG/DL (ref 0.55–1.02)
GLOBULIN SER CALC-MCNC: 4 G/DL (ref 2–4)
GLUCOSE SERPL-MCNC: 90 MG/DL (ref 65–100)
POTASSIUM SERPL-SCNC: 3.8 MMOL/L (ref 3.5–5.1)
PROT SERPL-MCNC: 7.8 G/DL (ref 6.4–8.2)
SODIUM SERPL-SCNC: 138 MMOL/L (ref 136–145)

## 2021-02-17 PROCEDURE — 96375 TX/PRO/DX INJ NEW DRUG ADDON: CPT

## 2021-02-17 PROCEDURE — 74011250636 HC RX REV CODE- 250/636: Performed by: NURSE PRACTITIONER

## 2021-02-17 PROCEDURE — 96417 CHEMO IV INFUS EACH ADDL SEQ: CPT

## 2021-02-17 PROCEDURE — 99214 OFFICE O/P EST MOD 30 MIN: CPT | Performed by: INTERNAL MEDICINE

## 2021-02-17 PROCEDURE — 96377 APPLICATON ON-BODY INJECTOR: CPT

## 2021-02-17 PROCEDURE — 96361 HYDRATE IV INFUSION ADD-ON: CPT

## 2021-02-17 PROCEDURE — 96413 CHEMO IV INFUSION 1 HR: CPT

## 2021-02-17 RX ORDER — SODIUM CHLORIDE 9 MG/ML
25 INJECTION, SOLUTION INTRAVENOUS CONTINUOUS
Status: DISCONTINUED | OUTPATIENT
Start: 2021-02-17 | End: 2021-02-18 | Stop reason: HOSPADM

## 2021-02-17 RX ORDER — DEXAMETHASONE SODIUM PHOSPHATE 4 MG/ML
10 INJECTION, SOLUTION INTRA-ARTICULAR; INTRALESIONAL; INTRAMUSCULAR; INTRAVENOUS; SOFT TISSUE ONCE
Status: COMPLETED | OUTPATIENT
Start: 2021-02-17 | End: 2021-02-17

## 2021-02-17 RX ORDER — PALONOSETRON 0.05 MG/ML
0.25 INJECTION, SOLUTION INTRAVENOUS ONCE
Status: COMPLETED | OUTPATIENT
Start: 2021-02-17 | End: 2021-02-17

## 2021-02-17 RX ADMIN — PEGFILGRASTIM 6 MG: KIT SUBCUTANEOUS at 16:40

## 2021-02-17 RX ADMIN — DEXAMETHASONE SODIUM PHOSPHATE 10 MG: 4 INJECTION, SOLUTION INTRA-ARTICULAR; INTRALESIONAL; INTRAMUSCULAR; INTRAVENOUS; SOFT TISSUE at 14:17

## 2021-02-17 RX ADMIN — PALONOSETRON 0.25 MG: 0.05 INJECTION, SOLUTION INTRAVENOUS at 14:23

## 2021-02-17 RX ADMIN — DOCETAXEL ANHYDROUS 130 MG: 10 INJECTION, SOLUTION INTRAVENOUS at 14:55

## 2021-02-17 RX ADMIN — SODIUM CHLORIDE 1296 MG: 900 INJECTION, SOLUTION INTRAVENOUS at 16:03

## 2021-02-17 RX ADMIN — SODIUM CHLORIDE 500 ML: 900 INJECTION, SOLUTION INTRAVENOUS at 13:30

## 2021-02-17 NOTE — PROGRESS NOTES
[facility] Chemo Progress Note    Date: 2021    Name: Memo Shelton    MRN: [id number]         : 1945    1200 Ms. Fabien Casey Arrived to [facility] for  C3 TC ambulatory in stable condition. Assessment was completed, no acute issues at this time, no new complaints voiced. Peripheral IV  accessed with positive blood return. Normal Saline started at [address] 2021   Cycle C3   Date 2021   Drug / Regimen TC   Pre Meds -   Notes given         Patient denies SOB, fever, cough, general not feeling well. Patient denies recent exposure to someone who has tested positive for COVID-19. Patient denies having contact with anyone who has a pending COVID test.      Ms. Jones's vitals were reviewed. Patient Vitals for the past 12 hrs:   Temp Pulse Resp BP   21 1641  (!) 58 18 (!) 147/67   21 1218 (!) 96.4 °F (35.8 °C)  18 133/65         Lab results were obtained and reviewed. No results found for this or any previous visit (from the past 12 hour(s)). Pre-medications  were administered as ordered and chemotherapy was initiated.   Medications Administered     cyclophosphamide (CYTOXAN) 1,296 mg in 0.9% sodium chloride 250 mL, overfill volume 25 mL chemo infusion     Admin Date  2021 Action  New Bag Dose  1,296 mg Rate  679.6 mL/hr Route  IntraVENous Administered By  Muriel Hernandez RN          dexamethasone (DECADRON) 4 mg/mL injection 10 mg     Admin Date  2021 Action  Given Dose  10 mg Route  IntraVENous Administered By  Muriel Hernandez RN          DOCEtaxeL (TAXOTERE) 130 mg in 0.9% sodium chloride 250 mL, overfill volume 25 mL chemo infusion     Admin Date  2021 Action  New Bag Dose  130 mg Rate  288 mL/hr Route  IntraVENous Administered By  Muriel Hernandez RN          palonosetron HCl (ALOXI) injection 0.25 mg     Admin Date  2021 Action  Given Dose  0.25 mg Route  IntraVENous Administered By  Muriel Hernandez RN          pegfilgrastim (Josephus Plough) wearable SQ injector 6 mg     Admin Date  02/17/2021 Action  Given Dose  6 mg Route  SubCUTAneous Administered By  Prem Coffey, [place]          sodium chloride 0.9 % bolus infusion 500 mL     Admin Date  02/17/2021 Action  New Bag Dose  500 mL Route  IntraVENous Administered By  Moris Maurice, [facility] [address] Patient tolerated treatment well. Peripheral IV maintained positive blood return throughout treatment. Peripheral IV removed per protocol Patient was discharged from [place] [address].     Future Appointments   Date Time Provider Bhavesh Cross   3/9/2021  2:30 [facility]   3/10/2021 11:00 AM C1 PER[facility] MED [address] [place] [address]   3/16/2021  9:40 AM MD GONZALES Leach BS AMB   3/30/2021  4:00 PM [facility]   3/31/2021 11:00 AM C1 [facility] MED [place] [facility]   4/20/2021  4:00 PM [facility]   4/21/2021 11:00 AM C1 PERLA MED [address] [place] [address]   6/16/2021 10:15 AM Alfa Cavazos MD [facility] BS AMB   6/29/2021  1:45 PM Марина Le NP [facility] BS AMB         Haily Parrish, [place]  February 17, 2021

## 2021-02-17 NOTE — PROGRESS NOTES
Monisha Nguyen is a 70 y.o. female  Chief Complaint   Patient presents with    Chemotherapy    Breast Cancer     1. Have you been to the ER, urgent care clinic since your last visit? Hospitalized since your last visit? No.    2. Have you seen or consulted any other health care providers outside of the [address] [place] [address] since your last visit? Include any pap smears or colon screening.  No.

## 2021-02-17 NOTE — PROGRESS NOTES
[facility] [place] at [address], [id number] [place] [address] Road, [place]: [phone]  F: [phone]    Reason for Visit:   Payton Abarca is a 70 y.o. female who is seen today for follow up of left breast cancer on adjuvant chemotherapy with TC. Treatment History:   Per Surgery Note:  · Initially abnormal screening mammo 7/20 on LEFT with small mass  · 08/04/20: LEFT breast bx. PATH: IDC, 5 mm in greatest linear dimension extending to core biopsy tip, histologic grade 2, ER+(%)/ME+(%)/HER2-. Clinical stage 1  · MammaPrint: High risk, luminal type B, -0.123  · Breast MRI 8/28/20: 12 mm mass otherwise negative. · 11/03/20: LEFT oncoplastic reduction and SLNBx, and RIGHT breast reduction, with Dr. Sera Villa. PATH:  · LEFT: IDC, 14 x 13 x 12 mm, overall grade 2, negative margins. DCIS present with negative margins. Pathologic stage: pT1c, pN0.  · RIGHT: Breast tissue with stromal fibrosis and focal papillary apocrine metaplasia. Benign skin and subcutaneous soft tissue. No in-situ or invasive carcinoma identified  · Adjuvant TC chemotherapy 1/6/21 - Current     STAGE: T1cN0 ER+ HEr2 negative mammaprint high risk luminal B    History of Present Illness:   Payton Abarca is a 70 y.o. female seen today in office for follow up of left breast cancer ER+ HER2 negative s/p lumpectomy and bilateral breast reduction on 11/3/20 started adjuvant TC chemotherapy on 1/6/21. Here for cycle 3. Tolerating chemo well. Had some fatigue/ HAs/ less diarrhea. Eating ok. Needs to drink more. CBC yesterday good. Pt feels ready for chemo today. No fevers/ chills/ chest pain/ SOB/ nausea/ vomiting/ pain        Last visit: She is here today for Cycle 2 of adjuvant TC chemotherapy. She reports that she feels well overall today. She tolerated first chemo well overall with some side effects. She reports that she had some \"hoarseness\" but thinks due to allergies.  She started taking Flonase and Allegra. She had mild diarrhea, did not take any anti-diarrheals. Appetite was low for about a week. Energy levels are stable. She denies fever, chills, cough, SOB, CP, nausea, vomiting, and constipation. CBC and CMP were stable/good yesterday. She is ready for treatment today. She is here alone today. Fam Hx:  · Aunt colon cancer, uncle throat. Past Medical History:   Diagnosis Date    Acute diverticulitis 8/18/2017    Allergic rhinitis 8/18/2017    Arthritis 8/18/2017    Arthritis of right knee 11/25/2019    Back pain, thoracic 8/18/2017    Cancer ([facility] [address] 08/2020    LEFT BREAST     Cataract, left 8/18/2017    Cataract, right 8/18/2017    Chronic obstructive pulmonary disease (HCC)     Cirrhosis ([facility] [address] 8/18/2017    Cold sore 8/18/2017    Elevated hemoglobin A1c 8/18/2017    Fatigue 8/18/2017    Gastrointestinal disorder     acid reflux    GERD (gastroesophageal reflux disease) 8/18/2017    Heart murmur 8/18/2017    Hepatitis C 8/18/2017    Herpes zoster infection of thoracic region 8/18/2017    Hyperlipidemia LDL goal <100 8/18/2017    Hypertension     Menopause     Hysterectomy-42years old    Murmur     Obesity (BMI 30-39. 9) 8/18/2017    Osteopenia 8/18/2017    Other ill-defined conditions(799.89)     hep c    Post-menopausal 8/18/2017    Posterior auricular pain of right ear 8/18/2017    Preop examination 8/18/2017    Valvular heart disease     mitral valve prolapse    Vertigo, benign positional 8/18/2017      Past Surgical History:   Procedure Laterality Date    HX BREAST LUMPECTOMY Bilateral 11/3/2020    LEFT BREAST REDUCTION LUMPECTOMY WITH ULTRASOUND, LEFT BREAST SENTINEL NODE BIOPSY, LEFT BREAST RECONSTRUCTION, RIGHT BREAST REDUCTION performed by Shaka Logan MD at [address] HX COLONOSCOPY      HX HYSTERECTOMY      42years old   [facility] HX ORTHOPAEDIC Right     knee, foot      Social History     Tobacco Use    Smoking status: Former Smoker Packs/day: 0.25     Years: 25.00     Pack years: 6.25     Types: Cigarettes     Quit date: 1990     Years since quittin.1    Smokeless tobacco: Never Used   Substance Use Topics    Alcohol use: Yes     Alcohol/week: 0.0 standard drinks     Comment: OCCASIONALLY      Family History   Problem Relation Age of Onset    Hypertension Mother     Diabetes Mother     Kidney Disease Mother     Heart Disease Father     Colon Cancer Maternal Grandmother     No Known Problems Sister     Anesth Problems Neg Hx      Current Outpatient Medications   Medication Sig    ondansetron (ZOFRAN ODT) 8 mg disintegrating tablet Take 1 Tab by mouth every eight (8) hours as needed for Nausea.  olmesartan-hydroCHLOROthiazide (BENICAR HCT) 20-12.5 mg per tablet TAKE 1 TABLET BY MOUTH ONCE DAILY    prochlorperazine (Compazine) 5 mg tablet Take 1 tab by mouth every 6 hours as needed for nausea or vomiting    dexAMETHasone (DECADRON) 4 mg tablet Take 2 tabs (8mg) by mouth twice daily the day before chemotherapy and the day after chemotherapy    MILK THISTLE PO Take 175 mg by mouth daily.  OTHER Indications: AM/PM MENOPAUSE FORMULA 1 TAB QHS    furosemide (LASIX) 20 mg tablet TAKE 1 TABLET BY MOUTH ONCE DAILY (Patient taking differently: Take 20 mg by mouth every evening. TAKE 1 TABLET BY MOUTH ONCE DAILY)    valACYclovir (VALTREX) 500 mg tablet Take 1 tablet by mouth once daily (Patient taking differently: as needed.)    TURMERIC ROOT EXTRACT PO Take 200 mg by mouth.  cyanocobalamin (VITAMIN B-12) 1,000 mcg tablet Take 1,000 mcg by mouth daily.  psyllium (METAMUCIL) powd Take  by mouth daily.  magnesium 250 mg tab Take  by mouth.  mometasone (NASONEX) 50 mcg/actuation nasal spray 2 Sprays as needed.  naproxen sodium (ALEVE) 220 mg tablet Take 220 mg by mouth two (2) times daily (with meals).  biotin 5,000 mcg TbDi Take  by mouth.     S-Adenosylmethionine (YRN-E, ENTERIC COATED,) 400 mg TbEC Take by mouth.  cholecalciferol, VITAMIN D3, (VITAMIN D3) 5,000 unit tab tablet Take  by mouth daily.  Dexlansoprazole (DEXILANT) 60 mg CpDB Take 60 mg by mouth as needed.  omeprazole (PRILOSEC OTC) 20 mg tablet Take 20 mg by mouth as needed.  docosanol (ABREVA) 10 % topical cream Apply  to affected area five (5) times daily.  krill oil 500 mg cap Take  by mouth. No current facility-administered medications for this visit. No Known Allergies     A complete review of systems was obtained, negative except as described above and as reported on ROS sheet scanned into system. Physical Exam:     Visit Vitals  BP (!) 162/61 (BP 1 Location: Left upper arm, BP Patient Position: Sitting)   Pulse 88   Temp (!) 95.1 °F (35.1 °C) (Temporal)   Ht 5' 8\" (1.727 m)   Wt 208 lb 12.8 oz (94.7 kg)   SpO2 98%   BMI 31.75 kg/m²     ECOG PS: 1  General: No distress  Eyes:  Anicteric sclerae  HENT: Atraumatic, wearing a mask  Neck: Supple  Respiratory: Normal respiratory effort, lungs clear  CV regular rate and rhythm  GI soft NT  Ext no edema  MS: Normal gait and station. Digits without clubbing or cyanosis. Skin: No rashes, ecchymoses, or petechiae. Normal temperature, turgor, and texture. Psych: Alert, oriented, appropriate affect, normal judgment/insight    Results:     Lab Results   Component Value Date/Time    WBC 4.3 02/16/2021 06:43 PM    HGB 11.2 (L) 02/16/2021 06:43 PM    HCT 34.3 (L) 02/16/2021 06:43 PM    PLATELET 742 25/91/8915 06:43 PM    MCV 87.7 02/16/2021 06:43 PM    ABS.  NEUTROPHILS 2.2 02/16/2021 06:43 PM    HGB (POC) 12.6 09/11/2017 11:03 AM    HCT (POC) 38.2 09/11/2017 11:03 AM     Lab Results   Component Value Date/Time    Sodium 138 01/26/2021 02:40 PM    Potassium 3.8 01/26/2021 02:40 PM    Chloride 105 01/26/2021 02:40 PM    CHLORIDE 104.0 03/28/2018 10:08 AM    CO2 26 01/26/2021 02:40 PM    Glucose 99 01/26/2021 02:40 PM    BUN 20 01/26/2021 02:40 PM    Creatinine 1.08 (H) 01/26/2021 02:40 PM    GFR est AA >60 01/26/2021 02:40 PM    GFR est non-AA 50 (L) 01/26/2021 02:40 PM    Calcium 9.4 01/26/2021 02:40 PM    Sodium (POC) 146.0 (A) 03/28/2018 10:08 AM    Potassium (POC) 4.6 03/28/2018 10:08 AM    Creatinine (POC) 0.7 03/28/2018 10:08 AM     Lab Results   Component Value Date/Time    Bilirubin, total 0.3 01/26/2021 02:40 PM    ALT (SGPT) 22 01/26/2021 02:40 PM    Alk. phosphatase 50 01/26/2021 02:40 PM    Protein, total 8.5 (H) 01/26/2021 02:40 PM    Albumin 3.5 01/26/2021 02:40 PM    Globulin 5.0 (H) 01/26/2021 02:40 PM     Records reviewed and summarized above. Pathology report(s) reviewed above. Radiology report(s) reviewed above. Assessment/PLAN:     1) Stage 1 T1cN0 ER+ HER2 negative mammaprint high risk luminal B post lumpectomy/bilateral reduction on 11/3/20  She started adjuvant TC chemotherapy on 1/6/21.    here today for Cycle 3 of adjuvant TC chemotherapy. Tolerating chemo well with mild side effects. reviewed drug side effect management with prn meds today. She is doing chemo peripherally - no issues thus far. She is clinically stable today, feels well overall. CBC and CMP were stable/good yesterday. She is ready for chemo today. Follow up in 3 weeks with Cycle 3. Patient agrees with plan. 2) high risk drugs requiring intensive monitoring for toxicity. Reviewed chemo and dosing today. No dose changes required. Continue with cycle 3 of 4.     3) Hx of Hep C  LFTs being monitored. Normal.   Cured per patient. Management per [facility]. 4) Heart Valve Problems/HTN. Management per Cardio. 5) COPD/GERD   Management per PCP. 6) Psychosocial  Mood good, coping well. She lives alone and is here alone today. She continues to work during [employer] from home. SW support as needed. Call if questions. Follow up in 3 weeks with Cycle 4. Time spent today in reviewing history and CC records specifically.    Time spent in independently interpreting results and communicating to patient today. Time spent in counseling and education of patient regarding chemo continuation and high risk drug toxicity management. Time spent in care coordination with primary care doctor. Time 30 min    I appreciate the opportunity to participate in Ms. Danielle Jones's care.     Signed By: Claudetta Shaw, DO

## 2021-02-18 ENCOUNTER — APPOINTMENT (OUTPATIENT)
Dept: INFUSION THERAPY | Age: 76
End: 2021-02-18

## 2021-03-09 ENCOUNTER — HOSPITAL ENCOUNTER (OUTPATIENT)
Dept: INFUSION THERAPY | Age: 76
Discharge: HOME | End: 2021-03-09
Payer: COMMERCIAL

## 2021-03-09 VITALS
HEART RATE: 89 BPM | RESPIRATION RATE: 16 BRPM | DIASTOLIC BLOOD PRESSURE: 63 MMHG | TEMPERATURE: 97.3 F | SYSTOLIC BLOOD PRESSURE: 96 MMHG

## 2021-03-09 LAB
ALBUMIN SERPL-MCNC: 3.6 G/DL (ref 3.5–5)
ALBUMIN/GLOB SERPL: 0.8 {RATIO} (ref 1.1–2.2)
ALP SERPL-CCNC: 50 U/L (ref 45–117)
ALT SERPL-CCNC: 24 U/L (ref 12–78)
ANION GAP SERPL CALC-SCNC: 5 MMOL/L (ref 5–15)
AST SERPL-CCNC: 19 U/L (ref 15–37)
BASOPHILS # BLD: 0.1 K/UL (ref 0–0.1)
BASOPHILS NFR BLD: 2 % (ref 0–1)
BILIRUB SERPL-MCNC: 0.4 MG/DL (ref 0.2–1)
BUN SERPL-MCNC: 18 MG/DL (ref 6–20)
BUN/CREAT SERPL: 20 (ref 12–20)
CALCIUM SERPL-MCNC: 9.1 MG/DL (ref 8.5–10.1)
CHLORIDE SERPL-SCNC: 107 MMOL/L (ref 97–108)
CO2 SERPL-SCNC: 26 MMOL/L (ref 21–32)
CREAT SERPL-MCNC: 0.91 MG/DL (ref 0.55–1.02)
DIFFERENTIAL METHOD BLD: ABNORMAL
EOSINOPHIL # BLD: 0 K/UL (ref 0–0.4)
EOSINOPHIL NFR BLD: 0 % (ref 0–7)
ERYTHROCYTE [DISTWIDTH] IN BLOOD BY AUTOMATED COUNT: 16.6 % (ref 11.5–14.5)
GLOBULIN SER CALC-MCNC: 4.3 G/DL (ref 2–4)
GLUCOSE SERPL-MCNC: 107 MG/DL (ref 65–100)
HCT VFR BLD AUTO: 32.4 % (ref 35–47)
HGB BLD-MCNC: 10.5 G/DL (ref 11.5–16)
IMM GRANULOCYTES # BLD AUTO: 0 K/UL (ref 0–0.04)
IMM GRANULOCYTES NFR BLD AUTO: 0 % (ref 0–0.5)
LYMPHOCYTES # BLD: 1.2 K/UL (ref 0.8–3.5)
LYMPHOCYTES NFR BLD: 25 % (ref 12–49)
MCH RBC QN AUTO: 28.5 PG (ref 26–34)
MCHC RBC AUTO-ENTMCNC: 32.4 G/DL (ref 30–36.5)
MCV RBC AUTO: 87.8 FL (ref 80–99)
MONOCYTES # BLD: 0.7 K/UL (ref 0–1)
MONOCYTES NFR BLD: 15 % (ref 5–13)
NEUTS SEG # BLD: 2.8 K/UL (ref 1.8–8)
NEUTS SEG NFR BLD: 58 % (ref 32–75)
NRBC # BLD: 0 K/UL (ref 0–0.01)
NRBC BLD-RTO: 0 PER 100 WBC
PLATELET # BLD AUTO: 219 K/UL (ref 150–400)
PMV BLD AUTO: 12 FL (ref 8.9–12.9)
POTASSIUM SERPL-SCNC: 3.3 MMOL/L (ref 3.5–5.1)
PROT SERPL-MCNC: 7.9 G/DL (ref 6.4–8.2)
RBC # BLD AUTO: 3.69 M/UL (ref 3.8–5.2)
SODIUM SERPL-SCNC: 138 MMOL/L (ref 136–145)
WBC # BLD AUTO: 4.8 K/UL (ref 3.6–11)

## 2021-03-09 PROCEDURE — 85025 COMPLETE CBC W/AUTO DIFF WBC: CPT

## 2021-03-09 PROCEDURE — 80053 COMPREHEN METABOLIC PANEL: CPT

## 2021-03-09 PROCEDURE — 36415 COLL VENOUS BLD VENIPUNCTURE: CPT

## 2021-03-09 NOTE — PROGRESS NOTES
\[facility] [place] [facility]\"" Lab Visit:          3835 Pt arrived ambulatory to [facility] in stable condition, for lab draw. Labs drawn peripherally from Right AC arm an sent for processing. Pt tolerated well. Visit Vitals  BP 96/63 (BP 1 Location: Right upper arm, BP Patient Position: Sitting)   Pulse 89   Temp 97.3 °F (36.3 °C)   Resp 16       1445 No new concerns voiced. D/cd home ambulatory in no distress. Pt aware of next [facility] appointment scheduled. Labs available in CC once resulted.

## 2021-03-10 ENCOUNTER — HOSPITAL ENCOUNTER (OUTPATIENT)
Dept: INFUSION THERAPY | Age: 76
Discharge: HOME | End: 2021-03-10
Payer: COMMERCIAL

## 2021-03-10 ENCOUNTER — OFFICE VISIT (OUTPATIENT)
Dept: ONCOLOGY | Age: 76
End: 2021-03-10
Payer: COMMERCIAL

## 2021-03-10 VITALS
SYSTOLIC BLOOD PRESSURE: 155 MMHG | WEIGHT: 207.2 LBS | HEART RATE: 100 BPM | BODY MASS INDEX: 31.5 KG/M2 | DIASTOLIC BLOOD PRESSURE: 78 MMHG | OXYGEN SATURATION: 97 % | TEMPERATURE: 96.5 F

## 2021-03-10 VITALS
BODY MASS INDEX: 31.37 KG/M2 | HEIGHT: 68 IN | WEIGHT: 207 LBS | DIASTOLIC BLOOD PRESSURE: 77 MMHG | SYSTOLIC BLOOD PRESSURE: 141 MMHG | HEART RATE: 62 BPM | RESPIRATION RATE: 18 BRPM | TEMPERATURE: 97.1 F

## 2021-03-10 DIAGNOSIS — Z98.890 H/O BILATERAL BREAST REDUCTION SURGERY: ICD-10-CM

## 2021-03-10 DIAGNOSIS — I10 ESSENTIAL HYPERTENSION: ICD-10-CM

## 2021-03-10 DIAGNOSIS — C50.912 INVASIVE DUCTAL CARCINOMA OF LEFT BREAST (HCC): Primary | ICD-10-CM

## 2021-03-10 DIAGNOSIS — K52.1 CHEMOTHERAPY INDUCED DIARRHEA: ICD-10-CM

## 2021-03-10 DIAGNOSIS — T45.1X5A CHEMOTHERAPY INDUCED DIARRHEA: ICD-10-CM

## 2021-03-10 DIAGNOSIS — Z09 CHEMOTHERAPY FOLLOW-UP EXAMINATION: ICD-10-CM

## 2021-03-10 DIAGNOSIS — Z98.890 HISTORY OF LUMPECTOMY: ICD-10-CM

## 2021-03-10 DIAGNOSIS — Z86.19 HISTORY OF HEPATITIS C: ICD-10-CM

## 2021-03-10 PROCEDURE — 96413 CHEMO IV INFUSION 1 HR: CPT

## 2021-03-10 PROCEDURE — 99214 OFFICE O/P EST MOD 30 MIN: CPT | Performed by: INTERNAL MEDICINE

## 2021-03-10 PROCEDURE — 96417 CHEMO IV INFUS EACH ADDL SEQ: CPT

## 2021-03-10 PROCEDURE — 96377 APPLICATON ON-BODY INJECTOR: CPT

## 2021-03-10 PROCEDURE — 74011250636 HC RX REV CODE- 250/636: Performed by: NURSE PRACTITIONER

## 2021-03-10 PROCEDURE — 74011250637 HC RX REV CODE- 250/637: Performed by: REGISTERED NURSE

## 2021-03-10 PROCEDURE — 96375 TX/PRO/DX INJ NEW DRUG ADDON: CPT

## 2021-03-10 RX ORDER — DEXAMETHASONE SODIUM PHOSPHATE 10 MG/ML
10 INJECTION INTRAMUSCULAR; INTRAVENOUS ONCE
Status: COMPLETED | OUTPATIENT
Start: 2021-03-10 | End: 2021-03-10

## 2021-03-10 RX ORDER — PALONOSETRON 0.05 MG/ML
0.25 INJECTION, SOLUTION INTRAVENOUS ONCE
Status: COMPLETED | OUTPATIENT
Start: 2021-03-10 | End: 2021-03-10

## 2021-03-10 RX ORDER — POTASSIUM CHLORIDE 750 MG/1
20 TABLET, FILM COATED, EXTENDED RELEASE ORAL
Status: COMPLETED | OUTPATIENT
Start: 2021-03-10 | End: 2021-03-10

## 2021-03-10 RX ORDER — SODIUM CHLORIDE 9 MG/ML
25 INJECTION, SOLUTION INTRAVENOUS CONTINUOUS
Status: DISPENSED | OUTPATIENT
Start: 2021-03-10 | End: 2021-03-10

## 2021-03-10 RX ADMIN — PALONOSETRON 0.25 MG: 0.05 INJECTION, SOLUTION INTRAVENOUS at 11:50

## 2021-03-10 RX ADMIN — CYCLOPHOSPHAMIDE 1296 MG: 1 INJECTION, POWDER, FOR SOLUTION INTRAVENOUS; ORAL at 13:28

## 2021-03-10 RX ADMIN — DOCETAXEL ANHYDROUS 130 MG: 10 INJECTION, SOLUTION INTRAVENOUS at 12:15

## 2021-03-10 RX ADMIN — POTASSIUM CHLORIDE 20 MEQ: 750 TABLET, FILM COATED, EXTENDED RELEASE ORAL at 11:53

## 2021-03-10 RX ADMIN — DEXAMETHASONE SODIUM PHOSPHATE 10 MG: 10 INJECTION INTRAMUSCULAR; INTRAVENOUS at 11:48

## 2021-03-10 RX ADMIN — SODIUM CHLORIDE 25 ML/HR: 900 INJECTION, SOLUTION INTRAVENOUS at 11:00

## 2021-03-10 RX ADMIN — SODIUM CHLORIDE 500 ML: 900 INJECTION, SOLUTION INTRAVENOUS at 11:48

## 2021-03-10 RX ADMIN — PEGFILGRASTIM 6 MG: KIT SUBCUTANEOUS at 14:18

## 2021-03-10 NOTE — PROGRESS NOTES
Celsa Izaguirre is a 70 y.o. female  Chief Complaint   Patient presents with    Chemotherapy    Breast Cancer     1. Have you been to the ER, urgent care clinic since your last visit? Hospitalized since your last visit? No.    2. Have you seen or consulted any other health care providers outside of the [address] [place] [address] since your last visit? Include any pap smears or colon screening. No.      Patient stated she has gotten her first dose of the covid-19 vaccine.

## 2021-03-10 NOTE — PROGRESS NOTES
[facility] [place] at [place] [address], [place]: [phone]  F: [phone]    Reason for Visit:   Anna Alicea is a 70 y.o. female who is seen today for follow up of left breast cancer on adjuvant chemotherapy with TC. Treatment History:   Per Surgery Note:  · Initially abnormal screening mammo 7/20 on LEFT with small mass  · 08/04/20: LEFT breast bx. PATH: IDC, 5 mm in greatest linear dimension extending to core biopsy tip, histologic grade 2, ER+(%)/NC+(%)/HER2-. Clinical stage 1  · MammaPrint: High risk, luminal type B, -0.123  · Breast MRI 8/28/20: 12 mm mass otherwise negative. · 11/03/20: LEFT oncoplastic reduction and SLNBx, and RIGHT breast reduction, with Dr. Amandeep Colunga. PATH:  · LEFT: IDC, 14 x 13 x 12 mm, overall grade 2, negative margins. DCIS present with negative margins. Pathologic stage: pT1c, pN0.  · RIGHT: Breast tissue with stromal fibrosis and focal papillary apocrine metaplasia. Benign skin and subcutaneous soft tissue. No in-situ or invasive carcinoma identified  · Adjuvant TC chemotherapy 1/6/21 - Current     STAGE: T1cN0 ER+ HEr2 negative mammaprint high risk luminal B    History of Present Illness:   Anna Alicea is a 70 y.o. female seen today in office for follow up of left breast cancer ER+ HER2 negatito seeve s/p lumpectomy and bilateral breast reduction on 11/3/20 started adjuvant TC chemotherapy on 1/6/21. Here for cycle 4. Feels great today. Happy to be done. Seeing rad/onc 3/12/21. Getting vaccine second dose this month. Had COVID 1/20. No issues today. Labs good yesterday. No fevers/ chills/ chest pain/ SOB/ nausea/ vomiting/diarrhea/ pain/fatigue        Last visit: Tolerating chemo well. Had some fatigue/ HAs/ less diarrhea. Eating ok. Needs to drink more. CBC yesterday good. Pt feels ready for chemo today.    No fevers/ chills/ chest pain/ SOB/ nausea/ vomiting/ pain      Past Medical History:   Diagnosis Date    Acute diverticulitis 2017    Allergic rhinitis 2017    Arthritis 2017    Arthritis of right knee 2019    Back pain, thoracic 2017    Cancer ([facility] 2020    LEFT BREAST     Cataract, left 2017    Cataract, right 2017    Chronic obstructive pulmonary disease (HCC)     Cirrhosis ([facility] [address] 2017    Cold sore 2017    Elevated hemoglobin A1c 2017    Fatigue 2017    Gastrointestinal disorder     acid reflux    GERD (gastroesophageal reflux disease) 2017    Heart murmur 2017    Hepatitis C 2017    Herpes zoster infection of thoracic region 2017    Hyperlipidemia LDL goal <100 2017    Hypertension     Menopause     Hysterectomy-42years old    Murmur     Obesity (BMI 30-39. 9) 2017    Osteopenia 2017    Other ill-defined conditions(799.89)     hep c    Post-menopausal 2017    Posterior auricular pain of right ear 2017    Preop examination 2017    Valvular heart disease     mitral valve prolapse    Vertigo, benign positional 2017      Past Surgical History:   Procedure Laterality Date    HX BREAST LUMPECTOMY Bilateral 11/3/2020    LEFT BREAST REDUCTION LUMPECTOMY WITH ULTRASOUND, LEFT BREAST SENTINEL NODE BIOPSY, LEFT BREAST RECONSTRUCTION, RIGHT BREAST REDUCTION performed by Dhaval Davalos MD at [address] HX COLONOSCOPY      HX HYSTERECTOMY      42years old   Nehemiah Zavala HX ORTHOPAEDIC Right     knee, foot      Social History     Tobacco Use    Smoking status: Former Smoker     Packs/day: 0.25     Years: 25.00     Pack years: 6.25     Types: Cigarettes     Quit date: 1990     Years since quittin.2    Smokeless tobacco: Never Used   Substance Use Topics    Alcohol use:  Yes     Alcohol/week: 0.0 standard drinks     Comment: OCCASIONALLY      Family History   Problem Relation Age of Onset    Hypertension Mother    Nehemiah Zavala Diabetes Mother    Toddzahraa Lucas Kidney Disease Mother     Heart Disease Father     Colon Cancer Maternal Grandmother     No Known Problems Sister     Anesth Problems Neg Hx      Current Outpatient Medications   Medication Sig    ondansetron (ZOFRAN ODT) 8 mg disintegrating tablet Take 1 Tab by mouth every eight (8) hours as needed for Nausea.  olmesartan-hydroCHLOROthiazide (BENICAR HCT) 20-12.5 mg per tablet TAKE 1 TABLET BY MOUTH ONCE DAILY    prochlorperazine (Compazine) 5 mg tablet Take 1 tab by mouth every 6 hours as needed for nausea or vomiting    dexAMETHasone (DECADRON) 4 mg tablet Take 2 tabs (8mg) by mouth twice daily the day before chemotherapy and the day after chemotherapy    MILK THISTLE PO Take 175 mg by mouth daily.  OTHER Indications: AM/PM MENOPAUSE FORMULA 1 TAB QHS    furosemide (LASIX) 20 mg tablet TAKE 1 TABLET BY MOUTH ONCE DAILY (Patient taking differently: Take 20 mg by mouth every evening. TAKE 1 TABLET BY MOUTH ONCE DAILY)    valACYclovir (VALTREX) 500 mg tablet Take 1 tablet by mouth once daily (Patient taking differently: as needed.)    TURMERIC ROOT EXTRACT PO Take 200 mg by mouth.  cyanocobalamin (VITAMIN B-12) 1,000 mcg tablet Take 1,000 mcg by mouth daily.  psyllium (METAMUCIL) powd Take  by mouth daily.  magnesium 250 mg tab Take  by mouth.  mometasone (NASONEX) 50 mcg/actuation nasal spray 2 Sprays as needed.  naproxen sodium (ALEVE) 220 mg tablet Take 220 mg by mouth two (2) times daily (with meals).  docosanol (ABREVA) 10 % topical cream Apply  to affected area five (5) times daily.  biotin 5,000 mcg TbDi Take  by mouth.  krill oil 500 mg cap Take  by mouth.  S-Adenosylmethionine (YRN-E, ENTERIC COATED,) 400 mg TbEC Take  by mouth.  cholecalciferol, VITAMIN D3, (VITAMIN D3) 5,000 unit tab tablet Take  by mouth daily.  Dexlansoprazole (DEXILANT) 60 mg CpDB Take 60 mg by mouth as needed.  omeprazole (PRILOSEC OTC) 20 mg tablet Take 20 mg by mouth as needed. No current facility-administered medications for this visit. Facility-Administered Medications Ordered in Other Visits   Medication Dose Route Frequency    0.9% sodium chloride infusion  25 mL/hr IntraVENous CONTINUOUS    sodium chloride 0.9 % bolus infusion 500 mL  500 mL IntraVENous ONCE    dexamethasone (DECADRON) 10 mg/mL injection 10 mg  10 mg IntraVENous ONCE    dexamethasone (DECADRON) 20 mg in 0.9% sodium chloride 50 mL IVPB  20 mg IntraVENous ONCE    palonosetron HCl (ALOXI) injection 0.25 mg  0.25 mg IntraVENous ONCE    DOCEtaxeL (TAXOTERE) 130 mg in 0.9% sodium chloride 250 mL, overfill volume 25 mL chemo infusion  60 mg/m2 (Treatment Plan Recorded) IntraVENous ONCE    cyclophosphamide (CYTOXAN) 1,296 mg in 0.9% sodium chloride 250 mL, overfill volume 25 mL chemo infusion  600 mg/m2 (Treatment Plan Recorded) IntraVENous ONCE    pegfilgrastim (NEULASTA) wearable SQ injector 6 mg  6 mg SubCUTAneous ONCE    potassium chloride SR (KLOR-CON 10) tablet 20 mEq  20 mEq Oral NOW      No Known Allergies     A complete review of systems was obtained, negative except as described above and as reported on ROS sheet scanned into system. Physical Exam:     Visit Vitals  BP (!) 155/78 (BP 1 Location: Left upper arm, BP Patient Position: Sitting)   Pulse 100   Temp (!) 96.5 °F (35.8 °C) (Temporal)   Wt 207 lb 3.2 oz (94 kg)   SpO2 97%   BMI 31.50 kg/m²     ECOG PS: 1  General: No distress  Eyes:  Anicteric sclerae  HENT: Atraumatic, wearing a mask  Neck: Supple  Respiratory: Normal respiratory effort, lungs clear  CV regular rate and rhythm  GI soft NT  Ext no edema  MS: Normal gait and station. Digits without clubbing or cyanosis. Skin: No rashes, ecchymoses, or petechiae. Normal temperature, turgor, and texture.   Psych: Alert, oriented, appropriate affect, normal judgment/insight    Results:     Lab Results   Component Value Date/Time    WBC 4.8 03/09/2021 02:38 PM    HGB 10.5 (L) 03/09/2021 02:38 PM    HCT 32.4 (L) 03/09/2021 02:38 PM    PLATELET 021 51/59/4571 02:38 PM    MCV 87.8 03/09/2021 02:38 PM    ABS. NEUTROPHILS 2.8 03/09/2021 02:38 PM    HGB (POC) 12.6 09/11/2017 11:03 AM    HCT (POC) 38.2 09/11/2017 11:03 AM     Lab Results   Component Value Date/Time    Sodium 138 03/09/2021 02:38 PM    Potassium 3.3 (L) 03/09/2021 02:38 PM    Chloride 107 03/09/2021 02:38 PM    CHLORIDE 104.0 03/28/2018 10:08 AM    CO2 26 03/09/2021 02:38 PM    Glucose 107 (H) 03/09/2021 02:38 PM    BUN 18 03/09/2021 02:38 PM    Creatinine 0.91 03/09/2021 02:38 PM    GFR est AA >60 03/09/2021 02:38 PM    GFR est non-AA >60 03/09/2021 02:38 PM    Calcium 9.1 03/09/2021 02:38 PM    Sodium (POC) 146.0 (A) 03/28/2018 10:08 AM    Potassium (POC) 4.6 03/28/2018 10:08 AM    Creatinine (POC) 0.7 03/28/2018 10:08 AM     Lab Results   Component Value Date/Time    Bilirubin, total 0.4 03/09/2021 02:38 PM    ALT (SGPT) 24 03/09/2021 02:38 PM    Alk. phosphatase 50 03/09/2021 02:38 PM    Protein, total 7.9 03/09/2021 02:38 PM    Albumin 3.6 03/09/2021 02:38 PM    Globulin 4.3 (H) 03/09/2021 02:38 PM     Records reviewed and summarized above. Pathology report(s) reviewed above. Radiology report(s) reviewed above. Assessment/PLAN:     1) Stage 1 T1cN0 ER+ HER2 negative mammaprint high risk luminal B post lumpectomy/bilateral reduction on 11/3/20  She started adjuvant TC chemotherapy on 1/6/21.    here today for Cycle 4 of adjuvant TC chemotherapy. Tolerating chemo well with mild side effects. reviewed drug side effect management with prn meds today. She is doing chemo peripherally - no issues thus far. She is clinically stable today, feels well overall. Happy to be done chemo today. CBC and CMP were stable/good yesterday. She is ready for chemo today. Has radiation visit already set up. Then for AI after radiation. Follow up in 4 weeks  Patient agrees with plan.     2) high risk drugs requiring intensive monitoring for toxicity. Reviewed chemo and dosing today. No dose changes required. Continue with cycle 4.     3) Hx of Hep C  LFTs being monitored. Normal.   Cured per patient. Management per [facility]. 4) Heart Valve Problems/HTN. Management per Cardio. 5) COPD/GERD   Management per PCP. 6) Psychosocial  Mood good, coping well. She lives alone and is here alone today. She continues to work during [employer] from home. SW support as needed. Call if questions. Follow up in 4 weeks   I appreciate the opportunity to participate in Ms. Danielle Jones's care.     Signed By: Raissa Lee DO

## 2021-03-10 NOTE — PROGRESS NOTES
[facility] Chemotherapy Progress Note    Date: March 10, 2021    Name: Elizabeth Avila    MRN: [id number]         : 1945      1100 Pt admit to [place] for TC ambulatory in stable condition. Assessment completed. No new concerns voiced. Port with positive blood return. Patient denies SOB, fever, cough, general not feeling well. Patient denies recent exposure to someone who has tested positive for COVID-19. Patient denies having contact with anyone who has a pending COVID test.       Labs were reviewed     Chemotherapy Flowsheet 3/10/2021   Cycle C4   Date 3/10/2021   Drug / Regimen TC   Pre Meds given   Notes given         Ms. Jones's vitals were reviewed. Patient Vitals for the past 12 hrs:   Temp Pulse Resp BP   03/10/21 1424  62  (!) 141/77   03/10/21 1056 97.1 °F (36.2 °C) 70 18 134/71       Pre-medications  were administered as ordered and chemotherapy was initiated.   Medications Administered     0.9% sodium chloride infusion     Admin Date  03/10/2021 Action  New Bag Dose  25 mL/hr Rate  25 mL/hr Route  IntraVENous Administered By  Zamzam Rock RN          cyclophosphamide (CYTOXAN) 1,296 mg in 0.9% sodium chloride 250 mL, overfill volume 25 mL chemo infusion     Admin Date  03/10/2021 Action  New Bag Dose  1,296 mg Rate  679.6 mL/hr Route  IntraVENous Administered By  Zamzam Rock RN          dexamethasone (PF) (DECADRON) 10 mg/mL injection 10 mg     Admin Date  03/10/2021 Action  Given Dose  10 mg Route  IntraVENous Administered By  Zamzam Rock RN          DOCEtaxeL (TAXOTERE) 130 mg in 0.9% sodium chloride 250 mL, overfill volume 25 mL chemo infusion     Admin Date  03/10/2021 Action  New Bag Dose  130 mg Rate  288 mL/hr Route  IntraVENous Administered By  Zamzam Rock, RN          palonosetron HCl (ALOXI) injection 0.25 mg     Admin Date  03/10/2021 Action  Given Dose  0.25 mg Route  IntraVENous Administered By  Zamzam Rock RN          pegfilgrastim (NEULASTA) wearable SQ injector 6 mg Admin Date  03/10/2021 Action  Given Dose  6 mg Route  SubCUTAneous Administered By  Nam Castaneda RN          potassium chloride SR (KLOR-CON 10) tablet 20 mEq     Admin Date  03/10/2021 Action  Given Dose  20 mEq Route  Oral Administered By  Nam Castaneda RN          sodium chloride 0.9 % bolus infusion 500 mL     Admin Date  03/10/2021 Action  New Bag Dose  500 mL Route  IntraVENous Administered By  Nam Castaneda RN                1430 Pt tolerated treatment well. Port maintained positive blood return throughout treatment. Flushed, heparinized and de-accessed per protocol. D/c home ambulatory in no distress.      Future Appointments   Date Time Provider Bhavesh Cross   3/16/2021  9:40 AM MD GONZALES Batista BS AMB   6/16/2021 10:15 AM MD FLORENCIA Alvarado BS AMB   6/29/2021  1:45 PM Ac Patrick NP [facility] BS AMB         Agatha Holman, [place]  March 10, 2021

## 2021-03-16 ENCOUNTER — VIRTUAL VISIT (OUTPATIENT)
Dept: CARDIOLOGY CLINIC | Age: 76
End: 2021-03-16
Payer: COMMERCIAL

## 2021-03-16 DIAGNOSIS — I34.0 NON-RHEUMATIC MITRAL REGURGITATION: Primary | ICD-10-CM

## 2021-03-16 DIAGNOSIS — R00.2 PALPITATIONS: ICD-10-CM

## 2021-03-16 DIAGNOSIS — E66.9 OBESITY (BMI 30-39.9): ICD-10-CM

## 2021-03-16 DIAGNOSIS — E78.5 HYPERLIPIDEMIA LDL GOAL <100: ICD-10-CM

## 2021-03-16 DIAGNOSIS — R53.83 FATIGUE, UNSPECIFIED TYPE: ICD-10-CM

## 2021-03-16 DIAGNOSIS — I36.1 NON-RHEUMATIC TRICUSPID VALVE INSUFFICIENCY: ICD-10-CM

## 2021-03-16 DIAGNOSIS — I27.20 PULMONARY HTN (HCC): ICD-10-CM

## 2021-03-16 DIAGNOSIS — R06.09 DYSPNEA ON EXERTION: ICD-10-CM

## 2021-03-16 DIAGNOSIS — I10 ESSENTIAL HYPERTENSION: ICD-10-CM

## 2021-03-16 PROCEDURE — 99214 OFFICE O/P EST MOD 30 MIN: CPT | Performed by: INTERNAL MEDICINE

## 2021-03-16 NOTE — PROGRESS NOTES
[facility]  [id number] who was evaluated through a synchronous (real-time) audio-video encounter, and/or her healthcare decision maker, is aware that it is a billable service, with coverage as determined by her insurance carrier. She provided verbal consent to proceed: Yes, and patient identification was verified. It was conducted pursuant to the emergency declaration under the [address] [place] [address] and the [employer] General Act. A caregiver was present when appropriate. Ability to conduct physical exam was limited. I was at the office. The patient was at home. HPI: Tracey Luciano is a 70y.o. year-old female who presents for follow up regarding her valve disease. FInished her last chemo last week and that is great but she is feelign wiped out,   XRT for month, with Dr. Andrea Ball  Pre chemo she was feeling ok, energy ok, breathing ok. She had some ankle swelling after standing a lot a but that is gone and she is happy about that. BP is great, 109/52  Second shot Friday  Lasix daily for now. PCP appt coming up to recheck labs. Recent ones looked good. K low on last labs and she will have it rechecked. Mod TR by TTE   Has had a few flutters but very brief, no high risk features. Has mild dyspnea with moderate exertion but it is stable, no change  Denies any PND or orthopnea  No dizziness or syncope, does have some vertigo from time to time   No LE edema   Doing well, reviwed her echo results, sx what to expect, etc. Watch for now.      I encouraged her to keep up her efforts at healthy lifestyle with walking and increasing her exercise and to let me know if she has any new symptoms for now she seems to be doing pretty well, and although the stress of losing her mom has been a new addition to her stressors in the last couple of weeks she seems to be handling it okay. Assessment/Plan:  1. Dyspnea with exertion - none, doing better on Lasix  2. HTN - well controlled on Benicar HCTZ   3. GERD - on Dexilant every other day   4. MVP- previously diagnosed with MVP 20 years ago  5. Obesity - There is no height or weight on file to calculate BMI. 6.  Moderate TR, mild PAHTN by TTE today - symptoms stable on Lasix at  20mg daily   7. Hepatitis C s/p treatment with harvoni and rivavirin - has HERNANDEZ, followed by hepatology  8. LDL near goal, due to recheck  9. Vitamin D deficiency - on 5000 units daily   10.  Edema, post chemo- recheck ef in 9/21, earlier if edema is a recurrent issue, cont lasix, benicarHCT    Echo 9/19 (prelim) - mod TR, EF normal, PASP 45mmhg  Echo 9/18 - LVEF 55-60%, mod TR, RVSP 32mmhg  Echo 9/17 - LVEF 60 % to 65 %, no WMA, mod-severe TR, RVSP 38mmHg/PASP 25mmHg   Echo 9/16 - LVEF 60 % to 65 %, no WMA, mod-severe TR, mild to mod PAHTN (PASP 40mmHg  Echo 8/15 - EF 60%, mild MR, mod-severe TR  Nuclear stress 8/15 EF 86%, no ischemia    Soc Hx: 2 glasses of wine every other day, no tobacco, no drug use, lives alone, very busy at [employer], continues to work as a  for a nonprofit  [employer] Hx: father passed from bleeding ulcers at age 67 and had end stage CKD and HTN, mother DM and ESRD and living at age 80     She  has a past medical history of Acute diverticulitis (8/18/2017), Allergic rhinitis (8/18/2017), Arthritis (8/18/2017), Arthritis of right knee (11/25/2019), Back pain, thoracic (8/18/2017), Cancer ([address] (08/2020), Cataract, left (8/18/2017), Cataract, right (8/18/2017), Chronic obstructive pulmonary disease ([facility] Cirrhosis ([facility] (8/18/2017), Cold sore (8/18/2017), Elevated hemoglobin A1c (8/18/2017), Fatigue (8/18/2017), Gastrointestinal disorder, GERD (gastroesophageal reflux disease) (8/18/2017), Heart murmur (8/18/2017), Hepatitis C (8/18/2017), Herpes zoster infection of thoracic region (8/18/2017), Hyperlipidemia LDL goal <100 (8/18/2017), Hypertension, Menopause, Murmur, Obesity (BMI 30-39.9) (8/18/2017), Osteopenia (8/18/2017), Other ill-defined conditions(799.89), Post-menopausal (8/18/2017), Posterior auricular pain of right ear (8/18/2017), Preop examination (8/18/2017), Valvular heart disease, and Vertigo, benign positional (8/18/2017). Cardiovascular ROS: denies chest pain or increase in dyspnea on exertion  Respiratory ROS: no cough or wheezing  Neurological ROS: no TIA or stroke symptoms  All other systems negative except as above. PE  There were no vitals filed for this visit. There is no height or weight on file to calculate BMI.   gen alert NAD  Mental normal affect normal speech pattern logical thought process  resp no labored breathing wheezing or coughing  Neuro no speech slurring or difficulty with word finding    12 lead ECG: NSR, anteroseptal Q waves (unchanged) last    Recent Labs:  Lab Results   Component Value Date/Time    Cholesterol, total 187 06/04/2020 11:49 AM     No results found for: BRENNEN  Lab Results   Component Value Date/Time    BUN 18 03/09/2021 02:38 PM     Lab Results   Component Value Date/Time    Potassium 3.3 (L) 03/09/2021 02:38 PM     Lab Results   Component Value Date/Time    Hemoglobin A1c 4.9 12/14/2020 11:14 AM     No components found for: HGBI,  IHGB,  HGB,  HGBP,  WBHGB  Lab Results   Component Value Date/Time    PLATELET 519 15/33/4601 02:38 PM       Reviewed:  Past Medical History:   Diagnosis Date    Acute diverticulitis 8/18/2017    Allergic rhinitis 8/18/2017    Arthritis 8/18/2017    Arthritis of right knee 11/25/2019    Back pain, thoracic 8/18/2017    Cancer ([facility] 08/2020    LEFT BREAST     Cataract, left 8/18/2017    Cataract, right 8/18/2017    Chronic obstructive pulmonary disease ([address]     Cirrhosis ([facility] 8/18/2017    Cold sore 8/18/2017    Elevated hemoglobin A1c 8/18/2017    Fatigue 8/18/2017    Gastrointestinal disorder     acid reflux    GERD (gastroesophageal reflux disease) 2017    Heart murmur 2017    Hepatitis C 2017    Herpes zoster infection of thoracic region 2017    Hyperlipidemia LDL goal <100 2017    Hypertension     Menopause     Hysterectomy-42years old    Murmur     Obesity (BMI 30-39. 9) 2017    Osteopenia 2017    Other ill-defined conditions(799.89)     hep c    Post-menopausal 2017    Posterior auricular pain of right ear 2017    Preop examination 2017    Valvular heart disease     mitral valve prolapse    Vertigo, benign positional 2017     Social History     Tobacco Use   Smoking Status Former Smoker    Packs/day: 0.25    Years: 25.00    Pack years: 6.25    Types: Cigarettes    Quit date: 1990    Years since quittin.2   Smokeless Tobacco Never Used     Social History     Substance and Sexual Activity   Alcohol Use Yes    Alcohol/week: 0.0 standard drinks    Comment: OCCASIONALLY     No Known Allergies    Current Outpatient Medications   Medication Sig    ondansetron (ZOFRAN ODT) 8 mg disintegrating tablet Take 1 Tab by mouth every eight (8) hours as needed for Nausea.  olmesartan-hydroCHLOROthiazide (BENICAR HCT) 20-12.5 mg per tablet TAKE 1 TABLET BY MOUTH ONCE DAILY    MILK THISTLE PO Take 175 mg by mouth daily.  OTHER Indications: AM/PM MENOPAUSE FORMULA 1 TAB QHS    furosemide (LASIX) 20 mg tablet TAKE 1 TABLET BY MOUTH ONCE DAILY (Patient taking differently: Take 20 mg by mouth every evening. TAKE 1 TABLET BY MOUTH ONCE DAILY)    valACYclovir (VALTREX) 500 mg tablet Take 1 tablet by mouth once daily (Patient taking differently: as needed.)    TURMERIC ROOT EXTRACT PO Take 200 mg by mouth.  cyanocobalamin (VITAMIN B-12) 1,000 mcg tablet Take 1,000 mcg by mouth daily.  psyllium (METAMUCIL) powd Take  by mouth daily.  magnesium 250 mg tab Take  by mouth.  mometasone (NASONEX) 50 mcg/actuation nasal spray 2 Sprays as needed.     naproxen sodium (ALEVE) 220 mg tablet Take 220 mg by mouth two (2) times daily (with meals).  docosanol (ABREVA) 10 % topical cream Apply  to affected area five (5) times daily.  biotin 5,000 mcg TbDi Take  by mouth.  krill oil 500 mg cap Take  by mouth.  S-Adenosylmethionine (YRN-E, ENTERIC COATED,) 400 mg TbEC Take  by mouth.  cholecalciferol, VITAMIN D3, (VITAMIN D3) 5,000 unit tab tablet Take  by mouth daily.  Dexlansoprazole (DEXILANT) 60 mg CpDB Take 60 mg by mouth as needed.  omeprazole (PRILOSEC OTC) 20 mg tablet Take 20 mg by mouth as needed.  prochlorperazine (Compazine) 5 mg tablet Take 1 tab by mouth every 6 hours as needed for nausea or vomiting    dexAMETHasone (DECADRON) 4 mg tablet Take 2 tabs (8mg) by mouth twice daily the day before chemotherapy and the day after chemotherapy     No current facility-administered medications for this visit.         Didi Durbin MD  [facility] [place]  [address] [facility] [address]  [address]

## 2021-03-16 NOTE — PATIENT INSTRUCTIONS
MD Kathi Sibley  
  
   
  
6 mo fuv with echo for chemo/mr 3/16/2021 appointments scheduled with patient Future Appointments Date Time Provider Bhavesh Cross 6/16/2021 10:15 AM MD NINOSKA Barfield BS AMB  
6/29/2021  1:45 PM Darcy Brady NP [facility] BS AMB  
9/22/2021 10:00 AM DANE PERSAUD BS AMB  
9/22/2021 10:40 AM MD GONZALES Sibley BS AMB

## 2021-03-25 ENCOUNTER — HOSPITAL ENCOUNTER (OUTPATIENT)
Dept: RADIATION THERAPY | Age: 76
Discharge: HOME | End: 2021-03-25

## 2021-03-29 ENCOUNTER — HOSPITAL ENCOUNTER (OUTPATIENT)
Dept: RADIATION THERAPY | Age: 76
Discharge: HOME | End: 2021-03-29

## 2021-03-30 ENCOUNTER — APPOINTMENT (OUTPATIENT)
Dept: INFUSION THERAPY | Age: 76
End: 2021-03-30

## 2021-03-31 ENCOUNTER — APPOINTMENT (OUTPATIENT)
Dept: INFUSION THERAPY | Age: 76
End: 2021-03-31

## 2021-04-08 ENCOUNTER — TELEPHONE (OUTPATIENT)
Dept: ONCOLOGY | Age: 76
End: 2021-04-08

## 2021-04-08 ENCOUNTER — VIRTUAL VISIT (OUTPATIENT)
Dept: ONCOLOGY | Age: 76
End: 2021-04-08
Payer: COMMERCIAL

## 2021-04-08 ENCOUNTER — HOSPITAL ENCOUNTER (OUTPATIENT)
Dept: RADIATION THERAPY | Age: 76
Discharge: HOME | End: 2021-04-08

## 2021-04-08 DIAGNOSIS — Z98.890 H/O BILATERAL BREAST REDUCTION SURGERY: ICD-10-CM

## 2021-04-08 DIAGNOSIS — I10 ESSENTIAL HYPERTENSION: ICD-10-CM

## 2021-04-08 DIAGNOSIS — C50.912 INVASIVE DUCTAL CARCINOMA OF LEFT BREAST (HCC): Primary | ICD-10-CM

## 2021-04-08 DIAGNOSIS — Z98.890 HISTORY OF LUMPECTOMY: ICD-10-CM

## 2021-04-08 DIAGNOSIS — Z09 CHEMOTHERAPY FOLLOW-UP EXAMINATION: ICD-10-CM

## 2021-04-08 PROCEDURE — 99213 OFFICE O/P EST LOW 20 MIN: CPT | Performed by: INTERNAL MEDICINE

## 2021-04-08 NOTE — PROGRESS NOTES
[facility] [place] [address] [address], [address], [place]: [phone]  F: [phone]      Reason for Visit:   Rafael De Leon is a 70 y.o. female who is seen by synchronous (real-time) audio-video technology for follow up of left breast cancer . Treatment History:   Per Surgery Note:  · Initially abnormal screening mammo 7/20 on LEFT with small mass  · 08/04/20: LEFT breast bx. PATH: IDC, 5 mm in greatest linear dimension extending to core biopsy tip, histologic grade 2, ER+(%)/ND+(%)/HER2-. Clinical stage 1  · MammaPrint: High risk, luminal type B, -0.123  · Breast MRI 8/28/20: 12 mm mass otherwise negative. · 11/03/20: LEFT oncoplastic reduction and SLNBx, and RIGHT breast reduction, with Dr. Zhanna Thompson. PATH:  · LEFT: IDC, 14 x 13 x 12 mm, overall grade 2, negative margins. DCIS present with negative margins. Pathologic stage: pT1c, pN0.  · RIGHT: Breast tissue with stromal fibrosis and focal papillary apocrine metaplasia. Benign skin and subcutaneous soft tissue. No in-situ or invasive carcinoma identified  · Adjuvant TC chemotherapy 1/6/21 - Current     STAGE: T1cN0 ER+ HEr2 negative mammaprint high risk luminal B    History of Present Illness:   Rafael De Leon is a 70 y.o. female seen today virtually due to [facility] for follow up of left breast cancer ER+ HER2 negative s/p lumpectomy and bilateral breast reduction on 11/3/20   Did 4 cycles of TC chemo. Did well with chemo. Still feeling tired and weak but getting better. Very busy and working hard. To start radiation today. No fevers/ chills/ chest pain/ SOB/ nausea/ vomiting/diarrhea/ pain/fatigue      Last visit: started adjuvant TC chemotherapy on 1/6/21. Here for cycle 4. Feels great today. Happy to be done. Seeing rad/onc 3/12/21. Getting vaccine second dose this month. Had COVID 1/20. No issues today. Labs good yesterday.    No fevers/ chills/ chest pain/ SOB/ nausea/ vomiting/diarrhea/ pain/fatigue      Past Medical History:   Diagnosis Date    Acute diverticulitis 2017    Allergic rhinitis 2017    Arthritis 2017    Arthritis of right knee 2019    Back pain, thoracic 2017    Cancer ([facility] 2020    LEFT BREAST     Cataract, left 2017    Cataract, right 2017    Chronic obstructive pulmonary disease (HCC)     Cirrhosis (HCC) 2017    Cold sore 2017    Elevated hemoglobin A1c 2017    Fatigue 2017    Gastrointestinal disorder     acid reflux    GERD (gastroesophageal reflux disease) 2017    Heart murmur 2017    Hepatitis C 2017    Herpes zoster infection of thoracic region 2017    Hyperlipidemia LDL goal <100 2017    Hypertension     Menopause     Hysterectomy-42years old    Murmur     Obesity (BMI 30-39. 9) 2017    Osteopenia 2017    Other ill-defined conditions(799.89)     hep c    Post-menopausal 2017    Posterior auricular pain of right ear 2017    Preop examination 2017    Valvular heart disease     mitral valve prolapse    Vertigo, benign positional 2017      Past Surgical History:   Procedure Laterality Date    HX BREAST LUMPECTOMY Bilateral 11/3/2020    LEFT BREAST REDUCTION LUMPECTOMY WITH ULTRASOUND, LEFT BREAST SENTINEL NODE BIOPSY, LEFT BREAST RECONSTRUCTION, RIGHT BREAST REDUCTION performed [facility] Des[facility]z [facility] HX COLONOSCOPY      HX HYSTERECTOMY      42years old   [facility] HX ORTHOPAEDIC Right     knee, foot      Social History     Tobacco Use    Smoking status: Former Smoker     Packs/day: 0.25     Years: 25.00     Pack years: 6.25     Types: Cigarettes     Quit date: 1990     Years since quittin.2    Smokeless tobacco: Never Used   Substance Use Topics    Alcohol use:  Yes     Alcohol/week: 0.0 standard drinks     Comment: OCCASIONALLY      Family History   Problem Relation Age of Onset  Hypertension Mother     Diabetes Mother     Kidney Disease Mother     Heart Disease Father     Colon Cancer Maternal Grandmother     No Known Problems Sister     Anesth Problems Neg Hx      Current Outpatient Medications   Medication Sig    ondansetron (ZOFRAN ODT) 8 mg disintegrating tablet Take 1 Tab by mouth every eight (8) hours as needed for Nausea.  olmesartan-hydroCHLOROthiazide (BENICAR HCT) 20-12.5 mg per tablet TAKE 1 TABLET BY MOUTH ONCE DAILY    prochlorperazine (Compazine) 5 mg tablet Take 1 tab by mouth every 6 hours as needed for nausea or vomiting    dexAMETHasone (DECADRON) 4 mg tablet Take 2 tabs (8mg) by mouth twice daily the day before chemotherapy and the day after chemotherapy    MILK THISTLE PO Take 175 mg by mouth daily.  OTHER Indications: AM/PM MENOPAUSE FORMULA 1 TAB QHS    furosemide (LASIX) 20 mg tablet TAKE 1 TABLET BY MOUTH ONCE DAILY (Patient taking differently: Take 20 mg by mouth every evening. TAKE 1 TABLET BY MOUTH ONCE DAILY)    valACYclovir (VALTREX) 500 mg tablet Take 1 tablet by mouth once daily (Patient taking differently: as needed.)    TURMERIC ROOT EXTRACT PO Take 200 mg by mouth.  cyanocobalamin (VITAMIN B-12) 1,000 mcg tablet Take 1,000 mcg by mouth daily.  psyllium (METAMUCIL) powd Take  by mouth daily.  magnesium 250 mg tab Take  by mouth.  mometasone (NASONEX) 50 mcg/actuation nasal spray 2 Sprays as needed.  naproxen sodium (ALEVE) 220 mg tablet Take 220 mg by mouth two (2) times daily (with meals).  docosanol (ABREVA) 10 % topical cream Apply  to affected area five (5) times daily.  biotin 5,000 mcg TbDi Take  by mouth.  krill oil 500 mg cap Take  by mouth.  S-Adenosylmethionine (YRN-E, ENTERIC COATED,) 400 mg TbEC Take  by mouth.  cholecalciferol, VITAMIN D3, (VITAMIN D3) 5,000 unit tab tablet Take  by mouth daily.  Dexlansoprazole (DEXILANT) 60 mg CpDB Take 60 mg by mouth as needed.     omeprazole (PRILOSEC OTC) 20 mg tablet Take 20 mg by mouth as needed. No current facility-administered medications for this visit. No Known Allergies     A complete review of systems was obtained, negative except as described above    Physical Exam:     There were no vitals taken for this visit. General: alert, cooperative, no distress   Mental  status: normal mood, behavior, speech, dress, motor activity, and thought processes, able to follow commands   HENT: NCAT   Neck: no visualized mass   Resp: no respiratory distress   Neuro: no gross deficits   Skin: no discoloration or lesions of concern on visible areas   Psychiatric: normal affect, consistent with stated mood, no evidence of hallucinations       Due to this being a TeleHealth evaluation (During ZAXKB-95 public health emergency), many elements of the physical examination are unable to be assessed. Evaluation of the following organ systems was limited: Vitals/Constitutional/EENT/Resp/CV/GI//MS/Neuro/Skin/Heme-Lymph-Imm. Results:     Lab Results   Component Value Date/Time    WBC 4.8 03/09/2021 02:38 PM    HGB 10.5 (L) 03/09/2021 02:38 PM    HCT 32.4 (L) 03/09/2021 02:38 PM    PLATELET 631 18/42/6100 02:38 PM    MCV 87.8 03/09/2021 02:38 PM    ABS.  NEUTROPHILS 2.8 03/09/2021 02:38 PM    HGB (POC) 12.6 09/11/2017 11:03 AM    HCT (POC) 38.2 09/11/2017 11:03 AM     Lab Results   Component Value Date/Time    Sodium 138 03/09/2021 02:38 PM    Potassium 3.3 (L) 03/09/2021 02:38 PM    Chloride 107 03/09/2021 02:38 PM    CHLORIDE 104.0 03/28/2018 10:08 AM    CO2 26 03/09/2021 02:38 PM    Glucose 107 (H) 03/09/2021 02:38 PM    BUN 18 03/09/2021 02:38 PM    Creatinine 0.91 03/09/2021 02:38 PM    GFR est AA >60 03/09/2021 02:38 PM    GFR est non-AA >60 03/09/2021 02:38 PM    Calcium 9.1 03/09/2021 02:38 PM    Sodium (POC) 146.0 (A) 03/28/2018 10:08 AM    Potassium (POC) 4.6 03/28/2018 10:08 AM    Creatinine (POC) 0.7 03/28/2018 10:08 AM     Lab Results   Component Value Date/Time    Bilirubin, total 0.4 03/09/2021 02:38 PM    ALT (SGPT) 24 03/09/2021 02:38 PM    Alk. phosphatase 50 03/09/2021 02:38 PM    Protein, total 7.9 03/09/2021 02:38 PM    Albumin 3.6 03/09/2021 02:38 PM    Globulin 4.3 (H) 03/09/2021 02:38 PM     Records reviewed and summarized above. Pathology report(s) reviewed above. Radiology report(s) reviewed above. Assessment/PLAN:     1) Stage 1 T1cN0 ER+ HER2 negative mammaprint high risk luminal B post lumpectomy/bilateral reduction on 11/3/20  She started adjuvant TC chemotherapy on 1/6/21. Did 4 cycles. Seen today virtually due to [place]. She is clinically stable today, feels well overall. starts radiation today. Then for AI after radiation. AI info given. Follow up in 4 weeks  Patient agrees with plan. 2) chemo side effects getting better. Reviewed fatigue/ CARROLL/nail and skin changes getting better today. Continue to monitor. 3) Hx of Hep C  LFTs being monitored. Normal.   Cured per patient. Management per [facility] [place]. 4) Heart Valve Problems/HTN. Management per Cardio. 5) COPD/GERD   Management per PCP. 6) Psychosocial  Mood good, coping well. She lives alone   She continues to work during [place] from home. SW support as needed. Call if questions. Follow up in 4 weeks     The patient was evaluated through a synchronous (real-time) audio-video encounter. The patient (or guardian if applicable) is aware that this is a billable service. Verbal consent to proceed has been obtained within the past 12 months. The visit was conducted pursuant to the emergency declaration under the [address] [facility], [address] [place] [address] authority and the [employer] and [employer] General Act. Patient identification was verified, and a caregiver was present when appropriate. The patient was located in a state where the provider was credentialed to provide care.     I appreciate the opportunity to participate in Ms. Danielle Jones's care.     Signed By: Lisa Moore DO

## 2021-04-08 NOTE — PATIENT INSTRUCTIONS
Anastrozole (Arimidex®) This information is about a hormonal therapy used to treat breast cancer called anastrozole, which is also called Arimidex®. Throughout this page we refer to it by its more commonly used name, Arimidex. This information should ideally be read with our general information about breast cancer or secondary breast cancer. Arimidex Arimidex is a type of hormonal therapy used to treat breast cancer in women who have been through the menopause (change of life). You will see your doctor regularly while you have this treatment so they can monitor its effects. Hormonal therapies interfere with the production or action of particular hormones in the body. Hormones are substances produced naturally in the body. They act as chemical messengers and help control the activity of cells and organs. Aromatase inhibitors Many breast cancers rely on the hormone oestrogen to grow. These cancers are known as hormone-sensitive breast cancers. In women who have had their menopause, the main source of oestrogen is through the conversion of androgens (sex hormones produced by the adrenal glands) into oestrogens. This is carried out by an enzyme called aromatase. The conversion process is known as aromatisation, and it happens mainly in the fatty tissues of the body. Arimidex is a drug that blocks the process of aromatisation, and so reduces the amount of oestrogen in the body. As less oestrogen reaches the cancer cells, they grow more slowly or stop growing altogether. Drugs that work in this way are known as aromatase inhibitors. Other aromatase inhibitors include letrozole (Femara®) and exemestane (Aromasin®). How Arimidex is taken Arimidex is a tablet that is taken once a day. It should be swallowed whole with a glass of water, at about the same time each day. It doesn't matter whether this is in the morning or evening. When it is given Your doctor will take into account a number of different factors when planning your treatment. Arimidex is used to treat post-menopausal women with hormone-sensitive breast cancer. Early breast cancer Arimidex may be given to women with early breast cancer (cancer that has not spread) after they have had surgery to remove the cancer. Giving treatment after surgery to reduce the chance of the cancer coming back is known as adjuvant therapy. For some women, Arimidex may be more effective than tamoxifen, and it has different side effects. Studies show that switching to Arimidex after taking tamoxifen for 2-3 years may be better than five years of tamoxifen for some women. Advanced breast cancer Arimidex can be used to treat women who have breast cancer that has spread to other parts of the body (advanced or secondary breast cancer). It can also be used to treat women whose breast cancer has come back after initial treatment. You might find our information about staging and grading of breast cancer useful. Length of treatment Your doctors will discuss the length of treatment they feel is appropriate for you. Arimidex may be given over a number of years, or for as long as it is controlling your cancer, depending on your individual situation. Possible side effects Each person's reaction to any medicine is different. Most people have very few side effects with Arimidex, while others may experience more. The side effects described here won't affect everyone and may be different if you are taking more than one drug. We have outlined the most common side effects, but haven't included those that are rare and therefore unlikely to affect you. If you notice any effects which aren't listed here, discuss them with your doctor or nurse. You may have some of the following side effects, to varying degrees:  
Hot flushes and sweats These are usually mild and may wear off after a period of time.  Sometimes people find it helps to cut down on tea, coffee, nicotine and alcohol. Research suggests that hormones called progestogens or some types of antidepressants may be helpful in controlling this side effect. Your doctor or nurse can discuss this with you. Some people find complementary therapies such as acupuncture helpful. Your GP may be able to give you details about having these on the NHS. If you find your own therapist, make sure that they are properly qualified and registered. You can read more about treatments for menopausal symptoms like hot flushes in our information about managing menopausal symptoms. Vaginal dryness This may occur while using Arimidex. Gels that can help to overcome the dryness are available. You can buy these from a  or your doctor can prescribe them. Feeling sick (nausea) and being sick (vomiting) These side effects are rare and usually mild. They can usually be effectively treated, so let your doctor know if you are affected. Feeling sick can often be relieved by taking your tablet with food or at night. Diarrhoea If you have diarrhoea it's important to drink plenty of fluids. Hair thinning Some women notice that their hair becomes thinner while taking Arimidex. This is usually mild and the hair grows back at the end of treatment. Headaches Some people have headaches while taking Arimidex, but this is not common. It's important to drink plenty of fluids. Let your doctor know if you are getting headaches, as they can prescribe medication to help. Skin rashes Rarely, Arimidex can cause skin rashes. Vaginal bleeding Some women have some vaginal bleeding, usually in the first few weeks of treatment. This is rare and usually occurs after changing from other hormonal therapies to treatment with Arimidex. If the bleeding continues, tell your doctor or breast care nurse. Joint pains/muscular stiffness Some women have pain and stiffness in their joints while taking Arimidex.  Let your doctor know if these effects are a problem. You may find it helpful to take mild painkillers. Tiredness (fatigue) and lethargy Some people can have increased tiredness, especially at the start of treatment. It's important to get plenty of rest. If you are very sleepy you should not drive or operate machinery. Risk of osteoporosis Women who have, or are at risk of, osteoporosis (weakened bones), should have their bone strength assessed before and during treatment with Arimidex. Some women may need to take bone-strengthening drugs to help prevent osteoporosis developing. Always let your doctor or nurse know about any side effects you have. There are usually ways in which they can be controlled or improved. Things to remember about Arimidex tablets  Arimidex may interact with other medicines. Tell your doctor about any medicines you are taking, including non-prescribed drugs such as complementary therapies, vitamins and herbal drugs.  Keep the tablets in a safe place, out of the reach of children.  Keep the tablets in the original packaging and store them at room temperature, away from heat and direct sunlight.  Don't worry if you forget to take your tablet. Do not take a double dose. The levels of the drug in your blood will not change very much, but try not to miss more than one or two tablets in a row.  If your doctor decides to stop the treatment, return any remaining tablets to the pharmacist. Don't flush them down the toilet or throw them away.  Remember to get a new prescription a few weeks before you run out of tablets, and make sure that you have plenty for holidays. References This information is based on our Anastrozole (Arimidex®) fact sheet and has been compiled using information from a number of reliable sources, including:  cortez Nune[employer]. Teresa Mullen: The Complete Drug Reference. 36th edition. 2009. [employer]. [facility] [employer]. 59th edition. 2010.  [employer] and [address].  Early and locally advanced breast cancer: diagnosis and treatment. February 2009. National institute for Health and Clinical Excellence (NICE).  electronic Medicines Compendium [facility]). www.medicines. org.uk (accessed September 2010).

## 2021-04-09 ENCOUNTER — HOSPITAL ENCOUNTER (OUTPATIENT)
Dept: RADIATION THERAPY | Age: 76
Discharge: HOME | End: 2021-04-09

## 2021-04-12 ENCOUNTER — HOSPITAL ENCOUNTER (OUTPATIENT)
Dept: RADIATION THERAPY | Age: 76
Discharge: HOME | End: 2021-04-12

## 2021-04-13 ENCOUNTER — HOSPITAL ENCOUNTER (OUTPATIENT)
Dept: RADIATION THERAPY | Age: 76
Discharge: HOME | End: 2021-04-13

## 2021-04-14 ENCOUNTER — HOSPITAL ENCOUNTER (OUTPATIENT)
Dept: RADIATION THERAPY | Age: 76
Discharge: HOME | End: 2021-04-14

## 2021-04-15 ENCOUNTER — HOSPITAL ENCOUNTER (OUTPATIENT)
Dept: RADIATION THERAPY | Age: 76
Discharge: HOME | End: 2021-04-15

## 2021-04-19 ENCOUNTER — HOSPITAL ENCOUNTER (OUTPATIENT)
Dept: RADIATION THERAPY | Age: 76
Discharge: HOME | End: 2021-04-19

## 2021-04-20 ENCOUNTER — APPOINTMENT (OUTPATIENT)
Dept: INFUSION THERAPY | Age: 76
End: 2021-04-20

## 2021-04-20 ENCOUNTER — HOSPITAL ENCOUNTER (OUTPATIENT)
Dept: RADIATION THERAPY | Age: 76
Discharge: HOME | End: 2021-04-20

## 2021-04-21 ENCOUNTER — APPOINTMENT (OUTPATIENT)
Dept: INFUSION THERAPY | Age: 76
End: 2021-04-21

## 2021-04-21 ENCOUNTER — HOSPITAL ENCOUNTER (OUTPATIENT)
Dept: RADIATION THERAPY | Age: 76
Discharge: HOME | End: 2021-04-21

## 2021-04-22 ENCOUNTER — HOSPITAL ENCOUNTER (OUTPATIENT)
Dept: RADIATION THERAPY | Age: 76
Discharge: HOME | End: 2021-04-22

## 2021-04-26 ENCOUNTER — HOSPITAL ENCOUNTER (OUTPATIENT)
Dept: RADIATION THERAPY | Age: 76
Discharge: HOME | End: 2021-04-26

## 2021-04-27 ENCOUNTER — HOSPITAL ENCOUNTER (OUTPATIENT)
Dept: RADIATION THERAPY | Age: 76
Discharge: HOME | End: 2021-04-27

## 2021-04-28 ENCOUNTER — HOSPITAL ENCOUNTER (OUTPATIENT)
Dept: RADIATION THERAPY | Age: 76
Discharge: HOME | End: 2021-04-28

## 2021-04-29 ENCOUNTER — HOSPITAL ENCOUNTER (OUTPATIENT)
Dept: RADIATION THERAPY | Age: 76
Discharge: HOME | End: 2021-04-29

## 2021-04-30 ENCOUNTER — HOSPITAL ENCOUNTER (OUTPATIENT)
Dept: RADIATION THERAPY | Age: 76
Discharge: HOME | End: 2021-04-30

## 2021-05-03 ENCOUNTER — HOSPITAL ENCOUNTER (OUTPATIENT)
Dept: RADIATION THERAPY | Age: 76
Discharge: HOME | End: 2021-05-03

## 2021-05-04 ENCOUNTER — HOSPITAL ENCOUNTER (OUTPATIENT)
Dept: RADIATION THERAPY | Age: 76
Discharge: HOME | End: 2021-05-04

## 2021-05-05 ENCOUNTER — HOSPITAL ENCOUNTER (OUTPATIENT)
Dept: RADIATION THERAPY | Age: 76
Discharge: HOME | End: 2021-05-05

## 2021-05-06 ENCOUNTER — VIRTUAL VISIT (OUTPATIENT)
Dept: ONCOLOGY | Age: 76
End: 2021-05-06
Payer: COMMERCIAL

## 2021-05-06 DIAGNOSIS — I10 ESSENTIAL HYPERTENSION: ICD-10-CM

## 2021-05-06 DIAGNOSIS — Z98.890 HISTORY OF LUMPECTOMY: ICD-10-CM

## 2021-05-06 DIAGNOSIS — C50.912 INVASIVE DUCTAL CARCINOMA OF LEFT BREAST (HCC): Primary | ICD-10-CM

## 2021-05-06 DIAGNOSIS — Z86.19 HISTORY OF HEPATITIS C: ICD-10-CM

## 2021-05-06 DIAGNOSIS — Z98.890 H/O BILATERAL BREAST REDUCTION SURGERY: ICD-10-CM

## 2021-05-06 PROCEDURE — 99214 OFFICE O/P EST MOD 30 MIN: CPT | Performed by: INTERNAL MEDICINE

## 2021-05-06 RX ORDER — ANASTROZOLE 1 MG/1
1 TABLET ORAL DAILY
Qty: 90 TAB | Refills: 3 | Status: SHIPPED | OUTPATIENT
Start: 2021-05-06 | End: 2022-06-23 | Stop reason: SDUPTHER

## 2021-05-06 NOTE — PROGRESS NOTES
[facility] [place] [facility] [place] [address] [address], [place]: [phone]  F: [phone]      Reason for Visit:   Samm Mendoza is a 70 y.o. female who is seen by synchronous (real-time) audio-video technology for follow up of left breast cancer . Treatment History:   Per Surgery Note:  · Initially abnormal screening mammo 7/20 on LEFT with small mass  · 08/04/20: LEFT breast bx. PATH: IDC, 5 mm in greatest linear dimension extending to core biopsy tip, histologic grade 2, ER+(%)/AK+(%)/HER2-. Clinical stage 1  · MammaPrint: High risk, luminal type B, -0.123  · Breast MRI 8/28/20: 12 mm mass otherwise negative. · 11/03/20: LEFT oncoplastic reduction and SLNBx, and RIGHT breast reduction, with Dr. Barrie Gonzalez. PATH:  · LEFT: IDC, 14 x 13 x 12 mm, overall grade 2, negative margins. DCIS present with negative margins. Pathologic stage: pT1c, pN0.  · RIGHT: Breast tissue with stromal fibrosis and focal papillary apocrine metaplasia. Benign skin and subcutaneous soft tissue. No in-situ or invasive carcinoma identified  · Adjuvant TC chemotherapy 1/6/21 - Current     STAGE: T1cN0 ER+ HEr2 negative mammaprint high risk luminal B    History of Present Illness:   Samm Mendoza is a 70 y.o. female [facility] cancer ER+ HER2 negative s/p lumpectomy and bilateral breast reduction on 11/3/20   Finished XRT yesterday. Did well. Some breast tenderness. Going on vacation tmw. No fevers/ chills/ chest pain/ SOB/ nausea/ vomiting/diarrhea/ pain/fatigue      Last visit:  Did 4 cycles of TC chemo. Did well with chemo. Still feeling tired and weak but getting better. Very busy and working hard. To start radiation today.    No fevers/ chills/ chest pain/ SOB/ nausea/ vomiting/diarrhea/ pain/fatigue      Past Medical History:   Diagnosis Date    Acute diverticulitis 8/18/2017    Allergic rhinitis 8/18/2017    Arthritis 2017    Arthritis of right knee 2019    Back pain, thoracic 2017    Cancer ([facility] 2020    LEFT BREAST     Cataract, left 2017    Cataract, right 2017    Chronic obstructive pulmonary disease (HCC)     [facility]    Cold sore 2017    Elevated hemoglobin A1c 2017    Fatigue 2017    Gastrointestinal disorder     acid reflux    GERD (gastroesophageal reflux disease) 2017    Heart murmur 2017    Hepatitis C 2017    Herpes zoster infection of thoracic region 2017    Hyperlipidemia LDL goal <100 2017    Hypertension     Menopause     Hysterectomy-42years old    Murmur     Obesity (BMI 30-39. 9) 2017    Osteopenia 2017    Other ill-defined conditions(799.89)     hep c    Post-menopausal 2017    Posterior auricular pain of right ear 2017    Preop examination 2017    Valvular heart disease     mitral valve prolapse    Vertigo, benign positional 2017      Past Surgical History:   Procedure Laterality Date    HX BREAST LUMPECTOMY Bilateral 11/3/2020    LEFT BREAST REDUCTION LUMPECTOMY WITH ULTRASOUND, LEFT BREAST SENTINEL NODE BIOPSY, LEFT BREAST RECONSTRUCTION, RIGHT BREAST REDUCTION performed by Desire Montanez MD at [address] [place], [place] [address]way HX COLONOSCOPY      HX HYSTERECTOMY      42years old   [facility] HX ORTHOPAEDIC Right     knee, foot      Social History     Tobacco Use    Smoking status: Former Smoker     Packs/day: 0.25     Years: 25.00     Pack years: 6.25     Types: Cigarettes     Quit date: 1990     Years since quittin.3    Smokeless tobacco: Never Used   Substance Use Topics    Alcohol use:  Yes     Alcohol/week: 0.0 standard drinks     Comment: OCCASIONALLY      Family History   Problem Relation Age of Onset    Hypertension Mother     Diabetes Mother     Kidney Disease Mother     Heart Disease Father     Colon Cancer Maternal Grandmother     No Known Problems Sister     Anesth Problems Neg Hx      Current Outpatient Medications   Medication Sig    ondansetron (ZOFRAN ODT) 8 mg disintegrating tablet Take 1 Tab by mouth every eight (8) hours as needed for Nausea.  olmesartan-hydroCHLOROthiazide (BENICAR HCT) 20-12.5 mg per tablet TAKE 1 TABLET BY MOUTH ONCE DAILY    prochlorperazine (Compazine) 5 mg tablet Take 1 tab by mouth every 6 hours as needed for nausea or vomiting    dexAMETHasone (DECADRON) 4 mg tablet Take 2 tabs (8mg) by mouth twice daily the day before chemotherapy and the day after chemotherapy    MILK THISTLE PO Take 175 mg by mouth daily.  OTHER Indications: AM/PM MENOPAUSE FORMULA 1 TAB QHS    furosemide (LASIX) 20 mg tablet TAKE 1 TABLET BY MOUTH ONCE DAILY (Patient taking differently: Take 20 mg by mouth every evening. TAKE 1 TABLET BY MOUTH ONCE DAILY)    valACYclovir (VALTREX) 500 mg tablet Take 1 tablet by mouth once daily (Patient taking differently: as needed.)    TURMERIC ROOT EXTRACT PO Take 200 mg by mouth.  cyanocobalamin (VITAMIN B-12) 1,000 mcg tablet Take 1,000 mcg by mouth daily.  psyllium (METAMUCIL) powd Take  by mouth daily.  magnesium 250 mg tab Take  by mouth.  mometasone (NASONEX) 50 mcg/actuation nasal spray 2 Sprays as needed.  naproxen sodium (ALEVE) 220 mg tablet Take 220 mg by mouth two (2) times daily (with meals).  docosanol (ABREVA) 10 % topical cream Apply  to affected area five (5) times daily.  biotin 5,000 mcg TbDi Take  by mouth.  krill oil 500 mg cap Take  by mouth.  S-Adenosylmethionine (YRN-E, ENTERIC COATED,) 400 mg TbEC Take  by mouth.  cholecalciferol, VITAMIN D3, (VITAMIN D3) 5,000 unit tab tablet Take  by mouth daily.  Dexlansoprazole (DEXILANT) 60 mg CpDB Take 60 mg by mouth as needed.  omeprazole (PRILOSEC OTC) 20 mg tablet Take 20 mg by mouth as needed. No current facility-administered medications for this visit.        No Known Allergies     A complete review of systems was obtained, negative except as described above    Physical Exam:     There were no vitals taken for this visit. General: alert, cooperative, no distress   Mental  status: normal mood, behavior, speech, dress, motor activity, and thought processes, able to follow commands   HENT: NCAT   Neck: no visualized mass   Resp: no respiratory distress   Neuro: no gross deficits   Skin: no discoloration or lesions of concern on visible areas   Psychiatric: normal affect, consistent with stated mood, no evidence of hallucinations       Due to this being a TeleHealth evaluation (During TTD-17 public health emergency), many elements of the physical examination are unable to be assessed. Evaluation of the following organ systems was limited: Vitals/Constitutional/EENT/Resp/CV/GI//MS/Neuro/Skin/Heme-Lymph-Imm. Results:     Lab Results   Component Value Date/Time    WBC 4.8 03/09/2021 02:38 PM    HGB 10.5 (L) 03/09/2021 02:38 PM    HCT 32.4 (L) 03/09/2021 02:38 PM    PLATELET 123 87/04/9031 02:38 PM    MCV 87.8 03/09/2021 02:38 PM    ABS. NEUTROPHILS 2.8 03/09/2021 02:38 PM    HGB (POC) 12.6 09/11/2017 11:03 AM    HCT (POC) 38.2 09/11/2017 11:03 AM     Lab Results   Component Value Date/Time    Sodium 138 03/09/2021 02:38 PM    Potassium 3.3 (L) 03/09/2021 02:38 PM    Chloride 107 03/09/2021 02:38 PM    CHLORIDE 104.0 03/28/2018 10:08 AM    CO2 26 03/09/2021 02:38 PM    Glucose 107 (H) 03/09/2021 02:38 PM    BUN 18 03/09/2021 02:38 PM    Creatinine 0.91 03/09/2021 02:38 PM    GFR est AA >60 03/09/2021 02:38 PM    GFR est non-AA >60 03/09/2021 02:38 PM    Calcium 9.1 03/09/2021 02:38 PM    Sodium (POC) 146.0 (A) 03/28/2018 10:08 AM    Potassium (POC) 4.6 03/28/2018 10:08 AM    Creatinine (POC) 0.7 03/28/2018 10:08 AM     Lab Results   Component Value Date/Time    Bilirubin, total 0.4 03/09/2021 02:38 PM    ALT (SGPT) 24 03/09/2021 02:38 PM    Alk.  phosphatase 50 03/09/2021 02:38 PM Protein, total 7.9 03/09/2021 02:38 PM    Albumin 3.6 03/09/2021 02:38 PM    Globulin 4.3 (H) 03/09/2021 02:38 PM     Records reviewed and summarized above. Pathology report(s) reviewed above. Radiology report(s) reviewed above. Assessment/PLAN:     1) Stage 1 T1cN0 ER+ HER2 negative mammaprint high risk luminal B post lumpectomy/bilateral reduction on 11/3/20  She started adjuvant TC chemotherapy on 1/6/21. Did 4 cycles. Seen today virtually due to [place]. She is clinically stable today, feels well overall. finished radiation yesterday. The risks and benefits of aromatase inhibitors (anastrozole, letrozole, and exemestane) were discussed in detail and the patient was informed of the following: Risks include the development of painful muscles and joints (arthralgia/myalgia) and bone loss. Muscle and joint pain can be severe but rarely result in any tissue damage; symptoms usually resolve in several weeks when the medication is stopped. Bone loss is common and a bone density test is recommended as a baseline and then yearly to every several years depending on initial results. The risk of fractures is increased by a few percent in patients taking these drugs, but careful monitoring of bone density and using bone protecting agents when indicated can minimize these risks. Unlike tamoxifen there is no increased risk of blood clots or endometrial cancer. AIs can cause or worsen vaginal dryness but women using these drugs should not use vaginal estrogen preparations for these symptoms. AIs can also cause or increase hot flashes. Any other symptoms should be reported. Pt is fine with adjuvant AI. Will start after vacation. Pt healthy overall. 2) chemo side effects getting better. Reviewed fatigue/ CARROLL/nail and skin changes getting better today. Continue to monitor. 3) Hx of Hep C  LFTs being monitored. Normal.   Cured per patient. Management per [facility] [place].      4) Heart Valve Problems/HTN. Management per Cardio. 5) COPD/GERD   Management per PCP. 6) Psychosocial  Mood good, coping well. She lives alone   She continues to work during [place] from home. SW support as needed. Call if questions. Follow up in 3 months    The patient was evaluated through a synchronous (real-time) audio-video encounter. The patient (or guardian if applicable) is aware that this is a billable service. Verbal consent to proceed has been obtained within the past 12 months. The visit was conducted pursuant to the emergency declaration under the [facility], [address] [place] [address] authority and the [employer] Act. Patient identification was verified, and a caregiver was present when appropriate. The patient was located in a state where the provider was credentialed to provide care. I appreciate the opportunity to participate in Ms. Danielle Jones's care.     Signed By: Saeid Kumar DO

## 2021-05-06 NOTE — PATIENT INSTRUCTIONS
Anastrozole (Arimidex®) This information is about a hormonal therapy used to treat breast cancer called anastrozole, which is also called Arimidex®. Throughout this page we refer to it by its more commonly used name, Arimidex. This information should ideally be read with our general information about breast cancer or secondary breast cancer. Arimidex Arimidex is a type of hormonal therapy used to treat breast cancer in women who have been through the menopause (change of life). You will see your doctor regularly while you have this treatment so they can monitor its effects. Hormonal therapies interfere with the production or action of particular hormones in the body. Hormones are substances produced naturally in the body. They act as chemical messengers and help control the activity of cells and organs. Aromatase inhibitors Many breast cancers rely on the hormone oestrogen to grow. These cancers are known as hormone-sensitive breast cancers. In women who have had their menopause, the main source of oestrogen is through the conversion of androgens (sex hormones produced by the adrenal glands) into oestrogens. This is carried out by an enzyme called aromatase. The conversion process is known as aromatisation, and it happens mainly in the fatty tissues of the body. Arimidex is a drug that blocks the process of aromatisation, and so reduces the amount of oestrogen in the body. As less oestrogen reaches the cancer cells, they grow more slowly or stop growing altogether. Drugs that work in this way are known as aromatase inhibitors. Other aromatase inhibitors include letrozole (Femara®) and exemestane (Aromasin®). How Arimidex is taken Arimidex is a tablet that is taken once a day. It should be swallowed whole with a glass of water, at about the same time each day. It doesn't matter whether this is in the morning or evening. When it is given Your doctor will take into account a number of different factors when planning your treatment. Arimidex is used to treat post-menopausal women with hormone-sensitive breast cancer. Early breast cancer Arimidex may be given to women with early breast cancer (cancer that has not spread) after they have had surgery to remove the cancer. Giving treatment after surgery to reduce the chance of the cancer coming back is known as adjuvant therapy. For some women, Arimidex may be more effective than tamoxifen, and it has different side effects. Studies show that switching to Arimidex after taking tamoxifen for 2-3 years may be better than five years of tamoxifen for some women. Advanced breast cancer Arimidex can be used to treat women who have breast cancer that has spread to other parts of the body (advanced or secondary breast cancer). It can also be used to treat women whose breast cancer has come back after initial treatment. You might find our information about staging and grading of breast cancer useful. Length of treatment Your doctors will discuss the length of treatment they feel is appropriate for you. Arimidex may be given over a number of years, or for as long as it is controlling your cancer, depending on your individual situation. Possible side effects Each person's reaction to any medicine is different. Most people have very few side effects with Arimidex, while others may experience more. The side effects described here won't affect everyone and may be different if you are taking more than one drug. We have outlined the most common side effects, but haven't included those that are rare and therefore unlikely to affect you. If you notice any effects which aren't listed here, discuss them with your doctor or nurse. You may have some of the following side effects, to varying degrees:  
Hot flushes and sweats These are usually mild and may wear off after a period of time.  Sometimes people find it helps to cut down on tea, coffee, nicotine and alcohol. Research suggests that hormones called progestogens or some types of antidepressants may be helpful in controlling this side effect. Your doctor or nurse can discuss this with you. Some people find complementary therapies such as acupuncture helpful. Your GP may be able to give you details about having these on the NHS. If you find your own therapist, make sure that they are properly qualified and registered. You can read more about treatments for menopausal symptoms like hot flushes in our information about managing menopausal symptoms. Vaginal dryness This may occur while using Arimidex. Gels that can help to overcome the dryness are available. You can buy these from a  or your doctor can prescribe them. Feeling sick (nausea) and being sick (vomiting) These side effects are rare and usually mild. They can usually be effectively treated, so let your doctor know if you are affected. Feeling sick can often be relieved by taking your tablet with food or at night. Diarrhoea If you have diarrhoea it's important to drink plenty of fluids. Hair thinning Some women notice that their hair becomes thinner while taking Arimidex. This is usually mild and the hair grows back at the end of treatment. Headaches Some people have headaches while taking Arimidex, but this is not common. It's important to drink plenty of fluids. Let your doctor know if you are getting headaches, as they can prescribe medication to help. Skin rashes Rarely, Arimidex can cause skin rashes. Vaginal bleeding Some women have some vaginal bleeding, usually in the first few weeks of treatment. This is rare and usually occurs after changing from other hormonal therapies to treatment with Arimidex. If the bleeding continues, tell your doctor or breast care nurse. Joint pains/muscular stiffness Some women have pain and stiffness in their joints while taking Arimidex.  Let your doctor know if these effects are a problem. You may find it helpful to take mild painkillers. Tiredness (fatigue) and lethargy Some people can have increased tiredness, especially at the start of treatment. It's important to get plenty of rest. If you are very sleepy you should not drive or operate machinery. Risk of osteoporosis Women who have, or are at risk of, osteoporosis (weakened bones), should have their bone strength assessed before and during treatment with Arimidex. Some women may need to take bone-strengthening drugs to help prevent osteoporosis developing. Always let your doctor or nurse know about any side effects you have. There are usually ways in which they can be controlled or improved. Things to remember about Arimidex tablets  Arimidex may interact with other medicines. Tell your doctor about any medicines you are taking, including non-prescribed drugs such as complementary therapies, vitamins and herbal drugs.  Keep the tablets in a safe place, out of the reach of children.  Keep the tablets in the original packaging and store them at room temperature, away from heat and direct sunlight.  Don't worry if you forget to take your tablet. Do not take a double dose. The levels of the drug in your blood will not change very much, but try not to miss more than one or two tablets in a row.  If your doctor decides to stop the treatment, return any remaining tablets to the pharmacist. Don't flush them down the toilet or throw them away.  Remember to get a new prescription a few weeks before you run out of tablets, and make sure that you have plenty for holidays. References This information is based on our Anastrozole (Arimidex®) fact sheet and has been compiled using information from a number of reliable sources, including:  cortez Nunez. [facility]: The Complete Drug Reference. 36th edition. 2009. [employer]. [place]. 59th edition. 2010.  [facility] [employer] and [address].  Early and locally advanced breast cancer: diagnosis and treatment. February 2009. National institute for Health and Clinical Excellence (NICE).  electronic Medicines [facility]). www.medicines. org.uk (accessed September 2010).

## 2021-06-08 ENCOUNTER — HOSPITAL ENCOUNTER (OUTPATIENT)
Dept: RADIATION THERAPY | Age: 76
Discharge: HOME | End: 2021-06-08

## 2021-06-16 ENCOUNTER — OFFICE VISIT (OUTPATIENT)
Dept: INTERNAL MEDICINE CLINIC | Age: 76
End: 2021-06-16
Payer: COMMERCIAL

## 2021-06-16 ENCOUNTER — TELEPHONE (OUTPATIENT)
Dept: CARDIOLOGY CLINIC | Age: 76
End: 2021-06-16

## 2021-06-16 VITALS
OXYGEN SATURATION: 98 % | DIASTOLIC BLOOD PRESSURE: 69 MMHG | SYSTOLIC BLOOD PRESSURE: 110 MMHG | RESPIRATION RATE: 18 BRPM | WEIGHT: 200 LBS | HEART RATE: 63 BPM | BODY MASS INDEX: 30.31 KG/M2 | HEIGHT: 68 IN | TEMPERATURE: 99.7 F

## 2021-06-16 DIAGNOSIS — E78.5 HYPERLIPIDEMIA LDL GOAL <100: ICD-10-CM

## 2021-06-16 DIAGNOSIS — R73.09 ELEVATED HEMOGLOBIN A1C: ICD-10-CM

## 2021-06-16 DIAGNOSIS — I10 ESSENTIAL HYPERTENSION: Primary | ICD-10-CM

## 2021-06-16 DIAGNOSIS — R53.83 FATIGUE, UNSPECIFIED TYPE: ICD-10-CM

## 2021-06-16 PROBLEM — J44.9 CHRONIC OBSTRUCTIVE PULMONARY DISEASE (HCC): Status: RESOLVED | Noted: 2021-01-06 | Resolved: 2021-06-16

## 2021-06-16 LAB
ALBUMIN SERPL-MCNC: 4.2 G/DL (ref 3.5–5)
ALBUMIN/GLOB SERPL: 1 {RATIO} (ref 1.1–2.2)
ALP SERPL-CCNC: 67 U/L (ref 45–117)
ALT SERPL-CCNC: 20 U/L (ref 12–78)
ANION GAP SERPL CALC-SCNC: 6 MMOL/L (ref 5–15)
APPEARANCE UR: CLEAR
AST SERPL-CCNC: 14 U/L (ref 15–37)
BACTERIA URNS QL MICRO: NEGATIVE /HPF
BASOPHILS # BLD: 0 K/UL (ref 0–0.1)
BASOPHILS NFR BLD: 1 % (ref 0–1)
BILIRUB SERPL-MCNC: 0.3 MG/DL (ref 0.2–1)
BILIRUB UR QL: NEGATIVE
BUN SERPL-MCNC: 18 MG/DL (ref 6–20)
BUN/CREAT SERPL: 21 (ref 12–20)
CALCIUM SERPL-MCNC: 10.4 MG/DL (ref 8.5–10.1)
CHLORIDE SERPL-SCNC: 106 MMOL/L (ref 97–108)
CHOLEST SERPL-MCNC: 214 MG/DL
CO2 SERPL-SCNC: 29 MMOL/L (ref 21–32)
COLOR UR: NORMAL
CREAT SERPL-MCNC: 0.86 MG/DL (ref 0.55–1.02)
DIFFERENTIAL METHOD BLD: NORMAL
EOSINOPHIL # BLD: 0.2 K/UL (ref 0–0.4)
EOSINOPHIL NFR BLD: 5 % (ref 0–7)
EPITH CASTS URNS QL MICRO: NORMAL /LPF
ERYTHROCYTE [DISTWIDTH] IN BLOOD BY AUTOMATED COUNT: 12.7 % (ref 11.5–14.5)
EST. AVERAGE GLUCOSE BLD GHB EST-MCNC: 103 MG/DL
GLOBULIN SER CALC-MCNC: 4.2 G/DL (ref 2–4)
GLUCOSE SERPL-MCNC: 96 MG/DL (ref 65–100)
GLUCOSE UR STRIP.AUTO-MCNC: NEGATIVE MG/DL
HBA1C MFR BLD: 5.2 % (ref 4–5.6)
HCT VFR BLD AUTO: 43.5 % (ref 35–47)
HDLC SERPL-MCNC: 52 MG/DL
HDLC SERPL: 4.1 {RATIO} (ref 0–5)
HGB BLD-MCNC: 13.6 G/DL (ref 11.5–16)
HGB UR QL STRIP: NEGATIVE
HYALINE CASTS URNS QL MICRO: NORMAL /LPF (ref 0–5)
IMM GRANULOCYTES # BLD AUTO: 0 K/UL (ref 0–0.04)
IMM GRANULOCYTES NFR BLD AUTO: 0 % (ref 0–0.5)
KETONES UR QL STRIP.AUTO: NEGATIVE MG/DL
LDLC SERPL CALC-MCNC: 136 MG/DL (ref 0–100)
LEUKOCYTE ESTERASE UR QL STRIP.AUTO: NEGATIVE
LYMPHOCYTES # BLD: 1 K/UL (ref 0.8–3.5)
LYMPHOCYTES NFR BLD: 26 % (ref 12–49)
MCH RBC QN AUTO: 28.9 PG (ref 26–34)
MCHC RBC AUTO-ENTMCNC: 31.3 G/DL (ref 30–36.5)
MCV RBC AUTO: 92.4 FL (ref 80–99)
MONOCYTES # BLD: 0.4 K/UL (ref 0–1)
MONOCYTES NFR BLD: 12 % (ref 5–13)
NEUTS SEG # BLD: 2 K/UL (ref 1.8–8)
NEUTS SEG NFR BLD: 56 % (ref 32–75)
NITRITE UR QL STRIP.AUTO: NEGATIVE
NRBC # BLD: 0 K/UL (ref 0–0.01)
NRBC BLD-RTO: 0 PER 100 WBC
PH UR STRIP: 5.5 [PH] (ref 5–8)
PLATELET # BLD AUTO: 215 K/UL (ref 150–400)
PMV BLD AUTO: 11.8 FL (ref 8.9–12.9)
POTASSIUM SERPL-SCNC: 4.6 MMOL/L (ref 3.5–5.1)
PROT SERPL-MCNC: 8.4 G/DL (ref 6.4–8.2)
PROT UR STRIP-MCNC: NEGATIVE MG/DL
RBC # BLD AUTO: 4.71 M/UL (ref 3.8–5.2)
RBC #/AREA URNS HPF: NORMAL /HPF (ref 0–5)
SODIUM SERPL-SCNC: 141 MMOL/L (ref 136–145)
SP GR UR REFRACTOMETRY: 1.03 (ref 1–1.03)
TRIGL SERPL-MCNC: 130 MG/DL (ref ?–150)
UA: UC IF INDICATED,UAUC: NORMAL
UROBILINOGEN UR QL STRIP.AUTO: 0.2 EU/DL (ref 0.2–1)
VLDLC SERPL CALC-MCNC: 26 MG/DL
WBC # BLD AUTO: 3.6 K/UL (ref 3.6–11)
WBC URNS QL MICRO: NORMAL /HPF (ref 0–4)

## 2021-06-16 PROCEDURE — 99214 OFFICE O/P EST MOD 30 MIN: CPT | Performed by: INTERNAL MEDICINE

## 2021-06-16 NOTE — LETTER
6/17/2021 4:52 PM 
 
Ms. Samm Mendoza [address] [address] Dear Dr. Ryan Zhou Ms Samm Mendoza is currently under the care of [address] [place], [place] [address]e N. She is low risk for cardiac complications during non-cardiac surgery. No further cardiac evaluation is indicated at this time. Please do not hesitate to contact me with questions. Sincerely, Titus Corona MD

## 2021-06-16 NOTE — PROGRESS NOTES
This note will not be viewable in [address] Olinda Francisco is a 70 y.o. female and presents with Follow-up (routine 6 month f/u)  . Subjective:  Mrs. Bailey Jameson presents today for follow-up of hypertension, history of elevated A1c, mild hyperlipidemia, and history of breast cancer. She has completed chemotherapy and radiation therapy. She is planning on having right knee surgery with Dr. Tanya Parham on July 19. She has no shortness of breath, chest pain, palpitations, PND, orthopnea, or pedal edema. She sees her cardiologist for routine follow-up and has had mild elevation of pulmonary artery pressures and tricuspid regurgitation on echocardiogram.  She does not appear to be symptomatic at this time. She continues to see her hepatologist at [facility] Dr. Magali Nino for follow-up of Luisito Rad. She was treated for hepatitis C remotely. Past Medical History:   Diagnosis Date    Acute diverticulitis 8/18/2017    Allergic rhinitis 8/18/2017    Arthritis 8/18/2017    Arthritis of right knee 11/25/2019    Back pain, thoracic 8/18/2017    Cancer ([facility] [address] 08/2020    LEFT BREAST     Cataract, left 8/18/2017    Cataract, right 8/18/2017    Chronic obstructive pulmonary disease (HCC)     Cirrhosis ([facility] [address] 8/18/2017    Cold sore 8/18/2017    Elevated hemoglobin A1c 8/18/2017    Fatigue 8/18/2017    Gastrointestinal disorder     acid reflux    GERD (gastroesophageal reflux disease) 8/18/2017    Heart murmur 8/18/2017    Hepatitis C 8/18/2017    Herpes zoster infection of thoracic region 8/18/2017    Hyperlipidemia LDL goal <100 8/18/2017    Hypertension     Menopause     Hysterectomy-42years old    Murmur     Obesity (BMI 30-39. 9) 8/18/2017    Osteopenia 8/18/2017    Other ill-defined conditions(799.89)     hep c    Post-menopausal 8/18/2017    Posterior auricular pain of right ear 8/18/2017    Preop examination 8/18/2017    Valvular heart disease     mitral valve prolapse    Vertigo, benign positional 8/18/2017     Past Surgical History:   Procedure Laterality Date    HX BREAST LUMPECTOMY Bilateral 11/3/2020    LEFT BREAST REDUCTION LUMPECTOMY WITH ULTRASOUND, LEFT BREAST SENTINEL NODE BIOPSY, LEFT BREAST RECONSTRUCTION, RIGHT BREAST REDUCTION performed by Joanne Led[facility]ma [facility] at [facility] AM[facility]    HX COLONOSCOPY      HX HYSTERECTOMY      42years old    HX ORTHOPAEDIC Right     knee, foot     No Known Allergies  Current Outpatient Medications   Medication Sig Dispense Refill    anastrozole (ARIMIDEX) 1 mg tablet Take 1 mg by mouth daily. Indications: hormone receptor positive breast cancer 90 Tab 3    ondansetron (ZOFRAN ODT) 8 mg disintegrating tablet Take 1 Tab by mouth every eight (8) hours as needed for Nausea. 60 Tab 0    olmesartan-hydroCHLOROthiazide (BENICAR HCT) 20-12.5 mg per tablet TAKE 1 TABLET BY MOUTH ONCE DAILY 90 Tab 3    prochlorperazine (Compazine) 5 mg tablet Take 1 tab by mouth every 6 hours as needed for nausea or vomiting 30 Tab 1    dexAMETHasone (DECADRON) 4 mg tablet Take 2 tabs (8mg) by mouth twice daily the day before chemotherapy and the day after chemotherapy 32 Tab 0    MILK THISTLE PO Take 175 mg by mouth daily.  OTHER Indications: AM/PM MENOPAUSE FORMULA 1 TAB QHS      furosemide (LASIX) 20 mg tablet TAKE 1 TABLET BY MOUTH ONCE DAILY (Patient taking differently: Take 20 mg by mouth every evening. TAKE 1 TABLET BY MOUTH ONCE DAILY) 90 Tab 3    valACYclovir (VALTREX) 500 mg tablet Take 1 tablet by mouth once daily (Patient taking differently: as needed.) 30 Tab 0    TURMERIC ROOT EXTRACT PO Take 200 mg by mouth.  cyanocobalamin (VITAMIN B-12) 1,000 mcg tablet Take 1,000 mcg by mouth daily.  psyllium (METAMUCIL) powd Take  by mouth daily.  magnesium 250 mg tab Take  by mouth.  mometasone (NASONEX) 50 mcg/actuation nasal spray 2 Sprays as needed.       naproxen sodium (ALEVE) 220 mg tablet Take 220 mg by mouth two (2) times daily (with meals).  docosanol (ABREVA) 10 % topical cream Apply  to affected area five (5) times daily.  biotin 5,000 mcg TbDi Take  by mouth.  krill oil 500 mg cap Take  by mouth.  S-Adenosylmethionine (YRN-E, ENTERIC COATED,) 400 mg TbEC Take  by mouth.  cholecalciferol, VITAMIN D3, (VITAMIN D3) 5,000 unit tab tablet Take  by mouth daily.  Dexlansoprazole (DEXILANT) 60 mg CpDB Take 60 mg by mouth as needed.  omeprazole (PRILOSEC OTC) 20 mg tablet Take 20 mg by mouth as needed. Social History     Socioeconomic History    Marital status: SINGLE     Spouse name: Not on file    Number of children: Not on file    Years of education: Not on file    Highest education level: Not on file   Tobacco Use    Smoking status: Former Smoker     Packs/day: 0.25     Years: 25.00     Pack years: 6.25     Types: Cigarettes     Quit date: 1990     Years since quittin.4    Smokeless tobacco: Never Used   Vaping Use    Vaping Use: Never used   Substance and Sexual Activity    Alcohol use: Yes     Alcohol/week: 0.0 standard drinks     Comment: OCCASIONALLY    Drug use: No     Social Determinants of Health     Financial Resource Strain:     Difficulty of Paying Living Expenses:    Food Insecurity:     Worried About Running Out of Food in the Last Year:     [address] in the Last Year:    Transportation Needs:     Lack of Transportation (Medical):      Lack of Transportation (Non-Medical):    Physical Activity:     Days of Exercise per Week:     Minutes of Exercise per Session:    Stress:     Feeling of Stress :    Social Connections:     Frequency of Communication with Friends and Family:     Frequency of Social Gatherings with Friends and Family:     Attends Sikhism Services:     Active Member of Clubs or Organizations:     Attends Club or Organization Meetings:     Marital Status:      Family History   Problem Relation Age of Onset    Hypertension Mother     Diabetes Mother     Kidney Disease Mother     Heart Disease Father     Colon Cancer Maternal Grandmother     No Known Problems Sister     Anesth Problems Neg Hx        Review of Systems  Constitutional:  negative for fevers, chills, anorexia and weight loss  Eyes:    negative for visual disturbance and irritation  ENT:    negative for tinnitus,sore throat,nasal congestion,ear pains. hoarseness  Respiratory:     negative for cough, hemoptysis, dyspnea,wheezing  CV:    negative for chest pain, palpitations, lower extremity edema  GI:    negative for nausea, vomiting, diarrhea, abdominal pain,melena  Endo:               negative for polyuria,polydipsia,polyphagia,heat intolerance  Genitourinary : negative for frequency, dysuria and hematuria  Integumentary: negative for rash and pruritus  Hematologic:   negative for easy bruising and gum/nose bleeding  Musculoskel:  negative for myalgias, arthralgias, back pain, muscle weakness, joint pain  Neurological:   negative for headaches, dizziness, vertigo, memory problems and gait   Behavl/Psych:  negative for feelings of anxiety, depression, mood changes  ROS otherwise negative      Objective:  Visit Vitals  /69 (BP 1 Location: Right arm, BP Patient Position: Sitting, BP Cuff Size: Large adult)   Pulse 63   Temp 99.7 °F (37.6 °C) (Oral)   Resp 18   Ht 5' 8\" (1.727 m)   Wt 200 lb (90.7 kg)   SpO2 98%   BMI 30.41 kg/m²     Physical Exam:   General appearance - alert, well appearing, and in no distress  Mental status - alert, oriented to person, place, and time  EYE-EMMY, EOMI, fundi normal, corneas normal, no foreign bodies  ENT-ENT exam normal, no neck nodes or sinus tenderness  Nose - normal and patent, no erythema, discharge or polyps  Mouth - mucous membranes moist, pharynx normal without lesions  Neck - supple, no significant adenopathy   Chest - clear to auscultation, no wheezes, rales or rhonchi, symmetric air entry   Heart - normal rate, regular rhythm, normal S1, S2, no murmurs, rubs, clicks or gallops   Abdomen - soft, nontender, nondistended, no masses or organomegaly  Lymph- no adenopathy palpable  Ext-peripheral pulses normal, no pedal edema, no clubbing or cyanosis  Skin-Warm and dry. no hyperpigmentation, vitiligo, or suspicious lesions  Neuro -alert, oriented, normal speech, no focal findings or movement disorder noted      Assessment/Plan:  Diagnoses and all orders for this visit:    1. Essential hypertension  -     METABOLIC PANEL, COMPREHENSIVE; Future  -     URINALYSIS W/ REFLEX CULTURE; Future    2. Elevated hemoglobin A1c  -     HEMOGLOBIN A1C WITH EAG; Future    3. Hyperlipidemia LDL goal <100  -     LIPID PANEL; Future    4. Fatigue, unspecified type  -     CBC WITH AUTOMATED DIFF; Future          ICD-10-CM ICD-9-CM    1. Essential hypertension  Y75 164.5 METABOLIC PANEL, COMPREHENSIVE      URINALYSIS W/ REFLEX CULTURE   2. Elevated hemoglobin A1c  R73.09 790.29 HEMOGLOBIN A1C WITH EAG   3. Hyperlipidemia LDL goal <100  E78.5 272.4 LIPID PANEL   4. Fatigue, unspecified type  R53.83 780.79 CBC WITH AUTOMATED DIFF       Plan:    The patient will continue her current medical regimen as outlined above. Further recommendations based on labs as ordered. Patient should be stable for the planned procedure as scheduled but I do not think further restratification is needed at this time. I have reviewed with the patient details of the assessment and plan and all questions were answered. Relevent patient education was performed. Verbal and/or written instructions (see AVS) provided. The most recent lab findings were reviewed with the patient. Plan was discussed with patient who verbally expressed understanding. An After Visit Summary was printed and given to the patient.     Pritesh Willett MD

## 2021-06-16 NOTE — TELEPHONE ENCOUNTER
Patient is having total rt knee replacement on 07/19/2021, surgeon is requiring  Cardiac clearance, no appts available.     Please assist        [facility]:[phone]

## 2021-06-16 NOTE — PROGRESS NOTES
1. Have you been to the ER, urgent care clinic since your last visit? Hospitalized since your last visit? No    2. Have you seen or consulted any other health care providers outside of the [address] [place] [address] since your last visit? Include any pap smears or colon screening.  No

## 2021-06-17 NOTE — PROGRESS NOTES
Your A1c which is an average of your blood sugar is normal.  Your cholesterol is just above normal range but your HDL cholesterol or good cholesterol remains excellent. Your glucose is normal.  Your liver and kidney function are stable.   Your urine analysis is clear and your complete blood count is normal.

## 2021-06-17 NOTE — TELEPHONE ENCOUNTER
Returned patient's call, 2 pt identifiers used    The following message given per Dr. Peter Mar:    MD Ric Beltran, RN  Caller: Unspecified (Yesterday, 12:56 PM)  Cleared to proceed         Clearance letter faxed to Dr. Bailey Bell.

## 2021-06-29 ENCOUNTER — TRANSCRIBE ORDER (OUTPATIENT)
Dept: SCHEDULING | Age: 76
End: 2021-06-29

## 2021-06-29 ENCOUNTER — OFFICE VISIT (OUTPATIENT)
Dept: SURGERY | Age: 76
End: 2021-06-29
Payer: COMMERCIAL

## 2021-06-29 VITALS
HEIGHT: 68 IN | SYSTOLIC BLOOD PRESSURE: 111 MMHG | BODY MASS INDEX: 30.31 KG/M2 | WEIGHT: 200 LBS | DIASTOLIC BLOOD PRESSURE: 59 MMHG

## 2021-06-29 DIAGNOSIS — Z85.3 HISTORY OF BREAST CANCER IN FEMALE: ICD-10-CM

## 2021-06-29 DIAGNOSIS — Z92.3 S/P RADIATION THERAPY: ICD-10-CM

## 2021-06-29 DIAGNOSIS — Z98.890 S/P LUMPECTOMY OF BREAST: ICD-10-CM

## 2021-06-29 DIAGNOSIS — Z85.3 HX OF BREAST CANCER: Primary | ICD-10-CM

## 2021-06-29 DIAGNOSIS — C50.912 INVASIVE DUCTAL CARCINOMA OF LEFT BREAST (HCC): Primary | ICD-10-CM

## 2021-06-29 PROCEDURE — 99213 OFFICE O/P EST LOW 20 MIN: CPT | Performed by: NURSE PRACTITIONER

## 2021-06-29 NOTE — PATIENT INSTRUCTIONS

## 2021-06-29 NOTE — PROGRESS NOTES
HISTORY OF PRESENT ILLNESS  Dawna Moore is a 70 y.o. female. HPI Established patient presents for follow-up to LEFT breast cancer. Denies breast mass, skin changes, nipple discharge and pain. Breast history -   Referring - Dr. Aranza Godoy  08/04/20: LEFT breast bx. PATH: IDC, 5 mm in greatest linear dimension extending to core biopsy tip, histologic grade 2, ER+(%)/FL+(%)/HER2-. Clinical stage 1. MammaPrint: High risk, luminal type B, -0.123.  11/03/20: LEFT oncoplastic reduction and SLNBx, and RIGHT breast reduction - Dr. Blaise Hogan and Dr. Rosalio Rodriguez. · LEFT: IDC, 14 x 13 x 12mm, overall grade 2, negative margins. DCIS present with negative margins. · Pathologic stage: pT1c, pN0.  · RIGHT: Breast tissue with stromal fibrosis and focal papillary apocrine metaplasia. Benign skin and subcutaneous soft tissue. No in-situ or invasive carcinoma identified. 1/6/21 - started adjuvant TC chemotherapy - 4 cycles - Dr. Marco A Juárez  5/2021 - completed XRT - Lovette Barrier   6/2021 - started anastrozole - Dr. Marco A Juárez        Family history -  Maternal aunt had colon cancer. OB History    No obstetric history on file. Obstetric Comments   Menarche 17, LMP age 42, # of children 1, age of 4st delivery 23, Hysterectomy/oophorectomy yes partial/yes, Breast bx no, history of breast feeding no, BCP yes, Hormone therapy yes               Past Surgical History:   Procedure Laterality Date    HX BREAST LUMPECTOMY Bilateral 11/3/2020    LEFT BREAST REDUCTION LUMPECTOMY WITH ULTRASOUND, LEFT BREAST SENTINEL NODE BIOPSY, LEFT BREAST RECONSTRUCTION, RIGHT BREAST REDUCTION performed by Bruna Orosco MD at [address] HX COLONOSCOPY      HX HYSTERECTOMY      42years old    HX ORTHOPAEDIC Right     knee, foot       ROS    Physical Exam  Constitutional:       Appearance: Normal appearance. Chest:      Breasts:         Right: No mass, nipple discharge, skin change or tenderness.          Left: No mass, nipple discharge, skin change (post XRT skin changes) or tenderness. Comments: Well healed surgical incisions bilaterally  Musculoskeletal:      Comments: FROM - UE x 2   Lymphadenopathy:      Upper Body:      Right upper body: No supraclavicular or axillary adenopathy. Left upper body: No supraclavicular or axillary adenopathy. Neurological:      Mental Status: She is alert. Psychiatric:         Attention and Perception: Attention normal.         Mood and Affect: Mood normal.         Speech: Speech normal.         Behavior: Behavior normal.         Visit Vitals  BP (!) 111/59 (BP 1 Location: Right arm, BP Patient Position: Sitting, BP Cuff Size: Adult)   Ht 5' 8\" (1.727 m)   Wt 200 lb (90.7 kg)   BMI 30.41 kg/m²         ASSESSMENT and PLAN  Encounter Diagnoses   Name Primary? 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 Reviewed normal post treatment changes. Continues care with Dr. Sylwia Wolf - has follow-up in August.  Has started AI and reports some hot flashes. Continues care with Dr. Yuval Milian - follow-up next year. BDmammogram 3D in 11/2021. RTC in 6 months or sooner PRN. She is comfortable with this plan. All questions answered and she stated understanding. Total time spent for this patient - 20 minutes.

## 2021-07-09 ENCOUNTER — HOSPITAL ENCOUNTER (OUTPATIENT)
Dept: PREADMISSION TESTING | Age: 76
Discharge: HOME | End: 2021-07-09
Payer: COMMERCIAL

## 2021-07-09 VITALS
DIASTOLIC BLOOD PRESSURE: 72 MMHG | HEIGHT: 68 IN | HEART RATE: 65 BPM | TEMPERATURE: 98 F | SYSTOLIC BLOOD PRESSURE: 122 MMHG | RESPIRATION RATE: 16 BRPM | WEIGHT: 203.26 LBS | BODY MASS INDEX: 30.81 KG/M2

## 2021-07-09 LAB
ABO + RH BLD: NORMAL
ATRIAL RATE: 66 BPM
BLOOD GROUP ANTIBODIES SERPL: NORMAL
CALCULATED P AXIS, ECG09: 57 DEGREES
CALCULATED R AXIS, ECG10: -11 DEGREES
CALCULATED T AXIS, ECG11: 33 DEGREES
DIAGNOSIS, 93000: NORMAL
INR PPP: 1 (ref 0.9–1.1)
P-R INTERVAL, ECG05: 164 MS
PROTHROMBIN TIME: 10.7 SEC (ref 9–11.1)
Q-T INTERVAL, ECG07: 396 MS
QRS DURATION, ECG06: 84 MS
QTC CALCULATION (BEZET), ECG08: 415 MS
SPECIMEN EXP DATE BLD: NORMAL
VENTRICULAR RATE, ECG03: 66 BPM

## 2021-07-09 PROCEDURE — 85610 PROTHROMBIN TIME: CPT

## 2021-07-09 PROCEDURE — 86901 BLOOD TYPING SEROLOGIC RH(D): CPT

## 2021-07-09 PROCEDURE — 93005 ELECTROCARDIOGRAM TRACING: CPT

## 2021-07-09 RX ORDER — AMOXICILLIN 500 MG/1
2000 CAPSULE ORAL AS NEEDED
COMMUNITY
End: 2022-06-22

## 2021-07-09 RX ORDER — MINERAL OIL
360 ENEMA (ML) RECTAL DAILY
COMMUNITY

## 2021-07-09 RX ORDER — FLUTICASONE FUROATE 27.5 UG/1
2 SPRAY, METERED NASAL DAILY
COMMUNITY

## 2021-07-09 NOTE — PERIOP NOTES
Preoperative instructions reviewed with patient. Patient given  2 bottles of CHG soap. Instructions reviewed on use of CHG soap. Patient given SSI infection FAQS sheet, as well as a  MRSA/MSSA treatment instruction sheet  With an explanation to patient that they will be notified if treatment instructions need to be initiated. Patient was given the opportunity to ask questions on the information provided. Ortho Discharge Planning form placed in folder at . ORTHOPEDIC PRE-OP ASSESSMENT AND PLANNING FORM SENT FOR SCANNING.

## 2021-07-09 NOTE — PERIOP NOTES
PER PAT  TIMING NOTE:  SCHED.  PAT APPT; 7-9-21 AT 1130/ALL COVID QUESTIONS ANSWERED/PT FULLY VACCINATED; PFIZER 2-23-21 AND 3-19-21 /PT INSTRUCTED TO BRING PFIZER CARD TO PAT APPT/PT OPTED TO WATCH JOINT VIDEO

## 2021-07-10 LAB
BACTERIA SPEC CULT: NORMAL
BACTERIA SPEC CULT: NORMAL
SERVICE CMNT-IMP: NORMAL

## 2021-07-12 ENCOUNTER — TELEPHONE (OUTPATIENT)
Dept: ONCOLOGY | Age: 76
End: 2021-07-12

## 2021-07-12 NOTE — TELEPHONE ENCOUNTER
HIPPA Verified. Called pt on mobile phone number listed. Patient did agree she is talking about the Anastrozole, patient was advised she does not need to stop taking her Anastrozole prior to surgery! Patient was very appreciative over call.   Brendan Callaway

## 2021-07-12 NOTE — TELEPHONE ENCOUNTER
I am assuming she is talking about Anastrozole. No need to hold Anastrozole prior to surgery. Please let her know.

## 2021-07-12 NOTE — H&P
Melina Elmore  : 1945  Undefined / Language: Terri Geronimo / Race: Undefined  Female      History of Present Illness   The patient is a 70year old female who presents to the practice today for a transition into care. Additional reasons for visit:    Knee Pain is described as the following: The onset of the knee pain has been gradual and has been occurring in a persistent pattern for 3 years. The course has been gradually worsening. The knee pain is severe. The knee pain is characterized as a dull aching. The knee pain is described as being located in the entire knee (right). The knee pain is aggravated by physical activity. Note for \"Knee Pain\": Chief complaint right knee pain. Referred by prior patient. She has had multiple injections including cortisone and gel. Slowly progressive symptoms. She now feels that she would like her knee replaced. She is having night pain. Limitation of function. She is recently been treated for breast cancer and is finishing up that treatment now. Allergies   No Known Allergies   [2021]:  No Known Drug Allergies   [2021]: Allergies Reconciled      Family History   Arthritis   Father, Mother. Hypertension   Father, Mother. Kidney disease   Father, Mother. Social History   Alcohol use   1-2 drinks per occasion, 4 times, Drinks wine, Never drinks more than 5 drinks per occasion, per month. Caffeine use   1-2 drinks per day, Coffee. Current work status   Full-time. Exercise   Occasionally, walking. Marital status   Single. No drug use    Seat Belt Use   Occasionally uses seat belts. Sun Exposure   Rarely. Tobacco / smoke exposure   None. Tobacco use   Former smoker, Smokes . 75 pack of cigarettes per day. Medication History  Benicar HCT  (20-12.5MG Tablet, Oral) Active. Furosemide  (20MG Tablet, Oral) Active. Dexilant  (60MG Capsule DR, Oral) Active. Amoxicillin  (500MG Tablet, Oral) Active.   Medications Reconciled     Pregnancy / Birth History Pregnant   No.    Past Surgical History   Breast Biopsy   left  Breast Mass; Local Excision   left  Cataract Surgery   bilateral  Colon Polyp Removal - Colonoscopy    Hysterectomy   non-cancerous: partial  Mammoplasty; Reduction   bilateral  Tonsillectomy      Other Problems  Arthritis    Breast Cancer    Gastroesophageal Reflux Disease    Hepatitis C    High blood pressure    Oophorectomy   bilateral        Review of Systems  General Not Present- Appetite Loss, Chills, Fatigue, Fever, HIV Exposure, Night Sweats, Persistent Infections, Seasonal Allergies, Weight Gain and Weight Loss. Skin Not Present- Itching, Nail Changes, Poor Wound Healing, Rash, Skin Color Changes, Suspicious Lesions and Yellowish Skin Color. HEENT Not Present- Decreased Hearing, Double Vision, Earache, Hoarseness, Jaundice/Yellow Eyes, Loose Teeth, Nose Bleed, Ringing in the Ears and Sore Throat. Respiratory Not Present- Bloody sputum, Chronic Cough, Difficulty Breathing, Snoring, Wakes up from Sleep Wheezing or Short of Breath and Wheezing. Cardiovascular Not Present- Bluish Discoloration Of Lips Or Nails, Chest Pain, Difficulty Breathing Lying Down, Difficulty Breathing On Exertion, Leg Cramps With Exertion, Palpitations and Swelling of Extremities. Gastrointestinal Not Present- Abdominal Pain, Black, Tarry Stool, Change in Bowel Habits, Cirrhosis, Constipation, Diarrhea, Difficulty Swallowing, Nausea and Vomiting. Female Genitourinary Not Present- Blood in Urine, Frequency, Painful Urination, Pelvic Pain, Trouble Starting Urinary Stream and Urgency. Musculoskeletal Not Present- Back Pain, Joint Pain, Joint Stiffness and Joint Swelling. Neurological Not Present- Fainting, Headaches, Memory Loss, Numbness, Seizures, Tingling, Tremor, Unsteadiness and Weakness. Psychiatric Not Present- Anxiety, Bipolar and Depression.   Endocrine Not Present- Cold Intolerance, Excessive Hunger, Excessive Thirst, Excessive Urination and Heat Intolerance. Hematology Not Present- Abnormal Bruising , Enlarged Lymph Nodes, Excessive bleeding and Skin Discoloration. Vitals   Weight: 206 lb   Height: 69 in   Weight was reported by patient. Height was reported by patient. Body Surface Area: 2.09 m²   Body Mass Index: 30.42 kg/m²      Physical Exam   Musculoskeletal  Global Assessment  Examination of related systems reveals - well-developed, well-nourished, in no acute distress, alert and oriented x 3, no rashes or ulcers of bilateral upper and lower extremities, head or trunk, no generalized swelling or edema of extremities, no digital clubbing or cyanosis, neurovascularly intact globally with normal deep tendon reflexes and normal coordination. Gait and Station - normal posture. Right Lower Extremity - Note: Hip was examined and has painless range of motion with negative impingement and apprehension sign. Straight leg raise and femoral nerve stretch test are negative. Lower extremity has palpable dorsalis pedis pulse. Skin is intact and foot is sensate and well-perfused. Knee exam today shows valgus malalignment. Range of motion is 3 to 120 degrees. No instability. Pain mostly lateral. Moderate-sized effusion. Motor testing reveals 5 out of 5 strength of the hip flexors, quads, ankle plantar flexors, and ankle dorsiflexors. Left Lower Extremity - Note: Hip was examined and has painless range of motion with negative impingement and apprehension sign. Straight leg raise and femoral nerve stretch test are negative. Lower extremity has palpable dorsalis pedis pulse. Skin is intact and foot is sensate and well-perfused. Motor testing reveals 5 out of 5 strength of the hip flexors, quads, ankle plantar flexors, and ankle dorsiflexors. Assessment & Plan     Primary osteoarthritis of right knee (715.16  M17.11)  Impression: Longstanding conservative treatment. At this point she would like to have her knee replaced and that is very reasonable.  Scheduled total knee today. All questions were answered. She has had appropriate conservative treatment including injections medications over the years by other physicians. Current Plans  Pt Education - How to [address] using Patient Portal and 3rd Party Apps: discussed with patient and provided information. Pt Education - Educational materials were provided.: discussed with patient and provided information. X-RAY EXAM OF KNEE 3 VIEWS (02164) (PA, Lateral and bilateral [place] Patella views were taken today using Digital Radiography. right 3 views right. Severe bone-on-bone lateral lateral osteophytes and cyst. End-stage patellofemoral joint disease.  Left knee bone-on-bone med)    REVIEW OF SYSTEMS: Systems were reviewed by the provider.(V49.9)      Weight above 97th percentile (V49.89  Z78.9)    Current Plans  LIFESTYLE EDUCATION REGARDING DIET (78706)

## 2021-07-12 NOTE — TELEPHONE ENCOUNTER
Patient is having a Right Total Knee Athroplasty done on Monday, what advise should be given?   Nayeli Lopez

## 2021-07-12 NOTE — TELEPHONE ENCOUNTER
Patient called and stated that she is having surgery next Monday and was told that she should ask if she needs to stop taking her radiation medication before surgery.      Call back [phone]

## 2021-07-13 ENCOUNTER — OFFICE VISIT (OUTPATIENT)
Dept: INTERNAL MEDICINE CLINIC | Age: 76
End: 2021-07-13
Payer: COMMERCIAL

## 2021-07-13 ENCOUNTER — DOCUMENTATION ONLY (OUTPATIENT)
Dept: INTERNAL MEDICINE CLINIC | Age: 76
End: 2021-07-13

## 2021-07-13 VITALS
WEIGHT: 201.8 LBS | RESPIRATION RATE: 18 BRPM | DIASTOLIC BLOOD PRESSURE: 72 MMHG | SYSTOLIC BLOOD PRESSURE: 115 MMHG | HEIGHT: 68 IN | BODY MASS INDEX: 30.58 KG/M2 | HEART RATE: 79 BPM | OXYGEN SATURATION: 98 %

## 2021-07-13 DIAGNOSIS — M17.11 ARTHRITIS OF RIGHT KNEE: ICD-10-CM

## 2021-07-13 DIAGNOSIS — I10 ESSENTIAL HYPERTENSION: ICD-10-CM

## 2021-07-13 DIAGNOSIS — Z01.818 PREOP EXAMINATION: Primary | ICD-10-CM

## 2021-07-13 PROCEDURE — 99214 OFFICE O/P EST MOD 30 MIN: CPT | Performed by: INTERNAL MEDICINE

## 2021-07-13 NOTE — PROGRESS NOTES
Doug Navraro is a 78 y.o. female and presents with Pre-op Exam (knee replacement )  . Subjective:  Mrs. Yuni Azul presents today for preoperative evaluation prior to undergoing right total knee replacement by Dr. Rosa Jason on 19 July. She denies shortness of breath, chest pain, palpitations, PND, orthopnea, or pedal edema. She remains on olmesartan HCT 20/12.5 mg daily for hypertension with good results. She is remote history of breast cancer status post lumpectomy with reconstruction chemo and radiation. She remains on Arimidex 1 mg daily. Past Medical History:   Diagnosis Date    Acute diverticulitis 8/18/2017    Allergic rhinitis 8/18/2017    Arthritis 8/18/2017    Arthritis of right knee 11/25/2019    Back pain, thoracic 8/18/2017    Cancer ([facility] [address] 08/2020    LEFT BREAST , LUMPECTOMY WITH RECONSTRUCTION, CHEMO, RADIATION    Cataract, left 8/18/2017    Cataract, right 8/18/2017    Chronic obstructive pulmonary disease (HCC)     Cirrhosis ([facility] [address] 8/18/2017    Cold sore 8/18/2017    COVID-19 vaccine series completed 2/23/21, 3/19/21    PFIZER    Elevated hemoglobin A1c 8/18/2017    Fatigue 8/18/2017    Gastrointestinal disorder     acid reflux    GERD (gastroesophageal reflux disease) 8/18/2017    Heart murmur 8/18/2017    Hepatitis C 8/18/2017    TREATED    Herpes zoster infection of thoracic region 8/18/2017    Hyperlipidemia LDL goal <100 8/18/2017    Hypertension     Menopause     Hysterectomy-42years old    Murmur     Obesity (BMI 30-39. 9) 8/18/2017    Osteopenia 8/18/2017    Other ill-defined conditions(799.89)     hep c    Post-menopausal 8/18/2017    Posterior auricular pain of right ear 8/18/2017    Preop examination 8/18/2017    Valvular heart disease     mitral valve prolapse    Vertigo, benign positional 8/18/2017     Past Surgical History:   Procedure Laterality Date    HX BREAST LUMPECTOMY Bilateral 11/3/2020    LEFT BREAST REDUCTION LUMPECTOMY WITH ULTRASOUND, LEFT BREAST SENTINEL NODE BIOPSY, LEFT BREAST RECONSTRUCTION, RIGHT BREAST REDUCTION performed by Jared [facility] at [facility] AM[facility]    HX COLONOSCOPY      HX HYSTERECTOMY      42years old    HX KNEE ARTHROSCOPY Right     knee,    HX ORTHOPAEDIC Right     BUNIONECTOMY, HAMMERTOE REPAIR    HX TONSILLECTOMY       No Known Allergies  Current Outpatient Medications   Medication Sig Dispense Refill    fluticasone (Flonase Sensimist) 27.5 mcg/actuation nasal spray 2 Sprays by Both Nostrils route daily.  fexofenadine (Allegra Allergy) 180 mg tablet Take 360 mg by mouth daily.  anastrozole (ARIMIDEX) 1 mg tablet Take 1 mg by mouth daily. Indications: hormone receptor positive breast cancer (Patient taking differently: Take 1 mg by mouth daily. PT TAKES WITH DINNER  Indications: hormone receptor positive breast cancer) 90 Tab 3    olmesartan-hydroCHLOROthiazide (BENICAR HCT) 20-12.5 mg per tablet TAKE 1 TABLET BY MOUTH ONCE DAILY (Patient taking differently: Take 1 Tablet by mouth daily. TAKE 1 TABLET BY MOUTH ONCE DAILY AT 4 PM) 90 Tab 3    MILK THISTLE PO Take 175 mg by mouth daily.  OTHER 1 Tablet daily. Indications: AM/PM MENOPAUSE FORMULA 1 TAB QHS      furosemide (LASIX) 20 mg tablet TAKE 1 TABLET BY MOUTH ONCE DAILY (Patient taking differently: Take 20 mg by mouth every evening. TAKE 1 TABLET BY MOUTH ONCE DAILY) 90 Tab 3    valACYclovir (VALTREX) 500 mg tablet Take 1 tablet by mouth once daily 30 Tab 0    TURMERIC ROOT EXTRACT PO Take 200 mg by mouth daily.  psyllium (METAMUCIL) powd Take  by mouth nightly.  magnesium 250 mg tab Take 1 Tablet by mouth nightly.  naproxen sodium (ALEVE) 220 mg tablet Take 440 mg by mouth as needed.  docosanol (ABREVA) 10 % topical cream Apply  to affected area as needed.  biotin 5,000 mcg TbDi Take 1 Tablet by mouth daily.       S-Adenosylmethionine (YRN-E, ENTERIC COATED,) 400 mg TbEC Take 1 Tablet by mouth Every morning.  cholecalciferol, VITAMIN D3, (VITAMIN D3) 5,000 unit tab tablet Take  by mouth daily.  Dexlansoprazole (DEXILANT) 60 mg CpDB Take 60 mg by mouth as needed.  omeprazole (PRILOSEC OTC) 20 mg tablet Take 20 mg by mouth as needed.  amoxicillin (AMOXIL) 500 mg capsule Take 2,000 mg by mouth as needed (PRIOR TO DENTAL APPT). (Patient not taking: Reported on 2021)       Social History     Socioeconomic History    Marital status: SINGLE     Spouse name: Not on file    Number of children: Not on file    Years of education: Not on file    Highest education level: Not on file   Tobacco Use    Smoking status: Former Smoker     Packs/day: 0.50     Years: 25.00     Pack years: 12.50     Types: Cigarettes     Quit date: 1982     Years since quittin.5    Smokeless tobacco: Never Used   Vaping Use    Vaping Use: Never used   Substance and Sexual Activity    Alcohol use: Yes     Alcohol/week: 1.0 standard drinks     Types: 1 Glasses of wine per week     Comment: 1/WK    Drug use: No    Sexual activity: Not Currently     Partners: Male     Birth control/protection: None     Social Determinants of Health     Financial Resource Strain:     Difficulty of Paying Living Expenses:    Food Insecurity:     Worried About Running Out of Food in the Last Year:     [address] in the Last Year:    Transportation Needs:     Lack of Transportation (Medical):      Lack of Transportation (Non-Medical):    Physical Activity:     Days of Exercise per Week:     Minutes of Exercise per Session:    Stress:     Feeling of Stress :    Social Connections:     Frequency of Communication with Friends and Family:     Frequency of Social Gatherings with Friends and Family:     Attends [employer] Services:     Active Member of Clubs or Organizations:     Attends Club or Organization Meetings:     Marital Status:      Family History   Problem Relation Age of Onset    Hypertension Mother  Diabetes Mother     Kidney Disease Mother     Arthritis-osteo Mother     Heart Disease Mother     Heart Disease Father     Arthritis-osteo Father     Hypertension Father     Colon Cancer Maternal Grandmother     No Known Problems Sister     Anesth Problems Neg Hx        Review of Systems  Constitutional:  negative for fevers, chills, anorexia and weight loss  Eyes:    negative for visual disturbance and irritation  ENT:    negative for tinnitus,sore throat,nasal congestion,ear pains. hoarseness  Respiratory:     negative for cough, hemoptysis, dyspnea,wheezing  CV:    negative for chest pain, palpitations, lower extremity edema  GI:    negative for nausea, vomiting, diarrhea, abdominal pain,melena  Endo:               negative for polyuria,polydipsia,polyphagia,heat intolerance  Genitourinary : negative for frequency, dysuria and hematuria  Integumentary: negative for rash and pruritus  Hematologic:   negative for easy bruising and gum/nose bleeding  Musculoskel:  negative for myalgias,  back pain, muscle weakness  Neurological:   negative for headaches, dizziness, vertigo, memory problems and gait   Behavl/Psych:  negative for feelings of anxiety, depression, mood changes  ROS otherwise negative      Objective:  Visit Vitals  /72 (BP 1 Location: Left arm, BP Patient Position: Sitting, BP Cuff Size: Large adult)   Pulse 79   Resp 18   Ht 5' 8\" (1.727 m)   Wt 201 lb 12.8 oz (91.5 kg)   SpO2 98%   BMI 30.68 kg/m²     Physical Exam:   General appearance - alert, well appearing, and in no distress  Mental status - alert, oriented to person, place, and time  EYE-EMMY, EOMI, fundi normal, corneas normal, no foreign bodies  ENT-ENT exam normal, no neck nodes or sinus tenderness  Nose - normal and patent, no erythema, discharge or polyps  Mouth - mucous membranes moist, pharynx normal without lesions  Neck - supple, no significant adenopathy   Chest - clear to auscultation, no wheezes, rales or rhonchi, symmetric air entry   Heart - normal rate, regular rhythm, normal S1, S2, no murmurs, rubs, clicks or gallops   Abdomen - soft, nontender, nondistended, no masses or organomegaly  Lymph- no adenopathy palpable  Ext-peripheral pulses normal, no pedal edema, no clubbing or cyanosis  Skin-Warm and dry. no hyperpigmentation, vitiligo, or suspicious lesions  Neuro -alert, oriented, normal speech, no focal findings or movement disorder noted      Assessment/Plan:  Diagnoses and all orders for this visit:    1. Preop examination  -     AMB POC EKG ROUTINE W/ 12 LEADS, INTER & REP    2. Arthritis of right knee    3. Essential hypertension  -     AMB POC EKG ROUTINE W/ 12 LEADS, INTER & REP          ICD-10-CM ICD-9-CM    1. Preop examination  Z01.818 V72.84 AMB POC EKG ROUTINE W/ 12 LEADS, INTER & REP   2. Arthritis of right knee  M17.11 716.96    3. Essential hypertension  I10 401.9 AMB POC EKG ROUTINE W/ 12 LEADS, INTER & REP     Plan:    The patient is low perioperative risk based on her current medical exam.  No further risk ratification is indicated at this time. I have reviewed with the patient details of the assessment and plan and all questions were answered. Relevent patient education was performed. Verbal and/or written instructions (see AVS) provided. The most recent lab findings were reviewed with the patient. Plan was discussed with patient who verbally expressed understanding. An After Visit Summary was printed and given to the patient.     Yareli Hawk MD

## 2021-07-13 NOTE — PROGRESS NOTES
1. Have you been to the ER, urgent care clinic since your last visit? Hospitalized since your last visit? No    2. Have you seen or consulted any other health care providers outside of the [address] [place] [address] since your last visit? Include any pap smears or colon screening.  No

## 2021-07-16 ENCOUNTER — ANESTHESIA EVENT (OUTPATIENT)
Dept: SURGERY | Age: 76
End: 2021-07-16
Payer: COMMERCIAL

## 2021-07-19 ENCOUNTER — HOSPITAL ENCOUNTER (OUTPATIENT)
Age: 76
Discharge: HOME HEALTH | End: 2021-07-21
Attending: ORTHOPAEDIC SURGERY | Admitting: ORTHOPAEDIC SURGERY
Payer: COMMERCIAL

## 2021-07-19 ENCOUNTER — ANESTHESIA (OUTPATIENT)
Dept: SURGERY | Age: 76
End: 2021-07-19
Payer: COMMERCIAL

## 2021-07-19 DIAGNOSIS — M17.11 LOCALIZED OSTEOARTHRITIS OF RIGHT KNEE: Primary | ICD-10-CM

## 2021-07-19 LAB
GLUCOSE BLD STRIP.AUTO-MCNC: 95 MG/DL (ref 65–117)
SERVICE CMNT-IMP: NORMAL

## 2021-07-19 PROCEDURE — 74011000250 HC RX REV CODE- 250: Performed by: PHYSICIAN ASSISTANT

## 2021-07-19 PROCEDURE — C1776 JOINT DEVICE (IMPLANTABLE): HCPCS | Performed by: ORTHOPAEDIC SURGERY

## 2021-07-19 PROCEDURE — C9290 INJ, BUPIVACAINE LIPOSOME: HCPCS | Performed by: ORTHOPAEDIC SURGERY

## 2021-07-19 PROCEDURE — 74011000250 HC RX REV CODE- 250: Performed by: ORTHOPAEDIC SURGERY

## 2021-07-19 PROCEDURE — 76010000171 HC OR TIME 2 TO 2.5 HR INTENSV-TIER 1: Performed by: ORTHOPAEDIC SURGERY

## 2021-07-19 PROCEDURE — 77030027138 HC INCENT SPIROMETER -A

## 2021-07-19 PROCEDURE — 97530 THERAPEUTIC ACTIVITIES: CPT

## 2021-07-19 PROCEDURE — 74011250637 HC RX REV CODE- 250/637: Performed by: PHYSICIAN ASSISTANT

## 2021-07-19 PROCEDURE — 74011000250 HC RX REV CODE- 250: Performed by: ANESTHESIOLOGY

## 2021-07-19 PROCEDURE — 74011000258 HC RX REV CODE- 258: Performed by: ORTHOPAEDIC SURGERY

## 2021-07-19 PROCEDURE — 97161 PT EVAL LOW COMPLEX 20 MIN: CPT

## 2021-07-19 PROCEDURE — C1713 ANCHOR/SCREW BN/BN,TIS/BN: HCPCS | Performed by: ORTHOPAEDIC SURGERY

## 2021-07-19 PROCEDURE — 74011250636 HC RX REV CODE- 250/636: Performed by: ANESTHESIOLOGY

## 2021-07-19 PROCEDURE — 77030041279 HC DRSG PRMSL AG MDII -B: Performed by: ORTHOPAEDIC SURGERY

## 2021-07-19 PROCEDURE — 77030008462 HC STPLR SKN PROX J&J -A: Performed by: ORTHOPAEDIC SURGERY

## 2021-07-19 PROCEDURE — 77030008463 HC STPLR SKN PROX J&J -B: Performed by: ORTHOPAEDIC SURGERY

## 2021-07-19 PROCEDURE — 74011250636 HC RX REV CODE- 250/636: Performed by: ORTHOPAEDIC SURGERY

## 2021-07-19 PROCEDURE — 77030000032 HC CUF TRNQT ZIMM -B: Performed by: ORTHOPAEDIC SURGERY

## 2021-07-19 PROCEDURE — 77030003601 HC NDL NRV BLK BBMI -A

## 2021-07-19 PROCEDURE — 74011000258 HC RX REV CODE- 258: Performed by: ANESTHESIOLOGY

## 2021-07-19 PROCEDURE — 76210000016 HC OR PH I REC 1 TO 1.5 HR: Performed by: ORTHOPAEDIC SURGERY

## 2021-07-19 PROCEDURE — 74011250636 HC RX REV CODE- 250/636: Performed by: PHYSICIAN ASSISTANT

## 2021-07-19 PROCEDURE — 76060000035 HC ANESTHESIA 2 TO 2.5 HR: Performed by: ORTHOPAEDIC SURGERY

## 2021-07-19 PROCEDURE — 77030005513 HC CATH URETH FOL11 MDII -B: Performed by: ORTHOPAEDIC SURGERY

## 2021-07-19 PROCEDURE — 2709999900 HC NON-CHARGEABLE SUPPLY: Performed by: ORTHOPAEDIC SURGERY

## 2021-07-19 PROCEDURE — 74011250637 HC RX REV CODE- 250/637: Performed by: ANESTHESIOLOGY

## 2021-07-19 PROCEDURE — 77030010783 HC BOWL MX BN CEM J&J -B: Performed by: ORTHOPAEDIC SURGERY

## 2021-07-19 PROCEDURE — 77030040922 HC BLNKT HYPOTHRM STRY -A

## 2021-07-19 PROCEDURE — 77030040361 HC SLV COMPR DVT MDII -B

## 2021-07-19 PROCEDURE — 77030031139 HC SUT VCRL2 J&J -A: Performed by: ORTHOPAEDIC SURGERY

## 2021-07-19 PROCEDURE — 77030006822 HC BLD SAW SAG BRSM -B: Performed by: ORTHOPAEDIC SURGERY

## 2021-07-19 PROCEDURE — 2709999900 HC NON-CHARGEABLE SUPPLY

## 2021-07-19 PROCEDURE — 77030035236 HC SUT PDS STRATFX BARB J&J -B: Performed by: ORTHOPAEDIC SURGERY

## 2021-07-19 PROCEDURE — 77030018673: Performed by: ORTHOPAEDIC SURGERY

## 2021-07-19 PROCEDURE — 82962 GLUCOSE BLOOD TEST: CPT

## 2021-07-19 PROCEDURE — 77030028907 HC WRP KNEE WO BGS SOLM -B

## 2021-07-19 PROCEDURE — 97116 GAIT TRAINING THERAPY: CPT

## 2021-07-19 DEVICE — IMPLANTABLE DEVICE
Type: IMPLANTABLE DEVICE | Site: KNEE | Status: FUNCTIONAL
Brand: PERSONA®

## 2021-07-19 DEVICE — IMPLANTABLE DEVICE
Type: IMPLANTABLE DEVICE | Site: KNEE | Status: FUNCTIONAL
Brand: PERSONA® VIVACIT-E®

## 2021-07-19 DEVICE — IMPLANTABLE DEVICE
Type: IMPLANTABLE DEVICE | Site: KNEE | Status: FUNCTIONAL
Brand: PERSONA® NATURAL TIBIA®

## 2021-07-19 DEVICE — KNEE K1 TOT HEMI STD CEM IMPL CAPPED K1 ZIM: Type: IMPLANTABLE DEVICE | Status: FUNCTIONAL

## 2021-07-19 DEVICE — SMARTSET GHV GENTAMICIN HIGH VISCOSITY BONE CEMENT 40G
Type: IMPLANTABLE DEVICE | Site: KNEE | Status: FUNCTIONAL
Brand: SMARTSET

## 2021-07-19 RX ORDER — FAMOTIDINE 20 MG/1
20 TABLET, FILM COATED ORAL
Status: DISCONTINUED | OUTPATIENT
Start: 2021-07-19 | End: 2021-07-21 | Stop reason: HOSPADM

## 2021-07-19 RX ORDER — PROPOFOL 10 MG/ML
INJECTION, EMULSION INTRAVENOUS
Status: DISCONTINUED | OUTPATIENT
Start: 2021-07-19 | End: 2021-07-19 | Stop reason: HOSPADM

## 2021-07-19 RX ORDER — SODIUM CHLORIDE 0.9 % (FLUSH) 0.9 %
5-40 SYRINGE (ML) INJECTION AS NEEDED
Status: DISCONTINUED | OUTPATIENT
Start: 2021-07-19 | End: 2021-07-19 | Stop reason: HOSPADM

## 2021-07-19 RX ORDER — SODIUM CHLORIDE, SODIUM LACTATE, POTASSIUM CHLORIDE, CALCIUM CHLORIDE 600; 310; 30; 20 MG/100ML; MG/100ML; MG/100ML; MG/100ML
125 INJECTION, SOLUTION INTRAVENOUS CONTINUOUS
Status: DISCONTINUED | OUTPATIENT
Start: 2021-07-19 | End: 2021-07-19 | Stop reason: HOSPADM

## 2021-07-19 RX ORDER — HYDROMORPHONE HYDROCHLORIDE 1 MG/ML
1 INJECTION, SOLUTION INTRAMUSCULAR; INTRAVENOUS; SUBCUTANEOUS
Status: ACTIVE | OUTPATIENT
Start: 2021-07-19 | End: 2021-07-20

## 2021-07-19 RX ORDER — FENTANYL CITRATE 50 UG/ML
25 INJECTION, SOLUTION INTRAMUSCULAR; INTRAVENOUS
Status: COMPLETED | OUTPATIENT
Start: 2021-07-19 | End: 2021-07-19

## 2021-07-19 RX ORDER — SODIUM CHLORIDE 0.9 % (FLUSH) 0.9 %
5-40 SYRINGE (ML) INJECTION AS NEEDED
Status: DISCONTINUED | OUTPATIENT
Start: 2021-07-19 | End: 2021-07-21 | Stop reason: HOSPADM

## 2021-07-19 RX ORDER — SUCCINYLCHOLINE CHLORIDE 20 MG/ML
INJECTION INTRAMUSCULAR; INTRAVENOUS AS NEEDED
Status: DISCONTINUED | OUTPATIENT
Start: 2021-07-19 | End: 2021-07-19 | Stop reason: HOSPADM

## 2021-07-19 RX ORDER — FUROSEMIDE 20 MG/1
20 TABLET ORAL EVERY EVENING
Status: DISCONTINUED | OUTPATIENT
Start: 2021-07-19 | End: 2021-07-21 | Stop reason: HOSPADM

## 2021-07-19 RX ORDER — AMOXICILLIN 250 MG
1 CAPSULE ORAL 2 TIMES DAILY
Status: DISCONTINUED | OUTPATIENT
Start: 2021-07-19 | End: 2021-07-21 | Stop reason: HOSPADM

## 2021-07-19 RX ORDER — OXYCODONE HYDROCHLORIDE 5 MG/1
5 TABLET ORAL
Status: DISCONTINUED | OUTPATIENT
Start: 2021-07-19 | End: 2021-07-21 | Stop reason: HOSPADM

## 2021-07-19 RX ORDER — MIDAZOLAM HYDROCHLORIDE 1 MG/ML
1 INJECTION, SOLUTION INTRAMUSCULAR; INTRAVENOUS
Status: DISCONTINUED | OUTPATIENT
Start: 2021-07-19 | End: 2021-07-19 | Stop reason: HOSPADM

## 2021-07-19 RX ORDER — OXYCODONE HYDROCHLORIDE 5 MG/1
5 TABLET ORAL AS NEEDED
Status: DISCONTINUED | OUTPATIENT
Start: 2021-07-19 | End: 2021-07-19 | Stop reason: HOSPADM

## 2021-07-19 RX ORDER — SODIUM CHLORIDE 9 MG/ML
125 INJECTION, SOLUTION INTRAVENOUS CONTINUOUS
Status: DISPENSED | OUTPATIENT
Start: 2021-07-19 | End: 2021-07-20

## 2021-07-19 RX ORDER — ONDANSETRON 2 MG/ML
4 INJECTION INTRAMUSCULAR; INTRAVENOUS AS NEEDED
Status: DISCONTINUED | OUTPATIENT
Start: 2021-07-19 | End: 2021-07-19 | Stop reason: HOSPADM

## 2021-07-19 RX ORDER — PANTOPRAZOLE SODIUM 40 MG/1
40 TABLET, DELAYED RELEASE ORAL DAILY PRN
Status: DISCONTINUED | OUTPATIENT
Start: 2021-07-19 | End: 2021-07-19 | Stop reason: ALTCHOICE

## 2021-07-19 RX ORDER — DEXTROSE, SODIUM CHLORIDE, SODIUM LACTATE, POTASSIUM CHLORIDE, AND CALCIUM CHLORIDE 5; .6; .31; .03; .02 G/100ML; G/100ML; G/100ML; G/100ML; G/100ML
125 INJECTION, SOLUTION INTRAVENOUS CONTINUOUS
Status: DISCONTINUED | OUTPATIENT
Start: 2021-07-19 | End: 2021-07-19 | Stop reason: HOSPADM

## 2021-07-19 RX ORDER — ACETAMINOPHEN 325 MG/1
650 TABLET ORAL ONCE
Status: COMPLETED | OUTPATIENT
Start: 2021-07-19 | End: 2021-07-19

## 2021-07-19 RX ORDER — ASPIRIN 325 MG
325 TABLET, DELAYED RELEASE (ENTERIC COATED) ORAL EVERY 12 HOURS
Status: DISCONTINUED | OUTPATIENT
Start: 2021-07-19 | End: 2021-07-21 | Stop reason: HOSPADM

## 2021-07-19 RX ORDER — CETIRIZINE HCL 10 MG
10 TABLET ORAL
Status: DISCONTINUED | OUTPATIENT
Start: 2021-07-19 | End: 2021-07-21 | Stop reason: HOSPADM

## 2021-07-19 RX ORDER — SODIUM CHLORIDE 0.9 % (FLUSH) 0.9 %
5-40 SYRINGE (ML) INJECTION EVERY 8 HOURS
Status: DISCONTINUED | OUTPATIENT
Start: 2021-07-19 | End: 2021-07-21 | Stop reason: HOSPADM

## 2021-07-19 RX ORDER — OXYCODONE HYDROCHLORIDE 5 MG/1
10 TABLET ORAL
Status: DISCONTINUED | OUTPATIENT
Start: 2021-07-19 | End: 2021-07-21 | Stop reason: HOSPADM

## 2021-07-19 RX ORDER — FENTANYL CITRATE 50 UG/ML
INJECTION, SOLUTION INTRAMUSCULAR; INTRAVENOUS AS NEEDED
Status: DISCONTINUED | OUTPATIENT
Start: 2021-07-19 | End: 2021-07-19 | Stop reason: HOSPADM

## 2021-07-19 RX ORDER — DIPHENHYDRAMINE HYDROCHLORIDE 50 MG/ML
12.5 INJECTION, SOLUTION INTRAMUSCULAR; INTRAVENOUS AS NEEDED
Status: DISCONTINUED | OUTPATIENT
Start: 2021-07-19 | End: 2021-07-19 | Stop reason: HOSPADM

## 2021-07-19 RX ORDER — FLUTICASONE PROPIONATE 50 MCG
1 SPRAY, SUSPENSION (ML) NASAL DAILY
Status: DISCONTINUED | OUTPATIENT
Start: 2021-07-20 | End: 2021-07-21 | Stop reason: HOSPADM

## 2021-07-19 RX ORDER — MIDAZOLAM HYDROCHLORIDE 1 MG/ML
1 INJECTION, SOLUTION INTRAMUSCULAR; INTRAVENOUS AS NEEDED
Status: DISCONTINUED | OUTPATIENT
Start: 2021-07-19 | End: 2021-07-19 | Stop reason: HOSPADM

## 2021-07-19 RX ORDER — MELATONIN
5000 DAILY
Status: DISCONTINUED | OUTPATIENT
Start: 2021-07-19 | End: 2021-07-21 | Stop reason: HOSPADM

## 2021-07-19 RX ORDER — HYDROCHLOROTHIAZIDE 25 MG/1
12.5 TABLET ORAL DAILY
Status: DISCONTINUED | OUTPATIENT
Start: 2021-07-20 | End: 2021-07-21 | Stop reason: HOSPADM

## 2021-07-19 RX ORDER — ANASTROZOLE 1 MG/1
1 TABLET ORAL
Status: DISCONTINUED | OUTPATIENT
Start: 2021-07-19 | End: 2021-07-21 | Stop reason: HOSPADM

## 2021-07-19 RX ORDER — LOSARTAN POTASSIUM 50 MG/1
50 TABLET ORAL DAILY
Status: DISCONTINUED | OUTPATIENT
Start: 2021-07-20 | End: 2021-07-21 | Stop reason: HOSPADM

## 2021-07-19 RX ORDER — ACETAMINOPHEN 325 MG/1
650 TABLET ORAL EVERY 6 HOURS
Status: DISCONTINUED | OUTPATIENT
Start: 2021-07-19 | End: 2021-07-21 | Stop reason: HOSPADM

## 2021-07-19 RX ORDER — ONDANSETRON 2 MG/ML
4 INJECTION INTRAMUSCULAR; INTRAVENOUS
Status: ACTIVE | OUTPATIENT
Start: 2021-07-19 | End: 2021-07-21

## 2021-07-19 RX ORDER — ONDANSETRON 2 MG/ML
INJECTION INTRAMUSCULAR; INTRAVENOUS AS NEEDED
Status: DISCONTINUED | OUTPATIENT
Start: 2021-07-19 | End: 2021-07-19 | Stop reason: HOSPADM

## 2021-07-19 RX ORDER — SODIUM CHLORIDE 0.9 % (FLUSH) 0.9 %
5-40 SYRINGE (ML) INJECTION EVERY 8 HOURS
Status: DISCONTINUED | OUTPATIENT
Start: 2021-07-19 | End: 2021-07-19 | Stop reason: HOSPADM

## 2021-07-19 RX ORDER — LIDOCAINE HYDROCHLORIDE 10 MG/ML
INJECTION, SOLUTION EPIDURAL; INFILTRATION; INTRACAUDAL; PERINEURAL
Status: DISCONTINUED | OUTPATIENT
Start: 2021-07-19 | End: 2021-07-19 | Stop reason: HOSPADM

## 2021-07-19 RX ORDER — SODIUM CHLORIDE, SODIUM LACTATE, POTASSIUM CHLORIDE, CALCIUM CHLORIDE 600; 310; 30; 20 MG/100ML; MG/100ML; MG/100ML; MG/100ML
INJECTION, SOLUTION INTRAVENOUS
Status: DISCONTINUED | OUTPATIENT
Start: 2021-07-19 | End: 2021-07-19 | Stop reason: HOSPADM

## 2021-07-19 RX ORDER — FENTANYL CITRATE 50 UG/ML
50 INJECTION, SOLUTION INTRAMUSCULAR; INTRAVENOUS AS NEEDED
Status: DISCONTINUED | OUTPATIENT
Start: 2021-07-19 | End: 2021-07-19 | Stop reason: HOSPADM

## 2021-07-19 RX ORDER — DEXAMETHASONE SODIUM PHOSPHATE 4 MG/ML
INJECTION, SOLUTION INTRA-ARTICULAR; INTRALESIONAL; INTRAMUSCULAR; INTRAVENOUS; SOFT TISSUE AS NEEDED
Status: DISCONTINUED | OUTPATIENT
Start: 2021-07-19 | End: 2021-07-19 | Stop reason: HOSPADM

## 2021-07-19 RX ORDER — PHENYLEPHRINE HCL IN 0.9% NACL 0.4MG/10ML
SYRINGE (ML) INTRAVENOUS
Status: DISCONTINUED | OUTPATIENT
Start: 2021-07-19 | End: 2021-07-19 | Stop reason: HOSPADM

## 2021-07-19 RX ORDER — DEXMEDETOMIDINE HYDROCHLORIDE 100 UG/ML
INJECTION, SOLUTION INTRAVENOUS AS NEEDED
Status: DISCONTINUED | OUTPATIENT
Start: 2021-07-19 | End: 2021-07-19 | Stop reason: HOSPADM

## 2021-07-19 RX ORDER — ACETAMINOPHEN 500 MG
1000 TABLET ORAL EVERY 6 HOURS
Status: DISCONTINUED | OUTPATIENT
Start: 2021-07-19 | End: 2021-07-19

## 2021-07-19 RX ORDER — LIDOCAINE HYDROCHLORIDE 10 MG/ML
0.5 INJECTION, SOLUTION EPIDURAL; INFILTRATION; INTRACAUDAL; PERINEURAL AS NEEDED
Status: DISCONTINUED | OUTPATIENT
Start: 2021-07-19 | End: 2021-07-19 | Stop reason: HOSPADM

## 2021-07-19 RX ORDER — NALOXONE HYDROCHLORIDE 0.4 MG/ML
0.4 INJECTION, SOLUTION INTRAMUSCULAR; INTRAVENOUS; SUBCUTANEOUS AS NEEDED
Status: DISCONTINUED | OUTPATIENT
Start: 2021-07-19 | End: 2021-07-21 | Stop reason: HOSPADM

## 2021-07-19 RX ORDER — LIDOCAINE HYDROCHLORIDE 20 MG/ML
INJECTION, SOLUTION EPIDURAL; INFILTRATION; INTRACAUDAL; PERINEURAL AS NEEDED
Status: DISCONTINUED | OUTPATIENT
Start: 2021-07-19 | End: 2021-07-19 | Stop reason: HOSPADM

## 2021-07-19 RX ORDER — FACIAL-BODY WIPES
10 EACH TOPICAL DAILY PRN
Status: DISCONTINUED | OUTPATIENT
Start: 2021-07-21 | End: 2021-07-21 | Stop reason: HOSPADM

## 2021-07-19 RX ORDER — MORPHINE SULFATE 2 MG/ML
2 INJECTION, SOLUTION INTRAMUSCULAR; INTRAVENOUS
Status: DISCONTINUED | OUTPATIENT
Start: 2021-07-19 | End: 2021-07-19 | Stop reason: HOSPADM

## 2021-07-19 RX ORDER — POLYETHYLENE GLYCOL 3350 17 G/17G
17 POWDER, FOR SOLUTION ORAL DAILY
Status: DISCONTINUED | OUTPATIENT
Start: 2021-07-19 | End: 2021-07-21 | Stop reason: HOSPADM

## 2021-07-19 RX ORDER — ROPIVACAINE HYDROCHLORIDE 5 MG/ML
INJECTION, SOLUTION EPIDURAL; INFILTRATION; PERINEURAL
Status: COMPLETED | OUTPATIENT
Start: 2021-07-19 | End: 2021-07-19

## 2021-07-19 RX ORDER — HYDROXYZINE HYDROCHLORIDE 10 MG/1
10 TABLET, FILM COATED ORAL
Status: DISCONTINUED | OUTPATIENT
Start: 2021-07-19 | End: 2021-07-21 | Stop reason: HOSPADM

## 2021-07-19 RX ADMIN — FENTANYL CITRATE 25 MCG: 50 INJECTION, SOLUTION INTRAMUSCULAR; INTRAVENOUS at 09:30

## 2021-07-19 RX ADMIN — OXYCODONE HYDROCHLORIDE 5 MG: 5 TABLET ORAL at 20:14

## 2021-07-19 RX ADMIN — OXYCODONE HYDROCHLORIDE 5 MG: 5 TABLET ORAL at 23:13

## 2021-07-19 RX ADMIN — DEXMEDETOMIDINE HYDROCHLORIDE 8 MCG: 100 INJECTION, SOLUTION, CONCENTRATE INTRAVENOUS at 08:14

## 2021-07-19 RX ADMIN — MEPERIDINE HYDROCHLORIDE 12.5 MG: 25 INJECTION INTRAMUSCULAR; INTRAVENOUS; SUBCUTANEOUS at 10:55

## 2021-07-19 RX ADMIN — FENTANYL CITRATE 50 MCG: 50 INJECTION, SOLUTION INTRAMUSCULAR; INTRAVENOUS at 07:51

## 2021-07-19 RX ADMIN — PROPOFOL 30 MG: 10 INJECTION, EMULSION INTRAVENOUS at 09:10

## 2021-07-19 RX ADMIN — SODIUM CHLORIDE, POTASSIUM CHLORIDE, SODIUM LACTATE AND CALCIUM CHLORIDE: 600; 310; 30; 20 INJECTION, SOLUTION INTRAVENOUS at 07:30

## 2021-07-19 RX ADMIN — FENTANYL CITRATE 50 MCG: 50 INJECTION, SOLUTION INTRAMUSCULAR; INTRAVENOUS at 07:20

## 2021-07-19 RX ADMIN — MEPERIDINE HYDROCHLORIDE 12.5 MG: 25 INJECTION INTRAMUSCULAR; INTRAVENOUS; SUBCUTANEOUS at 10:47

## 2021-07-19 RX ADMIN — MIDAZOLAM HYDROCHLORIDE 2 MG: 1 INJECTION, SOLUTION INTRAMUSCULAR; INTRAVENOUS at 07:20

## 2021-07-19 RX ADMIN — DOCUSATE SODIUM 50 MG AND SENNOSIDES 8.6 MG 1 TABLET: 8.6; 5 TABLET, FILM COATED ORAL at 18:39

## 2021-07-19 RX ADMIN — LIDOCAINE HYDROCHLORIDE 10 MG: 10 INJECTION, SOLUTION EPIDURAL; INFILTRATION; INTRACAUDAL; PERINEURAL at 12:51

## 2021-07-19 RX ADMIN — DOCUSATE SODIUM 50 MG AND SENNOSIDES 8.6 MG 1 TABLET: 8.6; 5 TABLET, FILM COATED ORAL at 12:34

## 2021-07-19 RX ADMIN — ASPIRIN 325 MG: 325 TABLET, COATED ORAL at 18:39

## 2021-07-19 RX ADMIN — FENTANYL CITRATE 25 MCG: 50 INJECTION, SOLUTION INTRAMUSCULAR; INTRAVENOUS at 10:14

## 2021-07-19 RX ADMIN — PROPOFOL 20 MG: 10 INJECTION, EMULSION INTRAVENOUS at 09:04

## 2021-07-19 RX ADMIN — DEXMEDETOMIDINE HYDROCHLORIDE 4 MCG: 100 INJECTION, SOLUTION, CONCENTRATE INTRAVENOUS at 09:04

## 2021-07-19 RX ADMIN — POLYETHYLENE GLYCOL 3350 17 G: 17 POWDER, FOR SOLUTION ORAL at 12:34

## 2021-07-19 RX ADMIN — Medication 20 MCG/MIN: at 07:34

## 2021-07-19 RX ADMIN — ACETAMINOPHEN 650 MG: 325 TABLET ORAL at 06:57

## 2021-07-19 RX ADMIN — OXYCODONE HYDROCHLORIDE 5 MG: 5 TABLET ORAL at 17:00

## 2021-07-19 RX ADMIN — FENTANYL CITRATE 25 MCG: 50 INJECTION, SOLUTION INTRAMUSCULAR; INTRAVENOUS at 10:33

## 2021-07-19 RX ADMIN — ACETAMINOPHEN 650 MG: 325 TABLET ORAL at 18:39

## 2021-07-19 RX ADMIN — PROPOFOL 30 MG: 10 INJECTION, EMULSION INTRAVENOUS at 07:45

## 2021-07-19 RX ADMIN — Medication 5000 UNITS: at 12:34

## 2021-07-19 RX ADMIN — ROPIVACAINE HYDROCHLORIDE 20 ML: 5 INJECTION, SOLUTION EPIDURAL; INFILTRATION; PERINEURAL at 07:20

## 2021-07-19 RX ADMIN — PROPOFOL 30 MG: 10 INJECTION, EMULSION INTRAVENOUS at 07:37

## 2021-07-19 RX ADMIN — PROPOFOL 50 MCG/KG/MIN: 10 INJECTION, EMULSION INTRAVENOUS at 07:33

## 2021-07-19 RX ADMIN — ONDANSETRON HYDROCHLORIDE 4 MG: 2 INJECTION, SOLUTION INTRAMUSCULAR; INTRAVENOUS at 08:05

## 2021-07-19 RX ADMIN — ACETAMINOPHEN 650 MG: 325 TABLET ORAL at 12:34

## 2021-07-19 RX ADMIN — SUCCINYLCHOLINE CHLORIDE 140 MG: 20 INJECTION, SOLUTION INTRAMUSCULAR; INTRAVENOUS at 07:53

## 2021-07-19 RX ADMIN — SODIUM CHLORIDE 125 ML/HR: 9 INJECTION, SOLUTION INTRAVENOUS at 10:25

## 2021-07-19 RX ADMIN — DEXAMETHASONE SODIUM PHOSPHATE 4 MG: 4 INJECTION, SOLUTION INTRAMUSCULAR; INTRAVENOUS at 08:05

## 2021-07-19 RX ADMIN — FENTANYL CITRATE 25 MCG: 50 INJECTION, SOLUTION INTRAMUSCULAR; INTRAVENOUS at 10:23

## 2021-07-19 RX ADMIN — TRANEXAMIC ACID 1 G: 100 INJECTION, SOLUTION INTRAVENOUS at 08:05

## 2021-07-19 RX ADMIN — ANASTROZOLE 1 MG: 1 TABLET, COATED ORAL at 16:51

## 2021-07-19 RX ADMIN — PSYLLIUM HUSK 1 PACKET: 3.4 POWDER ORAL at 23:13

## 2021-07-19 RX ADMIN — CEFAZOLIN SODIUM 2 G: 1 INJECTION, POWDER, FOR SOLUTION INTRAMUSCULAR; INTRAVENOUS at 16:51

## 2021-07-19 RX ADMIN — WATER 2 G: 1 INJECTION INTRAMUSCULAR; INTRAVENOUS; SUBCUTANEOUS at 08:05

## 2021-07-19 RX ADMIN — PROPOFOL 150 MG: 10 INJECTION, EMULSION INTRAVENOUS at 07:51

## 2021-07-19 RX ADMIN — FENTANYL CITRATE 25 MCG: 50 INJECTION, SOLUTION INTRAMUSCULAR; INTRAVENOUS at 10:38

## 2021-07-19 RX ADMIN — SODIUM CHLORIDE, POTASSIUM CHLORIDE, SODIUM LACTATE AND CALCIUM CHLORIDE 125 ML/HR: 600; 310; 30; 20 INJECTION, SOLUTION INTRAVENOUS at 07:08

## 2021-07-19 RX ADMIN — LIDOCAINE HYDROCHLORIDE 50 MG: 20 INJECTION, SOLUTION EPIDURAL; INFILTRATION; INTRACAUDAL; PERINEURAL at 07:51

## 2021-07-19 NOTE — PROGRESS NOTES
Ortho Post-Op Note    7/19/2021  3:10 PM    POD:  Day of Surgery  S/P:  Procedure(s):  RIGHT TOTAL KNEE ARTHROPLASTY    Afebrile/VSS, NAD, A&O x 3  Obese  Doing well without complaints of nausea  Pain well controlled  Calves soft/NTTP Bilaterally  Leg soft. Dressing clean and dry  Moving lower extremities well. Neurocirculatory exam intact and within normal range. Lab Results   Component Value Date/Time    HGB 13.6 06/16/2021 11:06 AM    INR 1.0 07/09/2021 11:43 AM     Recent Labs     06/16/21  1106 03/09/21  1438 03/09/21  1438 02/16/21  1843 02/16/21  1843 01/26/21  1440 01/26/21  1440 01/06/21  1117 01/06/21  1117 12/14/20  1115 12/14/20  1115 10/26/20  1430 10/26/20  1430   CREA 0.86   < > 0.91   < > 0.81   < > 1.08*   < > 1.12*   < > 0.9   < > 1.08*   BUN 18  --  18  --  17  --  20  --  25*  --  22.0*  --  22*    < > = values in this interval not displayed. Estimated Creatinine Clearance: 68.1 mL/min (by C-G formula based on SCr of 0.86 mg/dL).   Visit Vitals  BP (!) (P) 160/60 (BP 1 Location: Left upper arm, BP Patient Position: Sitting)   Pulse (!) (P) 56   Temp 98 °F (36.7 °C)   Resp 17   Ht 5' 8\" (1.727 m)   Wt 92.2 kg (203 lb 4.2 oz)   SpO2 100%   BMI 30.91 kg/m²       PLAN:  DVT prophylaxis:  mg bid  WBAT with PT-mobilization  Pain Control: Tylenol, oxycodone  Plan to D/C home in 1-2 days      Damaris Yuan, [address], [address]    Department of Orthopaedics  [phone]

## 2021-07-19 NOTE — PROGRESS NOTES
TRANSFER - IN REPORT:    Verbal report received from Ghazala Patrick, [facility] (name) on Judy Grant  being received from PACU (unit) for routine post - op      Report consisted of patients Situation, Background, Assessment and   Recommendations(SBAR). Information from the following report(s) SBAR, OR Summary, Procedure Summary, MAR and Recent Results was reviewed with the receiving nurse. Opportunity for questions and clarification was provided. Assessment completed upon patients arrival to unit and care assumed.

## 2021-07-19 NOTE — ANESTHESIA PROCEDURE NOTES
Spinal Block    Start time: 7/19/2021 7:30 AM  End time: 7/19/2021 7:50 AM  Performed by: Marilee Luciano CRNA  Authorized by: Rylan Bond MD     Pre-procedure: Indications: primary anesthetic  Preanesthetic Checklist: patient identified, risks and benefits discussed, anesthesia consent, site marked, patient being monitored and timeout performed    Timeout Time: 07:35 EDT          Spinal Block:   Patient Position:  Seated  Prep Region:  Lumbar  Prep: DuraPrep      Location:  L3-4  Technique:  Single shot        Needle:   Needle Type:  Pencan    Attempts:  3      Events: failed spinal        Assessment:      Pt had the appearance of scoliosis visually and on TL xrays, unable to successfully access intrathecal space after 3 attempts (2 by CRNA, 1 by MD). Proceeded with general anesthetic for case.

## 2021-07-19 NOTE — PERIOP NOTES
Pt's son called but unable to reach her, pt's friend tolu called and informed the start time of the surgery per pt.

## 2021-07-19 NOTE — ANESTHESIA POSTPROCEDURE EVALUATION
Procedure(s):  RIGHT TOTAL KNEE ARTHROPLASTY. general    Anesthesia Post Evaluation        Patient location during evaluation: PACU  Patient participation: complete - patient participated  Level of consciousness: awake and alert  Pain management: adequate  Airway patency: patent  Anesthetic complications: no  Cardiovascular status: acceptable  Respiratory status: acceptable  Hydration status: acceptable  Comments: I have seen and evaluated the patient and is ready for discharge. Arti Chaparro MD    Post anesthesia nausea and vomiting:  none      INITIAL Post-op Vital signs:   Vitals Value Taken Time   /61 07/19/21 1030   Temp 36.4 °C (97.5 °F) 07/19/21 0955   Pulse 55 07/19/21 1031   Resp 14 07/19/21 1031   SpO2 99 % 07/19/21 1031   Vitals shown include unvalidated device data.

## 2021-07-19 NOTE — OP NOTES
Name: Melanie Maria  MRN:  [id number]  : 1945  Age:  78 y.o. Surgery Date: 2021      OPERATIVE REPORT - RIGHT TOTAL KNEE REPLACEMENT    PREOPERATIVE DIAGNOSIS: Osteoarthritis, right knee. POSTOPERATIVE DIAGNOSIS: Osteoarthritis, right knee. PROCEDURE PERFORMED: Right total knee arthroplasty. SURGEON: Susanne Rosenbaum MD    FIRST ASSISTANT:   Italo Stevenson PA-C    ANESTHESIA: General    PRE-OP ANTIBIOTIC: Ancef 2g    COMPLICATIONS: None. ESTIMATED BLOOD LOSS: 50 mL. SPECIMENS REMOVED: None. COMPONENTS IMPLANTED:   Implant Name Type Inv. Item Serial No.  Lot No. LRB No. Used Action   CEMENT BNE 40GM FULL DOSE PMMA W/ GENT HI VISC RADPQ LNG - SNA  CEMENT BNE 40GM FULL DOSE PMMA W/ GENT HI VISC RADPQ LNG NA [employer]_ [id number] Right 2 Implanted   PSN MC VE ASF R 14MM 8-11 EF - SNA  PSN MC VE ASF R 14MM 8-11 EF NA [employer]WD [id number] Right 1 Implanted   COMPONENT PAT [id number] THK9MM STD KNEE VIVACIT-E JIN PERSONA - [id number]  COMPONENT PAT [id number] THK9MM STD KNEE VIVACIT-E JIN PERSONA  [employer]WD [id number] Right 1 Implanted   TIB PRN NP STM 5 DEG SZ FR -- PERSONA - SNA  TIB PRN NP STM 5 DEG SZ FR -- PERSONA NA [employer] [id number] Right 1 Implanted   IMPL FEMUR CR CMT CCR STD SZ29 RT - SNA  IMPL FEMUR CR CMT CCR STD SZ29 RT NA [employer] [id number] Right 1 Implanted       INDICATIONS: The patient is an 78 yrs female with progressive debilitating right knee pain due to severe osteoarthritis. Symptoms have progressed despite comprehensive conservative treatment and the patient presents for right total knee replacement. Risks, benefits, alternatives of the procedure were reviewed in detail and the patient elects to proceed. The patient understands the risk for perioperative medical complications. DESCRIPTION OF PROCEDURE: Spinal anesthesia was initiated. Preoperative dose of antibiotic was given. Ceballos catheter was not placed.  The right lower extremity was prepped and draped in the usual sterile fashion. The skin was covered with Ioban occlusive dressing. The right lower extremity was exsanguinated. A medial parapatellar incision was made to the right knee. The right knee was exposed and the distal femur was cut at 5 degrees distal femoral valgus. Femur was sized for a size 9. An AP cutting block was placed, rotated based on bony landmarks. Femoral cuts were completed for a size 9. The tibia was subluxed and a neutral varus/valgus proximal tibial cut was made matching the patient's slope. The meniscal remnants were removed. The posterior osteophytes were removed from the femur. Gaps were examined. The flexion and extension gaps were 14 mm. The femur was finished for a 9, tibia for a F. Trials were placed. Patella was cut and a 35 mm trial was fitted . The knee tracked well with all trial implants. The trials were then removed. Bony surfaces were drilled, lavaged, and dried. All components were cemented. Excess cement was removed. A 14 polyethylene component was placed. After the cement had fully cured, the knee was ranged and  irrigated copiously with pulsatile lavage. All surrounding soft tissues were infiltrated with local anesthetic. The arthrotomy was closed with #2 Vicryl sutures and 0 Vicryl sutures. Skin and subcutaneous tissues were irrigated and closed in standard fashion. Sterile dressing was applied. There were no complications. The procedure was a RIGHT TOTAL KNEE REPLACEMENT. A Feng total knee construct was utilized. No specimens were obtained/sent. The patient was transferred to the recovery room in stable condition. Valgus release. Kenneth Frey PA-C was critical throughout the case to assist with positioning, retraction and closure. There were no other available residents or surgical assistants to assist during this procedure.       Debbie Herrera MD

## 2021-07-19 NOTE — PROGRESS NOTES
Primary Nurse Major Sensor and Aravind, RN performed a dual skin assessment on this patient No impairment noted  Lokesh score is 20

## 2021-07-19 NOTE — PROGRESS NOTES
Problem: Mobility Impaired (Adult and Pediatric)  Goal: *Acute Goals and Plan of Care (Insert Text)  Description: FUNCTIONAL STATUS PRIOR TO ADMISSION: Patient was independent without use of DME.    HOME SUPPORT PRIOR TO ADMISSION: The patient lived alone and did not require assist.  Pt has arranged for family member to stay first night - encouraged pt to arrange for additional names. Physical Therapy Goals  Initiated 7/19/2021    1. Patient will move from supine to sit and sit to supine  and scoot up and down in bed with modified independence within 4 days. 2. Patient will perform sit to stand with modified independence within 4 days. 3. Patient will ambulate with modified independence for > 150 feet with the least restrictive device within 4 days. 4. Patient will ascend/descend 4 stairs with one handrail(s) with modified independence within 4 days. 5. Patient will perform home exercise program per protocol with supervision/set-up within 4 days. 6. Patient will demonstrate AROM 0-90 degrees in operative joint within 4 days. PHYSICAL THERAPY EVALUATION  Patient: Gogo Dominguez (89 y.o. female)  Date: 7/19/2021  Primary Diagnosis: Localized osteoarthritis of right knee [M17.11]  Procedure(s) (LRB):  RIGHT TOTAL KNEE ARTHROPLASTY (Right) Day of Surgery   Precautions:   WBAT, Fall    ASSESSMENT  Based on the objective data described below, the patient presents POD# Right TKA with pain right knee, decreased AROM/strength and function right leg, decreased activity tolerance, decline in functional mobility and impaired standing balance/gait. Pt did well mobilizing with stable VS - amb distance limited by pain. Anticipate if pain controlled pt will ready for discharge after 1-2 sessions to progress exercise, ambulation distance and progress to stairs.     Current Level of Function Impacting Discharge (mobility/balance): Min assist x 1 for bed mobility, CGA for functional transfers and amb    Functional Outcome Measure: The patient scored 55/100 on the Barthel Index outcome measure . Other factors to consider for discharge: encouraged to have family assistance after discharge     Patient will benefit from skilled therapy intervention to address the above noted impairments. PLAN :  Recommendations and Planned Interventions: bed mobility training, transfer training, gait training, therapeutic exercises, patient and family training/education and therapeutic activities      Frequency/Duration: Patient will be followed by physical therapy:  twice daily to address goals. Recommendation for discharge: (in order for the patient to meet his/her long term goals)  Physical therapy at least 2 days/week in the home     This discharge recommendation:  Has been made in collaboration with the attending provider and/or case management    IF patient discharges home will need the following DME: patient owns DME required for discharge         SUBJECTIVE:   Patient stated Palomo Georges was going to have someone stay with me the first night.     OBJECTIVE DATA SUMMARY:   HISTORY:    Past Medical History:   Diagnosis Date    Acute diverticulitis 8/18/2017    Allergic rhinitis 8/18/2017    Arthritis 8/18/2017    Arthritis of right knee 11/25/2019    Back pain, thoracic 8/18/2017    Cancer ([facility] [address] 08/2020    LEFT BREAST , LUMPECTOMY WITH RECONSTRUCTION, CHEMO, RADIATION    Cataract, left 8/18/2017    Cataract, right 8/18/2017    Chronic obstructive pulmonary disease (HCC)     Cirrhosis ([facility] [address] 8/18/2017    Cold sore 8/18/2017    COVID-19 vaccine series completed 2/23/21, 3/19/21    PFIZER    Elevated hemoglobin A1c 8/18/2017    Fatigue 8/18/2017    Gastrointestinal disorder     acid reflux    GERD (gastroesophageal reflux disease) 8/18/2017    Heart murmur 8/18/2017    Hepatitis C 8/18/2017    TREATED    Herpes zoster infection of thoracic region 8/18/2017    Hyperlipidemia LDL goal <100 8/18/2017    Hypertension     Menopause     Hysterectomy-42years old    Murmur     Obesity (BMI 30-39. 9) 8/18/2017    Osteopenia 8/18/2017    Other ill-defined conditions(799.89)     hep c    Post-menopausal 8/18/2017    Posterior auricular pain of right ear 8/18/2017    Preop examination 8/18/2017    Valvular heart disease     mitral valve prolapse    Vertigo, benign positional 8/18/2017     Past Surgical History:   Procedure Laterality Date    HX BREAST LUMPECTOMY Bilateral 11/3/2020    LEFT BREAST REDUCTION LUMPECTOMY WITH ULTRASOUND, LEFT BREAST SENTINEL NODE BIOPSY, LEFT BREAST RECONSTRUCTION, RIGHT BREAST REDUCTION performed by Yordan Diaz MD at [address] HX COLONOSCOPY      HX HYSTERECTOMY      42years old    HX KNEE ARTHROSCOPY Right     knee,    HX ORTHOPAEDIC Right     BUNIONECTOMY, HAMMERTOE REPAIR    HX TONSILLECTOMY         Personal factors and/or comorbidities impacting plan of care: as above    Home Situation  Home Environment: Private residence  # Steps to Enter: 3  Rails to Enter: Yes  Hand Rails : Right  Wheelchair Ramp: No  Living Alone: Yes (family to come check on her; family staying 1 night)  Support Systems: Family member(s)  Patient Expects to be Discharged to[de-identified] House  Current DME Used/Available at Home: Cane, straight, Raised toilet seat, Shower chair, Walker, rolling  Tub or Shower Type: Tub/Shower combination    EXAMINATION/PRESENTATION/DECISION MAKING:   Critical Behavior:  Neurologic State: Alert  Orientation Level: Oriented X4  Cognition: Follows commands  Safety/Judgement: Awareness of environment, Good awareness of safety precautions  Skin:  Right leg covered with ace wraps - no drainage noted    Range Of Motion:  AROM: Within functional limits (except right leg)                       Strength:    Strength:  Within functional limits (except right leg)                    Tone & Sensation:   Tone: Normal              Sensation: Intact               Coordination:  Coordination: Within functional limits  Functional Mobility:  Bed Mobility:     Supine to Sit: Minimum assistance  Sit to Supine: Minimum assistance     Transfers:  Sit to Stand: Contact guard assistance  Stand to Sit: Contact guard assistance                       Balance:   Sitting: Intact; Without support  Standing: Impaired; With support  Standing - Static: Good;Constant support  Standing - Dynamic : Fair;Constant support  Ambulation/Gait Training:  Distance (ft): 25 Feet (ft)  Assistive Device: Walker, rolling;Gait belt  Ambulation - Level of Assistance: Contact guard assistance; Adaptive equipment        Gait Abnormalities: Antalgic;Decreased step clearance; Step to gait  Right Side Weight Bearing: As tolerated  Left Side Weight Bearing: Full  Base of Support: Center of gravity altered;Shift to left  Stance: Right decreased  Speed/Zamzam: Slow  Step Length: Right shortened;Left shortened  Swing Pattern: Right asymmetrical               Therapeutic Exercises:   Pt instructed and performed ankle pumps, quad sets, hamstring sets and heel slides - to perform x 10 once hr when awake. Pt also instructed to use incentive spirometer x 10 once hr when awake. Functional Measure:  Barthel Index:    Bathin  Bladder: 10  Bowels: 10  Groomin  Dressin  Feeding: 10  Mobility: 0  Stairs: 0  Toilet Use: 5  Transfer (Bed to Chair and Back): 10  Total: 55/100       The Barthel ADL Index: Guidelines  1. The index should be used as a record of what a patient does, not as a record of what a patient could do. 2. The main aim is to establish degree of independence from any help, physical or verbal, however minor and for whatever reason. 3. The need for supervision renders the patient not independent. 4. A patient's performance should be established using the best available evidence. Asking the patient, friends/relatives and nurses are the usual sources, but direct observation and common sense are also important.  However direct testing is not needed. 5. Usually the patient's performance over the preceding 24-48 hours is important, but occasionally longer periods will be relevant. 6. Middle categories imply that the patient supplies over 50 per cent of the effort. 7. Use of aids to be independent is allowed. Andrew Thomas., Barthel, D.W. (1550). Functional evaluation: the Barthel Index. [facility] (14)2. Horacio Patten nicole DADA Kuo, Fatimah Jung., Aileen Felton., [place], [address] (1999). Measuring the change indisability after inpatient rehabilitation; comparison of the responsiveness of the Barthel Index and Functional Ness Measure. Journal of Neurology, Neurosurgery, and Psychiatry, 66(4), 670-033. LILLY Roe, FRANK Pineda, & Arabella Byrnes MTACOS. (2004.) Assessment of post-stroke quality of life in cost-effectiveness studies: The usefulness of the Barthel Index and the EuroQoL-5D. Quality of Life Research, 15, 136-66          Physical Therapy Evaluation Charge Determination   History Examination Presentation Decision-Making   LOW Complexity : Zero comorbidities / personal factors that will impact the outcome / POC LOW Complexity : 1-2 Standardized tests and measures addressing body structure, function, activity limitation and / or participation in recreation  LOW Complexity : Stable, uncomplicated  LOW Complexity : FOTO score of       Based on the above components, the patient evaluation is determined to be of the following complexity level: LOW     Pain Rating: At rest in bed - 5/10; after ambulation 8/10    Activity Tolerance:   Good - POD# 0 - limited by pain    After treatment patient left in no apparent distress:   Supine in bed, Call bell within reach and ice right knee    COMMUNICATION/EDUCATION:   The patients plan of care was discussed with: Registered nurse.      Fall prevention education was provided and the patient/caregiver indicated understanding., Patient/family have participated as able in goal setting and plan of care. and Patient/family agree to work toward stated goals and plan of care.     Thank you for this referral.  Vandana Saeed, PT   Time Calculation: 30 mins

## 2021-07-19 NOTE — ANESTHESIA PREPROCEDURE EVALUATION
Relevant Problems   RESPIRATORY SYSTEM   (+) Dyspnea on exertion   (+) History of hepatitis C      CARDIOVASCULAR   (+) Essential hypertension   (+) HTN (hypertension)   (+) Heart murmur   (+) Non-rheumatic mitral regurgitation      GASTROINTESTINAL   (+) Cirrhosis (HCC)   (+) GERD (gastroesophageal reflux disease)   (+) Gastroesophageal reflux disease without esophagitis   (+) Hepatitis C      ENDOCRINE   (+) Arthritis   (+) Arthritis of right knee      PERSONAL HX & FAMILY HX OF CANCER   (+) Invasive ductal carcinoma of left breast (HCC)       Anesthetic History   No history of anesthetic complications            Review of Systems / Medical History  Patient summary reviewed, nursing notes reviewed and pertinent labs reviewed    Pulmonary    COPD: mild               Neuro/Psych   Within defined limits           Cardiovascular    Hypertension: well controlled              Exercise tolerance: >4 METS     GI/Hepatic/Renal     GERD      Liver disease    Comments: cirrohosis Endo/Other        Obesity and arthritis  Pertinent negatives: No morbid obesity   Other Findings              Physical Exam    Airway  Mallampati: II  TM Distance: > 6 cm  Neck ROM: normal range of motion   Mouth opening: Normal     Cardiovascular  Regular rate and rhythm,  S1 and S2 normal,  no murmur, click, rub, or gallop  Rhythm: regular  Rate: normal         Dental  No notable dental hx       Pulmonary  Breath sounds clear to auscultation               Abdominal  GI exam deferred       Other Findings            Anesthetic Plan    ASA: 3  Anesthesia type: spinal      Post-op pain plan if not by surgeon: peripheral nerve block single    Induction: Intravenous  Anesthetic plan and risks discussed with: Patient

## 2021-07-19 NOTE — PERIOP NOTES
TRANSFER - OUT REPORT:    Verbal report given to  ALDO RN(name) on Melina Elmore  being transferred to 5 S(unit) for routine post - op       Report consisted of patients Situation, Background, Assessment and   Recommendations(SBAR). Time Pre op antibiotic given:Y  Anesthesia Stop time: Y  Ceballos Present on Transfer to floor:N  Order for Ceballos on Chart:N  Discharge Prescriptions with Chart:N    Information from the following report(s) SBAR was reviewed with the receiving nurse. Opportunity for questions and clarification was provided. Is the patient on 02? YES       L/Min 2       Other     Is the patient on a monitor? NO    Is the nurse transporting with the patient? NO    Surgical Waiting Area notified of patient's transfer from PACU?  YES      The following personal items collected during your admission accompanied patient upon transfer:   Dental Appliance: Dental Appliances:  (permanent upper bridge)  Vision:    Hearing Aid:    Jewelry:    Clothing:    Other Valuables:    Valuables sent to safe:

## 2021-07-19 NOTE — PROGRESS NOTES
Bedside and Verbal shift change report given to Erika Russo RN (oncoming nurse) by Erwin Payne RN (offgoing nurse). Report included the following information SBAR, OR Summary, Procedure Summary, MAR and Recent Results.

## 2021-07-19 NOTE — ADDENDUM NOTE
Addendum  created 07/19/21 1256 by Lorie Carr CRNA    Child order released for a procedure order, Clinical Note Signed, Intraprocedure Blocks edited, Orders acknowledged in Narrator

## 2021-07-19 NOTE — PERIOP NOTES
1640: RT leg adductor canal nerve block done by Dr Kelly Gage using 20cc of 0.5% ropivicaine, with US guidance, with pt monitored, O2 @ 2l/m/nc and IV sedation. Pt tolerated procedure well. VSS post block: 163/32-20-16, TnJ3=698% on 2l/m/nc. See anesthesia note.

## 2021-07-19 NOTE — H&P
Date of Surgery Update:  Siva Alvarado was seen and examined. History and physical has been reviewed. The patient has been examined.  There have been no significant clinical changes since the completion of the originally dated History and Physical.    Signed By: Debbie Herrera MD     July 19, 2021 9:13 AM

## 2021-07-19 NOTE — ANESTHESIA PROCEDURE NOTES
Peripheral Block    Start time: 7/19/2021 7:16 AM  End time: 7/19/2021 7:21 AM  Performed by: Harjeet Acosta MD  Authorized by: Harjeet Acosta MD       Pre-procedure: Indications: at surgeon's request and post-op pain management    Preanesthetic Checklist: patient identified, risks and benefits discussed, site marked, timeout performed and patient being monitored      Block Type:   Block Type:   Adductor canal  Laterality:  Right  Monitoring:  Standard ASA monitoring, continuous pulse ox, frequent vital sign checks, heart rate, responsive to questions and oxygen  Injection Technique:  Single shot  Procedures: ultrasound guided    Patient Position: supine  Prep: DuraPrep    Needle Type:  Stimuplex  Needle Gauge:  22 G  Needle Localization:  Ultrasound guidance  Medication Injected:  Ropivacaine (PF) (NAROPIN)(0.5%) 5 mg/mL injection, 20 mL  Med Admin Time: 7/19/2021 7:20 AM    Assessment:  Number of attempts:  1  Injection Assessment:  Incremental injection every 5 mL, local visualized surrounding nerve on ultrasound, negative aspiration for blood, no paresthesia and no intravascular symptoms  Patient tolerance:  Patient tolerated the procedure well with no immediate complications

## 2021-07-20 LAB
ANION GAP SERPL CALC-SCNC: 5 MMOL/L (ref 5–15)
BUN SERPL-MCNC: 21 MG/DL (ref 6–20)
BUN/CREAT SERPL: 24 (ref 12–20)
CALCIUM SERPL-MCNC: 8.7 MG/DL (ref 8.5–10.1)
CHLORIDE SERPL-SCNC: 109 MMOL/L (ref 97–108)
CO2 SERPL-SCNC: 26 MMOL/L (ref 21–32)
CREAT SERPL-MCNC: 0.89 MG/DL (ref 0.55–1.02)
GLUCOSE SERPL-MCNC: 107 MG/DL (ref 65–100)
HGB BLD-MCNC: 10.2 G/DL (ref 11.5–16)
POTASSIUM SERPL-SCNC: 3.6 MMOL/L (ref 3.5–5.1)
SODIUM SERPL-SCNC: 140 MMOL/L (ref 136–145)

## 2021-07-20 PROCEDURE — 97116 GAIT TRAINING THERAPY: CPT

## 2021-07-20 PROCEDURE — 74011250637 HC RX REV CODE- 250/637: Performed by: PHYSICIAN ASSISTANT

## 2021-07-20 PROCEDURE — 97535 SELF CARE MNGMENT TRAINING: CPT

## 2021-07-20 PROCEDURE — 85018 HEMOGLOBIN: CPT

## 2021-07-20 PROCEDURE — 97110 THERAPEUTIC EXERCISES: CPT

## 2021-07-20 PROCEDURE — 97165 OT EVAL LOW COMPLEX 30 MIN: CPT

## 2021-07-20 PROCEDURE — 74011250636 HC RX REV CODE- 250/636: Performed by: PHYSICIAN ASSISTANT

## 2021-07-20 PROCEDURE — 36415 COLL VENOUS BLD VENIPUNCTURE: CPT

## 2021-07-20 PROCEDURE — 80048 BASIC METABOLIC PNL TOTAL CA: CPT

## 2021-07-20 PROCEDURE — 74011000250 HC RX REV CODE- 250: Performed by: PHYSICIAN ASSISTANT

## 2021-07-20 RX ORDER — DEXLANSOPRAZOLE 60 MG/1
60 CAPSULE, DELAYED RELEASE ORAL DAILY
Status: DISCONTINUED | OUTPATIENT
Start: 2021-07-20 | End: 2021-07-21 | Stop reason: HOSPADM

## 2021-07-20 RX ADMIN — POLYETHYLENE GLYCOL 3350 17 G: 17 POWDER, FOR SOLUTION ORAL at 08:29

## 2021-07-20 RX ADMIN — CEFAZOLIN SODIUM 2 G: 1 INJECTION, POWDER, FOR SOLUTION INTRAMUSCULAR; INTRAVENOUS at 00:41

## 2021-07-20 RX ADMIN — OXYCODONE HYDROCHLORIDE 10 MG: 5 TABLET ORAL at 21:37

## 2021-07-20 RX ADMIN — PSYLLIUM HUSK 1 PACKET: 3.4 POWDER ORAL at 21:37

## 2021-07-20 RX ADMIN — ACETAMINOPHEN 650 MG: 325 TABLET ORAL at 12:08

## 2021-07-20 RX ADMIN — ACETAMINOPHEN 650 MG: 325 TABLET ORAL at 18:38

## 2021-07-20 RX ADMIN — ANASTROZOLE 1 MG: 1 TABLET, COATED ORAL at 17:07

## 2021-07-20 RX ADMIN — ACETAMINOPHEN 650 MG: 325 TABLET ORAL at 00:41

## 2021-07-20 RX ADMIN — ASPIRIN 325 MG: 325 TABLET, COATED ORAL at 07:03

## 2021-07-20 RX ADMIN — DOCUSATE SODIUM 50 MG AND SENNOSIDES 8.6 MG 1 TABLET: 8.6; 5 TABLET, FILM COATED ORAL at 17:08

## 2021-07-20 RX ADMIN — ACETAMINOPHEN 650 MG: 325 TABLET ORAL at 07:04

## 2021-07-20 RX ADMIN — DOCUSATE SODIUM 50 MG AND SENNOSIDES 8.6 MG 1 TABLET: 8.6; 5 TABLET, FILM COATED ORAL at 08:29

## 2021-07-20 RX ADMIN — Medication 5000 UNITS: at 08:29

## 2021-07-20 RX ADMIN — OXYCODONE HYDROCHLORIDE 10 MG: 5 TABLET ORAL at 18:38

## 2021-07-20 RX ADMIN — OXYCODONE HYDROCHLORIDE 5 MG: 5 TABLET ORAL at 02:18

## 2021-07-20 RX ADMIN — OXYCODONE HYDROCHLORIDE 10 MG: 5 TABLET ORAL at 15:17

## 2021-07-20 RX ADMIN — ASPIRIN 325 MG: 325 TABLET, COATED ORAL at 19:06

## 2021-07-20 RX ADMIN — DEXLANSOPRAZOLE 60 MG: 60 CAPSULE, DELAYED RELEASE ORAL at 13:00

## 2021-07-20 RX ADMIN — OXYCODONE HYDROCHLORIDE 10 MG: 5 TABLET ORAL at 11:14

## 2021-07-20 RX ADMIN — OXYCODONE HYDROCHLORIDE 5 MG: 5 TABLET ORAL at 07:03

## 2021-07-20 NOTE — PROGRESS NOTES
Patient takes one 60 mg capsule of dexilant daily. Verbal order given with read back by NANCY Villarreal to place order for this medication.

## 2021-07-20 NOTE — PROGRESS NOTES
Problem: Mobility Impaired (Adult and Pediatric)  Goal: *Acute Goals and Plan of Care (Insert Text)  Description: FUNCTIONAL STATUS PRIOR TO ADMISSION: Patient was independent without use of DME.    HOME SUPPORT PRIOR TO ADMISSION: The patient lived alone and did not require assist.  Pt has arranged for family member to stay first night - encouraged pt to arrange for additional names. Physical Therapy Goals  Initiated 7/19/2021    1. Patient will move from supine to sit and sit to supine  and scoot up and down in bed with modified independence within 4 days. 2. Patient will perform sit to stand with modified independence within 4 days. 3. Patient will ambulate with modified independence for > 150 feet with the least restrictive device within 4 days. 4. Patient will ascend/descend 4 stairs with one handrail(s) with modified independence within 4 days. 5. Patient will perform home exercise program per protocol with supervision/set-up within 4 days. 6. Patient will demonstrate AROM 0-90 degrees in operative joint within 4 days. 7/20/2021 1320 by Maribell To PT  Outcome: Progressing Towards Goal   PHYSICAL THERAPY TREATMENT  Patient: Jah Arshad (9 y.o. female)  Date: 7/20/2021  Diagnosis: Localized osteoarthritis of right knee [M17.11] <principal problem not specified>  Procedure(s) (LRB):  RIGHT TOTAL KNEE ARTHROPLASTY (Right) 1 Day Post-Op  Precautions: WBAT, Fall  Chart, physical therapy assessment, plan of care and goals were reviewed. ASSESSMENT  Patient continues with skilled PT services and is progressing towards goals. Reviewed supine TKR exercise - and added SAQ and knee extension stretch with pillow roll under ankle. Performed seated exercise - LAQ and knee flexion stretch. Quality of gait improving with more even weight shift and stride. Anticipate pt will be ready for discharge after am session.      Current Level of Function Impacting Discharge (mobility/balance): Standby assist for bed mobility with use of gait belt, supervision TKR exercise, standby for amb with RW    Other factors to consider for discharge:          PLAN :  Patient continues to benefit from skilled intervention to address the above impairments. Continue treatment per established plan of care. to address goals. Recommendation for discharge: (in order for the patient to meet his/her long term goals)  Physical therapy at least 2 days/week in the home     This discharge recommendation:  Has been made in collaboration with the attending provider and/or case management    IF patient discharges home will need the following DME: patient owns DME required for discharge       SUBJECTIVE:   Patient stated my friends came by and they said they would definitely help me out after I get home.     OBJECTIVE DATA SUMMARY:   Critical Behavior:  Neurologic State: Alert  Orientation Level: Oriented X4  Cognition: Follows commands  Safety/Judgement: Awareness of environment, Home safety, Insight into deficits    Range of Motion:  AROM: Within functional limits (excluding RLE)   AROM - right knee flexion 70                      Functional Mobility Training:  Bed Mobility:     Supine to Sit: Stand-by assistance (instructed in use of gait belt to move leg in/out of bed )  Sit to Supine: Stand-by assistance           Transfers:  Sit to Stand: Stand-by assistance  Stand to Sit: Stand-by assistance                             Balance:  Sitting: Intact  Standing: Intact; With support  Standing - Static: Good;Constant support  Standing - Dynamic : Good;Constant support  Ambulation/Gait Training:  Distance (ft): 120 Feet (ft)  Assistive Device: Walker, rolling;Gait belt  Ambulation - Level of Assistance: Stand-by assistance        Gait Abnormalities: Antalgic;Decreased step clearance  Right Side Weight Bearing: As tolerated  Left Side Weight Bearing: Full  Base of Support: Center of gravity altered;Shift to left  Stance: Right decreased  Speed/Zamzam: Slow  Step Length: Right shortened;Left shortened  Swing Pattern: Right asymmetrical          Therapeutic Exercises:     EXERCISE   Sets   Reps   Active Active Assist   Passive Self ROM   Comments   Ankle Pumps  3 [x]                                        []                                        []                                        []                                           Quad Sets  3 [x]                                        []                                        []                                        []                                           Hamstring Sets  3 [x]                                        []                                        []                                        []                                           Short Arc Quads  3 [x]                                        []                                        []                                        []                                           Knee Extension Stretch     [x]                                          []                                          []                                          []                                        Pillow roll under ankle - tolerated 2 mins   Heel Slides  3 []                                        [x]                                        []                                        []                                        With gait belt to assist   Long Arc Quads  3 [x]                                        []                                        []                                        []                                           Knee Flexion Stretch  2 [x]                                        []                                        []                                        []                                           Straight Leg Raises   []                                        []                                        [] []                                               Pain Rating:  Pre-treatment right knee 3/10; after exercise and amb right knee 6/10    Activity Tolerance:   Fair and requires rest breaks    After treatment patient left in no apparent distress:   Supine in bed, Call bell within reach, and ice applied right knee    COMMUNICATION/COLLABORATION:   The patients plan of care was discussed with: Registered nurse.      Boaz Medina, PT   Time Calculation: 30 mins

## 2021-07-20 NOTE — PROGRESS NOTES
Occupational Therapy    Orders received, chart reviewed and patient evaluated by occupational therapy. Pending progression with skilled acute occupational therapy, recommend:  No skilled occupational therapy/ follow up rehabilitation needs identified at this time. Recommend with nursing ADLs with supervision/setup, OOB to chair 3x/day and toileting via functional mobility to and from bathroom CGA and walker. Thank you for completing as able in order to maintain patient strength, endurance and independence. Full evaluation to follow.      Thank you,  Hilda Strong OT

## 2021-07-20 NOTE — PROGRESS NOTES
Bedside and Verbal shift change report given to Erwin Payne RN (oncoming nurse) by Erika Russo RN (offgoing nurse). Report included the following information SBAR, Kardex, Procedure Summary, Intake/Output, MAR, Accordion and Recent Results.

## 2021-07-20 NOTE — PROGRESS NOTES
Problem: Mobility Impaired (Adult and Pediatric)  Goal: *Acute Goals and Plan of Care (Insert Text)  Description: FUNCTIONAL STATUS PRIOR TO ADMISSION: Patient was independent without use of DME.    HOME SUPPORT PRIOR TO ADMISSION: The patient lived alone and did not require assist.  Pt has arranged for family member to stay first night - encouraged pt to arrange for additional names. Physical Therapy Goals  Initiated 7/19/2021    1. Patient will move from supine to sit and sit to supine  and scoot up and down in bed with modified independence within 4 days. 2. Patient will perform sit to stand with modified independence within 4 days. 3. Patient will ambulate with modified independence for > 150 feet with the least restrictive device within 4 days. 4. Patient will ascend/descend 4 stairs with one handrail(s) with modified independence within 4 days. 5. Patient will perform home exercise program per protocol with supervision/set-up within 4 days. 6. Patient will demonstrate AROM 0-90 degrees in operative joint within 4 days. Outcome: Progressing Towards Goal   PHYSICAL THERAPY TREATMENT  Patient: Delmi Thomas (65 y.o. female)  Date: 7/20/2021  Diagnosis: Localized osteoarthritis of right knee [M17.11] <principal problem not specified>  Procedure(s) (LRB):  RIGHT TOTAL KNEE ARTHROPLASTY (Right) 1 Day Post-Op  Precautions: WBAT, Fall  Chart, physical therapy assessment, plan of care and goals were reviewed. ASSESSMENT  Patient continues with skilled PT services and is progressing towards goals. Pt concerned regarding pain control, however after pain meds was able to progress amb distance and perform stairs. Will focus this pm on advancing exercise - increasing right knee AROM. Anticipate pt will be ready for discharge from PT standpoint after 7/21 am session.      Current Level of Function Impacting Discharge (mobility/balance): Standby guard sit to/from stand and amb with RW; CGA for stairs    Other factors to consider for discharge: pt lives alone, pt arranging for additional help from friends/family at discharge         PLAN :  Patient continues to benefit from skilled intervention to address the above impairments. Continue treatment per established plan of care. to address goals. Recommendation for discharge: (in order for the patient to meet his/her long term goals)  Physical therapy at least 2 days/week in the home     This discharge recommendation:  Has been made in collaboration with the attending provider and/or case management    IF patient discharges home will need the following DME: patient owns DME required for discharge       SUBJECTIVE:   Patient stated I have to have pain meds before I can walk.     OBJECTIVE DATA SUMMARY:   Critical Behavior:  Neurologic State: Alert  Orientation Level: Oriented X4  Cognition: Appropriate decision making, Follows commands  Safety/Judgement: Awareness of environment, Home safety, Insight into deficits    Functional Mobility Training:  Bed Mobility:   Deferred - pt up in bedside chair                 Transfers:  Sit to Stand: Stand-by assistance  Stand to Sit: Stand-by assistance                             Balance:  Sitting: Intact  Standing: Impaired; With support  Standing - Static: Good;Constant support  Standing - Dynamic : Good;Constant support  Ambulation/Gait Training:  Distance (ft): 125 Feet (ft)  Assistive Device: Walker, rolling;Gait belt  Ambulation - Level of Assistance: Stand-by assistance        Gait Abnormalities: Antalgic;Decreased step clearance; Step to gait (Can perform reciprocal gait with verbal cues  )  Right Side Weight Bearing: As tolerated  Left Side Weight Bearing: Full  Base of Support: Center of gravity altered;Shift to left  Stance: Right decreased  Speed/Zamzam: Slow  Step Length: Right shortened;Left shortened  Swing Pattern: Right asymmetrical     Stairs:  Number of Stairs Trained: 4  Stairs - Level of Assistance: Contact guard assistance   Rail Use: Left  (cane right hand ascending)        Pain Rating:  Right knee pain 5/10    Activity Tolerance:   Good - POD# 1 - progressing amb distance and performed stairs    After treatment patient left in no apparent distress:   Sitting in chair and Call bell within reach    COMMUNICATION/COLLABORATION:   The patients plan of care was discussed with: Registered nurse.      Aris Ruiz, PT   Time Calculation: 25 mins

## 2021-07-20 NOTE — PROGRESS NOTES
OCCUPATIONAL THERAPY EVALUATION/DISCHARGE  Patient: Gwyneth Babinski (27 y.o. female)  Date: 7/20/2021  Primary Diagnosis: Localized osteoarthritis of right knee [M17.11]  Procedure(s) (LRB):  RIGHT TOTAL KNEE ARTHROPLASTY (Right) 1 Day Post-Op   Precautions:  WBAT, Fall    ASSESSMENT  Based on the objective data described below, the patient presents with decreased higher level mobility/balance, decreased higher level activity tolerance and c/o 9/10 R knee pain; however, she is demonstrating a high level of safe functional independence, completing self-care routine with SBA at RW, with good distal reaching noted implementing reach down method. Pt reports she will have friend/family stay with her upon her discharge home and reports she will independently acquire recommended tub-transfer bench. Given pt's current high level of safe functional independence, DME/AE needs fulfilled within home, pt reporting she will have friends/family stay with up at discharge and physical therapy following mobility/balance deficits, no other acute OT needs identified, with OT answering pt's questions/concerns and pt thanking therapist for his efforts. Current Level of Function (ADLs/self-care): SBA    Functional Outcome Measure: The patient scored 55/100 on the Barthel Index outcome measure. Other factors to consider for discharge: none     PLAN :  Recommendation for discharge: (in order for the patient to meet his/her long term goals)  No skilled occupational therapy/ follow up rehabilitation needs identified at this time. This discharge recommendation:  Has been made in collaboration with the attending provider and/or case management    IF patient discharges home will need the following DME: patient owns DME required for discharge       SUBJECTIVE:   Patient stated I'll plan to have my friend stay more than 1 night, it makes sense to have them around to help me out.     OBJECTIVE DATA SUMMARY:   HISTORY:   Past Medical History:   Diagnosis Date    Acute diverticulitis 8/18/2017    Allergic rhinitis 8/18/2017    Arthritis 8/18/2017    Arthritis of right knee 11/25/2019    Back pain, thoracic 8/18/2017    Cancer ([facility] 08/2020    LEFT BREAST , LUMPECTOMY WITH RECONSTRUCTION, CHEMO, RADIATION    Cataract, left 8/18/2017    Cataract, right 8/18/2017    Chronic obstructive pulmonary disease (HCC)     Cirrhosis ([facility] 8/18/2017    Cold sore 8/18/2017    COVID-19 vaccine series completed 2/23/21, 3/19/21    PFIZER    Elevated hemoglobin A1c 8/18/2017    Fatigue 8/18/2017    Gastrointestinal disorder     acid reflux    GERD (gastroesophageal reflux disease) 8/18/2017    Heart murmur 8/18/2017    Hepatitis C 8/18/2017    TREATED    Herpes zoster infection of thoracic region 8/18/2017    Hyperlipidemia LDL goal <100 8/18/2017    Hypertension     Menopause     Hysterectomy-42years old    Murmur     Obesity (BMI 30-39. 9) 8/18/2017    Osteopenia 8/18/2017    Other ill-defined conditions(799.89)     hep c    Post-menopausal 8/18/2017    Posterior auricular pain of right ear 8/18/2017    Preop examination 8/18/2017    Valvular heart disease     mitral valve prolapse    Vertigo, benign positional 8/18/2017     Past Surgical History:   Procedure Laterality Date    HX BREAST LUMPECTOMY Bilateral 11/3/2020    LEFT BREAST REDUCTION LUMPECTOMY WITH ULTRASOUND, LEFT BREAST SENTINEL NODE BIOPSY, LEFT BREAST RECONSTRUCTION, RIGHT BREAST REDUCTION performed by Yordan Diaz MD at [address] [place] [address] AMBULATORY OR    HX COLONOSCOPY      HX HYSTERECTOMY      42years old    HX KNEE ARTHROSCOPY Right     knee,    HX ORTHOPAEDIC Right     BUNIONECTOMY, HAMMERTOE REPAIR    HX TONSILLECTOMY         Prior Level of Function/Environment/Context: Independent  Expanded or extensive additional review of patient history:     Home Situation  Home Environment: Private residence  # Steps to Enter: 3  Rails to Enter: Yes  [employer] : Right  Wheelchair Ramp: No  Living Alone: Yes (family to come check on her; family staying 1 night)  Support Systems: Family member(s)  Patient Expects to be Discharged to[de-identified] House  Current DME Used/Available at Home: Cane, straight, Raised toilet seat, Shower chair, Walker, rolling  Tub or Shower Type: Tub/Shower combination    Hand dominance: Right    EXAMINATION OF PERFORMANCE DEFICITS:  Cognitive/Behavioral Status:  Neurologic State: Alert  Orientation Level: Oriented X4  Cognition: Appropriate decision making; Follows commands  Perception: Appears intact  Perseveration: No perseveration noted  Safety/Judgement: Awareness of environment;Home safety; Insight into deficits    Hearing: Auditory  Auditory Impairment: None    Vision/Perceptual:                                Corrective Lenses: Reading glasses    Range of Motion:  AROM: Within functional limits (excluding RLE)                         Strength:  Strength: Within functional limits (excluding RLE)                Coordination:  Coordination: Within functional limits  Fine Motor Skills-Upper: Left Intact; Right Intact    Gross Motor Skills-Upper: Left Intact; Right Intact    Tone & Sensation:  Tone: Normal  Sensation: Intact                      Balance:  Sitting: Intact; Without support  Standing: Impaired; With support  Standing - Static: Good;Constant support  Standing - Dynamic : Good;Fair;Constant support    Functional Mobility and Transfers for ADLs:    Transfers:  Sit to Stand: Stand-by assistance  Stand to Sit: Stand-by assistance    ADL Assessment:  Feeding: Independent    Oral Facial Hygiene/Grooming: Stand-by assistance    Bathing: Supervision    Upper Body Dressing: Independent    Lower Body Dressing: Stand-by assistance    Toileting: Stand by assistance                ADL Intervention and task modifications:  Patient instructed and indicated understanding the benefits of maintaining activity tolerance, functional mobility, and independence with self care tasks during acute stay  to ensure safe return home and to baseline. Encouraged patient to increase frequency and duration OOB, be out of bed for all meals, perform daily ADLs (as approved by RN/MD regarding bathing etc), and performing functional mobility to/from bathroom. Pt educated on safe transfer techniques, with specific emphasis on proper hand placement to push up from seated surface rather than attempt to pull self up, fully positioning self in-front of desired seated location, feeling chair on back of legs and reaching back with 1-2 UE to slowly lower self to seated position. Cognitive Retraining  Safety/Judgement: Awareness of environment;Home safety; Insight into deficits    Bathing: Patient instructed and indicated understanding when bathing to not submerge wound in water, stand to shower or sponge bathe, cover wound with plastic and tape to ensure no water reaches bandage/wound without cues. Dressing joint: Patient instructed and demonstrated understanding to don/doff Right LE first/last with min cues. Patient instructed and demonstrated to don all clothing while sitting prior to standing, doff all clothing to knees while standing, then sit to doff clothing off from knees to feet in order to facilitate fall prevention, pain management, and energy conservation with Stand-by assistance. Dressing joint reach exercise: To increase independence with lower body dressing, patient instructed and demonstrated to reach down Right LE in a seated position slowly to prevent tearing/shearing until slight pull is felt, hold at end range for 10 seconds, then return to starting upright position with Supervision. Patient instructed to complete three sets of three repetitions each daily.    Home safety: Patient instructed and indicated understanding on home modifications and safety (raise height of ADL objects, appropriate height of chair surfaces, recliner safety, change of floor surfaces, clear pathways) to increase independence and fall prevention. Standing: Patient instructed and demonstrated during ADLs to walk up to sink/counter top/surfaces, step into walker to increase safety of joint and fall prevention with Stand-by assistance. Patient educated about knee anatomy and educated to avoid rotation of Right LE. Instructed to apply concept to ADLs within the home (no twisting of knee during reaching across body, square off while using objects, slide objects along surfaces). Patient instructed and indicated understanding to increase amount of time standing, observe standing position during ADLs in order to increase even weight bearing through bilateral LEs in order to increase independence with ADLs. Goal to be reached 30 days post - op, per orthopedic surgeon or per PT. Tub/shower transfer: Patient instructed and indicated understanding regarding when it is safe to begin transfer into tub/shower (complete stairs with PT, advance exercises with PT high enough to clear tub/shower height). Patient instructed to use the same technique as used with stairs when entering and exiting tub/shower (\"up with the non-surgical, down with the surgical leg\"). Functional Measure:  Barthel Index:    Bathin  Bladder: 10  Bowels: 10  Groomin  Dressin  Feeding: 10  Mobility: 0  Stairs: 0  Toilet Use: 5  Transfer (Bed to Chair and Back): 10  Total: 55/100        The Barthel ADL Index: Guidelines  1. The index should be used as a record of what a patient does, not as a record of what a patient could do. 2. The main aim is to establish degree of independence from any help, physical or verbal, however minor and for whatever reason. 3. The need for supervision renders the patient not independent. 4. A patient's performance should be established using the best available evidence.  Asking the patient, friends/relatives and nurses are the usual sources, but direct observation and common sense are also important. However direct testing is not needed. 5. Usually the patient's performance over the preceding 24-48 hours is important, but occasionally longer periods will be relevant. 6. Middle categories imply that the patient supplies over 50 per cent of the effort. 7. Use of aids to be independent is allowed. Sid Samayoa., Barthel, D.W. ([id number]). Functional evaluation: the Barthel Index. [address] [facility] (14)2. DADA De Jesus, Kasi Hardin., Idelia Decree., [place], [address] [place] [address] Ave (). Measuring the change indisability after inpatient rehabilitation; comparison of the responsiveness of the Barthel Index and Functional [place] Measure. Journal of Neurology, Neurosurgery, and Psychiatry, 66(4), 019-921. LILLY Weber, FRANK Pineda, & Jody Gurrola M.A. (2004.) Assessment of post-stroke quality of life in cost-effectiveness studies: The usefulness of the Barthel Index and the EuroQoL-5D. Quality of Life Research, 15, 904-37       Occupational Therapy Evaluation Charge Determination   History Examination Decision-Making   LOW Complexity : Brief history review  LOW Complexity : 1-3 performance deficits relating to physical, cognitive , or psychosocial skils that result in activity limitations and / or participation restrictions  LOW Complexity : No comorbidities that affect functional and no verbal or physical assistance needed to complete eval tasks       Based on the above components, the patient evaluation is determined to be of the following complexity level: LOW   Pain Ratin/10 R knee pain; nursing aware and following    Activity Tolerance:   Good and requires rest breaks    After treatment patient left in no apparent distress:    Sitting in chair and Call bell within reach    COMMUNICATION/EDUCATION:   The patients plan of care was discussed with: Physical therapist, Registered nurse and Case management.      Thank you for this referral.  Kelly Aguila OT  Time Calculation: 27 mins

## 2021-07-20 NOTE — PROGRESS NOTES
Ortho Daily Progress Note    7/20/2021  9:13 AM    POD:  1 Day Post-Op  S/P:  Procedure(s):  RIGHT TOTAL KNEE ARTHROPLASTY    Afebrile/VSS, NAD, A&O x 3  Doing well without complaints of nausea  Pain well controlled  Calves soft/NTTP Bilaterally  Incision OK, no drainage or dehiscence. Leg soft. Dressing clean and dry  Moving lower extremities well. Neurocirculatory exam intact and within normal range. Lab Results   Component Value Date/Time    HGB 10.2 (L) 07/20/2021 02:26 AM    INR 1.0 07/09/2021 11:43 AM     Recent Labs     07/20/21  0226 06/16/21  1106 06/16/21  1106 03/09/21  1438 03/09/21  1438 02/16/21  1843 02/16/21  1843 01/26/21  1440 01/26/21  1440 01/06/21  1117 01/06/21  1117 12/14/20  1115 12/14/20  1115 10/26/20  1430 10/26/20  1430   CREA 0.89   < > 0.86   < > 0.91   < > 0.81   < > 1.08*   < > 1.12*   < > 0.9   < > 1.08*   BUN 21*  --  18  --  18  --  17  --  20  --  25*  --  22.0*  --  22*    < > = values in this interval not displayed. Estimated Creatinine Clearance: 65.8 mL/min (based on SCr of 0.89 mg/dL).     PLAN:  DVT prophylaxis:  mg bid  WBAT with PT-mobilization  Pain Control: Tylenol, oxycodone  Plan to D/C home later today versus tomorrow      NANCY Grover

## 2021-07-21 VITALS
HEART RATE: 73 BPM | HEIGHT: 68 IN | SYSTOLIC BLOOD PRESSURE: 109 MMHG | BODY MASS INDEX: 30.81 KG/M2 | WEIGHT: 203.26 LBS | DIASTOLIC BLOOD PRESSURE: 63 MMHG | TEMPERATURE: 98 F | OXYGEN SATURATION: 98 % | RESPIRATION RATE: 16 BRPM

## 2021-07-21 PROCEDURE — 94762 N-INVAS EAR/PLS OXIMTRY CONT: CPT

## 2021-07-21 PROCEDURE — 97530 THERAPEUTIC ACTIVITIES: CPT | Performed by: PHYSICAL THERAPIST

## 2021-07-21 PROCEDURE — 97116 GAIT TRAINING THERAPY: CPT | Performed by: PHYSICAL THERAPIST

## 2021-07-21 PROCEDURE — 74011250637 HC RX REV CODE- 250/637: Performed by: PHYSICIAN ASSISTANT

## 2021-07-21 RX ORDER — ASPIRIN 325 MG
325 TABLET, DELAYED RELEASE (ENTERIC COATED) ORAL EVERY 12 HOURS
Qty: 60 TABLET | Refills: 0 | Status: SHIPPED | OUTPATIENT
Start: 2021-07-21 | End: 2021-09-30

## 2021-07-21 RX ORDER — OXYCODONE HYDROCHLORIDE 5 MG/1
5-10 TABLET ORAL
Qty: 50 TABLET | Refills: 0 | Status: SHIPPED | OUTPATIENT
Start: 2021-07-21 | End: 2021-07-28

## 2021-07-21 RX ORDER — AMOXICILLIN 250 MG
1 CAPSULE ORAL 2 TIMES DAILY
Qty: 50 TABLET | Refills: 0 | Status: SHIPPED | OUTPATIENT
Start: 2021-07-21 | End: 2021-09-30

## 2021-07-21 RX ORDER — NALOXONE HYDROCHLORIDE 4 MG/.1ML
SPRAY NASAL
Qty: 1 EACH | Refills: 0 | Status: SHIPPED | OUTPATIENT
Start: 2021-07-21 | End: 2022-06-22

## 2021-07-21 RX ADMIN — OXYCODONE HYDROCHLORIDE 10 MG: 5 TABLET ORAL at 00:42

## 2021-07-21 RX ADMIN — ASPIRIN 325 MG: 325 TABLET, COATED ORAL at 07:11

## 2021-07-21 RX ADMIN — POLYETHYLENE GLYCOL 3350 17 G: 17 POWDER, FOR SOLUTION ORAL at 09:20

## 2021-07-21 RX ADMIN — DOCUSATE SODIUM 50 MG AND SENNOSIDES 8.6 MG 1 TABLET: 8.6; 5 TABLET, FILM COATED ORAL at 09:19

## 2021-07-21 RX ADMIN — ACETAMINOPHEN 650 MG: 325 TABLET ORAL at 13:31

## 2021-07-21 RX ADMIN — OXYCODONE HYDROCHLORIDE 10 MG: 5 TABLET ORAL at 04:01

## 2021-07-21 RX ADMIN — ACETAMINOPHEN 650 MG: 325 TABLET ORAL at 07:11

## 2021-07-21 RX ADMIN — DEXLANSOPRAZOLE 60 MG: 60 CAPSULE, DELAYED RELEASE ORAL at 11:19

## 2021-07-21 RX ADMIN — Medication 5 ML: at 07:00

## 2021-07-21 RX ADMIN — ACETAMINOPHEN 650 MG: 325 TABLET ORAL at 00:42

## 2021-07-21 RX ADMIN — OXYCODONE HYDROCHLORIDE 10 MG: 5 TABLET ORAL at 07:11

## 2021-07-21 RX ADMIN — Medication 5000 UNITS: at 09:18

## 2021-07-21 NOTE — PROGRESS NOTES
Bedside and Verbal shift change report given to LONDON Louie (oncoming nurse) by Shavonne Langston RN (offgoing nurse). Report included the following information SBAR, OR Summary, Procedure Summary, MAR and Recent Results.

## 2021-07-21 NOTE — PROGRESS NOTES
Bedside shift change report given to Josue Rebolledo RN (oncoming nurse) by Alessandro Celis RN (offgoing nurse). Report included the following information SBAR, Kardex, Output.

## 2021-07-21 NOTE — PROGRESS NOTES
ORTHO PROGRESS NOTE    2021  Admit Date:   2021        Subjective:    Kevin Lee is a 78 y.o. BLACK/ female who is 2 Days Post-Op Procedure(s):  RIGHT TOTAL KNEE ARTHROPLASTY. The patient states moderate pain, otherwise is without c/o at this time. Pain control is adequate. Making steady gains mobilizing with PT. The patient denies CP, SOB, N/V. Vital Signs:    Patient Vitals for the past 8 hrs:   BP Temp Pulse Resp SpO2   21 0448 -- -- -- -- 96 %   21 0202 128/66 98.5 °F (36.9 °C) 76 16 97 %     Temp (24hrs), Av.3 °F (36.8 °C), Min:98 °F (36.7 °C), Max:98.6 °F (37 °C)      Pain Control:   Pain Assessment  Pain Scale 1: Numeric (0 - 10)  Pain Intensity 1: 6  Pain Onset 1: post op  Pain Location 1: Knee  Pain Orientation 1: Anterior  Pain Description 1: Aching  Pain Intervention(s) 1: Medication (see MAR)    LAB:    Recent Labs     21  0226   HGB 10.2*      K 3.6   *   CO2 26   BUN 21*   CREA 0.89   *       Assessment & Physician's Comment:  A&O x 3, NAD  Respirations unlabored  Abdomen S/NT  DF/EHL/PF intact  Distal pulses intact  Calves S/NT, SILT bilateral lower extremities  Dressing is clean, dry, and intact    Procedure:  Procedure(s):  RIGHT TOTAL KNEE ARTHROPLASTY    Plan:  1) Pain Control: Adequate, continue current regimen. 2) Hemodynamics: Stable. 3) Wound: Change dressing if saturated. 4) Activity: WBAT, mobilize with assist.  5) DVT Prophylaxis: ASA, SCD's, encourage ankle pump exercise, mobilize. 6) Disposition: Plan on home today with HH/PT. Follow up in 3-4 weeks in Dr Angelica Wong office for post op care.          Gaetano Pimentel PA-C

## 2021-07-21 NOTE — PROGRESS NOTES
.Bedside and Verbal shift change report given to Royal (oncoming nurse) by Naomi Caraballo (offgoing nurse). Report included the following information SBAR.

## 2021-07-21 NOTE — PROGRESS NOTES
DERIK: Home with HH. Patient lives alone, however will have friends check on her. At [facility] [employer] has accepted patient. Patient owns walker. Family to transport home via car. Care Management Interventions  PCP Verified by CM: Yes  Mode of Transport at Discharge: Other (see comment) (family/car)  Transition of Care Consult (CM Consult): Home Health, Discharge Arnie Wu [phone] [address]: No  Reason Outside [place]: Physician referred to specific agency  MyChart Signup: No  Discharge Durable Medical Equipment: No  Physical Therapy Consult: Yes  Occupational Therapy Consult: Yes  Speech Therapy Consult: No  Current Support Network: Own Home, Lives Alone  Confirm Follow Up Transport: Family  The Patient and/or Patient Representative was Provided with a Choice of Provider and Agrees with the Discharge Plan?: Yes  Freedom of Choice List was Provided with Basic Dialogue that Supports the Patient's Individualized Plan of Care/Goals, Treatment Preferences and Shares the Quality Data Associated with the Providers?: Yes  Discharge Location  Discharge Placement: Home with home health    Reason for Admission:  RIGHT TOTAL KNEE ARTHROPLASTY                      RUR Score:       N/A (outpatient)              Plan for utilizing home health: The Plan for Transition of Care is related to the following treatment goals: [facility]    The Patient and/or patient representative  was provided with a choice of provider and agrees   with the discharge plan. [x] Yes [] No    Freedom of choice list was provided with basic dialogue that supports the patient's individualized plan of care/goals, treatment preferences and shares the quality data associated with the providers.  [x] Yes [] No      PCP: First and Last name:  Paolo Augustine MD     Name of Practice:    Are you a current patient: Yes/No: yes   Approximate date of last visit: July 13   Can you participate in a virtual visit with your PCP:                     Current Advanced Directive/Advance Care Plan: Full Code      Healthcare Decision Maker:   Click here to complete [address] including selection of the Healthcare Decision Maker Relationship (ie \"Primary\")                             Transition of Care Plan:        Home with [facility]              Observation notice provided in writing to patient and/or caregiver as well as verbal explanation of the policy. Patients who are in outpatient status also receive the Observation notice. Patient has received notice and or patient representative has received via secure email, fax, or certified mail based on patient representative's preference.    SHAHNAZ Barnes/CRM

## 2021-07-21 NOTE — DISCHARGE INSTRUCTIONS
Discharge Instructions Knee Replacement  Dr. Unique Torrez      Patient Name  Ray Meyer  Date of procedure  7/19/2021    Procedure  Procedure(s):  RIGHT TOTAL KNEE ARTHROPLASTY  Surgeon  Surgeon(s) and Role:     Alice Dean MD - Primary  Date of discharge: 7/21/2021  PCP: Merline Bushman, MD    Follow up care   Follow up visit with Dr. Unique Torrez in 3 weeks. Call [phone]  to make an appointment   You have staples in your knee incision. They will be [facility] out [facility]. Activity at home   Take a short walk every hour; except at night when sleeping   Do your Home Exercise Program 3 times every day    After exercising lie down and elevate your leg on pillows for 15-30 minutes to decrease swelling   Refer to your patient notebook for more information    Bathing and caring for your incision   You may take a shower with your waterproof dressing on your knee.  The waterproof dressing is to stay on your knee for 7 days.  On the 7th day have someone gently peel the dressing off by lifting the edge and stretching it to break the seal.   You may then leave your incision open to air unless you see drainage from your knee. Preventing blood clots   Take Aspirin 325 mg 1 tab by mouth twice daily with food for 3 weeks, then reduce dose to once daily for 3 weeks, then discontinue.  Call Dr. Unique Torrez if you have side effects of blood thinning medication: bleeding, bruising, upset stomach, or diarrhea.  Call Dr. Unique Torrez for signs of a blood clot in your leg: calf pain, tenderness, redness, swelling of lower leg    Preventing lung congestion   Use your incentive spirometer 4 times a day; do 10 repetitions each time   Remember to keep the small blue ball between the two arrows when taking a slow, deep breath     Pain Management   Get up and walk a short distance to relieve pain and stiffness.  Place ice wrap on your knee except when you are walking.  The gel ice packs should be changed about every 4 hours   Elevate your leg on pillows for 15-30 minutes    Take Tylenol 650mg (take two 325mg tablets) every 6 hours for pain.  If needed, take a narcotic pain pill every 4-6 hours as prescribed.  Take all medications with a small amount of food.  As your pain decreases, take the narcotics less often or take ½ of a pill   Call Dr. Bony Goodwin if you have side effects from your narcotic pain medication: itching, drowsiness, dizziness, upset stomach, dry mouth, constipation or if you medication is not relieving your pain. Diet after surgery   You may resume your normal diet. Include vegetables, fruit, whole grains, lean meats, and low-fat dairy products. Eat food high in fiber    Drink plenty of fluids, including 8 cups of water daily   Take Senokot-s twice a day to prevent constipation    Avoid after surgery   Do not take any over-the-counter medication for pain except Tylenol   Do not take more than 3000mg (3 grams) of Tylenol in 24 hours.  Do not drink alcoholic beverages   Do not smoke   Do not drive until seen for follow up appointment   Do not place frozen gel pack directly on your skin. It can cause frostbite.  Do not take a tub bath, swim or get in a hot tub for 6 weeks  Prevention of falls and safety at home   Set up an area where you can rest comfortably leaving space around furniture to allow you to walk with your walker   Keep stairs, hallways and bathrooms well lit; especially at night   Arrange for care for your pets   Keep your home free of clutter      Call Dr. Bony Goodwin at [phone] for:   Pain that is not relieved by pain medication, ice and activity   Side effects of medications   Increased/spread of bruising   Warning signs of infection:  ? persistent fever greater than 100 degrees  ?  shaking or chills  ? increased redness, tenderness, swelling or drainage from incision  ? increased pain during activity or rest   Warning signs of a blood clot in your leg:  ? increased pain in your calf  ? tenderness or redness  ? increased swelling or knee, calf, ankle or foot    Call [phone] after 5pm or on a weekend.  The on call physician will return your phone call   Call your Primary Care Doctor for:    Concerns about your medical conditions such as diabetes, high blood pressure, asthma, congestive heart failure   Blood sugars greater than 180   Persistent headache or dizziness   Coughing or congestion   Constipation or diarrhea   Burning when you go to the bathroom   Abnormal heart rate (fast or slow)     Call 911 and go to the nearest hospital for:    Sudden increased shortness of breath   Sudden onset of chest pain   Difficulty breathing   Localized chest pain with coughing or taking a deep breath

## 2021-07-21 NOTE — PROGRESS NOTES
Problem: Mobility Impaired (Adult and Pediatric)  Goal: *Acute Goals and Plan of Care (Insert Text)  Description: FUNCTIONAL STATUS PRIOR TO ADMISSION: Patient was independent without use of DME.    HOME SUPPORT PRIOR TO ADMISSION: The patient lived alone and did not require assist.  Pt has arranged for family member to stay first night - encouraged pt to arrange for additional names. Physical Therapy Goals  Initiated 7/19/2021    1. Patient will move from supine to sit and sit to supine  and scoot up and down in bed with modified independence within 4 days. 2. Patient will perform sit to stand with modified independence within 4 days. 3. Patient will ambulate with modified independence for > 150 feet with the least restrictive device within 4 days. 4. Patient will ascend/descend 4 stairs with one handrail(s) with modified independence within 4 days. 5. Patient will perform home exercise program per protocol with supervision/set-up within 4 days. 6. Patient will demonstrate AROM 0-90 degrees in operative joint within 4 days. Outcome: Progressing Towards Goal   PHYSICAL THERAPY TREATMENT  Patient: Saúl Dumont (40 y.o. female)  Date: 7/21/2021  Diagnosis: Localized osteoarthritis of right knee [M17.11] <principal problem not specified>  Procedure(s) (LRB):  RIGHT TOTAL KNEE ARTHROPLASTY (Right) 2 Days Post-Op  Precautions: WBAT, Fall  Chart, physical therapy assessment, plan of care and goals were reviewed. ASSESSMENT  Patient continues with skilled PT services and is progressing towards goals. Patient overall limited by pain and gait instability. Patient currently needing supervision to come to EOB and CGA for transfers. Patient reports she is \"not feeling right\" but her BP is stable and unable to describe why she isn't feeling well. Overall able to progress gait distance with RW without any overt LOB or difficulty. Patient practiced stairs yesterday and feels comfortable with them.   Patient safe to DC home from a mobility standpoint. Will continue to follow should DC be delayed. Patient is cleared for discharge from PT standpoint:  YES [x]     NO []         Other factors to consider for discharge: at risk for falls, below baseline         PLAN :  Patient continues to benefit from skilled intervention to address the above impairments. Continue treatment per established plan of care. to address goals. Recommendation for discharge: (in order for the patient to meet his/her long term goals)  Physical therapy at least 2 days/week in the home       IF patient discharges home will need the following DME: patient owns DME required for discharge       SUBJECTIVE:   Patient stated I'm not feeling right but I don't really know why.     OBJECTIVE DATA SUMMARY:   Critical Behavior:  Neurologic State: Alert, Appropriate for age  Orientation Level: Oriented X4  Cognition: Follows commands  Safety/Judgement: Awareness of environment, Home safety, Insight into deficits        Functional Mobility Training:  Bed Mobility:     Supine to Sit: Stand-by assistance  Sit to Supine: Stand-by assistance           Transfers:  Sit to Stand: Stand-by assistance  Stand to Sit: Stand-by assistance                             Balance:  Sitting: Intact  Standing: Intact; With support  Ambulation/Gait Training:  Distance (ft): 125 Feet (ft)  Assistive Device: Gait belt;Walker, rolling  Ambulation - Level of Assistance: Stand-by assistance        Gait Abnormalities: Antalgic;Decreased step clearance  Right Side Weight Bearing: As tolerated     Base of Support: Narrowed  Stance: Right decreased  Speed/Zamzam: Pace decreased (<100 feet/min); Slow  Step Length: Left shortened;Right shortened      Therapeutic Exercises:     EXERCISE   Sets   Reps   Active Active Assist   Passive Self ROM   Comments   Ankle Pumps 1 10 []                                        []                                        [] []                                           Quad Sets   []                                        []                                        []                                        []                                           Hamstring Sets   []                                        []                                        []                                        []                                           Short Arc Quads   []                                        []                                        []                                        []                                           Knee Extension Stretch     []                                          []                                          []                                          []                                           Heel Slides   []                                        []                                        []                                        []                                           Long Arc Quads   []                                        []                                        []                                        []                                           Knee Flexion Stretch   []                                        []                                        []                                        []                                           Straight Leg Raises   []                                        []                                        []                                        []                                               Pain Rating:  Does not report pain during session    Activity Tolerance:   Fair and requires rest breaks    After treatment patient left in no apparent distress:   Sitting in chair and Call bell within reach    COMMUNICATION/COLLABORATION:   The patients plan of care was discussed with: Physical therapist, Occupational therapist, and Registered nurse.      Lalit Philippe José Miguel PT, DPT   Time Calculation: 26 mins

## 2021-08-03 PROBLEM — K21.9 GERD (GASTROESOPHAGEAL REFLUX DISEASE): Status: RESOLVED | Noted: 2017-08-18 | Resolved: 2021-08-03

## 2021-08-10 ENCOUNTER — OFFICE VISIT (OUTPATIENT)
Dept: ONCOLOGY | Age: 76
End: 2021-08-10
Payer: COMMERCIAL

## 2021-08-10 VITALS
SYSTOLIC BLOOD PRESSURE: 116 MMHG | HEIGHT: 68 IN | RESPIRATION RATE: 16 BRPM | WEIGHT: 197.8 LBS | DIASTOLIC BLOOD PRESSURE: 69 MMHG | OXYGEN SATURATION: 98 % | BODY MASS INDEX: 29.98 KG/M2 | HEART RATE: 98 BPM

## 2021-08-10 DIAGNOSIS — Z98.890 H/O BILATERAL BREAST REDUCTION SURGERY: ICD-10-CM

## 2021-08-10 DIAGNOSIS — K21.9 GASTROESOPHAGEAL REFLUX DISEASE WITHOUT ESOPHAGITIS: ICD-10-CM

## 2021-08-10 DIAGNOSIS — I36.1 NON-RHEUMATIC TRICUSPID VALVE INSUFFICIENCY: ICD-10-CM

## 2021-08-10 DIAGNOSIS — R23.2 HOT FLASHES RELATED TO AROMATASE INHIBITOR THERAPY: ICD-10-CM

## 2021-08-10 DIAGNOSIS — C50.912 INVASIVE DUCTAL CARCINOMA OF LEFT BREAST (HCC): Primary | ICD-10-CM

## 2021-08-10 DIAGNOSIS — J44.9 CHRONIC OBSTRUCTIVE PULMONARY DISEASE, UNSPECIFIED COPD TYPE (HCC): ICD-10-CM

## 2021-08-10 DIAGNOSIS — Z86.19 HISTORY OF HEPATITIS C: ICD-10-CM

## 2021-08-10 DIAGNOSIS — I10 ESSENTIAL HYPERTENSION: ICD-10-CM

## 2021-08-10 DIAGNOSIS — Z98.890 HISTORY OF LUMPECTOMY: ICD-10-CM

## 2021-08-10 DIAGNOSIS — T45.1X5A HOT FLASHES RELATED TO AROMATASE INHIBITOR THERAPY: ICD-10-CM

## 2021-08-10 DIAGNOSIS — Z96.651 S/P TKR (TOTAL KNEE REPLACEMENT), RIGHT: ICD-10-CM

## 2021-08-10 PROCEDURE — 99214 OFFICE O/P EST MOD 30 MIN: CPT | Performed by: INTERNAL MEDICINE

## 2021-08-10 NOTE — PROGRESS NOTES
Chief Complaint   Patient presents with    Breast Cancer     3 mo f/u       Visit Vitals  /69 (BP 1 Location: Left arm, BP Patient Position: Sitting, BP Cuff Size: Large adult)   Pulse 98   Resp 16   Ht 5' 8\" (1.727 m)   Wt 197 lb 12.8 oz (89.7 kg)   SpO2 98%   BMI 30.08 kg/m²       1. Have you been to the ER, urgent care clinic since your last visit? Hospitalized since your last visit? YES, 7/19/21-7/21/31 [facility] for osteoarthritis of right knee    2. Have you seen or consulted any other health care providers outside of the [address] [place] [address] since your last visit? Include any pap smears or colon screening.  No

## 2021-08-10 NOTE — PROGRESS NOTES
[facility] [place] at [address], [address] [place] [address] Road, [facility] [phone]  F: [phone]    Reason for Visit:   Cheli Coon is a 78 y.o. seen today in office for 3 month follow up of Left Breast Cancer on adjuvant Anastrozole. Treatment History:   Per Surgery Note:  · Initially abnormal screening mammo 7/20 on LEFT with small mass  · 08/04/20: LEFT Breast Bx. PATH: IDC, 5 mm in greatest linear dimension extending to core biopsy tip, histologic grade 2, ER+(%)/WA+(%)/HER2-. Clinical stage 1  · MammaPrint: High risk, luminal type B, -0.123  · Breast MRI 8/28/20: 12 mm mass otherwise negative. · 11/03/20: LEFT oncoplastic reduction and SLNBx, and RIGHT breast reduction, with Dr. Freddy Calero. PATH:  · LEFT: IDC, 14 x 13 x 12 mm, overall grade 2, negative margins. DCIS present with negative margins. Pathologic stage: pT1c, pN0  · RIGHT: Breast tissue with stromal fibrosis and focal papillary apocrine metaplasia. Benign skin and subcutaneous soft tissue. No in-situ or invasive carcinoma identified  · Adjuvant TC chemotherapy 1/6/21 - 3/10/21  · Completed XRT on 5/5/21  · Adjuvant Anastrozole 5/21 - Current     STAGE: T1cN0 ER+ HEr2 negative mammaprint high risk luminal B    History of Present Illness:   Cheli Coon is a 78 y.o. female seen today in office for 3 month follow up of left breast cancer ER+ HER2 negative s/p lumpectomy and bilateral breast reduction on 11/3/20. She completed XRT on 5/5/21. She started adjuvant hormonal therapy with Anastrozole in 5/21. She reports that she feels well overall today. She is tolerating Anastrozole well overall with some mild hot flashes. Her appetite and energy levels are good. She had a right TKR on 7/19/21 and is recovering from this. She denies fever, chills, mouth sores, cough, SOB, CP, nausea, vomiting, diarrhea, and constipation. She denies pain today. She has routine HM and labs with her PCP.  Bilateral mammogram is scheduled for 12/2/21 with Surgery visit. She is here alone today. Past Medical History:   Diagnosis Date    Acute diverticulitis 8/18/2017    Allergic rhinitis 8/18/2017    Arthritis 8/18/2017    Arthritis of right knee 11/25/2019    Back pain, thoracic 8/18/2017    Cancer ([facility] 08/2020    LEFT BREAST , LUMPECTOMY WITH RECONSTRUCTION, CHEMO, RADIATION    Cataract, left 8/18/2017    Cataract, right 8/18/2017    Chronic obstructive pulmonary disease (HCC)     [facility] 8/18/2017    Cold sore 8/18/2017    COVID-19 vaccine series completed 2/23/21, 3/19/21    PFIZER    Elevated hemoglobin A1c 8/18/2017    Fatigue 8/18/2017    Gastrointestinal disorder     acid reflux    GERD (gastroesophageal reflux disease) 8/18/2017    Heart murmur 8/18/2017    Hepatitis C 8/18/2017    TREATED    Herpes zoster infection of thoracic region 8/18/2017    Hyperlipidemia LDL goal <100 8/18/2017    Hypertension     Menopause     Hysterectomy-42years old    Murmur     Obesity (BMI 30-39. 9) 8/18/2017    Osteopenia 8/18/2017    Other ill-defined conditions(799.89)     hep c    Post-menopausal 8/18/2017    Posterior auricular pain of right ear 8/18/2017    Preop examination 8/18/2017    Valvular heart disease     mitral valve prolapse    Vertigo, benign positional 8/18/2017      Past Surgical History:   Procedure Laterality Date    HX BREAST LUMPECTOMY Bilateral 11/3/2020    LEFT BREAST REDUCTION LUMPECTOMY WITH ULTRASOUND, LEFT BREAST SENTINEL NODE BIOPSY, LEFT BREAST RECONSTRUCTION, RIGHT BREAST REDUCTION performed by Tiffany Mora MD at [address] HX COLONOSCOPY      HX HYSTERECTOMY      42years old   [place] Hen[place] HX KNEE ARTHROSCOPY Right     knee,    HX ORTHOPAEDIC Right     BUNIONECTOMY, HAMMERTOE REPAIR    HX TONSILLECTOMY        Social History     Tobacco Use    Smoking status: Former Smoker     Packs/day: 0.50     Years: 25.00     Pack years: 12.50     Types: Cigarettes     Quit date: 1982     Years since quittin.6    Smokeless tobacco: Never Used   Substance Use Topics    Alcohol use: Yes     Alcohol/week: 1.0 standard drinks     Types: 1 Glasses of wine per week     Comment: 1/WK      Family History   Problem Relation Age of Onset    Hypertension Mother     Diabetes Mother     Kidney Disease Mother     Arthritis-osteo Mother     Heart Disease Mother     Heart Disease Father     Arthritis-osteo Father     Hypertension Father     Colon Cancer Maternal Grandmother     No Known Problems Sister     Anesth Problems Neg Hx      Current Outpatient Medications   Medication Sig    fluticasone (Flonase Sensimist) 27.5 mcg/actuation nasal spray 2 Sprays by Both Nostrils route daily.  fexofenadine (Allegra Allergy) 180 mg tablet Take 360 mg by mouth daily.  anastrozole (ARIMIDEX) 1 mg tablet Take 1 mg by mouth daily. Indications: hormone receptor positive breast cancer (Patient taking differently: Take 1 mg by mouth daily. PT TAKES WITH DINNER  Indications: hormone receptor positive breast cancer)    olmesartan-hydroCHLOROthiazide (BENICAR HCT) 20-12.5 mg per tablet TAKE 1 TABLET BY MOUTH ONCE DAILY (Patient taking differently: Take 1 Tablet by mouth daily. TAKE 1 TABLET BY MOUTH ONCE DAILY AT 4 PM)    MILK THISTLE PO Take 175 mg by mouth daily.  OTHER 1 Tablet daily. Indications: AM/PM MENOPAUSE FORMULA 1 TAB QHS    furosemide (LASIX) 20 mg tablet TAKE 1 TABLET BY MOUTH ONCE DAILY (Patient taking differently: Take 20 mg by mouth every evening. TAKE 1 TABLET BY MOUTH ONCE DAILY)    TURMERIC ROOT EXTRACT PO Take 200 mg by mouth daily.  psyllium (METAMUCIL) powd Take  by mouth nightly.  magnesium 250 mg tab Take 1 Tablet by mouth nightly.  biotin 5,000 mcg TbDi Take 1 Tablet by mouth daily.  cholecalciferol, VITAMIN D3, (VITAMIN D3) 5,000 unit tab tablet Take  by mouth daily.     Dexlansoprazole (DEXILANT) 60 mg CpDB Take 60 mg by mouth as needed.  aspirin delayed-release 325 mg tablet Take 1 Tablet by mouth every twelve (12) hours. Indications: post op DVT prevention (Patient not taking: Reported on 8/10/2021)    senna-docusate (PERICOLACE) 8.6-50 mg per tablet Take 1 Tablet by mouth two (2) times a day. Indications: constipation (Patient not taking: Reported on 8/10/2021)    naloxone [facility]) 4 mg/actuation nasal spray Use 1 spray intranasally, then discard. Repeat with new spray every 2 min as needed for opioid overdose symptoms, alternating nostrils. Indications: decrease in rate & depth of breathing due to opioid drug, opioid overdose (Patient not taking: Reported on 8/10/2021)    amoxicillin (AMOXIL) 500 mg capsule Take 2,000 mg by mouth as needed (PRIOR TO DENTAL APPT). (Patient not taking: Reported on 7/13/2021)    valACYclovir (VALTREX) 500 mg tablet Take 1 tablet by mouth once daily (Patient not taking: Reported on 7/19/2021)    docosanol (ABREVA) 10 % topical cream Apply  to affected area as needed. (Patient not taking: Reported on 7/19/2021)    omeprazole (PRILOSEC OTC) 20 mg tablet Take 20 mg by mouth as needed. (Patient not taking: Reported on 7/19/2021)     No current facility-administered medications for this visit. No Known Allergies     Review of Systems:  A complete review of systems was obtained, negative except as described above and as reported on ROS sheet scanned into system. Physical Exam:     Visit Vitals  /69 (BP 1 Location: Left arm, BP Patient Position: Sitting, BP Cuff Size: Large adult)   Pulse 98   Resp 16   Ht 5' 8\" (1.727 m)   Wt 197 lb 12.8 oz (89.7 kg)   SpO2 98%   BMI 30.08 kg/m²     ECOG PS: 1  General: No distress  Eyes:  Anicteric sclerae  HENT: Atraumatic, wearing a mask  Neck: Supple  Respiratory: Normal respiratory effort, lungs clear  CV: Regular rate and rhythm  GI: Soft, non tender  Ext[de-identified] No edema  MS: Ambulatory with a cane. Digits without clubbing or cyanosis.   Skin: No rashes, ecchymoses, or petechiae. Normal temperature, turgor, and texture. Psych: Alert, oriented, appropriate affect, normal judgment/insight  Breast: Left breast with XRT skin changes, lumpectomy incision with pea sized area of scar tissue/fat necrosis, and well healed reduction scars. Right breast with well healed reduction scars and no palpable masses  Neuro: Non focal     Results:     Lab Results   Component Value Date/Time    WBC 3.6 06/16/2021 11:06 AM    HGB 10.2 (L) 07/20/2021 02:26 AM    HCT 43.5 06/16/2021 11:06 AM    PLATELET 003 33/00/9409 11:06 AM    MCV 92.4 06/16/2021 11:06 AM    ABS. NEUTROPHILS 2.0 06/16/2021 11:06 AM    HGB (POC) 12.6 09/11/2017 11:03 AM    HCT (POC) 38.2 09/11/2017 11:03 AM     Lab Results   Component Value Date/Time    Sodium 140 07/20/2021 02:26 AM    Potassium 3.6 07/20/2021 02:26 AM    Chloride 109 (H) 07/20/2021 02:26 AM    CHLORIDE 104.0 03/28/2018 10:08 AM    CO2 26 07/20/2021 02:26 AM    Glucose 107 (H) 07/20/2021 02:26 AM    BUN 21 (H) 07/20/2021 02:26 AM    Creatinine 0.89 07/20/2021 02:26 AM    GFR est AA >60 07/20/2021 02:26 AM    GFR est non-AA >60 07/20/2021 02:26 AM    Calcium 8.7 07/20/2021 02:26 AM    Sodium (POC) 146.0 (A) 03/28/2018 10:08 AM    Potassium (POC) 4.6 03/28/2018 10:08 AM    Glucose (POC) 95 07/19/2021 07:14 AM    Creatinine (POC) 0.7 03/28/2018 10:08 AM     Lab Results   Component Value Date/Time    Bilirubin, total 0.3 06/16/2021 11:06 AM    ALT (SGPT) 20 06/16/2021 11:06 AM    Alk. phosphatase 67 06/16/2021 11:06 AM    Protein, total 8.4 (H) 06/16/2021 11:06 AM    Albumin 4.2 06/16/2021 11:06 AM    Globulin 4.2 (H) 06/16/2021 11:06 AM     Records reviewed and summarized above. Pathology report(s) reviewed above. Radiology report(s) reviewed above. Assessment/PLAN:     1) Stage 1 T1cN0 ER+ HER2 negative mammaprint high risk luminal B post Lumpectomy/Bilateral Reduction on 11/3/20  She started adjuvant TC chemotherapy on 1/6/21.   She completed 4 cycles of TC on 3/10/21. She completed radiation on 5/5/21. She started adjuvant Anastrozole in 5/21. Patient seen today in office for 3 month follow up. She remains on adjuvant hormonal therapy with Anastrozole. She is tolerating Anastrozole well overall with some mild hot flashes. She is clinically stable today, feels well overall. Pea sized area palpable on left breast today - advised to see Breast Surgery for evaluation. Pt will call them to set up appt. She will have a bilateral mammogram on 12/2/21. She has follow up with Surgery in 12/21. Continue Anastrozole. Follow up in 3 months. Patient agrees with plan. 2) Hx of Hep C  Cured per patient. Management per [facility]. 3) Heart Valve Problems/HTN. Management per Cardiology. 4) COPD/GERD   Management per PCP. 5) s/p Right TKR  Recovering well - management per Ortho. 6) Hot Flashes  Likely due to AI. Mild, tolerable per patient. Will continue to monitor. 7) Psychosocial  Mood good, coping well. She continues to work during [employer] from home. SW support as needed. She is here alone today. Call if questions. Follow up in 3 months  This patient was seen in conjunction with Yousif Segovia NP. I personally performed a face to face diagnostic evaluation on this patient. I personally reviewed the history and performed the key points on the exam.   I personally reviewed all points in the assessment and created treatment plan with the patient. Specifically, pt seen by me today  Doing well overall. Post knee surgery walking with a cane. Has a small round mass under LEFT breast at scar line. Pt is unsure if this is increasing in size. Advised to see breast surgery for eval of this. Pt will call for appt. I appreciate the opportunity to participate in Ms. Danielle Jones's care.     Signed By: Ganesh Helms DO

## 2021-08-23 NOTE — PROGRESS NOTES
HISTORY OF PRESENT ILLNESS  Lindsey Landis is a 78 y.o. female. HPI  ESTABLISHED patient here for LEFT breast lump found by Dr. Netta Sanford during exam at the incision site. Requesting ultrasound to see if it is scar tissue. 08/04/20: LEFT breast bx. PATH: IDC, 5 mm in greatest linear dimension extending to core biopsy tip, histologic grade 2, ER+(%)/[place]+(%)/HER2-. Clinical stage 1.  - MammaPrint: High risk, luminal type B, -0.123.    11/03/20: LEFT oncoplastic reduction and SLNBx, and RIGHT breast reduction, with Dr. Jimenez Holland. PATH:  - LEFT: IDC, 14 x 13 x 12mm, overall grade 2, negative margins. DCIS present with negative margins. Pathologic stage: pT1c, pN0.  - RIGHT: Breast tissue with stromal fibrosis and focal papillary apocrine metaplasia. Benign skin and subcutaneous soft tissue. No in-situ or invasive carcinoma identified. 06/06/21 - 03/10/21: Adjuvant TC chemotherapy. (Dr. Netta Sanford)    04/08/21 - 05/05/21: XRT. (Dr. Zahraa Kimball)    05/2021 - : Adjuvant Anastrozole.  (Dr. Netta Sanford)      Past Medical History:   Diagnosis Date    Acute diverticulitis 8/18/2017    Allergic rhinitis 8/18/2017    Arthritis 8/18/2017    Arthritis of right knee 11/25/2019    Back pain, thoracic 8/18/2017    Cancer ([address] 08/2020    LEFT BREAST , LUMPECTOMY WITH RECONSTRUCTION, CHEMO, RADIATION    Cataract, left 8/18/2017    Cataract, right 8/18/2017    Chronic obstructive pulmonary disease ([address]     Cirrhosis ([address] 8/18/2017    Cold sore 8/18/2017    COVID-19 vaccine series completed 2/23/21, 3/19/21    PFIZER    Elevated hemoglobin A1c 8/18/2017    Fatigue 8/18/2017    Gastrointestinal disorder     acid reflux    GERD (gastroesophageal reflux disease) 8/18/2017    Heart murmur 8/18/2017    Hepatitis C 8/18/2017    TREATED    Herpes zoster infection of thoracic region 8/18/2017    Hyperlipidemia LDL goal <100 8/18/2017    Hypertension     Menopause     Hysterectomy-42years old    Murmur     Obesity (BMI 30-39. 9) 2017    Osteopenia 2017    Other ill-defined conditions(799.89)     hep c    Post-menopausal 2017    Posterior auricular pain of right ear 2017    Preop examination 2017    Valvular heart disease     mitral valve prolapse    Vertigo, benign positional 2017       Past Surgical History:   Procedure Laterality Date    HX BREAST LUMPECTOMY Bilateral 11/3/2020    LEFT BREAST REDUCTION LUMPECTOMY WITH ULTRASOUND, LEFT BREAST SENTINEL NODE BIOPSY, LEFT BREAST RECONSTRUCTION, RIGHT BREAST REDUCTION performed by William Calzada MD at [address] HX COLONOSCOPY      HX HYSTERECTOMY      42years old   [place] Gall[place] HX KNEE ARTHROSCOPY Right     knee,    HX ORTHOPAEDIC Right     BUNIONECTOMY, HAMMERTOE REPAIR    HX TONSILLECTOMY         Social History     Socioeconomic History    Marital status: SINGLE     Spouse name: Not on file    Number of children: Not on file    Years of education: Not on file    Highest education level: Not on file   Occupational History    Not on file   Tobacco Use    Smoking status: Former Smoker     Packs/day: 0.50     Years: 25.00     Pack years: 12.50     Types: Cigarettes     Quit date: 1982     Years since quittin.6    Smokeless tobacco: Never Used   Vaping Use    Vaping Use: Never used   Substance and Sexual Activity    Alcohol use: Yes     Alcohol/week: 1.0 standard drinks     Types: 1 Glasses of wine per week     Comment: 1/WK    Drug use: No    Sexual activity: Not Currently     Partners: Male     Birth control/protection: None   Other Topics Concern    Not on file   Social History Narrative    Not on file     Social Determinants of Health     Financial Resource Strain:     Difficulty of Paying Living Expenses:    Food Insecurity:     Worried About Running Out of Food in the Last Year:     [address] in the Last Year:    Transportation Needs:     Lack of Transportation (Medical):      Lack of Transportation (Non-Medical):    Physical Activity:     Days of Exercise per Week:     Minutes of Exercise per Session:    Stress:     Feeling of Stress :    Social Connections:     Frequency of Communication with Friends and Family:     Frequency of Social Gatherings with Friends and Family:     Attends [employer] Services:     Active Member of Clubs or Organizations:     Attends Club or Organization Meetings:     Marital Status:    Intimate Partner Violence:     Fear of Current or Ex-Partner:     Emotionally Abused:     Physically Abused:     Sexually Abused:        Current Outpatient Medications on File Prior to Visit   Medication Sig Dispense Refill    aspirin delayed-release 325 mg tablet Take 1 Tablet by mouth every twelve (12) hours. Indications: post op DVT prevention 60 Tablet 0    senna-docusate (PERICOLACE) 8.6-50 mg per tablet Take 1 Tablet by mouth two (2) times a day. Indications: constipation 50 Tablet 0    naloxone (NARCAN) 4 mg/actuation nasal spray Use 1 spray intranasally, then discard. Repeat with new spray every 2 min as needed for opioid overdose symptoms, alternating nostrils. Indications: decrease in rate & depth of breathing due to opioid drug, opioid overdose 1 Each 0    fluticasone (Flonase Sensimist) 27.5 mcg/actuation nasal spray 2 Sprays by Both Nostrils route daily.  fexofenadine (Allegra Allergy) 180 mg tablet Take 360 mg by mouth daily.  amoxicillin (AMOXIL) 500 mg capsule Take 2,000 mg by mouth as needed (PRIOR TO DENTAL APPT).  anastrozole (ARIMIDEX) 1 mg tablet Take 1 mg by mouth daily. Indications: hormone receptor positive breast cancer (Patient taking differently: Take 1 mg by mouth daily. PT TAKES WITH DINNER  Indications: hormone receptor positive breast cancer) 90 Tab 3    olmesartan-hydroCHLOROthiazide (BENICAR HCT) 20-12.5 mg per tablet TAKE 1 TABLET BY MOUTH ONCE DAILY (Patient taking differently: Take 1 Tablet by mouth daily.  TAKE 1 TABLET BY MOUTH ONCE DAILY AT 4 PM) 90 Tab 3    MILK THISTLE PO Take 175 mg by mouth daily.  OTHER 1 Tablet daily. Indications: AM/PM MENOPAUSE FORMULA 1 TAB QHS      furosemide (LASIX) 20 mg tablet TAKE 1 TABLET BY MOUTH ONCE DAILY (Patient taking differently: Take 20 mg by mouth every evening. TAKE 1 TABLET BY MOUTH ONCE DAILY) 90 Tab 3    valACYclovir (VALTREX) 500 mg tablet Take 1 tablet by mouth once daily 30 Tab 0    TURMERIC ROOT EXTRACT PO Take 200 mg by mouth daily.  psyllium (METAMUCIL) powd Take  by mouth nightly.  magnesium 250 mg tab Take 1 Tablet by mouth nightly.  docosanol (ABREVA) 10 % topical cream Apply  to affected area as needed.  biotin 5,000 mcg TbDi Take 1 Tablet by mouth daily.  cholecalciferol, VITAMIN D3, (VITAMIN D3) 5,000 unit tab tablet Take  by mouth daily.  Dexlansoprazole (DEXILANT) 60 mg CpDB Take 60 mg by mouth as needed.  omeprazole (PRILOSEC OTC) 20 mg tablet Take 20 mg by mouth as needed. No current facility-administered medications on file prior to visit. No Known Allergies    OB History    No obstetric history on file. Obstetric Comments   Menarche 17, LMP age 42, # of children 1, age of 4st delivery 23, Hysterectomy/oophorectomy yes partial/yes, Breast bx no, history of breast feeding no, BCP yes, Hormone therapy yes             ROS    Physical Exam  Exam conducted with a chaperone present. Cardiovascular:      Rate and Rhythm: Normal rate and regular rhythm. Heart sounds: Normal heart sounds. Pulmonary:      Breath sounds: Normal breath sounds. Chest:      Breasts: Breasts are symmetrical.         Right: Normal. No swelling, bleeding, inverted nipple, mass, nipple discharge, skin change or tenderness. Left: Normal. No swelling, bleeding, inverted nipple, mass, nipple discharge, skin change or tenderness.        Lymphadenopathy:      Cervical:      Right cervical: No superficial, deep or posterior cervical adenopathy. Left cervical: No superficial, deep or posterior cervical adenopathy. Upper Body:      Right upper body: No supraclavicular or axillary adenopathy. Left upper body: No supraclavicular or axillary adenopathy. BREAST ULTRASOUND  Indication: LEFT breast mass 6:00  Technique: The area was scanned using a high-frequency linear-array near-field transducer  Findings: Hyperechoic area with central fluid, corresponding to palpable abnormality  Impression: Probable fat necrosis  Disposition: No worrisome finding on ultrasound      ASSESSMENT and PLAN    ICD-10-CM ICD-9-CM    1. 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 History of left breast cancer  Z85.3 V10.3    3. Fat necrosis of left breast  N64.1 611.3       Established patient presents for evaluation of LEFT breast lump, and is doing well overall. Well healed incisions bilaterally, no evidence of recurrence. Small area of fat necrosis noted on LEFT breast exam and US right at the junction of wise pattern incision at 6:00. No worrisome findings on exam or US. Pt due for mammogram in December. F/U after mammogram as previously scheduled with Conor Villa, NP. This plan was reviewed with the patient and patient agrees. All questions were answered. Total time spent was 20 minutes.     Written by Estee Rossi, as dictated by Dr. Jefry Mayorga MD.

## 2021-08-25 ENCOUNTER — OFFICE VISIT (OUTPATIENT)
Dept: SURGERY | Age: 76
End: 2021-08-25
Payer: COMMERCIAL

## 2021-08-25 VITALS
HEIGHT: 68 IN | HEART RATE: 78 BPM | DIASTOLIC BLOOD PRESSURE: 52 MMHG | WEIGHT: 197 LBS | SYSTOLIC BLOOD PRESSURE: 118 MMHG | BODY MASS INDEX: 29.86 KG/M2

## 2021-08-25 DIAGNOSIS — Z85.3 HISTORY OF LEFT BREAST CANCER: ICD-10-CM

## 2021-08-25 DIAGNOSIS — N64.1 FAT NECROSIS OF LEFT BREAST: ICD-10-CM

## 2021-08-25 DIAGNOSIS — C50.912 INVASIVE DUCTAL CARCINOMA OF LEFT BREAST (HCC): Primary | ICD-10-CM

## 2021-08-25 PROCEDURE — 99213 OFFICE O/P EST LOW 20 MIN: CPT | Performed by: SURGERY

## 2021-08-25 PROCEDURE — 76642 ULTRASOUND BREAST LIMITED: CPT | Performed by: SURGERY

## 2021-08-25 NOTE — PROGRESS NOTES
HISTORY OF PRESENT ILLNESS  Hugh Mckeon is a 78 y.o. female. HPI ESTABLISHED patient here for LEFT breast lump found by Dr. Kiana Orosco during exam at the incision site. Requesting ultrasound to see if it is scar tissue. 08/04/20: LEFT breast bx. PATH: IDC, 5 mm in greatest linear dimension extending to core biopsy tip, histologic grade 2, ER+(%)/RI+(%)/HER2-. Clinical stage 1.  - MammaPrint: High risk, luminal type B, -0.123.    11/03/20: LEFT oncoplastic reduction and SLNBx, and RIGHT breast reduction, with Dr. Yousif Johnson. PATH:  - LEFT: IDC, 14 x 13 x 12mm, overall grade 2, negative margins. DCIS present with negative margins. Pathologic stage: pT1c, pN0.  - RIGHT: Breast tissue with stromal fibrosis and focal papillary apocrine metaplasia. Benign skin and subcutaneous soft tissue. No in-situ or invasive carcinoma identified. 06/06/21 - 03/10/21: Adjuvant TC chemotherapy. (Dr. Kiana Orosco)    04/08/21 - 05/05/21: XRT. (Dr. Willie Vogel)    05/2021 - : Adjuvant Anastrozole.  (Dr. Kiana Orosco)      ROS    Physical Exam    ASSESSMENT and PLAN  {ASSESSMENT/PLAN:[id number]}

## 2021-08-25 NOTE — PATIENT INSTRUCTIONS

## 2021-09-30 ENCOUNTER — OFFICE VISIT (OUTPATIENT)
Dept: CARDIOLOGY CLINIC | Age: 76
End: 2021-09-30
Payer: COMMERCIAL

## 2021-09-30 ENCOUNTER — ANCILLARY PROCEDURE (OUTPATIENT)
Dept: CARDIOLOGY CLINIC | Age: 76
End: 2021-09-30

## 2021-09-30 VITALS
BODY MASS INDEX: 29.4 KG/M2 | HEIGHT: 68 IN | RESPIRATION RATE: 14 BRPM | OXYGEN SATURATION: 100 % | HEART RATE: 76 BPM | SYSTOLIC BLOOD PRESSURE: 110 MMHG | DIASTOLIC BLOOD PRESSURE: 70 MMHG | WEIGHT: 194 LBS

## 2021-09-30 VITALS — BODY MASS INDEX: 29.4 KG/M2 | HEIGHT: 68 IN | WEIGHT: 194 LBS

## 2021-09-30 DIAGNOSIS — E78.5 HYPERLIPIDEMIA LDL GOAL <100: ICD-10-CM

## 2021-09-30 DIAGNOSIS — I36.1 NON-RHEUMATIC TRICUSPID VALVE INSUFFICIENCY: ICD-10-CM

## 2021-09-30 DIAGNOSIS — E66.9 OBESITY (BMI 30-39.9): ICD-10-CM

## 2021-09-30 DIAGNOSIS — R53.83 FATIGUE, UNSPECIFIED TYPE: ICD-10-CM

## 2021-09-30 DIAGNOSIS — R00.2 PALPITATIONS: ICD-10-CM

## 2021-09-30 DIAGNOSIS — I27.20 PULMONARY HTN (HCC): ICD-10-CM

## 2021-09-30 DIAGNOSIS — Z92.21 HISTORY OF CHEMOTHERAPY: Primary | ICD-10-CM

## 2021-09-30 DIAGNOSIS — R06.09 DYSPNEA ON EXERTION: ICD-10-CM

## 2021-09-30 DIAGNOSIS — I10 ESSENTIAL HYPERTENSION: ICD-10-CM

## 2021-09-30 DIAGNOSIS — I34.0 NON-RHEUMATIC MITRAL REGURGITATION: ICD-10-CM

## 2021-09-30 LAB
ECHO AO ROOT DIAM: 2.87 CM
ECHO AV AREA PEAK VELOCITY: 2.01 CM2
ECHO AV AREA VTI: 2.07 CM2
ECHO AV AREA/BSA PEAK VELOCITY: 1 CM2/M2
ECHO AV AREA/BSA VTI: 1 CM2/M2
ECHO AV MEAN GRADIENT: 5.89 MMHG
ECHO AV PEAK GRADIENT: 10.97 MMHG
ECHO AV PEAK VELOCITY: 165.59 CM/S
ECHO AV VTI: 32.33 CM
ECHO IVC PROX: 2.25 CM
ECHO LA AREA 4C: 15.86 CM2
ECHO LA MAJOR AXIS: 3.14 CM
ECHO LA MINOR AXIS: 1.55 CM
ECHO LA VOL 2C: 49.85 ML (ref 22–52)
ECHO LA VOL 4C: 39.53 ML (ref 22–52)
ECHO LA VOL BP: 47.64 ML (ref 22–52)
ECHO LA VOL/BSA BIPLANE: 23.58 ML/M2 (ref 16–28)
ECHO LA VOLUME INDEX A2C: 24.68 ML/M2 (ref 16–28)
ECHO LA VOLUME INDEX A4C: 19.57 ML/M2 (ref 16–28)
ECHO LV E' LATERAL VELOCITY: 11.99 CM/S
ECHO LV E' SEPTAL VELOCITY: 10.31 CM/S
ECHO LV EDV A2C: 45.94 ML
ECHO LV EDV A4C: 75.24 ML
ECHO LV EDV BP: 59.15 ML (ref 56–104)
ECHO LV EDV INDEX A4C: 37.2 ML/M2
ECHO LV EDV INDEX BP: 29.3 ML/M2
ECHO LV EDV NDEX A2C: 22.7 ML/M2
ECHO LV EJECTION FRACTION A2C: 69 PERCENT
ECHO LV EJECTION FRACTION A4C: 62 PERCENT
ECHO LV EJECTION FRACTION BIPLANE: 64.9 PERCENT (ref 55–100)
ECHO LV ESV A2C: 14.05 ML
ECHO LV ESV A4C: 28.59 ML
ECHO LV ESV BP: 20.76 ML (ref 19–49)
ECHO LV ESV INDEX A2C: 7 ML/M2
ECHO LV ESV INDEX A4C: 14.2 ML/M2
ECHO LV ESV INDEX BP: 10.3 ML/M2
ECHO LV GLOBAL LONGITUDINAL STRAIN (GLS): -22.7 PERCENT
ECHO LV INTERNAL DIMENSION DIASTOLIC: 4.15 CM (ref 3.9–5.3)
ECHO LV INTERNAL DIMENSION SYSTOLIC: 2.73 CM
ECHO LV IVSD: 1.26 CM (ref 0.6–0.9)
ECHO LV MASS 2D: 180.3 G (ref 67–162)
ECHO LV MASS INDEX 2D: 89.3 G/M2 (ref 43–95)
ECHO LV POSTERIOR WALL DIASTOLIC: 1.19 CM (ref 0.6–0.9)
ECHO LVOT DIAM: 1.85 CM
ECHO LVOT PEAK GRADIENT: 6.12 MMHG
ECHO LVOT PEAK VELOCITY: 123.7 CM/S
ECHO LVOT SV: 67 ML
ECHO LVOT VTI: 24.92 CM
ECHO MV A VELOCITY: 124.53 CM/S
ECHO MV E DECELERATION TIME (DT): 259.05 MS
ECHO MV E VELOCITY: 98.03 CM/S
ECHO MV E/A RATIO: 0.79
ECHO MV E/E' LATERAL: 8.18
ECHO MV E/E' RATIO (AVERAGED): 8.84
ECHO MV E/E' SEPTAL: 9.51
ECHO PV MAX VELOCITY: 128.15 CM/S
ECHO PV PEAK INSTANTANEOUS GRADIENT SYSTOLIC: 6.57 MMHG
ECHO RA MINOR AXIS: 2.87 CM
ECHO RV INTERNAL DIMENSION: 2.98 CM
ECHO RV TAPSE: 2.32 CM (ref 1.5–2)
ECHO TV REGURGITANT MAX VELOCITY: 281.91 CM/S
ECHO TV REGURGITANT PEAK GRADIENT: 31.79 MMHG
GLOBAL LONGITUDINAL STRAIN 2 CHAMBER: -22.6 PERCENT
GLOBAL LONGITUDINAL STRAIN 4 CHAMBER: -23.9 PERCENT
GLOBAL LONGITUDINAL STRAIN LONG AXIS: -21.5 PERCENT
LA VOL DISK BP: 44.7 ML (ref 22–52)

## 2021-09-30 PROCEDURE — 99214 OFFICE O/P EST MOD 30 MIN: CPT | Performed by: NURSE PRACTITIONER

## 2021-09-30 PROCEDURE — 93306 TTE W/DOPPLER COMPLETE: CPT | Performed by: INTERNAL MEDICINE

## 2021-09-30 NOTE — PROGRESS NOTES
HPI: Teagan Henson, a 78y.o. year-old who presents for follow up regarding her valve disease. Echo today showed EF 60-65%, normal strain, mild to mod TR, RVSP about 35 mmhg  She finished her radiation May 6 2021  Finished chemo 6 weeks prior   Has been getting regular echocardiograms during treatment about every 6 months  She denies any dyspnea with exertion  Has some mild swelling from recent right knee replacement, takes lasix daily  No PND  No chest pain or palpitations   No dizziness, does have some vertigo from time to time   Discussed Dr. Iris Russo departure from [facility], she would like to continue to follow up with me and Dr. Rj Segundo  Will order echo for her next visit with Dr. Rj Segundo and told her that they could discuss how frequently to do echocardiograms going forward at that visit     Assessment/Plan:  1. Dyspnea with exertion - none, doing better on Lasix  2. HTN - well controlled on Benicar HCTZ   3. 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 Body mass index is 29.5 kg/m². 5.  Mild to moderate TR, mild PAHTN by TTE - symptoms stable on Lasix 20mg daily   6. Hepatitis C s/p treatment with harvoni and rivavirin - has HERNANDEZ, followed by hepatology  7. Dyslipidemia -  in 6/21, advised her to work on diet and exercise, she'll plan to have lipids rechecked in November at PCP office   8. Vitamin D deficiency - on 5000 units daily   9.  Hx of chemo and radiation therapy - EF normal by TTE, normal strain, continue lasix, benicarHCT  -she will follow up with Dr. Rj Segundo in 6 months with repeat TTE and will see me again in 1 year     Echo 9/21 - EF 60-65%, normal strain, mild to mod TR, RVSP 35mmHg  Echo 9/20 - EF 60-65%, grade 1 dd, myxomatous MV, mod TR, PASP 33mmHg  Echo 9/19 - mod TR, EF normal, PASP 45mmhg  Echo 9/18 - LVEF 55-60%, mod TR, RVSP 32mmhg  Echo 9/17 - LVEF 60 % to 65 %, no WMA, mod-severe TR, RVSP 38mmHg/PASP 25mmHg   Echo 9/16 - LVEF 60 % to 65 %, no WMA, mod-severe TR, mild to mod PAHTN (PASP 40mmHg  Echo 8/15 - EF 60%, mild MR, mod-severe TR  Nuclear stress 8/15 EF 86%, no ischemia    Soc Hx: 2 glasses of wine every other day, no tobacco, no drug use, lives alone, very busy at [employer]  Fam Hx: father passed from bleeding ulcers at age 67 and had end stage CKD and HTN, mother DM and ESRD and living at age 80     She  has a past medical history of Acute diverticulitis (8/18/2017), Allergic rhinitis (8/18/2017), Arthritis (8/18/2017), Arthritis of right knee (11/25/2019), Back pain, thoracic (8/18/2017), Cancer ([facility] [address] (08/2020), Cataract, left (8/18/2017), Cataract, right (8/18/2017), Chronic obstructive pulmonary disease ([facility] [address] Cirrhosis ([facility] (8/18/2017), Cold sore (8/18/2017), COVID-19 vaccine series completed (2/23/21, 3/19/21), Elevated hemoglobin A1c (8/18/2017), Fatigue (8/18/2017), Gastrointestinal disorder, GERD (gastroesophageal reflux disease) (8/18/2017), Heart murmur (8/18/2017), Hepatitis C (8/18/2017), Herpes zoster infection of thoracic region (8/18/2017), Hyperlipidemia LDL goal <100 (8/18/2017), Hypertension, Menopause, Murmur, Obesity (BMI 30-39.9) (8/18/2017), Osteopenia (8/18/2017), Other ill-defined conditions(799.89), Post-menopausal (8/18/2017), Posterior auricular pain of right ear (8/18/2017), Preop examination (8/18/2017), Valvular heart disease, and Vertigo, benign positional (8/18/2017). She also has no past medical history of Difficult intubation or Nausea & vomiting. Cardiovascular ROS: no chest pain or dyspnea on exertion  Respiratory ROS: no cough, shortness of breath, or wheezing  Neurological ROS: no TIA or stroke symptoms  All other systems negative except as above. PE  Vitals:    09/30/21 1036   BP: 110/70   Pulse: 76   Resp: 14   SpO2: 100%   Weight: 194 lb (88 kg)   Height: 5' 8\" (1.727 m)    Body mass index is 29.5 kg/m².    General appearance - alert, well appearing, and in no distress  Mental status - affect appropriate to mood  Eyes - sclera anicteric, moist mucous membranes  Neck - supple  Lymphatics - not assessed  Chest - clear to auscultation, no wheezes, rales or rhonchi  Heart - normal rate, regular rhythm, normal S1, S2, no murmurs, rubs, clicks or gallops  Abdomen - soft, nontender, nondistended  Back exam - full range of motion, no tenderness  Neurological - cranial nerves II through XII grossly intact, no focal deficit  Musculoskeletal - no muscular tenderness noted, normal strength  Extremities - peripheral pulses normal, no pedal edema  Skin - normal coloration  no rashes    Recent Labs:  Lab Results   Component Value Date/Time    Cholesterol, total 214 (H) 06/16/2021 11:06 AM    HDL Cholesterol 52 06/16/2021 11:06 AM    LDL, calculated 136 (H) 06/16/2021 11:06 AM    Triglyceride 130 06/16/2021 11:06 AM    CHOL/HDL Ratio 4.1 06/16/2021 11:06 AM     Lab Results   Component Value Date/Time    Creatinine 0.89 07/20/2021 02:26 AM     Lab Results   Component Value Date/Time    BUN 21 (H) 07/20/2021 02:26 AM     Lab Results   Component Value Date/Time    Potassium 3.6 07/20/2021 02:26 AM     Lab Results   Component Value Date/Time    Hemoglobin A1c 5.2 06/16/2021 11:06 AM     Lab Results   Component Value Date/Time    HGB 10.2 (L) 07/20/2021 02:26 AM     Lab Results   Component Value Date/Time    PLATELET 839 76/44/3416 11:06 AM       Reviewed:  Past Medical History:   Diagnosis Date    Acute diverticulitis 8/18/2017    Allergic rhinitis 8/18/2017    Arthritis 8/18/2017    Arthritis of right knee 11/25/2019    Back pain, thoracic 8/18/2017    Cancer ([facility] [address] 08/2020    LEFT BREAST , LUMPECTOMY WITH RECONSTRUCTION, CHEMO, RADIATION    Cataract, left 8/18/2017    Cataract, right 8/18/2017    Chronic obstructive pulmonary disease (HCC)     Cirrhosis ([facility] 8/18/2017    Cold sore 8/18/2017    COVID-19 vaccine series completed 2/23/21, 3/19/21    PFIZER    Elevated hemoglobin A1c 8/18/2017    Fatigue 2017    Gastrointestinal disorder     acid reflux    GERD (gastroesophageal reflux disease) 2017    Heart murmur 2017    Hepatitis C 2017    TREATED    Herpes zoster infection of thoracic region 2017    Hyperlipidemia LDL goal <100 2017    Hypertension     Menopause     Hysterectomy-42years old    Murmur     Obesity (BMI 30-39. 9) 2017    Osteopenia 2017    Other ill-defined conditions(799.89)     hep c    Post-menopausal 2017    Posterior auricular pain of right ear 2017    Preop examination 2017    Valvular heart disease     mitral valve prolapse    Vertigo, benign positional 2017     Social History     Tobacco Use   Smoking Status Former Smoker    Packs/day: 0.50    Years: 25.00    Pack years: 12.50    Types: Cigarettes    Quit date: 1982    Years since quittin.7   Smokeless Tobacco Never Used     Social History     Substance and Sexual Activity   Alcohol Use Yes    Alcohol/week: 1.0 standard drinks    Types: 1 Glasses of wine per week    Comment: 1/WK     No Known Allergies    Current Outpatient Medications   Medication Sig    fluticasone (Flonase Sensimist) 27.5 mcg/actuation nasal spray 2 Sprays by Both Nostrils route daily.  fexofenadine (Allegra Allergy) 180 mg tablet Take 360 mg by mouth daily.  amoxicillin (AMOXIL) 500 mg capsule Take 2,000 mg by mouth as needed (PRIOR TO DENTAL APPT).  anastrozole (ARIMIDEX) 1 mg tablet Take 1 mg by mouth daily. Indications: hormone receptor positive breast cancer (Patient taking differently: Take 1 mg by mouth daily. PT TAKES WITH DINNER  Indications: hormone receptor positive breast cancer)    olmesartan-hydroCHLOROthiazide (BENICAR HCT) 20-12.5 mg per tablet TAKE 1 TABLET BY MOUTH ONCE DAILY (Patient taking differently: Take 1 Tablet by mouth daily. TAKE 1 TABLET BY MOUTH ONCE DAILY AT 4 PM)    MILK THISTLE PO Take 175 mg by mouth daily.  OTHER 1 Tablet daily. Indications: AM/PM MENOPAUSE FORMULA 1 TAB QHS    furosemide (LASIX) 20 mg tablet TAKE 1 TABLET BY MOUTH ONCE DAILY (Patient taking differently: Take 20 mg by mouth every evening. TAKE 1 TABLET BY MOUTH ONCE DAILY)    valACYclovir (VALTREX) 500 mg tablet Take 1 tablet by mouth once daily (Patient taking differently: Take 500 mg by mouth as needed.)    TURMERIC ROOT EXTRACT PO Take 200 mg by mouth daily.  psyllium (METAMUCIL) powd Take  by mouth nightly.  magnesium 250 mg tab Take 1 Tablet by mouth nightly.  docosanol (ABREVA) 10 % topical cream Apply  to affected area as needed.  biotin 5,000 mcg TbDi Take 1 Tablet by mouth daily.  cholecalciferol, VITAMIN D3, (VITAMIN D3) 5,000 unit tab tablet Take  by mouth daily.  Dexlansoprazole (DEXILANT) 60 mg CpDB Take 60 mg by mouth as needed.  omeprazole (PRILOSEC OTC) 20 mg tablet Take 20 mg by mouth as needed.  naloxone (NARCAN) 4 mg/actuation nasal spray Use 1 spray intranasally, then discard. Repeat with new spray every 2 min as needed for opioid overdose symptoms, alternating nostrils. Indications: decrease in rate & depth of breathing due to opioid drug, opioid overdose (Patient not taking: Reported on 9/30/2021)     No current facility-administered medications for this visit. Landry Marie NP  [facility] [address] [place] [address]  [phone]      HPI: Bunny Wagner is a 78y.o. year-old female who presents for follow up regarding her valve disease. FInished her last chemo last week and that is great but she is feelign wiped out,   XRT for month, with Dr. Aida Almeida  Pre chemo she was feeling ok, energy ok, breathing ok. She had some ankle swelling after standing a lot a but that is gone and she is happy about that. BP is great, 109/52  Second shot Friday  Lasix daily for now. PCP appt coming up to recheck labs. Recent ones looked good.    K low on last labs and she will have it rechecked. Mod TR by TTE   Has had a few flutters but very brief, no high risk features. Has mild dyspnea with moderate exertion but it is stable, no change  Denies any PND or orthopnea  No dizziness or syncope, does have some vertigo from time to time   No LE edema   Doing well, reviwed her echo results, sx what to expect, etc. Watch for now. I encouraged her to keep up her efforts at healthy lifestyle with walking and increasing her exercise and to let me know if she has any new symptoms for now she seems to be doing pretty well, and although the stress of losing her mom has been a new addition to her stressors in the last couple of weeks she seems to be handling it okay. Assessment/Plan:  1. Dyspnea with exertion - none, doing better on Lasix  2. HTN - well controlled on Benicar HCTZ   3. GERD - on Dexilant every other day   4. MVP- previously diagnosed with MVP 20 years ago  5. Obesity - Body mass index is 29.5 kg/m². 6.  Moderate TR, mild PAHTN by TTE today - symptoms stable on Lasix at  20mg daily   7. Hepatitis C s/p treatment with harvoni and rivavirin - has EHRNANDEZ, followed by hepatology  8. LDL near goal, due to recheck  9. Vitamin D deficiency - on 5000 units daily   10.  Edema, post chemo- recheck ef in 9/21, earlier if edema is a recurrent issue, cont lasix, benicarHCT    Echo 9/19 (prelim) - mod TR, EF normal, PASP 45mmhg  Echo 9/18 - LVEF 55-60%, mod TR, RVSP 32mmhg  Echo 9/17 - LVEF 60 % to 65 %, no WMA, mod-severe TR, RVSP 38mmHg/PASP 25mmHg   Echo 9/16 - LVEF 60 % to 65 %, no WMA, mod-severe TR, mild to mod PAHTN (PASP 40mmHg  Echo 8/15 - EF 60%, mild MR, mod-severe TR  Nuclear stress 8/15 EF 86%, no ischemia    Soc Hx: 2 glasses of wine every other day, no tobacco, no drug use, lives alone, very busy at [employer], continues to work as a  for a nonprofFITiST Hx: father passed from bleeding ulcers at age 67 and had end stage CKD and HTN, mother DM and ESRD and living at age 80     She  has a past medical history of Acute diverticulitis (8/18/2017), Allergic rhinitis (8/18/2017), Arthritis (8/18/2017), Arthritis of right knee (11/25/2019), Back pain, thoracic (8/18/2017), Cancer ([facility] [address] (08/2020), Cataract, left (8/18/2017), Cataract, right (8/18/2017), Chronic obstructive pulmonary disease ([facility] [address] [facility] Cirrhosis ([facility] (8/18/2017), Cold sore (8/18/2017), COVID-19 vaccine series completed (2/23/21, 3/19/21), Elevated hemoglobin A1c (8/18/2017), Fatigue (8/18/2017), Gastrointestinal disorder, GERD (gastroesophageal reflux disease) (8/18/2017), Heart murmur (8/18/2017), Hepatitis C (8/18/2017), Herpes zoster infection of thoracic region (8/18/2017), Hyperlipidemia LDL goal <100 (8/18/2017), Hypertension, Menopause, Murmur, Obesity (BMI 30-39.9) (8/18/2017), Osteopenia (8/18/2017), Other ill-defined conditions(799.89), Post-menopausal (8/18/2017), Posterior auricular pain of right ear (8/18/2017), Preop examination (8/18/2017), Valvular heart disease, and Vertigo, benign positional (8/18/2017). She also has no past medical history of Difficult intubation or Nausea & vomiting. Cardiovascular ROS: denies chest pain or increase in dyspnea on exertion  Respiratory ROS: no cough or wheezing  Neurological ROS: no TIA or stroke symptoms  All other systems negative except as above. PE  Vitals:    09/30/21 1036   BP: 110/70   Pulse: 76   Resp: 14   SpO2: 100%   Weight: 194 lb (88 kg)   Height: 5' 8\" (1.727 m)    Body mass index is 29.5 kg/m².   gen alert NAD  Mental normal affect normal speech pattern logical thought process  resp no labored breathing wheezing or coughing  Neuro no speech slurring or difficulty with word finding    12 lead ECG: NSR, anteroseptal Q waves (unchanged) last    Recent Labs:  Lab Results   Component Value Date/Time    Cholesterol, total 214 (H) 06/16/2021 11:06 AM     No results found for: BRENNEN  Lab Results   Component Value Date/Time    BUN 21 (H) 2021 02:26 AM     Lab Results   Component Value Date/Time    Potassium 3.6 2021 02:26 AM     Lab Results   Component Value Date/Time    Hemoglobin A1c 5.2 2021 11:06 AM     No components found for: HGBI,  IHGB,  HGB,  HGBP,  WBHGB  Lab Results   Component Value Date/Time    PLATELET 106  11:06 AM       Reviewed:  Past Medical History:   Diagnosis Date    Acute diverticulitis 2017    Allergic rhinitis 2017    Arthritis 2017    Arthritis of right knee 2019    Back pain, thoracic 2017    Cancer ([facility] 2020    LEFT BREAST , LUMPECTOMY WITH RECONSTRUCTION, CHEMO, RADIATION    Cataract, left 2017    Cataract, right 2017    Chronic obstructive pulmonary disease (HCC)     [facility] 2017    Cold sore 2017    COVID-19 vaccine series completed 21, 3/19/21    PFIZER    Elevated hemoglobin A1c 2017    Fatigue 2017    Gastrointestinal disorder     acid reflux    GERD (gastroesophageal reflux disease) 2017    Heart murmur 2017    Hepatitis C 2017    TREATED    Herpes zoster infection of thoracic region 2017    Hyperlipidemia LDL goal <100 2017    Hypertension     Menopause     Hysterectomy-42years old    Murmur     Obesity (BMI 30-39. 9) 2017    Osteopenia 2017    Other ill-defined conditions(799.89)     hep c    Post-menopausal 2017    Posterior auricular pain of right ear 2017    Preop examination 2017    Valvular heart disease     mitral valve prolapse    Vertigo, benign positional 2017     Social History     Tobacco Use   Smoking Status Former Smoker    Packs/day: 0.50    Years: 25.00    Pack years: 12.50    Types: Cigarettes    Quit date: 1982    Years since quittin.7   Smokeless Tobacco Never Used     Social History     Substance and Sexual Activity   Alcohol Use Yes    Alcohol/week: 1.0 standard drinks  Types: 1 Glasses of wine per week    Comment: 1/WK     No Known Allergies    Current Outpatient Medications   Medication Sig    fluticasone (Flonase Sensimist) 27.5 mcg/actuation nasal spray 2 Sprays by Both Nostrils route daily.  fexofenadine (Allegra Allergy) 180 mg tablet Take 360 mg by mouth daily.  amoxicillin (AMOXIL) 500 mg capsule Take 2,000 mg by mouth as needed (PRIOR TO DENTAL APPT).  anastrozole (ARIMIDEX) 1 mg tablet Take 1 mg by mouth daily. Indications: hormone receptor positive breast cancer (Patient taking differently: Take 1 mg by mouth daily. PT TAKES WITH DINNER  Indications: hormone receptor positive breast cancer)    olmesartan-hydroCHLOROthiazide (BENICAR HCT) 20-12.5 mg per tablet TAKE 1 TABLET BY MOUTH ONCE DAILY (Patient taking differently: Take 1 Tablet by mouth daily. TAKE 1 TABLET BY MOUTH ONCE DAILY AT 4 PM)    MILK THISTLE PO Take 175 mg by mouth daily.  OTHER 1 Tablet daily. Indications: AM/PM MENOPAUSE FORMULA 1 TAB QHS    furosemide (LASIX) 20 mg tablet TAKE 1 TABLET BY MOUTH ONCE DAILY (Patient taking differently: Take 20 mg by mouth every evening. TAKE 1 TABLET BY MOUTH ONCE DAILY)    valACYclovir (VALTREX) 500 mg tablet Take 1 tablet by mouth once daily (Patient taking differently: Take 500 mg by mouth as needed.)    TURMERIC ROOT EXTRACT PO Take 200 mg by mouth daily.  psyllium (METAMUCIL) powd Take  by mouth nightly.  magnesium 250 mg tab Take 1 Tablet by mouth nightly.  docosanol (ABREVA) 10 % topical cream Apply  to affected area as needed.  biotin 5,000 mcg TbDi Take 1 Tablet by mouth daily.  cholecalciferol, VITAMIN D3, (VITAMIN D3) 5,000 unit tab tablet Take  by mouth daily.  Dexlansoprazole (DEXILANT) 60 mg CpDB Take 60 mg by mouth as needed.  omeprazole (PRILOSEC OTC) 20 mg tablet Take 20 mg by mouth as needed.  naloxone (NARCAN) 4 mg/actuation nasal spray Use 1 spray intranasally, then discard.  Repeat with new spray every 2 min as needed for opioid overdose symptoms, alternating nostrils. Indications: decrease in rate & depth of breathing due to opioid drug, opioid overdose (Patient not taking: Reported on 9/30/2021)     No current facility-administered medications for this visit.        Krystal Foy NP  [employer] [facility]  [address], [address]

## 2021-10-22 RX ORDER — FUROSEMIDE 20 MG/1
TABLET ORAL
Qty: 90 TABLET | Refills: 1 | Status: SHIPPED | OUTPATIENT
Start: 2021-10-22 | End: 2022-04-25 | Stop reason: SDUPTHER

## 2021-10-22 NOTE — TELEPHONE ENCOUNTER
Requested Prescriptions     Signed Prescriptions Disp Refills    furosemide (LASIX) 20 mg tablet 90 Tablet 1     Sig: TAKE 1 TABLET BY MOUTH ONCE DAILY     Authorizing Provider: Katja Villalba     Ordering User: Jennifer Burrows     Per verbal orders

## 2021-11-09 ENCOUNTER — OFFICE VISIT (OUTPATIENT)
Dept: ORTHOPEDIC SURGERY | Age: 76
End: 2021-11-09
Payer: COMMERCIAL

## 2021-11-09 DIAGNOSIS — M25.561 POSTOPERATIVE PAIN OF RIGHT KNEE: ICD-10-CM

## 2021-11-09 DIAGNOSIS — M17.11 PRIMARY OSTEOARTHRITIS OF RIGHT KNEE: Primary | ICD-10-CM

## 2021-11-09 DIAGNOSIS — G89.18 POSTOPERATIVE PAIN OF RIGHT KNEE: ICD-10-CM

## 2021-11-09 PROCEDURE — 97112 NEUROMUSCULAR REEDUCATION: CPT | Performed by: PHYSICAL THERAPIST

## 2021-11-09 PROCEDURE — 97110 THERAPEUTIC EXERCISES: CPT | Performed by: PHYSICAL THERAPIST

## 2021-11-09 NOTE — PROGRESS NOTES
PT DAILY TREATMENT NOTE    Patient Name: Sunny To  Date:2021  : 1945  [x]  Patient  Verified  Payor: Justice Bahena / Plan: Luly Puente [id number] PPO / Product Type: PPO /    Total Treatment Time (min): 65  Total Timed Codes (min): 65  Visit #: 20   1. Primary osteoarthritis of right knee      2. Postoperative pain of right knee         SUBJECTIVE  Subjective functional status/changes:   [] No changes reported    Patient reports she has been feeling more confident maneuvering stairs using an alternating step pattern. She denies having any true knee pain or functional limitations. She feels she is ready for D/C at this time. OBJECTIVE    Manual Therapy:   PF/TF mobilizations to affected limb to promote improved joint mobility. PROM for knee flexion and extension mobility. STM/MFR to the distal IT band, quadriceps, peripatellar structures and popliteal musculature. Passive quad and hamstring stretching in supine and with leg off table. ROM 0-120    Therapeutic Exercise:   Strengthening/Endurance/ADL function/Neuromuscular reeducation activities/exercises supervised and completed per flow sheet. ROM     STRENGTHENING   BALANCE/PROPRIO/NMR    DYNAMIC    [x] Bike x 10' @ level 1.0  [x] TKE: grn    [x] balance board   [] ladder drills  [x] slantboard    [x] lateral steps to foam  [x] tandem stand     [] sliders with towel    [x] LAQ: 8 #    [x] ABC's w/ball in SLS  [] prostretch    [x] SAQ: 4 #    [x] BOSU march  [x] bridges with blue band  [] BOSU lat. stepovers   [x] lateral walk with blue band   [x] 4-way gait with blue band   [x] Total Gym press @ lv    [] Total Gym ecc.  Heel @ lv     [] Total Gym HR's @ lv     [] standing HS curls with   #   [x] LP @ 80 #   [] hip ER with    band    [] phonebook ecc. stepdowns    Added/Changed Exercises:  []  Advanced to address: [] functional strength/ROM deficits [] balance/proprioceptive tasks  []  Modified: [] per subjective reports [] for patient time constraints [] for clinic time constraints      Modality:  []  E-Stim: type _ x _ min     []att   []unatt   []w/ice   []w/heat  []  Ultrasound: []cont   []pulse    _ W/cm2 x _  min   []1MHz   []3MHz  []  Ice pack: post      []  Hot pack: pre    []  Other:     Neuromuscular Re-education:  []  Kinesiotaping for   []  Neuromuscular reeducation to the VMO with use of [place] electrical stimulation in conjunction with active contraction and exercises. Patient Education: [x] Review HEP    [] Progressed/Changed HEP based on:  [] positioning   [] body mechanics   [] transfers   [] heat/ice application      ASSESSMENT  []  See progress note/recertification  []  Patient will continue to benefit from skilled therapy to address remaining functional deficits:     She denies having any functional limitations due her R knee. She is I with exercises and household/community ADLs. She does not have any knee pain and her ROM is WNL. She is in agreement with plan to D/C at this time. Progress towards goals / Updated goals:    PLAN  []  Upgrade activities as tolerated      []  Continue plan of care  [x]  Discharge due to: having met all functional goals.    [] Other:_      Co-treatment by Aiem Ochoa, COY 11/9/2021  3:10 PM

## 2021-11-09 NOTE — PROGRESS NOTES
I have reviewed the notes, assessments, and/or procedures performed by Aime Ochoa PTA, I concur with her/his documentation of Yung Miner

## 2021-11-10 ENCOUNTER — TELEPHONE (OUTPATIENT)
Dept: ONCOLOGY | Age: 76
End: 2021-11-10

## 2021-11-29 ENCOUNTER — TELEPHONE (OUTPATIENT)
Dept: ONCOLOGY | Age: 76
End: 2021-11-29

## 2021-12-02 ENCOUNTER — HOSPITAL ENCOUNTER (OUTPATIENT)
Dept: MAMMOGRAPHY | Age: 76
Discharge: HOME | End: 2021-12-02
Attending: NURSE PRACTITIONER
Payer: COMMERCIAL

## 2021-12-02 ENCOUNTER — OFFICE VISIT (OUTPATIENT)
Dept: SURGERY | Age: 76
End: 2021-12-02
Payer: COMMERCIAL

## 2021-12-02 VITALS — HEART RATE: 77 BPM | DIASTOLIC BLOOD PRESSURE: 60 MMHG | SYSTOLIC BLOOD PRESSURE: 134 MMHG

## 2021-12-02 DIAGNOSIS — Z85.3 HISTORY OF BREAST CANCER IN FEMALE: ICD-10-CM

## 2021-12-02 DIAGNOSIS — C50.912 INVASIVE DUCTAL CARCINOMA OF LEFT BREAST (HCC): Primary | ICD-10-CM

## 2021-12-02 DIAGNOSIS — Z92.3 S/P RADIATION THERAPY: ICD-10-CM

## 2021-12-02 DIAGNOSIS — Z98.890 S/P LUMPECTOMY OF BREAST: ICD-10-CM

## 2021-12-02 DIAGNOSIS — R92.8 ABNORMAL FINDING ON BREAST IMAGING: ICD-10-CM

## 2021-12-02 PROCEDURE — 76642 ULTRASOUND BREAST LIMITED: CPT

## 2021-12-02 PROCEDURE — 77062 BREAST TOMOSYNTHESIS BI: CPT

## 2021-12-02 PROCEDURE — 99213 OFFICE O/P EST LOW 20 MIN: CPT | Performed by: NURSE PRACTITIONER

## 2021-12-02 NOTE — PROGRESS NOTES
HISTORY OF PRESENT ILLNESS  Phoenix Marcelo is a 78 y.o. female. HPI Established patient presents for follow-up to LEFT breast cancer. Denies breast mass, skin changes, nipple discharge and pain.          Breast history -   Referring - Dr. Hope Opitz  08/04/20: LEFT breast bx. PATH: IDC, 5 mm in greatest linear dimension extending to core biopsy tip, histologic grade 2, ER+(%)/SD+(%)/HER2-. Clinical stage 1. MammaPrint: High risk, luminal type B, -0.123.  11/03/20: LEFT oncoplastic reduction and SLNBx, and RIGHT breast reduction - Dr. Azul Lucas and Dr. Jeremiah Persaud. · LEFT: IDC, 14 x 13 x 12mm, overall grade 2, negative margins. DCIS present with negative margins. · Pathologic stage: pT1c, pN0.  · RIGHT: Breast tissue with stromal fibrosis and focal papillary apocrine metaplasia. Benign skin and subcutaneous soft tissue. No in-situ or invasive carcinoma identified. 1/6/21 - started adjuvant TC chemotherapy - 4 cycles - Dr. Joseph Batres  5/2021 - completed XRT - [facility]   6/2021 - started anastrozole - Dr. Joseph Batres           Family history -  Maternal aunt had colon cancer. OB History    No obstetric history on file.       Obstetric Comments   Menarche 17, LMP age 42, # of children 1, age of 4st delivery 23, Hysterectomy/oophorectomy yes partial/yes, Breast bx no, history of breast feeding no, BCP yes, Hormone therapy yes               Past Surgical History:   Procedure Laterality Date    HX BREAST LUMPECTOMY Bilateral 11/3/2020    LEFT BREAST REDUCTION LUMPECTOMY WITH ULTRASOUND, LEFT BREAST SENTINEL NODE BIOPSY, LEFT BREAST RECONSTRUCTION, RIGHT BREAST REDUCTION performed by Eliza [facility] at [facility]    HX BREAST REDUCTION Bilateral 2020    HX COLONOSCOPY      HX HYSTERECTOMY      42years old    HX KNEE ARTHROSCOPY Right     knee,    HX ORTHOPAEDIC Right     BUNIONECTOMY, HAMMERTOE REPAIR    HX TONSILLECTOMY         NABEEL Results (most recent):  Results from RENATO SENA JEAN-CLAUDE - AZUCENA Encounter encounter on 12/02/21    [facility] 3D CARRIE W MAMMO BI DX INCL CAD    Narrative  STUDY:  Bilateral Digital Diagnostic Mammogram including 3-D Tomosynthesis    INDICATION:  Left breast carcinoma, post lumpectomy and bilateral breast  reductions in November 2020. COMPARISON:  [id number]. BREAST COMPOSITION: The breast tissue is heterogeneously dense (51 - 75%  glandular), which could obscure detection of small masses. FINDINGS: Right  digital diagnostic mammography and tomography was performed,  and is interpreted in conjunction with a computer assisted detection (CAD)  system. Post bilateral breast reductions. Skin and trabecular thickening in the  left breast is likely treatment-related. There is at least one oil cyst in the  right breast, along the nipple line, in the middle third, on CC view. Multiple,  additional, lobulated masses in the central right breast probably correspond to  similar findings seen in the posterior third of the central right breast on CC  screening views from 2014 and 2016. No suspicious mass or calcification. Ultrasound of the right breast was performed by the radiologist and ultrasound  technologist. Lobulated hypoechoic areas at 9:00 and 2:00 correspond to the  mammographic findings. Impression  1. Probable postoperative change in the right breast.  2. No suspicious mass or calcification the left breast.  3. BI-RADS Assessment Category 3: Probably benign finding. 4. Recommendation: 6 month follow-up right diagnostic tomography and ultrasound. ROS    Physical Exam  Constitutional:       Appearance: Normal appearance. Chest:   Breasts:      Right: No mass, nipple discharge, skin change, tenderness, axillary adenopathy or supraclavicular adenopathy. Left: No mass, nipple discharge, skin change (post XRT skin changes), tenderness, axillary adenopathy or supraclavicular adenopathy.         Comments: Nodular post-op changes along inframammary folds bilaterally  Musculoskeletal:      Comments: FROM - UE x 2   Lymphadenopathy:      Upper Body:      Right upper body: No supraclavicular or axillary adenopathy. Left upper body: No supraclavicular or axillary adenopathy. Neurological:      Mental Status: She is alert. Psychiatric:         Attention and Perception: Attention normal.         Mood and Affect: Mood normal.         Speech: Speech normal.         Behavior: Behavior normal.             ASSESSMENT and PLAN  Encounter Diagnoses   Name Primary?  Invasive ductal carcinoma of left breast (HCC) Yes    S/P lumpectomy of breast     S/P radiation therapy     History of breast cancer in female     Abnormal finding on breast imaging       Normal exam with no evidence of breast malignancy. Imaging done today - RIGHT breast follow-up in 6 months. Continues on AI and has follow-up with Dr. David Merino. RTC in 6 months or sooner PRN. She is comfortable with this plan. All questions answered and she stated understanding. Total time spent for this patient - 20 minutes.

## 2021-12-07 ENCOUNTER — VIRTUAL VISIT (OUTPATIENT)
Dept: ONCOLOGY | Age: 76
End: 2021-12-07
Payer: COMMERCIAL

## 2021-12-07 DIAGNOSIS — Z98.890 HISTORY OF LUMPECTOMY: ICD-10-CM

## 2021-12-07 DIAGNOSIS — C50.912 INVASIVE DUCTAL CARCINOMA OF LEFT BREAST (HCC): Primary | ICD-10-CM

## 2021-12-07 DIAGNOSIS — I10 ESSENTIAL HYPERTENSION: ICD-10-CM

## 2021-12-07 DIAGNOSIS — Z98.890 H/O BILATERAL BREAST REDUCTION SURGERY: ICD-10-CM

## 2021-12-07 PROCEDURE — 99213 OFFICE O/P EST LOW 20 MIN: CPT | Performed by: INTERNAL MEDICINE

## 2021-12-07 NOTE — PROGRESS NOTES
[facility] [place] at [address], [address] [address], [facility] [phone]  F: [phone]        Reason for Visit:   Umair Vu is a 78 y.o. female who is seen by synchronous (real-time) audio-video technology for follow up of Left Breast Cancer on adjuvant Anastrozole. Treatment History:   Per Surgery Note:  · Initially abnormal screening mammo 7/20 on LEFT with small mass  · 08/04/20: LEFT Breast Bx. PATH: IDC, 5 mm in greatest linear dimension extending to core biopsy tip, histologic grade 2, ER+(%)/NH+(%)/HER2-. Clinical stage 1  · MammaPrint: High risk, luminal type B, -0.123  · Breast MRI 8/28/20: 12 mm mass otherwise negative. · 11/03/20: LEFT oncoplastic reduction and SLNBx, and RIGHT breast reduction, with Dr. Miranda Godoy. PATH:  · LEFT: IDC, 14 x 13 x 12 mm, overall grade 2, negative margins. DCIS present with negative margins. Pathologic stage: pT1c, pN0  · RIGHT: Breast tissue with stromal fibrosis and focal papillary apocrine metaplasia. Benign skin and subcutaneous soft tissue. No in-situ or invasive carcinoma identified  · Adjuvant TC chemotherapy 1/6/21 - 3/10/21  · Completed XRT on 5/5/21  · Adjuvant Anastrozole 5/21 - Current     STAGE: T1cN0 ER+ HEr2 negative mammaprint high risk luminal B    History of Present Illness:   Umair Vu is a 78 y.o. female seen today virtually for 3 month follow up of left breast cancer ER+ HER2 negative hx of lumpectomy and bilateral breast reduction on 11/3/20.    started adjuvant hormonal therapy with Anastrozole in 5/21. she feels well overall today and tolerating Anastrozole well overall with some mild hot flashes. denies pain today. She has routine HM and labs with her PCP.    Bilateral mammogram 12/2/21 with Surgery visit good for 6 mo fu on RIGHT>   No fevers/ chills/ chest pain/ SOB/ nausea/ vomiting/diarrhea/ pain/fatigue      Past Medical History:   Diagnosis Date    Acute diverticulitis 2017    Allergic rhinitis 2017    Arthritis 2017    Arthritis of right knee 2019    Back pain, thoracic 2017    Cancer ([facility] 2020    LEFT BREAST , LUMPECTOMY WITH RECONSTRUCTION, CHEMO, RADIATION    Cataract, left 2017    Cataract, right 2017    Chronic obstructive pulmonary disease (HCC)     Cirrhosis ([facility] 2017    Cold sore 2017    COVID-19 vaccine series completed 21, 3/19/21    PFIZER    Elevated hemoglobin A1c 2017    Fatigue 2017    Gastrointestinal disorder     acid reflux    GERD (gastroesophageal reflux disease) 2017    Heart murmur 2017    Hepatitis C 2017    TREATED    Herpes zoster infection of thoracic region 2017    Hyperlipidemia LDL goal <100 2017    Hypertension     Menopause     Hysterectomy-42years old    Murmur     Obesity (BMI 30-39. 9) 2017    Osteopenia 2017    Other ill-defined conditions(799.89)     hep c    Post-menopausal 2017    Posterior auricular pain of right ear 2017    Preop examination 2017    Valvular heart disease     mitral valve prolapse    Vertigo, benign positional 2017      Past Surgical History:   Procedure Laterality Date    HX BREAST LUMPECTOMY Bilateral 11/3/2020    LEFT BREAST REDUCTION LUMPECTOMY WITH ULTRASOUND, LEFT BREAST SENTINEL NODE BIOPSY, LEFT BREAST RECONSTRUCTION, RIGHT BREAST REDUCTION performed by Swapna Zepeda [facility] at [facility] OR    HX BREAST REDUCTION Bilateral     HX COLONOSCOPY      HX HYSTERECTOMY      42years old    HX KNEE ARTHROSCOPY Right     knee,    HX ORTHOPAEDIC Right     BUNIONECTOMY, HAMMERTOE REPAIR    HX TONSILLECTOMY        Social History     Tobacco Use    Smoking status: Former Smoker     Packs/day: 0.50     Years: 25.00     Pack years: 12.50     Types: Cigarettes     Quit date: 1982     Years since quittin.9    Smokeless tobacco: Never Used Substance Use Topics    Alcohol use: Yes     Alcohol/week: 1.0 standard drink     Types: 1 Glasses of wine per week     Comment: 1/WK      Family History   Problem Relation Age of Onset    Hypertension Mother     Diabetes Mother     Kidney Disease Mother     Arthritis-osteo Mother     Heart Disease Mother     Heart Disease Father     Arthritis-osteo Father     Hypertension Father     Colon Cancer Maternal Grandmother     No Known Problems Sister     Anesth Problems Neg Hx      Current Outpatient Medications   Medication Sig    furosemide (LASIX) 20 mg tablet TAKE 1 TABLET BY MOUTH ONCE DAILY    naloxone (NARCAN) 4 mg/actuation nasal spray Use 1 spray intranasally, then discard. Repeat with new spray every 2 min as needed for opioid overdose symptoms, alternating nostrils. Indications: decrease in rate & depth of breathing due to opioid drug, opioid overdose (Patient not taking: Reported on 9/30/2021)    fluticasone (Flonase Sensimist) 27.5 mcg/actuation nasal spray 2 Sprays by Both Nostrils route daily.  fexofenadine (Allegra Allergy) 180 mg tablet Take 360 mg by mouth daily.  amoxicillin (AMOXIL) 500 mg capsule Take 2,000 mg by mouth as needed (PRIOR TO DENTAL APPT).  anastrozole (ARIMIDEX) 1 mg tablet Take 1 mg by mouth daily. Indications: hormone receptor positive breast cancer (Patient taking differently: Take 1 mg by mouth daily. PT TAKES WITH DINNER  Indications: hormone receptor positive breast cancer)    olmesartan-hydroCHLOROthiazide (BENICAR HCT) 20-12.5 mg per tablet TAKE 1 TABLET BY MOUTH ONCE DAILY (Patient taking differently: Take 1 Tablet by mouth daily. TAKE 1 TABLET BY MOUTH ONCE DAILY AT 4 PM)    MILK THISTLE PO Take 175 mg by mouth daily.  OTHER 1 Tablet daily.  Indications: AM/PM MENOPAUSE FORMULA 1 TAB QHS    valACYclovir (VALTREX) 500 mg tablet Take 1 tablet by mouth once daily (Patient taking differently: Take 500 mg by mouth as needed.)    TURMERIC ROOT EXTRACT PO Take 200 mg by mouth daily.  psyllium (METAMUCIL) powd Take  by mouth nightly.  magnesium 250 mg tab Take 1 Tablet by mouth nightly.  docosanol (ABREVA) 10 % topical cream Apply  to affected area as needed.  biotin 5,000 mcg TbDi Take 1 Tablet by mouth daily.  cholecalciferol, VITAMIN D3, (VITAMIN D3) 5,000 unit tab tablet Take  by mouth daily.  Dexlansoprazole (DEXILANT) 60 mg CpDB Take 60 mg by mouth as needed.  omeprazole (PRILOSEC OTC) 20 mg tablet Take 20 mg by mouth as needed. No current facility-administered medications for this visit. No Known Allergies     Review of Systems:  A complete review of systems was obtained, negative except as described above     Physical Exam:     There were no vitals taken for this visit. General: alert, cooperative, no distress   Mental  status: normal mood, behavior, speech, dress, motor activity, and thought processes, able to follow commands   HENT: NCAT   Neck: no visualized mass   Resp: no respiratory distress   Neuro: no gross deficits   Skin: no discoloration or lesions of concern on visible areas   Psychiatric: normal affect, consistent with stated mood, no evidence of hallucinations       Due to this being a TeleHealth evaluation (During MEOOQ-59 public health emergency), many elements of the physical examination are unable to be assessed. Evaluation of the following organ systems was limited: Vitals/Constitutional/EENT/Resp/CV/GI//MS/Neuro/Skin/Heme-Lymph-Imm. Results:     Lab Results   Component Value Date/Time    WBC 3.6 06/16/2021 11:06 AM    HGB 10.2 (L) 07/20/2021 02:26 AM    HCT 43.5 06/16/2021 11:06 AM    PLATELET 147 11/15/7732 11:06 AM    MCV 92.4 06/16/2021 11:06 AM    ABS.  NEUTROPHILS 2.0 06/16/2021 11:06 AM    HGB (POC) 12.6 09/11/2017 11:03 AM    HCT (POC) 38.2 09/11/2017 11:03 AM     Lab Results   Component Value Date/Time    Sodium 140 07/20/2021 02:26 AM    Potassium 3.6 07/20/2021 02:26 AM Chloride 109 (H) 07/20/2021 02:26 AM    CHLORIDE 104.0 03/28/2018 10:08 AM    CO2 26 07/20/2021 02:26 AM    Glucose 107 (H) 07/20/2021 02:26 AM    BUN 21 (H) 07/20/2021 02:26 AM    Creatinine 0.89 07/20/2021 02:26 AM    GFR est AA >60 07/20/2021 02:26 AM    GFR est non-AA >60 07/20/2021 02:26 AM    Calcium 8.7 07/20/2021 02:26 AM    Sodium (POC) 146.0 (A) 03/28/2018 10:08 AM    Potassium (POC) 4.6 03/28/2018 10:08 AM    Glucose (POC) 95 07/19/2021 07:14 AM    Creatinine (POC) 0.7 03/28/2018 10:08 AM     Lab Results   Component Value Date/Time    Bilirubin, total 0.3 06/16/2021 11:06 AM    ALT (SGPT) 20 06/16/2021 11:06 AM    Alk. phosphatase 67 06/16/2021 11:06 AM    Protein, total 8.4 (H) 06/16/2021 11:06 AM    Albumin 4.2 06/16/2021 11:06 AM    Globulin 4.2 (H) 06/16/2021 11:06 AM     Records reviewed and summarized above. Pathology report(s) reviewed above. Radiology report(s) reviewed above. Assessment/PLAN:     1) Stage 1 T1cN0 ER+ HER2 negative mammaprint high risk luminal B post Lumpectomy/Bilateral Reduction on 11/3/20  She started adjuvant TC chemotherapy on 1/6/21. She completed 4 cycles of TC on 3/10/21. She completed radiation on 5/5/21. She started adjuvant Anastrozole in 5/21. Patient seen today in office for 3 month follow up. She remains on adjuvant hormonal therapy with Anastrozole. She is tolerating Anastrozole well overall with some mild hot flashes. clinically stable today, feels well overall. bilateral mammogram on 12/2/21 good and for fu RIGHT in 6 mo.   had follow up with Surgery in 12/21. Continue Anastrozole for 5 years then re eval.  Follow up in 3 months. Patient agrees with plan. 2) Hx of Hep C  Cured per patient. Management per [facility]. 3) Heart Valve Problems/HTN. Management per Cardiology. 4) COPD/GERD   Management per PCP. 5) s/p Right TKR  Recovering well - management per Ortho. 6) Hot Flashes  Likely due to AI.   Mild, tolerable per patient. Will continue to monitor. 7) Psychosocial  Mood good, coping well. She continues to work during [employer] from home. SW support as needed. Call if questions. Follow up in 3 months    The patient was evaluated through a synchronous (real-time) audio-video encounter. The patient (or guardian if applicable) is aware that this is a billable service. Verbal consent to proceed has been obtained within the past 12 months. The visit was conducted pursuant to the emergency declaration under the [address] [place] [address] authority and the [employer] General Act. Patient identification was verified, and a caregiver was present when appropriate. The patient was located in a state where the provider was credentialed to provide care. I appreciate the opportunity to participate in Ms. Danielle Jones's care.     Signed By: Darnell Haddad DO

## 2021-12-17 ENCOUNTER — OFFICE VISIT (OUTPATIENT)
Dept: INTERNAL MEDICINE CLINIC | Age: 76
End: 2021-12-17
Payer: COMMERCIAL

## 2021-12-17 VITALS
BODY MASS INDEX: 30.92 KG/M2 | HEART RATE: 78 BPM | OXYGEN SATURATION: 96 % | RESPIRATION RATE: 18 BRPM | DIASTOLIC BLOOD PRESSURE: 73 MMHG | WEIGHT: 204 LBS | SYSTOLIC BLOOD PRESSURE: 110 MMHG | HEIGHT: 68 IN

## 2021-12-17 DIAGNOSIS — E55.9 VITAMIN D DEFICIENCY: ICD-10-CM

## 2021-12-17 DIAGNOSIS — E78.5 HYPERLIPIDEMIA LDL GOAL <100: ICD-10-CM

## 2021-12-17 DIAGNOSIS — K21.9 GASTROESOPHAGEAL REFLUX DISEASE WITHOUT ESOPHAGITIS: ICD-10-CM

## 2021-12-17 DIAGNOSIS — C50.912 INVASIVE DUCTAL CARCINOMA OF LEFT BREAST (HCC): ICD-10-CM

## 2021-12-17 DIAGNOSIS — R73.09 ELEVATED HEMOGLOBIN A1C: ICD-10-CM

## 2021-12-17 DIAGNOSIS — M10.071 IDIOPATHIC GOUT INVOLVING TOE OF RIGHT FOOT, UNSPECIFIED CHRONICITY: ICD-10-CM

## 2021-12-17 DIAGNOSIS — M81.0 AGE-RELATED OSTEOPOROSIS WITHOUT CURRENT PATHOLOGICAL FRACTURE: ICD-10-CM

## 2021-12-17 DIAGNOSIS — I10 ESSENTIAL HYPERTENSION: Primary | ICD-10-CM

## 2021-12-17 DIAGNOSIS — Z96.651 S/P TKR (TOTAL KNEE REPLACEMENT), RIGHT: ICD-10-CM

## 2021-12-17 PROBLEM — K74.60 CIRRHOSIS (HCC): Status: RESOLVED | Noted: 2017-08-18 | Resolved: 2021-12-17

## 2021-12-17 PROBLEM — B19.20 HEPATITIS C: Status: RESOLVED | Noted: 2017-08-18 | Resolved: 2021-12-17

## 2021-12-17 LAB
25(OH)D3 SERPL-MCNC: 61.2 NG/ML (ref 30–100)
ALBUMIN SERPL-MCNC: 4.3 G/DL (ref 3.5–5)
ALBUMIN/GLOB SERPL: 1 {RATIO} (ref 1.1–2.2)
ALP SERPL-CCNC: 90 U/L (ref 45–117)
ALT SERPL-CCNC: 20 U/L (ref 12–78)
ANION GAP SERPL CALC-SCNC: 4 MMOL/L (ref 5–15)
APPEARANCE UR: CLEAR
AST SERPL-CCNC: 13 U/L (ref 15–37)
BACTERIA URNS QL MICRO: NEGATIVE /HPF
BASOPHILS # BLD: 0.1 K/UL (ref 0–0.1)
BASOPHILS NFR BLD: 1 % (ref 0–1)
BILIRUB SERPL-MCNC: 0.5 MG/DL (ref 0.2–1)
BILIRUB UR QL: NEGATIVE
BUN SERPL-MCNC: 23 MG/DL (ref 6–20)
BUN/CREAT SERPL: 26 (ref 12–20)
CALCIUM SERPL-MCNC: 10.2 MG/DL (ref 8.5–10.1)
CHLORIDE SERPL-SCNC: 102 MMOL/L (ref 97–108)
CHOLEST SERPL-MCNC: 198 MG/DL
CO2 SERPL-SCNC: 31 MMOL/L (ref 21–32)
COLOR UR: ABNORMAL
CREAT SERPL-MCNC: 0.87 MG/DL (ref 0.55–1.02)
DIFFERENTIAL METHOD BLD: ABNORMAL
EOSINOPHIL # BLD: 0.2 K/UL (ref 0–0.4)
EOSINOPHIL NFR BLD: 4 % (ref 0–7)
EPITH CASTS URNS QL MICRO: ABNORMAL /LPF
ERYTHROCYTE [DISTWIDTH] IN BLOOD BY AUTOMATED COUNT: 14.9 % (ref 11.5–14.5)
EST. AVERAGE GLUCOSE BLD GHB EST-MCNC: 108 MG/DL
GLOBULIN SER CALC-MCNC: 4.3 G/DL (ref 2–4)
GLUCOSE SERPL-MCNC: 97 MG/DL (ref 65–100)
GLUCOSE UR STRIP.AUTO-MCNC: NEGATIVE MG/DL
HBA1C MFR BLD: 5.4 % (ref 4–5.6)
HCT VFR BLD AUTO: 39.8 % (ref 35–47)
HDLC SERPL-MCNC: 56 MG/DL
HDLC SERPL: 3.5 {RATIO} (ref 0–5)
HGB BLD-MCNC: 12.7 G/DL (ref 11.5–16)
HGB UR QL STRIP: NEGATIVE
HYALINE CASTS URNS QL MICRO: ABNORMAL /LPF (ref 0–5)
IMM GRANULOCYTES # BLD AUTO: 0 K/UL (ref 0–0.04)
IMM GRANULOCYTES NFR BLD AUTO: 0 % (ref 0–0.5)
KETONES UR QL STRIP.AUTO: NEGATIVE MG/DL
LDLC SERPL CALC-MCNC: 124.2 MG/DL (ref 0–100)
LEUKOCYTE ESTERASE UR QL STRIP.AUTO: ABNORMAL
LYMPHOCYTES # BLD: 1.2 K/UL (ref 0.8–3.5)
LYMPHOCYTES NFR BLD: 22 % (ref 12–49)
MCH RBC QN AUTO: 28.7 PG (ref 26–34)
MCHC RBC AUTO-ENTMCNC: 31.9 G/DL (ref 30–36.5)
MCV RBC AUTO: 89.8 FL (ref 80–99)
MONOCYTES # BLD: 0.5 K/UL (ref 0–1)
MONOCYTES NFR BLD: 10 % (ref 5–13)
NEUTS SEG # BLD: 3.4 K/UL (ref 1.8–8)
NEUTS SEG NFR BLD: 63 % (ref 32–75)
NITRITE UR QL STRIP.AUTO: NEGATIVE
NRBC # BLD: 0 K/UL (ref 0–0.01)
NRBC BLD-RTO: 0 PER 100 WBC
PH UR STRIP: 6.5 [PH] (ref 5–8)
PLATELET # BLD AUTO: 240 K/UL (ref 150–400)
PMV BLD AUTO: 11.2 FL (ref 8.9–12.9)
POTASSIUM SERPL-SCNC: 3.7 MMOL/L (ref 3.5–5.1)
PROT SERPL-MCNC: 8.6 G/DL (ref 6.4–8.2)
PROT UR STRIP-MCNC: NEGATIVE MG/DL
RBC # BLD AUTO: 4.43 M/UL (ref 3.8–5.2)
RBC #/AREA URNS HPF: ABNORMAL /HPF (ref 0–5)
SODIUM SERPL-SCNC: 137 MMOL/L (ref 136–145)
SP GR UR REFRACTOMETRY: 1.02 (ref 1–1.03)
TRIGL SERPL-MCNC: 89 MG/DL (ref ?–150)
UR CULT HOLD, URHOLD: NORMAL
URATE SERPL-MCNC: 7.5 MG/DL (ref 2.6–6)
UROBILINOGEN UR QL STRIP.AUTO: 0.2 EU/DL (ref 0.2–1)
VLDLC SERPL CALC-MCNC: 17.8 MG/DL
WBC # BLD AUTO: 5.3 K/UL (ref 3.6–11)
WBC URNS QL MICRO: ABNORMAL /HPF (ref 0–4)

## 2021-12-17 PROCEDURE — 99214 OFFICE O/P EST MOD 30 MIN: CPT | Performed by: INTERNAL MEDICINE

## 2021-12-17 NOTE — PROGRESS NOTES
1. Have you been to the ER, urgent care clinic since your last visit? Hospitalized since your last visit? No    2. Have you seen or consulted any other health care providers outside of the [address] [place] [address] since your last visit? Include any pap smears or colon screening.  No

## 2021-12-17 NOTE — PROGRESS NOTES
Penelope Sánchez is a 78 y.o. female and presents with Annual Exam  .    Subjective:  Mrs. Maloney July presents today for follow-up of several medical problems including history of breast cancer status post lumpectomy with reconstruction, chemo, and radiation, osteoporosis by bone density test 2019, elevated A1c, GERD, aromatase therapy, hypertension, and gout. She had a bout of gout involving her right 1st MTP joint approximately 1 month ago. She was placed on medication but her symptoms did not significantly improve over 2 weeks. She was seen by her podiatrist and her symptoms had improved at that point but it was recommended she be tested for uric acid. She has no shortness of breath, chest pain, palpitations, PND, orthopnea, or pedal edema. She does describe some numbness in her hands that occurs especially at nighttime. She will use a wrist brace or sleeve that seems to help. She also has a history of chemotherapy-induced neuropathy which may be contributing factor. If her symptoms progress we discussed considering nerve conduction studies and/or referral to orthopedics for further evaluation. She has some spinal stenosis and has some discomfort in her back at nighttime.   She has had therapy for this in the past.    Past Medical History:   Diagnosis Date    Acute diverticulitis 8/18/2017    Allergic rhinitis 8/18/2017    Arthritis 8/18/2017    Arthritis of right knee 11/25/2019    Back pain, thoracic 8/18/2017    Cancer ([facility] [address] 08/2020    LEFT BREAST , LUMPECTOMY WITH RECONSTRUCTION, CHEMO, RADIATION    Cataract, left 8/18/2017    Cataract, right 8/18/2017    Chronic obstructive pulmonary disease ([address]     Cold sore 8/18/2017    COVID-19 vaccine series completed 2/23/21, 3/19/21    PFIZER    Elevated hemoglobin A1c 8/18/2017    Fatigue 8/18/2017    GERD (gastroesophageal reflux disease) 8/18/2017    Heart murmur 8/18/2017    Hepatitis C 8/18/2017    TREATED    Herpes zoster infection of thoracic region 8/18/2017    Hyperlipidemia LDL goal <100 8/18/2017    Hypertension     Menopause     Hysterectomy-42years old    Murmur     Obesity (BMI 30-39. 9) 8/18/2017    Osteopenia 8/18/2017    Other ill-defined conditions(799.89)     hep c    Post-menopausal 8/18/2017    Posterior auricular pain of right ear 8/18/2017    Preop examination 8/18/2017    Valvular heart disease     mitral valve prolapse    Vertigo, benign positional 8/18/2017     Past Surgical History:   Procedure Laterality Date    HX BREAST LUMPECTOMY Bilateral 11/3/2020    LEFT BREAST REDUCTION LUMPECTOMY WITH ULTRASOUND, LEFT BREAST SENTINEL NODE BIOPSY, LEFT BREAST RECONSTRUCTION, RIGHT BREAST REDUCTION performed by Isai Vigil [facility]    HX BREAST REDUCTION Bilateral 2020    HX COLONOSCOPY      HX HYSTERECTOMY      42years old    HX KNEE ARTHROSCOPY Right     knee,    HX ORTHOPAEDIC Right     BUNIONECTOMY, HAMMERTOE REPAIR    HX TONSILLECTOMY       No Known Allergies  Current Outpatient Medications   Medication Sig Dispense Refill    furosemide (LASIX) 20 mg tablet TAKE 1 TABLET BY MOUTH ONCE DAILY 90 Tablet 1    naloxone (NARCAN) 4 mg/actuation nasal spray Use 1 spray intranasally, then discard. Repeat with new spray every 2 min as needed for opioid overdose symptoms, alternating nostrils. Indications: decrease in rate & depth of breathing due to opioid drug, opioid overdose 1 Each 0    fluticasone (Flonase Sensimist) 27.5 mcg/actuation nasal spray 2 Sprays by Both Nostrils route daily.  fexofenadine (Allegra Allergy) 180 mg tablet Take 360 mg by mouth daily.  amoxicillin (AMOXIL) 500 mg capsule Take 2,000 mg by mouth as needed (PRIOR TO DENTAL APPT).  anastrozole (ARIMIDEX) 1 mg tablet Take 1 mg by mouth daily. Indications: hormone receptor positive breast cancer (Patient taking differently: Take 1 mg by mouth daily.  PT TAKES WITH DINNER  Indications: hormone receptor positive breast cancer) 90 Tab 3    olmesartan-hydroCHLOROthiazide (BENICAR HCT) 20-12.5 mg per tablet TAKE 1 TABLET BY MOUTH ONCE DAILY (Patient taking differently: Take 1 Tablet by mouth daily. TAKE 1 TABLET BY MOUTH ONCE DAILY AT 4 PM) 90 Tab 3    MILK THISTLE PO Take 175 mg by mouth daily.  OTHER 1 Tablet daily. Indications: AM/PM MENOPAUSE FORMULA 1 TAB QHS      valACYclovir (VALTREX) 500 mg tablet Take 1 tablet by mouth once daily (Patient taking differently: Take 500 mg by mouth as needed.) 30 Tab 0    TURMERIC ROOT EXTRACT PO Take 200 mg by mouth daily.  psyllium (METAMUCIL) powd Take  by mouth nightly.  magnesium 250 mg tab Take 1 Tablet by mouth nightly.  docosanol (ABREVA) 10 % topical cream Apply  to affected area as needed.  biotin 5,000 mcg TbDi Take 1 Tablet by mouth daily.  cholecalciferol, VITAMIN D3, (VITAMIN D3) 5,000 unit tab tablet Take  by mouth daily.  Dexlansoprazole (DEXILANT) 60 mg CpDB Take 60 mg by mouth as needed.  omeprazole (PRILOSEC OTC) 20 mg tablet Take 20 mg by mouth as needed. Social History     Socioeconomic History    Marital status: SINGLE   Tobacco Use    Smoking status: Former Smoker     Packs/day: 0.50     Years: 25.00     Pack years: 12.50     Types: Cigarettes     Quit date: 1982     Years since quittin.9    Smokeless tobacco: Never Used   Vaping Use    Vaping Use: Never used   Substance and Sexual Activity    Alcohol use:  Yes     Alcohol/week: 1.0 standard drink     Types: 1 Glasses of wine per week     Comment: 1/WK    Drug use: No    Sexual activity: Not Currently     Partners: Male     Birth control/protection: None     Family History   Problem Relation Age of Onset    Hypertension Mother     Diabetes Mother     Kidney Disease Mother     OSTEOARTHRITIS Mother     Heart Disease Mother     Heart Disease Father     OSTEOARTHRITIS Father     Hypertension Father     Colon Cancer Maternal Grandmother  No Known Problems Sister     Anesth Problems Neg Hx        Review of Systems  Constitutional:  negative for fevers, chills, anorexia and weight loss  Eyes:    negative for visual disturbance and irritation  ENT:    negative for tinnitus,sore throat,nasal congestion,ear pains. hoarseness  Respiratory:     negative for cough, hemoptysis, dyspnea,wheezing  CV:    negative for chest pain, palpitations, lower extremity edema  GI:    negative for nausea, vomiting, diarrhea, abdominal pain,melena  Endo:               negative for polyuria,polydipsia,polyphagia,heat intolerance  Genitourinary : negative for frequency, dysuria and hematuria  Integumentary: negative for rash and pruritus  Hematologic:   negative for easy bruising and gum/nose bleeding  Musculoskel:  negative for myalgias,  muscle weakness  Neurological:   negative for headaches, dizziness, vertigo, memory problems and gait   Behavl/Psych:  negative for feelings of anxiety, depression, mood changes  ROS otherwise negative      Objective:  Visit Vitals  /73 (BP 1 Location: Left arm, BP Patient Position: Sitting, BP Cuff Size: Large adult)   Pulse 78   Resp 18   Ht 5' 8\" (1.727 m)   Wt 204 lb (92.5 kg)   SpO2 96%   BMI 31.02 kg/m²     Physical Exam:   General appearance - alert, well appearing, and in no distress  Mental status - alert, oriented to person, place, and time  EYE-EMMY, EOMI, fundi normal, corneas normal, no foreign bodies  ENT-ENT exam normal, no neck nodes or sinus tenderness  Nose - normal and patent, no erythema, discharge or polyps  Mouth - mucous membranes moist, pharynx normal without lesions  Neck - supple, no significant adenopathy   Chest - clear to auscultation, no wheezes, rales or rhonchi, symmetric air entry   Heart - normal rate, regular rhythm, normal S1, S2, no murmurs, rubs, clicks or gallops   Abdomen - soft, nontender, nondistended, no masses or organomegaly  Lymph- no adenopathy palpable  Ext-peripheral pulses normal, no pedal edema, no clubbing or cyanosis  Skin-Warm and dry. no hyperpigmentation, vitiligo, or suspicious lesions  Neuro -alert, oriented, normal speech, no focal findings or movement disorder noted      Assessment/Plan:  Diagnoses and all orders for this visit:    1. Essential hypertension  -     LIPID PANEL; Future  -     METABOLIC PANEL, COMPREHENSIVE; Future  -     URINALYSIS W/ RFLX MICROSCOPIC; Future    2. Hyperlipidemia LDL goal <100  -     LIPID PANEL; Future    3. Invasive ductal carcinoma of left breast (HCC)  -     CBC WITH AUTOMATED DIFF; Future    4. S/P TKR (total knee replacement), right    5. Gastroesophageal reflux disease without esophagitis    6. Elevated hemoglobin A1c  -     HEMOGLOBIN A1C WITH EAG; Future    7. Age-related osteoporosis without current pathological fracture    8. Vitamin D deficiency  -     VITAMIN D, 25 HYDROXY; Future    9. Idiopathic gout involving toe of right foot, unspecified chronicity  -     URIC ACID; Future          ICD-10-CM ICD-9-CM    1. Essential hypertension  I10 401.9 LIPID PANEL      METABOLIC PANEL, COMPREHENSIVE      URINALYSIS W/ RFLX MICROSCOPIC   2. Hyperlipidemia LDL goal <100  E78.5 272.4 LIPID PANEL   3. Invasive ductal carcinoma of left breast (HCC)  C50.912 174.9 CBC WITH AUTOMATED DIFF   4. S/P TKR (total knee replacement), right  Z96.651 V43.65    5. Gastroesophageal reflux disease without esophagitis  K21.9 530.81    6. Elevated hemoglobin A1c  R73.09 790.29 HEMOGLOBIN A1C WITH EAG   7. Age-related osteoporosis without current pathological fracture  M81.0 733.01    8. Vitamin D deficiency  E55.9 268.9 VITAMIN D, 25 HYDROXY   9. Idiopathic gout involving toe of right foot, unspecified chronicity  M10.071 274.9 URIC ACID       Plan:    Continue current medical regimen as outlined above. Follow-up uric acid level as discussed. If this is elevated will initiate therapy with allopurinol.   If positive will review dietary factors that may be contributing. Follow-up bone density given the fact the patient is on aromatase therapy. I have reviewed with the patient details of the assessment and plan and all questions were answered. Relevent patient education was performed. Verbal and/or written instructions (see AVS) provided. The most recent lab findings were reviewed with the patient. Plan was discussed with patient who verbally expressed understanding. An After Visit Summary was printed and given to the patient.     Sonya Carrion MD

## 2021-12-21 RX ORDER — ALLOPURINOL 300 MG/1
300 TABLET ORAL DAILY
Qty: 90 TABLET | Refills: 3 | Status: SHIPPED | OUTPATIENT
Start: 2021-12-21 | End: 2022-06-22

## 2021-12-21 NOTE — PROGRESS NOTES
Uric acid level is elevated. This is consistent with gout. Please start allopurinol 300 mg 1 tablet daily. Have a follow-up uric acid level done in 1 month to reevaluate. The remainder of your labs are all stable.

## 2022-01-04 ENCOUNTER — OFFICE VISIT (OUTPATIENT)
Dept: ORTHOPEDIC SURGERY | Age: 77
End: 2022-01-04
Payer: COMMERCIAL

## 2022-01-04 VITALS — BODY MASS INDEX: 30.62 KG/M2 | WEIGHT: 202 LBS | HEIGHT: 68 IN

## 2022-01-04 DIAGNOSIS — Z96.651 STATUS POST RIGHT KNEE REPLACEMENT: Primary | ICD-10-CM

## 2022-01-04 PROCEDURE — 99213 OFFICE O/P EST LOW 20 MIN: CPT | Performed by: ORTHOPAEDIC SURGERY

## 2022-01-04 NOTE — PROGRESS NOTES
Heaven Ness (: 1945) is a 78 y.o. female, patient, here for evaluation of the following chief complaint(s):  Knee Surgery (post operative follow up - right total knee 2021)       SUBJECTIVE/OBJECTIVE:  Heaven Ness presents today for postop visit following right total knee. Patient is doing well. There are no issues. We wanted to follow-up due to diminished motion. She has improved significantly. Joanne Small PHYSICAL EXAM:  Vitals: Ht 5' 8\" (1.727 m)   Wt 202 lb (91.6 kg)   BMI 30.71 kg/m²   Body mass index is 30.71 kg/m². 78y.o. year old F in no acute distress. Right knee exam 0 to 120 degree arc of motion. IMAGING:  Radiographs: None    ASSESSMENT/PLAN:  1. Status post right knee replacement      Doing well post right total knee. There are no issues. Follow-up 1 year from the date of her surgical procedure. Discussed the use of dental prophylaxis with antibiotics. Return in about 6 months (around 2022). Review Of Systems     Patient denies any recent fever, chills, nausea, vomiting, chest pain, or shortness of breath. No Known Allergies    Current Outpatient Medications   Medication Sig    allopurinoL (ZYLOPRIM) 300 mg tablet Take 1 Tablet by mouth daily.  furosemide (LASIX) 20 mg tablet TAKE 1 TABLET BY MOUTH ONCE DAILY    naloxone (NARCAN) 4 mg/actuation nasal spray Use 1 spray intranasally, then discard. Repeat with new spray every 2 min as needed for opioid overdose symptoms, alternating nostrils. Indications: decrease in rate & depth of breathing due to opioid drug, opioid overdose    fluticasone (Flonase Sensimist) 27.5 mcg/actuation nasal spray 2 Sprays by Both Nostrils route daily.  fexofenadine (Allegra Allergy) 180 mg tablet Take 360 mg by mouth daily.  amoxicillin (AMOXIL) 500 mg capsule Take 2,000 mg by mouth as needed (PRIOR TO DENTAL APPT).  anastrozole (ARIMIDEX) 1 mg tablet Take 1 mg by mouth daily.  Indications: hormone receptor positive breast cancer (Patient taking differently: Take 1 mg by mouth daily. PT TAKES WITH DINNER  Indications: hormone receptor positive breast cancer)    olmesartan-hydroCHLOROthiazide (BENICAR HCT) 20-12.5 mg per tablet TAKE 1 TABLET BY MOUTH ONCE DAILY (Patient taking differently: Take 1 Tablet by mouth daily. TAKE 1 TABLET BY MOUTH ONCE DAILY AT 4 PM)    MILK THISTLE PO Take 175 mg by mouth daily.  OTHER 1 Tablet daily. Indications: AM/PM MENOPAUSE FORMULA 1 TAB QHS    valACYclovir (VALTREX) 500 mg tablet Take 1 tablet by mouth once daily (Patient taking differently: Take 500 mg by mouth as needed.)    TURMERIC ROOT EXTRACT PO Take 200 mg by mouth daily.  psyllium (METAMUCIL) powd Take  by mouth nightly.  magnesium 250 mg tab Take 1 Tablet by mouth nightly.  docosanol (ABREVA) 10 % topical cream Apply  to affected area as needed.  biotin 5,000 mcg TbDi Take 1 Tablet by mouth daily.  cholecalciferol, VITAMIN D3, (VITAMIN D3) 5,000 unit tab tablet Take  by mouth daily.  Dexlansoprazole (DEXILANT) 60 mg CpDB Take 60 mg by mouth as needed.  omeprazole (PRILOSEC OTC) 20 mg tablet Take 20 mg by mouth as needed. No current facility-administered medications for this visit.        Past Medical History:   Diagnosis Date    Acute diverticulitis 8/18/2017    Allergic rhinitis 8/18/2017    Arthritis 8/18/2017    Arthritis of right knee 11/25/2019    Back pain, thoracic 8/18/2017    [facility] 08/2020    LEFT BREAST , LUMPECTOMY WITH RECONSTRUCTION, CHEMO, RADIATION    Cataract, left 8/18/2017    Cataract, right 8/18/2017    Chronic obstructive pulmonary disease ([facility] [address]     Cold sore 8/18/2017    COVID-19 vaccine series completed 2/23/21, 3/19/21    PFIZER    Elevated hemoglobin A1c 8/18/2017    Fatigue 8/18/2017    GERD (gastroesophageal reflux disease) 8/18/2017    Heart murmur 8/18/2017    Hepatitis C 8/18/2017    TREATED    Herpes zoster infection of thoracic region 8/18/2017    Hyperlipidemia LDL goal <100 2017    Hypertension     Menopause     Hysterectomy-42years old    Murmur     Obesity (BMI 30-39. 9) 2017    Osteopenia 2017    Other ill-defined conditions(799.89)     hep c    Post-menopausal 2017    Posterior auricular pain of right ear 2017    Preop examination 2017    Valvular heart disease     mitral valve prolapse    Vertigo, benign positional 2017        Past Surgical History:   Procedure Laterality Date    HX BREAST LUMPECTOMY Bilateral 11/3/2020    LEFT BREAST REDUCTION LUMPECTOMY WITH ULTRASOUND, LEFT BREAST SENTINEL NODE BIOPSY, LEFT BREAST RECONSTRUCTION, RIGHT BREAST REDUCTION performed by Rao Bliss MD at [address] HX BREAST REDUCTION Bilateral     HX COLONOSCOPY      HX HYSTERECTOMY      42years old   [place] HX KNEE ARTHROSCOPY Right     knee,    HX ORTHOPAEDIC Right     BUNIONECTOMY, HAMMERTOE REPAIR    HX TONSILLECTOMY         Family History   Problem Relation Age of Onset    Hypertension Mother     Diabetes Mother     Kidney Disease Mother     OSTEOARTHRITIS Mother     Heart Disease Mother     Heart Disease Father     OSTEOARTHRITIS Father     Hypertension Father     Colon Cancer Maternal Grandmother     No Known Problems Sister     Anesth Problems Neg Hx         Social History     Socioeconomic History    Marital status: SINGLE     Spouse name: Not on file    Number of children: Not on file    Years of education: Not on file    Highest education level: Not on file   Occupational History    Not on file   Tobacco Use    Smoking status: Former Smoker     Packs/day: 0.50     Years: 25.00     Pack years: 12.50     Types: Cigarettes     Quit date: 1982     Years since quittin.0    Smokeless tobacco: Never Used   Vaping Use    Vaping Use: Never used   Substance and Sexual Activity    Alcohol use:  Yes     Alcohol/week: 1.0 standard drink     Types: 1 Glasses of wine per week     Comment: 1/WK    Drug use: No    Sexual activity: Not Currently     Partners: Male     Birth control/protection: None   Other Topics Concern    Not on file   Social History Narrative    Not on file     Social Determinants of Health     Financial Resource Strain:     Difficulty of Paying Living Expenses: Not on file   Food Insecurity:     Worried About Running Out of Food in the Last Year: Not on file    Robert of Food in the Last Year: Not on file   Transportation Needs:     Lack of Transportation (Medical): Not on file    Lack of Transportation (Non-Medical): Not on file   Physical Activity:     Days of Exercise per Week: Not on file    Minutes of Exercise per Session: Not on file   Stress:     Feeling of Stress : Not on file   Social Connections:     Frequency of Communication with Friends and Family: Not on file    Frequency of Social Gatherings with Friends and Family: Not on file    Attends [employer] Services: Not on file    Active Member of [address] [place] [address] or Organizations: Not on file    Attends Club or Organization Meetings: Not on file    Marital Status: Not on file   Intimate Partner Violence:     Fear of Current or Ex-Partner: Not on file    Emotionally Abused: Not on file    Physically Abused: Not on file    Sexually Abused: Not on file   Housing Stability:     Unable to Pay for Housing in the Last Year: Not on file    Number of [place] in the Last Year: Not on file    Unstable Housing in the Last Year: Not on file         No orders of the defined types were placed in this encounter. An electronic signature was used to authenticate this note.   -- Saurabh Stock MD

## 2022-01-25 ENCOUNTER — TELEPHONE (OUTPATIENT)
Dept: ONCOLOGY | Age: 77
End: 2022-01-25

## 2022-01-25 NOTE — TELEPHONE ENCOUNTER
Called patient to schedule for a 3 month F/U - left a detailed message on identified line of date, time and that it is scheduled as a VV. Asked for a confirmation call to ok or r/s for another time.

## 2022-02-17 ENCOUNTER — OFFICE VISIT (OUTPATIENT)
Dept: ORTHOPEDIC SURGERY | Age: 77
End: 2022-02-17
Payer: COMMERCIAL

## 2022-02-17 DIAGNOSIS — M25.562 CHRONIC PAIN OF LEFT KNEE: Primary | ICD-10-CM

## 2022-02-17 DIAGNOSIS — M25.562 LEFT KNEE PAIN, UNSPECIFIED CHRONICITY: ICD-10-CM

## 2022-02-17 DIAGNOSIS — G89.29 CHRONIC PAIN OF LEFT KNEE: Primary | ICD-10-CM

## 2022-02-17 DIAGNOSIS — M17.12 PRIMARY OSTEOARTHRITIS OF LEFT KNEE: ICD-10-CM

## 2022-02-17 PROCEDURE — 20610 DRAIN/INJ JOINT/BURSA W/O US: CPT | Performed by: ORTHOPAEDIC SURGERY

## 2022-02-17 NOTE — PROGRESS NOTES
Devendra Hummel (: 1945) is a 78 y.o. female, patient, here for evaluation of the following chief complaint(s):  No chief complaint on file. SUBJECTIVE/OBJECTIVE:  Devendra Hummel presents today complaining of left knee pain. To review, Devendra Hummel was last seen in in the office for left knee injection which provided moderate relief until recently. Acute exacerbation of pain. Here today for gel injection. PHYSICAL EXAM:  Vitals: There were no vitals taken for this visit. There is no height or weight on file to calculate BMI. 78y.o. year old F in no acute distress. Left knee has varus malalignment with significant pain along medial joint line. IMAGING:  Radiographs: XR Results (most recent):  Results from Appointment encounter on 22    XR KNEE LT 3 V    Narrative  3 views left knee. Severe bone-on-bone medial compartment with large osteophytes and subchondral cyst.  Some subluxation is noted. ASSESSMENT/PLAN:  1. Chronic pain of left knee  -     XR KNEE LT 3 V; Future  -     hyaluronate sodium, cross-linked (GEL-ONE) injection syrg 30 mg; 30 mg, Intra artICUlar, ONCE, 1 dose, On Thu 22 at 1800  2. Primary osteoarthritis of left knee  -     hyaluronate sodium, cross-linked (GEL-ONE) injection syrg 30 mg; 30 mg, Intra artICUlar, ONCE, 1 dose, On Thu 22 at 1800  3. Left knee pain, unspecified chronicity  -     hyaluronate sodium, cross-linked (GEL-ONE) injection syrg 30 mg; 30 mg, Intra artICUlar, ONCE, 1 dose, On Thu 22 at 1800      Known left knee knee osteoarthritis with acute exacerbation. Devendra Hummel elects to proceed with left knee injection. Discussed risks/benefits of cortisone injection and patient gave verbal consent. Under sterile conditions, the left knee was injected with     1 unit of patient supplied gel 1. Intra-articularly, tolerated the procedure well. Return if symptoms worsen or fail to improve.     Review Of Systems     Patient denies any recent fever, chills, nausea, vomiting, chest pain, or shortness of breath. No Known Allergies    Current Outpatient Medications   Medication Sig    olmesartan-hydroCHLOROthiazide (BENICAR HCT) 20-12.5 mg per tablet Take 1 tablet by mouth once daily    allopurinoL (ZYLOPRIM) 300 mg tablet Take 1 Tablet by mouth daily.  furosemide (LASIX) 20 mg tablet TAKE 1 TABLET BY MOUTH ONCE DAILY    naloxone (NARCAN) 4 mg/actuation nasal spray Use 1 spray intranasally, then discard. Repeat with new spray every 2 min as needed for opioid overdose symptoms, alternating nostrils. Indications: decrease in rate & depth of breathing due to opioid drug, opioid overdose    fluticasone (Flonase Sensimist) 27.5 mcg/actuation nasal spray 2 Sprays by Both Nostrils route daily.  fexofenadine (Allegra Allergy) 180 mg tablet Take 360 mg by mouth daily.  amoxicillin (AMOXIL) 500 mg capsule Take 2,000 mg by mouth as needed (PRIOR TO DENTAL APPT).  anastrozole (ARIMIDEX) 1 mg tablet Take 1 mg by mouth daily. Indications: hormone receptor positive breast cancer (Patient taking differently: Take 1 mg by mouth daily. PT TAKES WITH DINNER  Indications: hormone receptor positive breast cancer)    MILK THISTLE PO Take 175 mg by mouth daily.  OTHER 1 Tablet daily. Indications: AM/PM MENOPAUSE FORMULA 1 TAB QHS    valACYclovir (VALTREX) 500 mg tablet Take 1 tablet by mouth once daily (Patient taking differently: Take 500 mg by mouth as needed.)    TURMERIC ROOT EXTRACT PO Take 200 mg by mouth daily.  psyllium (METAMUCIL) powd Take  by mouth nightly.  magnesium 250 mg tab Take 1 Tablet by mouth nightly.  docosanol (ABREVA) 10 % topical cream Apply  to affected area as needed.  biotin 5,000 mcg TbDi Take 1 Tablet by mouth daily.  cholecalciferol, VITAMIN D3, (VITAMIN D3) 5,000 unit tab tablet Take  by mouth daily.  Dexlansoprazole (DEXILANT) 60 mg CpDB Take 60 mg by mouth as needed.     omeprazole (PRILOSEC OTC) 20 mg tablet Take 20 mg by mouth as needed. No current facility-administered medications for this visit. Past Medical History:   Diagnosis Date    Acute diverticulitis 8/18/2017    Allergic rhinitis 8/18/2017    Arthritis 8/18/2017    Arthritis of right knee 11/25/2019    Back pain, thoracic 8/18/2017    Cancer ([facility] [address] 08/2020    LEFT BREAST , LUMPECTOMY WITH RECONSTRUCTION, CHEMO, RADIATION    Cataract, left 8/18/2017    Cataract, right 8/18/2017    Chronic obstructive pulmonary disease ([address]     Cold sore 8/18/2017    COVID-19 vaccine series completed 2/23/21, 3/19/21    PFIZER    Elevated hemoglobin A1c 8/18/2017    Fatigue 8/18/2017    GERD (gastroesophageal reflux disease) 8/18/2017    Heart murmur 8/18/2017    Hepatitis C 8/18/2017    TREATED    Herpes zoster infection of thoracic region 8/18/2017    Hyperlipidemia LDL goal <100 8/18/2017    Hypertension     Menopause     Hysterectomy-42years old    Murmur     Obesity (BMI 30-39. 9) 8/18/2017    Osteopenia 8/18/2017    Other ill-defined conditions(799.89)     hep c    Post-menopausal 8/18/2017    Posterior auricular pain of right ear 8/18/2017    Preop examination 8/18/2017    Valvular heart disease     mitral valve prolapse    Vertigo, benign positional 8/18/2017        Past Surgical History:   Procedure Laterality Date    HX BREAST LUMPECTOMY Bilateral 11/3/2020    LEFT BREAST REDUCTION LUMPECTOMY WITH ULTRASOUND, LEFT BREAST SENTINEL NODE BIOPSY, LEFT BREAST RECONSTRUCTION, RIGHT BREAST REDUCTION performed by Jermain Oleary MD at [facility]    HX BREAST REDUCTION Bilateral 2020    HX COLONOSCOPY      HX HYSTERECTOMY      42years old   Froylan Coughlin HX KNEE ARTHROSCOPY Right     knee,    HX ORTHOPAEDIC Right     BUNIONECTOMY, HAMMERTOE REPAIR    HX TONSILLECTOMY         Family History   Problem Relation Age of Onset    Hypertension Mother     Diabetes Mother     Kidney Disease Mother    Froylan Coughlin OSTEOARTHRITIS Mother     Heart Disease Mother     Heart Disease Father     OSTEOARTHRITIS Father     Hypertension Father     Colon Cancer Maternal Grandmother     No Known Problems Sister     Anesth Problems Neg Hx         Social History     Socioeconomic History    Marital status: SINGLE     Spouse name: Not on file    Number of children: Not on file    Years of education: Not on file    Highest education level: Not on file   Occupational History    Not on file   Tobacco Use    Smoking status: Former Smoker     Packs/day: 0.50     Years: 25.00     Pack years: 12.50     Types: Cigarettes     Quit date: 1982     Years since quittin.1    Smokeless tobacco: Never Used   Vaping Use    Vaping Use: Never used   Substance and Sexual Activity    Alcohol use: Yes     Alcohol/week: 1.0 standard drink     Types: 1 Glasses of wine per week     Comment: 1/WK    Drug use: No    Sexual activity: Not Currently     Partners: Male     Birth control/protection: None   Other Topics Concern    Not on file   Social History Narrative    Not on file     Social Determinants of Health     Financial Resource Strain:     Difficulty of Paying Living Expenses: Not on file   Food Insecurity:     Worried About Running Out of Food in the Last Year: Not on file    Robert of Food in the Last Year: Not on file   Transportation Needs:     Lack of Transportation (Medical): Not on file    Lack of Transportation (Non-Medical):  Not on file   Physical Activity:     Days of Exercise per Week: Not on file    Minutes of Exercise per Session: Not on file   Stress:     Feeling of Stress : Not on file   Social Connections:     Frequency of Communication with Friends and Family: Not on file    Frequency of Social Gatherings with Friends and Family: Not on file    Attends [employer] Services: Not on file    Active Member of Clubs or Organizations: Not on file    Attends Club or Organization Meetings: Not on file   Bekah Marital Status: Not on file   Intimate Partner Violence:     Fear of Current or Ex-Partner: Not on file    Emotionally Abused: Not on file    Physically Abused: Not on file    Sexually Abused: Not on file   Housing Stability:     Unable to Pay for Housing in the Last Year: Not on file    Number of [place] in the Last Year: Not on file    Unstable Housing in the Last Year: Not on file       Orders Placed This Encounter    XR KNEE LT 3 V     2D     Standing Status:   Future     Number of Occurrences:   1     Standing Expiration Date:   2/18/2023    hyaluronate sodium, cross-linked (GEL-ONE) injection syrg 30 mg        An electronic signature was used to authenticate this note.   -- Mary Ellen Galvez MD

## 2022-03-15 ENCOUNTER — TELEPHONE (OUTPATIENT)
Dept: CARDIOLOGY CLINIC | Age: 77
End: 2022-03-15

## 2022-03-15 NOTE — TELEPHONE ENCOUNTER
LVM of appointment time/date change from 3/30-4/18 for an echo & follow up to see Dr. Farida Joaquin.

## 2022-03-18 PROBLEM — H92.01 POSTERIOR AURICULAR PAIN OF RIGHT EAR: Status: ACTIVE | Noted: 2017-08-18

## 2022-03-18 PROBLEM — Z96.651 S/P TKR (TOTAL KNEE REPLACEMENT), RIGHT: Status: ACTIVE | Noted: 2021-08-10

## 2022-03-18 PROBLEM — J30.9 ALLERGIC RHINITIS: Status: ACTIVE | Noted: 2017-08-18

## 2022-03-18 PROBLEM — K21.9 GASTROESOPHAGEAL REFLUX DISEASE WITHOUT ESOPHAGITIS: Status: ACTIVE | Noted: 2021-01-27

## 2022-03-18 PROBLEM — C50.912 INVASIVE DUCTAL CARCINOMA OF LEFT BREAST (HCC): Status: ACTIVE | Noted: 2020-08-04

## 2022-03-18 PROBLEM — Z86.19 HISTORY OF HEPATITIS C: Status: ACTIVE | Noted: 2021-01-06

## 2022-03-18 PROBLEM — T45.1X5A HOT FLASHES RELATED TO AROMATASE INHIBITOR THERAPY: Status: ACTIVE | Noted: 2021-08-10

## 2022-03-18 PROBLEM — R23.2 HOT FLASHES RELATED TO AROMATASE INHIBITOR THERAPY: Status: ACTIVE | Noted: 2021-08-10

## 2022-03-19 PROBLEM — E66.9 OBESITY (BMI 30-39.9): Status: ACTIVE | Noted: 2017-08-18

## 2022-03-19 PROBLEM — M85.80 OSTEOPENIA: Status: ACTIVE | Noted: 2017-08-18

## 2022-03-19 PROBLEM — Z78.0 POST-MENOPAUSAL: Status: ACTIVE | Noted: 2017-08-18

## 2022-03-19 PROBLEM — H81.10 VERTIGO, BENIGN POSITIONAL: Status: ACTIVE | Noted: 2017-08-18

## 2022-03-19 PROBLEM — K52.1 CHEMOTHERAPY INDUCED DIARRHEA: Status: ACTIVE | Noted: 2021-01-27

## 2022-03-19 PROBLEM — J44.9 CHRONIC OBSTRUCTIVE PULMONARY DISEASE (HCC): Status: ACTIVE | Noted: 2021-01-06

## 2022-03-19 PROBLEM — T45.1X5A CHEMOTHERAPY INDUCED DIARRHEA: Status: ACTIVE | Noted: 2021-01-27

## 2022-03-19 PROBLEM — R01.1 HEART MURMUR: Status: ACTIVE | Noted: 2017-08-18

## 2022-03-19 PROBLEM — M54.6 BACK PAIN, THORACIC: Status: ACTIVE | Noted: 2017-08-18

## 2022-03-19 PROBLEM — B00.1 COLD SORE: Status: ACTIVE | Noted: 2017-08-18

## 2022-03-19 PROBLEM — Z98.890 H/O BILATERAL BREAST REDUCTION SURGERY: Status: ACTIVE | Noted: 2021-01-06

## 2022-03-19 PROBLEM — K57.92 ACUTE DIVERTICULITIS: Status: ACTIVE | Noted: 2017-08-18

## 2022-03-19 PROBLEM — Z98.890 HISTORY OF LUMPECTOMY: Status: ACTIVE | Noted: 2021-01-06

## 2022-03-19 PROBLEM — M19.90 ARTHRITIS: Status: ACTIVE | Noted: 2017-08-18

## 2022-03-19 PROBLEM — E78.5 HYPERLIPIDEMIA LDL GOAL <100: Status: ACTIVE | Noted: 2017-08-18

## 2022-03-19 PROBLEM — M17.11 ARTHRITIS OF RIGHT KNEE: Status: ACTIVE | Noted: 2019-11-25

## 2022-03-19 PROBLEM — R73.09 ELEVATED HEMOGLOBIN A1C: Status: ACTIVE | Noted: 2017-08-18

## 2022-03-19 PROBLEM — R53.83 FATIGUE: Status: ACTIVE | Noted: 2017-08-18

## 2022-03-19 PROBLEM — I10 ESSENTIAL HYPERTENSION: Status: ACTIVE | Noted: 2021-01-06

## 2022-03-20 PROBLEM — M17.11 LOCALIZED OSTEOARTHRITIS OF RIGHT KNEE: Status: ACTIVE | Noted: 2021-07-19

## 2022-03-20 PROBLEM — Z09 CHEMOTHERAPY FOLLOW-UP EXAMINATION: Status: ACTIVE | Noted: 2021-01-27

## 2022-03-20 PROBLEM — M81.0 AGE-RELATED OSTEOPOROSIS WITHOUT CURRENT PATHOLOGICAL FRACTURE: Status: ACTIVE | Noted: 2021-12-17

## 2022-03-20 PROBLEM — B02.9 HERPES ZOSTER INFECTION OF THORACIC REGION: Status: ACTIVE | Noted: 2017-08-18

## 2022-03-20 PROBLEM — H26.9 CATARACT, RIGHT: Status: ACTIVE | Noted: 2017-08-18

## 2022-03-20 PROBLEM — H26.9 CATARACT, LEFT: Status: ACTIVE | Noted: 2017-08-18

## 2022-03-23 ENCOUNTER — VIRTUAL VISIT (OUTPATIENT)
Dept: ONCOLOGY | Age: 77
End: 2022-03-23
Payer: COMMERCIAL

## 2022-03-23 DIAGNOSIS — C50.912 INVASIVE DUCTAL CARCINOMA OF LEFT BREAST (HCC): Primary | ICD-10-CM

## 2022-03-23 DIAGNOSIS — Z98.890 H/O BILATERAL BREAST REDUCTION SURGERY: ICD-10-CM

## 2022-03-23 DIAGNOSIS — I10 ESSENTIAL HYPERTENSION: ICD-10-CM

## 2022-03-23 DIAGNOSIS — Z98.890 HISTORY OF LUMPECTOMY: ICD-10-CM

## 2022-03-23 PROCEDURE — 99213 OFFICE O/P EST LOW 20 MIN: CPT | Performed by: INTERNAL MEDICINE

## 2022-03-23 NOTE — PROGRESS NOTES
[facility] at [address], [address] [place] [address] Road, [facility] [phone]  F: [phone]        Reason for Visit:   Amie Jeffries is a 78 y.o. female who is seen by synchronous (real-time) audio-video technology for follow up of Left Breast Cancer on adjuvant Anastrozole. Treatment History:   Per Surgery Note:  · Initially abnormal screening mammo 7/20 on LEFT with small mass  · 08/04/20: LEFT Breast Bx. PATH: IDC, 5 mm in greatest linear dimension extending to core biopsy tip, histologic grade 2, ER+(%)/MI+(%)/HER2-. Clinical stage 1  · MammaPrint: High risk, luminal type B, -0.123  · Breast MRI 8/28/20: 12 mm mass otherwise negative. · 11/03/20: LEFT oncoplastic reduction and SLNBx, and RIGHT breast reduction, with Dr. Christine Yao. PATH:  · LEFT: IDC, 14 x 13 x 12 mm, overall grade 2, negative margins. DCIS present with negative margins. Pathologic stage: pT1c, pN0  · RIGHT: Breast tissue with stromal fibrosis and focal papillary apocrine metaplasia. Benign skin and subcutaneous soft tissue. No in-situ or invasive carcinoma identified  · Adjuvant TC chemotherapy 1/6/21 - 3/10/21  · Completed XRT on 5/5/21  · Adjuvant Anastrozole 5/21 - Current     STAGE: T1cN0 ER+ HEr2 negative mammaprint high risk luminal B    History of Present Illness:   Amie Jeffries is a 78 y.o. female seen today virtually for 3 month follow up of left breast cancer ER+ HER2 negative hx of lumpectomy and bilateral breast reduction on 11/3/20.    started adjuvant hormonal therapy with Anastrozole in 5/21. she feels well overall today and tolerating Anastrozole well overall with some mild hot flashes. Has CTS/ . She has routine HM and labs with her PCP.    Bilateral mammogram 12/2/21 and for mammo 6/22  No fevers/ chills/ chest pain/ SOB/ nausea/ vomiting/diarrhea/ pain/fatigue      Past Medical History:   Diagnosis Date    Acute diverticulitis 8/18/2017    Allergic rhinitis 2017    Arthritis 2017    Arthritis of right knee 2019    Back pain, thoracic 2017    Cancer ([facility] [address] 2020    LEFT BREAST , LUMPECTOMY WITH RECONSTRUCTION, CHEMO, RADIATION    Cataract, left 2017    Cataract, right 2017    Chronic obstructive pulmonary disease ([facility] [address]     Cold sore 2017    COVID-19 vaccine series completed 21, 3/19/21    PFIZER    Elevated hemoglobin A1c 2017    Fatigue 2017    GERD (gastroesophageal reflux disease) 2017    Heart murmur 2017    Hepatitis C 2017    TREATED    Herpes zoster infection of thoracic region 2017    Hyperlipidemia LDL goal <100 2017    Hypertension     Menopause     Hysterectomy-42years old    Murmur     Obesity (BMI 30-39. 9) 2017    Osteopenia 2017    Other ill-defined conditions(799.89)     hep c    Post-menopausal 2017    Posterior auricular pain of right ear 2017    Preop examination 2017    Valvular heart disease     mitral valve prolapse    Vertigo, benign positional 2017      Past Surgical History:   Procedure Laterality Date    HX BREAST LUMPECTOMY Bilateral 11/3/2020    LEFT BREAST REDUCTION LUMPECTOMY WITH ULTRASOUND, LEFT BREAST SENTINEL NODE BIOPSY, LEFT BREAST RECONSTRUCTION, RIGHT BREAST REDUCTION performed by Marya Farmer [facility] at [facility]    HX BREAST REDUCTION Bilateral     HX COLONOSCOPY      HX HYSTERECTOMY      42years old   Godfrey HX KNEE ARTHROSCOPY Right     knee,    HX ORTHOPAEDIC Right     BUNIONECTOMY, HAMMERTOE REPAIR    HX TONSILLECTOMY        Social History     Tobacco Use    Smoking status: Former Smoker     Packs/day: 0.50     Years: 25.00     Pack years: 12.50     Types: Cigarettes     Quit date: 1982     Years since quittin.2    Smokeless tobacco: Never Used   Substance Use Topics    Alcohol use:  Yes     Alcohol/week: 1.0 standard drink     Types: 1 Glasses of wine per week     Comment: 1/WK      Family History   Problem Relation Age of Onset    Hypertension Mother     Diabetes Mother     Kidney Disease Mother     OSTEOARTHRITIS Mother     Heart Disease Mother     Heart Disease Father     OSTEOARTHRITIS Father     Hypertension Father     Colon Cancer Maternal Grandmother     No Known Problems Sister     Anesth Problems Neg Hx      Current Outpatient Medications   Medication Sig    olmesartan-hydroCHLOROthiazide (BENICAR HCT) 20-12.5 mg per tablet Take 1 tablet by mouth once daily    allopurinoL (ZYLOPRIM) 300 mg tablet Take 1 Tablet by mouth daily.  furosemide (LASIX) 20 mg tablet TAKE 1 TABLET BY MOUTH ONCE DAILY    naloxone (NARCAN) 4 mg/actuation nasal spray Use 1 spray intranasally, then discard. Repeat with new spray every 2 min as needed for opioid overdose symptoms, alternating nostrils. Indications: decrease in rate & depth of breathing due to opioid drug, opioid overdose    fluticasone (Flonase Sensimist) 27.5 mcg/actuation nasal spray 2 Sprays by Both Nostrils route daily.  fexofenadine (Allegra Allergy) 180 mg tablet Take 360 mg by mouth daily.  amoxicillin (AMOXIL) 500 mg capsule Take 2,000 mg by mouth as needed (PRIOR TO DENTAL APPT).  anastrozole (ARIMIDEX) 1 mg tablet Take 1 mg by mouth daily. Indications: hormone receptor positive breast cancer (Patient taking differently: Take 1 mg by mouth daily. PT TAKES WITH DINNER  Indications: hormone receptor positive breast cancer)    MILK THISTLE PO Take 175 mg by mouth daily.  OTHER 1 Tablet daily. Indications: AM/PM MENOPAUSE FORMULA 1 TAB QHS    valACYclovir (VALTREX) 500 mg tablet Take 1 tablet by mouth once daily (Patient taking differently: Take 500 mg by mouth as needed.)    TURMERIC ROOT EXTRACT PO Take 200 mg by mouth daily.  psyllium (METAMUCIL) powd Take  by mouth nightly.  magnesium 250 mg tab Take 1 Tablet by mouth nightly.     docosanol (ABREVA) 10 % topical cream Apply to affected area as needed.  biotin 5,000 mcg TbDi Take 1 Tablet by mouth daily.  cholecalciferol, VITAMIN D3, (VITAMIN D3) 5,000 unit tab tablet Take  by mouth daily.  Dexlansoprazole (DEXILANT) 60 mg CpDB Take 60 mg by mouth as needed.  omeprazole (PRILOSEC OTC) 20 mg tablet Take 20 mg by mouth as needed. No current facility-administered medications for this visit. No Known Allergies     Review of Systems:  A complete review of systems was obtained, negative except as described above     Physical Exam:     There were no vitals taken for this visit. General: alert, cooperative, no distress   Mental  status: normal mood, behavior, speech, dress, motor activity, and thought processes, able to follow commands   HENT: NCAT   Neck: no visualized mass   Resp: no respiratory distress   Neuro: no gross deficits   Skin: no discoloration or lesions of concern on visible areas   Psychiatric: normal affect, consistent with stated mood, no evidence of hallucinations       Due to this being a TeleHealth evaluation (During [facility] public health emergency), many elements of the physical examination are unable to be assessed. Evaluation of the following organ systems was limited: Vitals/Constitutional/EENT/Resp/CV/GI//MS/Neuro/Skin/Heme-Lymph-Imm. Results:     Lab Results   Component Value Date/Time    WBC 5.3 12/17/2021 10:22 AM    HGB 12.7 12/17/2021 10:22 AM    HCT 39.8 12/17/2021 10:22 AM    PLATELET 485 95/10/0514 10:22 AM    MCV 89.8 12/17/2021 10:22 AM    ABS.  NEUTROPHILS 3.4 12/17/2021 10:22 AM    HGB (POC) 12.6 09/11/2017 11:03 AM    HCT (POC) 38.2 09/11/2017 11:03 AM     Lab Results   Component Value Date/Time    Sodium 137 12/17/2021 10:22 AM    Potassium 3.7 12/17/2021 10:22 AM    Chloride 102 12/17/2021 10:22 AM    CHLORIDE 104.0 03/28/2018 10:08 AM    CO2 31 12/17/2021 10:22 AM    Glucose 97 12/17/2021 10:22 AM    BUN 23 (H) 12/17/2021 10:22 AM    Creatinine 0.87 12/17/2021 10:22 AM GFR est AA >60 12/17/2021 10:22 AM    GFR est non-AA >60 12/17/2021 10:22 AM    Calcium 10.2 (H) 12/17/2021 10:22 AM    Sodium (POC) 146.0 (A) 03/28/2018 10:08 AM    Potassium (POC) 4.6 03/28/2018 10:08 AM    Glucose (POC) 95 07/19/2021 07:14 AM    Creatinine (POC) 0.7 03/28/2018 10:08 AM     Lab Results   Component Value Date/Time    Bilirubin, total 0.5 12/17/2021 10:22 AM    ALT (SGPT) 20 12/17/2021 10:22 AM    Alk. phosphatase 90 12/17/2021 10:22 AM    Protein, total 8.6 (H) 12/17/2021 10:22 AM    Albumin 4.3 12/17/2021 10:22 AM    Globulin 4.3 (H) 12/17/2021 10:22 AM     Records reviewed and summarized above. Pathology report(s) reviewed above. Radiology report(s) reviewed above. Assessment/PLAN:     1) Stage 1 T1cN0 ER+ HER2 negative mammaprint high risk luminal B post Lumpectomy/Bilateral Reduction on 11/3/20  She started adjuvant TC chemotherapy on 1/6/21. She completed 4 cycles of TC on 3/10/21. She completed radiation on 5/5/21. She started adjuvant Anastrozole in 5/21. Patient seen virtually for 3 month follow up.   remains on adjuvant hormonal therapy with Anastrozole. She is tolerating Anastrozole well overall with some mild hot flashes. clinically stable today, feels well overall. bilateral mammogram on 12/2/21 good and for fu RIGHT in 6 mo.   had follow up with Surgery in 12/21. Continue Anastrozole for 5 years then re eval.  Follow up in 3 months. 2) Hx of Hep C  Cured per patient. Management per [facility]. 3) Heart Valve Problems/HTN. Management per Cardiology. 4) COPD/GERD   Management per PCP. 5) hx Right TKR/CTS   per Ortho. 6) Hot Flashes  Likely due to AI. Mild, tolerable per patient. Will continue to monitor. 7) Psychosocial  Mood good, coping well. She continues to work .  support as needed. Call if questions. Follow up in 3 months    The patient was evaluated through a synchronous (real-time) audio-video encounter.  The patient (or guardian if applicable) is aware that this is a billable service. Verbal consent to proceed has been obtained within the past 12 months. The visit was conducted pursuant to the emergency declaration under the [address] [place] [address] authority and the [employer] Act. Patient identification was verified, and a caregiver was present when appropriate. The patient was located in a state where the provider was credentialed to provide care. I appreciate the opportunity to participate in Ms. Danielle Jones's care.     Signed By: Shana Berry DO

## 2022-03-24 ENCOUNTER — OFFICE VISIT (OUTPATIENT)
Dept: INTERNAL MEDICINE CLINIC | Age: 77
End: 2022-03-24
Payer: COMMERCIAL

## 2022-03-24 VITALS
WEIGHT: 215.6 LBS | OXYGEN SATURATION: 98 % | HEIGHT: 68 IN | DIASTOLIC BLOOD PRESSURE: 73 MMHG | RESPIRATION RATE: 16 BRPM | BODY MASS INDEX: 32.67 KG/M2 | TEMPERATURE: 98.9 F | HEART RATE: 80 BPM | SYSTOLIC BLOOD PRESSURE: 110 MMHG

## 2022-03-24 DIAGNOSIS — G56.01 CARPAL TUNNEL SYNDROME OF RIGHT WRIST: Primary | ICD-10-CM

## 2022-03-24 PROBLEM — J44.9 CHRONIC OBSTRUCTIVE PULMONARY DISEASE (HCC): Status: RESOLVED | Noted: 2021-01-06 | Resolved: 2022-03-24

## 2022-03-24 PROBLEM — I27.20 PULMONARY HTN (HCC): Status: ACTIVE | Noted: 2022-03-24

## 2022-03-24 PROCEDURE — 99213 OFFICE O/P EST LOW 20 MIN: CPT | Performed by: INTERNAL MEDICINE

## 2022-03-24 RX ORDER — VALACYCLOVIR HYDROCHLORIDE 500 MG/1
500 TABLET, FILM COATED ORAL DAILY
Qty: 30 TABLET | Refills: 3 | Status: SHIPPED | OUTPATIENT
Start: 2022-03-24

## 2022-03-24 RX ORDER — FAMOTIDINE 20 MG/1
20 TABLET, FILM COATED ORAL 2 TIMES DAILY
COMMUNITY
Start: 2022-02-01

## 2022-03-24 RX ORDER — PREDNISONE 10 MG/1
10 TABLET ORAL SEE ADMIN INSTRUCTIONS
Qty: 21 TABLET | Refills: 0 | Status: SHIPPED | OUTPATIENT
Start: 2022-03-24 | End: 2022-06-22

## 2022-03-24 NOTE — PROGRESS NOTES
Room: A1    Identified pt with two pt identifiers(name and ). Reviewed record in preparation for visit and have obtained necessary documentation. All patient medications has been reviewed. Chief Complaint   Patient presents with    Numbness     x2-3 weeks; right arm; at night       Health Maintenance Due   Topic    DTaP/Tdap/Td series (1 - Tdap)    Colorectal Cancer Screening Combo     Shingrix Vaccine Age 50> (2 of 2)    COVID-19 Vaccine (3 - Booster for [employer] series)    Flu Vaccine (1)       Vitals:    22 1520   BP: 110/73   Pulse: 80   Resp: 16   Temp: 98.9 °F (37.2 °C)   TempSrc: Oral   SpO2: 98%   Weight: 215 lb 9.6 oz (97.8 kg)   Height: 5' 8\" (1.727 m)   PainSc:   0 - No pain       4. Have you been to the ER, urgent care clinic since your last visit? Hospitalized since your last visit? No    5. Have you seen or consulted any other health care providers outside of the [address] [place] [address] since your last visit? Include any pap smears or colon screening. No    Patient is accompanied by self I have received verbal consent from Keith Giraldo to discuss any/all medical information while they are present in the room.

## 2022-03-24 NOTE — PROGRESS NOTES
Ilya Nichols is a 78 y.o. female and presents with Numbness (x2-3 weeks; right arm; at night)  . Subjective:  Mrs. Spencer Hatch presents today with complaint of numbness and burning of her right hand this been occurring at nighttime over the past 2 to 3 weeks. This is awakening her from her sleep. It does not last all night. It is sometimes alleviated by change in position. 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 She does have trigger finger involving the third digit of her left hand. She is not taking medication for this. Past Medical History:   Diagnosis Date    Acute diverticulitis 8/18/2017    Allergic rhinitis 8/18/2017    Arthritis 8/18/2017    Arthritis of right knee 11/25/2019    Back pain, thoracic 8/18/2017    Cancer ([facility] [address] 08/2020    LEFT BREAST , LUMPECTOMY WITH RECONSTRUCTION, CHEMO, RADIATION    Cataract, left 8/18/2017    Cataract, right 8/18/2017    Chronic obstructive pulmonary disease ([address]     Cold sore 8/18/2017    COVID-19 vaccine series completed 2/23/21, 3/19/21    PFIZER    Elevated hemoglobin A1c 8/18/2017    Fatigue 8/18/2017    GERD (gastroesophageal reflux disease) 8/18/2017    Heart murmur 8/18/2017    Hepatitis C 8/18/2017    TREATED    Herpes zoster infection of thoracic region 8/18/2017    Hyperlipidemia LDL goal <100 8/18/2017    Hypertension     Menopause     Hysterectomy-42years old    Murmur     Obesity (BMI 30-39. 9) 8/18/2017    Osteopenia 8/18/2017    Other ill-defined conditions(799.89)     hep c    Post-menopausal 8/18/2017    Posterior auricular pain of right ear 8/18/2017    Preop examination 8/18/2017    Valvular heart disease     mitral valve prolapse    Vertigo, benign positional 8/18/2017     Past Surgical History:   Procedure Laterality Date    HX BREAST LUMPECTOMY Bilateral 11/3/2020    LEFT BREAST REDUCTION LUMPECTOMY WITH ULTRASOUND, LEFT BREAST SENTINEL NODE BIOPSY, LEFT BREAST RECONSTRUCTION, RIGHT BREAST REDUCTION performed [facility] Pearl [facility]    HX BREAST REDUCTION Bilateral 2020    HX COLONOSCOPY      HX HYSTERECTOMY      42years old   [facility] HX KNEE ARTHROSCOPY Right     knee,    HX ORTHOPAEDIC Right     BUNIONECTOMY, HAMMERTOE REPAIR    HX TONSILLECTOMY       No Known Allergies  Current Outpatient Medications   Medication Sig Dispense Refill    famotidine (PEPCID) 20 mg tablet Take 20 mg by mouth two (2) times a day.  valACYclovir (VALTREX) 500 mg tablet Take 1 Tablet by mouth daily. 30 Tablet 3    predniSONE (STERAPRED DS) 10 mg dose pack Take 1 Tablet by mouth See Admin Instructions. See administration instruction per 10mg dose pack 21 Tablet 0    olmesartan-hydroCHLOROthiazide (BENICAR HCT) 20-12.5 mg per tablet Take 1 tablet by mouth once daily 90 Tablet 1    allopurinoL (ZYLOPRIM) 300 mg tablet Take 1 Tablet by mouth daily. 90 Tablet 3    furosemide (LASIX) 20 mg tablet TAKE 1 TABLET BY MOUTH ONCE DAILY 90 Tablet 1    fluticasone (Flonase Sensimist) 27.5 mcg/actuation nasal spray 2 Sprays by Both Nostrils route daily.  fexofenadine (Allegra Allergy) 180 mg tablet Take 360 mg by mouth daily.  amoxicillin (AMOXIL) 500 mg capsule Take 2,000 mg by mouth as needed (PRIOR TO DENTAL APPT).  anastrozole (ARIMIDEX) 1 mg tablet Take 1 mg by mouth daily. Indications: hormone receptor positive breast cancer (Patient taking differently: Take 1 mg by mouth daily. PT TAKES WITH DINNER  Indications: hormone receptor positive breast cancer) 90 Tab 3    MILK THISTLE PO Take 175 mg by mouth daily.  OTHER 1 Tablet daily. Indications: AM/PM MENOPAUSE FORMULA 1 TAB QHS      TURMERIC ROOT EXTRACT PO Take 200 mg by mouth daily.  psyllium (METAMUCIL) powd Take  by mouth nightly.  magnesium 250 mg tab Take 1 Tablet by mouth nightly.  docosanol (ABREVA) 10 % topical cream Apply  to affected area as needed.       biotin 5,000 mcg TbDi Take 1 Tablet by mouth daily.  cholecalciferol, VITAMIN D3, (VITAMIN D3) 5,000 unit tab tablet Take  by mouth daily.  omeprazole (PRILOSEC OTC) 20 mg tablet Take 20 mg by mouth as needed.  naloxone (NARCAN) 4 mg/actuation nasal spray Use 1 spray intranasally, then discard. Repeat with new spray every 2 min as needed for opioid overdose symptoms, alternating nostrils. Indications: decrease in rate & depth of breathing due to opioid drug, opioid overdose (Patient not taking: Reported on 3/24/2022) 1 Each 0    Dexlansoprazole (DEXILANT) 60 mg CpDB Take 60 mg by mouth as needed. (Patient not taking: Reported on 3/24/2022)       Social History     Socioeconomic History    Marital status: SINGLE   Tobacco Use    Smoking status: Former Smoker     Packs/day: 0.50     Years: 25.00     Pack years: 12.50     Types: Cigarettes     Quit date: 1982     Years since quittin.2    Smokeless tobacco: Never Used   Vaping Use    Vaping Use: Never used   Substance and Sexual Activity    Alcohol use: Yes     Alcohol/week: 1.0 standard drink     Types: 1 Glasses of wine per week     Comment: 1/WK    Drug use: No    Sexual activity: Not Currently     Partners: Male     Birth control/protection: None     Family History   Problem Relation Age of Onset    Hypertension Mother     Diabetes Mother     Kidney Disease Mother     OSTEOARTHRITIS Mother     Heart Disease Mother     Heart Disease Father     OSTEOARTHRITIS Father     Hypertension Father     Colon Cancer Maternal Grandmother     No Known Problems Sister     Anesth Problems Neg Hx        Review of Systems  Constitutional:  negative for fevers, chills, anorexia and weight loss  Eyes:    negative for visual disturbance and irritation  ENT:    negative for tinnitus,sore throat,nasal congestion,ear pains. hoarseness  Respiratory:     negative for cough, hemoptysis, dyspnea,wheezing  CV:    negative for chest pain, palpitations, lower extremity edema  GI:    negative for nausea, vomiting, diarrhea, abdominal pain,melena  Endo:               negative for polyuria,polydipsia,polyphagia,heat intolerance  Genitourinary : negative for frequency, dysuria and hematuria  Integumentary: negative for rash and pruritus  Hematologic:   negative for easy bruising and gum/nose bleeding  Musculoskel:  negative for myalgias, arthralgias, back pain, muscle weakness, joint pain  Neurological:   negative for headaches, dizziness, vertigo, memory problems and gait   Behavl/Psych:  negative for feelings of anxiety, depression, mood changes  ROS otherwise negative      Objective:  Visit Vitals  /73 (BP 1 Location: Left arm, BP Patient Position: Sitting)   Pulse 80   Temp 98.9 °F (37.2 °C) (Oral)   Resp 16   Ht 5' 8\" (1.727 m)   Wt 215 lb 9.6 oz (97.8 kg)   SpO2 98%   BMI 32.78 kg/m²     Physical Exam:   General appearance - alert, well appearing, and in no distress  Mental status - alert, oriented to person, place, and time  EYE-EMMY, EOMI, fundi normal, corneas normal, no foreign bodies  ENT-ENT exam normal, no neck nodes or sinus tenderness  Nose - normal and patent, no erythema, discharge or polyps  Mouth - mucous membranes moist, pharynx normal without lesions  Neck - supple, no significant adenopathy   Chest - clear to auscultation, no wheezes, rales or rhonchi, symmetric air entry   Heart - normal rate, regular rhythm, normal S1, S2, no murmurs, rubs, clicks or gallops   Abdomen - soft, nontender, nondistended, no masses or organomegaly  Lymph- no adenopathy palpable  Ext-peripheral pulses normal, no pedal edema, no clubbing or cyanosis  Skin-Warm and dry.  no hyperpigmentation, vitiligo, or suspicious lesions  Neuro -alert, oriented, normal speech, no focal findings or movement disorder noted  Musculoskeletal-Phalen sign is positive in the right hand, Tinel's sign is negative,  strength is normal, there is a slight catch or trigger finger involving the third digit of the left hand    Assessment/Plan:  Diagnoses and all orders for this visit:    1. Carpal tunnel syndrome of right wrist  -     AMB SUPPLY ORDER  -     predniSONE (STERAPRED DS) 10 mg dose pack; Take 1 Tablet by mouth See Admin Instructions. See administration instruction per 10mg dose pack    Other orders  -     valACYclovir (VALTREX) 500 mg tablet; Take 1 Tablet by mouth daily. ICD-10-CM ICD-9-CM    1. Carpal tunnel syndrome of right wrist  G56.01 354.0 AMB SUPPLY ORDER      predniSONE (STERAPRED DS) 10 mg dose pack       Plan:    The patient will be prescribed a Medrol Dosepak and a wrist brace to wear on her right wrist at nighttime. If her symptoms or not improved will consider orthopedic referral for same. (COPD and pulmonary hypertension popped up on the patient's [facility] [address] review report. She has been diagnosed with COPD but does not recall whether she had pulmonary function test or if this was by history or chest x-ray. She is not having any significant shortness of breath or sputum production at this time. She is not on any inhalers. COPD will be removed as a active problem on her medical history. She does have a previous smoking history. Similarly she was listed as having pulmonary hypertension and her most recent echocardiogram did not demonstrate findings consistent with pulmonary hypertension by Doppler measurements. This will be removed as an active problem on her problem list.)      I have reviewed with the patient details of the assessment and plan and all questions were answered. Relevent patient education was performed. Verbal and/or written instructions (see AVS) provided. The most recent lab findings were reviewed with the patient. Plan was discussed with patient who verbally expressed understanding. An After Visit Summary was printed and given to the patient.     Marcello Hankins MD

## 2022-04-18 ENCOUNTER — ANCILLARY PROCEDURE (OUTPATIENT)
Dept: CARDIOLOGY CLINIC | Age: 77
End: 2022-04-18
Payer: COMMERCIAL

## 2022-04-18 VITALS — BODY MASS INDEX: 32.58 KG/M2 | HEIGHT: 68 IN | WEIGHT: 215 LBS

## 2022-04-18 DIAGNOSIS — Z92.21 HISTORY OF CHEMOTHERAPY: ICD-10-CM

## 2022-04-18 DIAGNOSIS — I36.1 NON-RHEUMATIC TRICUSPID VALVE INSUFFICIENCY: ICD-10-CM

## 2022-04-18 LAB
ECHO AO ASC DIAM: 3.1 CM
ECHO AO ASCENDING AORTA INDEX: 1.47 CM/M2
ECHO AO ROOT DIAM: 2.9 CM
ECHO AO ROOT INDEX: 1.37 CM/M2
ECHO AV AREA PEAK VELOCITY: 1.6 CM2
ECHO AV AREA VTI: 1.8 CM2
ECHO AV AREA/BSA PEAK VELOCITY: 0.8 CM2/M2
ECHO AV AREA/BSA VTI: 0.9 CM2/M2
ECHO AV MEAN GRADIENT: 4 MMHG
ECHO AV MEAN VELOCITY: 0.9 M/S
ECHO AV PEAK GRADIENT: 10 MMHG
ECHO AV PEAK VELOCITY: 1.6 M/S
ECHO AV VELOCITY RATIO: 0.63
ECHO AV VTI: 30.2 CM
ECHO EST RA PRESSURE: 3 MMHG
ECHO IVC PROX: 2 CM
ECHO LA DIAMETER INDEX: 1.52 CM/M2
ECHO LA DIAMETER: 3.2 CM
ECHO LA TO AORTIC ROOT RATIO: 1.1
ECHO LA VOL 2C: 44 ML (ref 22–52)
ECHO LA VOL 4C: 42 ML (ref 22–52)
ECHO LA VOL BP: 43 ML (ref 22–52)
ECHO LA VOL/BSA BIPLANE: 20 ML/M2 (ref 16–34)
ECHO LA VOLUME AREA LENGTH: 47 ML
ECHO LA VOLUME INDEX A2C: 21 ML/M2 (ref 16–34)
ECHO LA VOLUME INDEX A4C: 20 ML/M2 (ref 16–34)
ECHO LA VOLUME INDEX AREA LENGTH: 22 ML/M2 (ref 16–34)
ECHO LV E' LATERAL VELOCITY: 11 CM/S
ECHO LV E' SEPTAL VELOCITY: 9 CM/S
ECHO LV EDV A2C: 107 ML
ECHO LV EDV A4C: 89 ML
ECHO LV EDV BP: 101 ML (ref 56–104)
ECHO LV EDV INDEX A4C: 42 ML/M2
ECHO LV EDV INDEX BP: 48 ML/M2
ECHO LV EDV NDEX A2C: 51 ML/M2
ECHO LV EJECTION FRACTION A2C: 61 %
ECHO LV EJECTION FRACTION A4C: 61 %
ECHO LV EJECTION FRACTION BIPLANE: 61 % (ref 55–100)
ECHO LV ESV A2C: 42 ML
ECHO LV ESV A4C: 35 ML
ECHO LV ESV BP: 39 ML (ref 19–49)
ECHO LV ESV INDEX A2C: 20 ML/M2
ECHO LV ESV INDEX A4C: 17 ML/M2
ECHO LV ESV INDEX BP: 18 ML/M2
ECHO LV FRACTIONAL SHORTENING: 37 % (ref 28–44)
ECHO LV GLOBAL LONGITUDINAL STRAIN (GLS): -22.8 %
ECHO LV INTERNAL DIMENSION DIASTOLE INDEX: 2.18 CM/M2
ECHO LV INTERNAL DIMENSION DIASTOLIC: 4.6 CM (ref 3.9–5.3)
ECHO LV INTERNAL DIMENSION SYSTOLIC INDEX: 1.37 CM/M2
ECHO LV INTERNAL DIMENSION SYSTOLIC: 2.9 CM
ECHO LV IVSD: 0.9 CM (ref 0.6–0.9)
ECHO LV MASS 2D: 148.1 G (ref 67–162)
ECHO LV MASS INDEX 2D: 70.2 G/M2 (ref 43–95)
ECHO LV POSTERIOR WALL DIASTOLIC: 1 CM (ref 0.6–0.9)
ECHO LV RELATIVE WALL THICKNESS RATIO: 0.43
ECHO LVOT AREA: 2.5 CM2
ECHO LVOT AV VTI INDEX: 0.74
ECHO LVOT DIAM: 1.8 CM
ECHO LVOT MEAN GRADIENT: 2 MMHG
ECHO LVOT PEAK GRADIENT: 4 MMHG
ECHO LVOT PEAK VELOCITY: 1 M/S
ECHO LVOT STROKE VOLUME INDEX: 26.9 ML/M2
ECHO LVOT SV: 56.7 ML
ECHO LVOT VTI: 22.3 CM
ECHO MV A VELOCITY: 1.23 M/S
ECHO MV AREA PHT: 3.7 CM2
ECHO MV E DECELERATION TIME (DT): 206.6 MS
ECHO MV E VELOCITY: 1.05 M/S
ECHO MV E/A RATIO: 0.85
ECHO MV E/E' LATERAL: 9.55
ECHO MV E/E' RATIO (AVERAGED): 10.61
ECHO MV E/E' SEPTAL: 11.67
ECHO MV PRESSURE HALF TIME (PHT): 59.9 MS
ECHO PULMONARY ARTERY SYSTOLIC PRESSURE (PASP): 30 MMHG
ECHO RIGHT VENTRICULAR SYSTOLIC PRESSURE (RVSP): 30 MMHG
ECHO RV INTERNAL DIMENSION: 3.8 CM
ECHO RV TAPSE: 2.4 CM (ref 1.7–?)
ECHO TV REGURGITANT MAX VELOCITY: 2.59 M/S
ECHO TV REGURGITANT PEAK GRADIENT: 27 MMHG

## 2022-04-18 PROCEDURE — 93356 MYOCRD STRAIN IMG SPCKL TRCK: CPT | Performed by: INTERNAL MEDICINE

## 2022-04-18 PROCEDURE — 93306 TTE W/DOPPLER COMPLETE: CPT | Performed by: INTERNAL MEDICINE

## 2022-04-19 ENCOUNTER — OFFICE VISIT (OUTPATIENT)
Dept: CARDIOLOGY CLINIC | Age: 77
End: 2022-04-19
Payer: COMMERCIAL

## 2022-04-19 VITALS
RESPIRATION RATE: 16 BRPM | HEIGHT: 68 IN | WEIGHT: 214 LBS | DIASTOLIC BLOOD PRESSURE: 80 MMHG | BODY MASS INDEX: 32.43 KG/M2 | HEART RATE: 79 BPM | OXYGEN SATURATION: 99 % | SYSTOLIC BLOOD PRESSURE: 120 MMHG

## 2022-04-19 DIAGNOSIS — I10 ESSENTIAL HYPERTENSION: ICD-10-CM

## 2022-04-19 DIAGNOSIS — I36.1 NON-RHEUMATIC TRICUSPID VALVE INSUFFICIENCY: Primary | ICD-10-CM

## 2022-04-19 DIAGNOSIS — Z85.3 HISTORY OF BREAST CANCER: ICD-10-CM

## 2022-04-19 DIAGNOSIS — Z92.21 HISTORY OF CHEMOTHERAPY: ICD-10-CM

## 2022-04-19 PROCEDURE — 99214 OFFICE O/P EST MOD 30 MIN: CPT | Performed by: INTERNAL MEDICINE

## 2022-04-19 NOTE — PROGRESS NOTES
[address]: [place] [address]  [id number]    History of Present Illness:  Ms. Dre Steve is a 77 yo F followed in the past by Dr. Rina Jones with a history of left sided breast cancer status post lumpectomy with chemotherapy and radiation therapy, essential hypertension, mild to moderate tricuspid regurgitation noted by echocardiogram in the past, most recent echocardiogram this week demonstrated just mild tricuspid regurgitation, hepatitis C status post treatment, dyslipidemia. From a symptom standpoint, she denies any exertional chest pain. Breathing has been stable. No significant palpitations, lightheadedness or dizziness. She has been compliant with her medications. She last had chemotherapy a year ago. Her echocardiogram yesterday demonstrated normal LV function and normal GLS strain that was unchanged. Her checkup for the tricuspid regurgitation is just mild and pulmonary hypertension was not present. We discussed the results. Assessment and Plan:   1. Tricuspid regurgitation. This is just in the mild range without significant pulmonary hypertension. On her previous echocardiogram, this was in the mild to moderate range. At this point, could probably repeat an echocardiogram in one year. She notes previously echos had been approximately every six months. She will follow up with Kathryn Lopez in August  2. Essential hypertension. Blood pressure is controlled. 3. Myxomatous mitral valve. Previously mitral valve prolapse, but this resolved. 4. Hepatitis C, status post Harvoni and Rivavirn. Followed by hepatology. 5. Left sided breast cancer, status post lumpectomy and radiation and chemotherapy. 6. Dyslipidemia.       Echo 9/21 - EF 60-65%, normal strain, mild to mod TR, RVSP 35mmHg  Echo 9/20 - EF 60-65%, grade 1 dd, myxomatous MV, mod TR, PASP 33mmHg  Echo 9/19 - mod TR, EF normal, PASP 45mmhg  Echo 9/18 - LVEF 55-60%, mod TR, RVSP 32mmhg  Echo 9/17 - LVEF 60 % to 65 %, no WMA, mod-severe TR, RVSP 38mmHg/PASP 25mmHg   Echo 9/16 - LVEF 60 % to 65 %, no WMA, mod-severe TR, mild to mod PAHTN (PASP 40mmHg  Echo 8/15 - EF 60%, mild MR, mod-severe TR  Nuclear stress 8/15 EF 86%, no ischemia    Soc Hx: 2 glasses of wine every other day, no tobacco, no drug use, lives alone, very busy at [employer]  Fam Hx: father passed from bleeding ulcers at age 67 and had end stage CKD and HTN, mother DM and ESRD and living at age 80         She  has a past medical history of Acute diverticulitis (8/18/2017), Allergic rhinitis (8/18/2017), Arthritis (8/18/2017), Arthritis of right knee (11/25/2019), Back pain, thoracic (8/18/2017), Cancer ([facility] [address] (08/2020), Cataract, left (8/18/2017), Cataract, right (8/18/2017), [facility] Cold sore (8/18/2017), COVID-19 vaccine series completed (2/23/21, 3/19/21), Elevated hemoglobin A1c (8/18/2017), Fatigue (8/18/2017), GERD (gastroesophageal reflux disease) (8/18/2017), Heart murmur (8/18/2017), Hepatitis C (8/18/2017), Herpes zoster infection of thoracic region (8/18/2017), Hyperlipidemia LDL goal <100 (8/18/2017), Hypertension, Menopause, Murmur, Obesity (BMI 30-39.9) (8/18/2017), Osteopenia (8/18/2017), Other ill-defined conditions(799.89), Post-menopausal (8/18/2017), Posterior auricular pain of right ear (8/18/2017), Preop examination (8/18/2017), Valvular heart disease, and Vertigo, benign positional (8/18/2017). She has no past medical history of Difficult intubation or Nausea & vomiting. All other systems negative except as above. PE  Vitals:    04/19/22 1553   BP: 120/80   Pulse: 79   Resp: 16   SpO2: 99%   Weight: 214 lb (97.1 kg)   Height: 5' 8\" (1.727 m)    Body mass index is 32.54 kg/m².    General appearance - alert, well appearing, and in no distress  Mental status - affect appropriate to mood  Eyes - sclera anicteric, moist mucous membranes  Neck - supple, no JVD  Chest - clear to auscultation, no wheezes, rales or rhonchi  Heart - normal rate, regular rhythm, normal S1, S2, no murmurs, rubs, clicks or gallops  Abdomen - soft, nontender, nondistended, no masses or organomegaly  Neurological - no focal deficit  Extremities - peripheral pulses normal, no pedal edema      Recent Labs:  Lab Results   Component Value Date/Time    Cholesterol, total 198 12/17/2021 10:22 AM    HDL Cholesterol 56 12/17/2021 10:22 AM    LDL, calculated 124.2 (H) 12/17/2021 10:22 AM    Triglyceride 89 12/17/2021 10:22 AM    CHOL/HDL Ratio 3.5 12/17/2021 10:22 AM     Lab Results   Component Value Date/Time    Creatinine (POC) 0.7 03/28/2018 10:08 AM    Creatinine 0.87 12/17/2021 10:22 AM     Lab Results   Component Value Date/Time    BUN 23 (H) 12/17/2021 10:22 AM    BUN 23.0 (A) 03/28/2018 10:08 AM     Lab Results   Component Value Date/Time    Potassium 3.7 12/17/2021 10:22 AM     Lab Results   Component Value Date/Time    Hemoglobin A1c 5.4 12/17/2021 10:22 AM     Lab Results   Component Value Date/Time    HGB (POC) 12.6 09/11/2017 11:03 AM    HGB 12.7 12/17/2021 10:22 AM     Lab Results   Component Value Date/Time    PLATELET 197 88/05/1449 10:22 AM       Reviewed:  Past Medical History:   Diagnosis Date    Acute diverticulitis 8/18/2017    Allergic rhinitis 8/18/2017    Arthritis 8/18/2017    Arthritis of right knee 11/25/2019    Back pain, thoracic 8/18/2017    Cancer ([address] 08/2020    LEFT BREAST , LUMPECTOMY WITH RECONSTRUCTION, CHEMO, RADIATION    Cataract, left 8/18/2017    Cataract, right 8/18/2017    Chronic obstructive pulmonary disease ([address]     Cold sore 8/18/2017    COVID-19 vaccine series completed 2/23/21, 3/19/21    PFIZER    Elevated hemoglobin A1c 8/18/2017    Fatigue 8/18/2017    GERD (gastroesophageal reflux disease) 8/18/2017    Heart murmur 8/18/2017    Hepatitis C 8/18/2017    TREATED    Herpes zoster infection of thoracic region 8/18/2017    Hyperlipidemia LDL goal <100 8/18/2017    Hypertension     Menopause Hysterectomy-42years old    Murmur     Obesity (BMI 30-39. 9) 2017    Osteopenia 2017    Other ill-defined conditions(799.89)     hep c    Post-menopausal 2017    Posterior auricular pain of right ear 2017    Preop examination 2017    Valvular heart disease     mitral valve prolapse    Vertigo, benign positional 2017     Social History     Tobacco Use   Smoking Status Former Smoker    Packs/day: 0.50    Years: 25.00    Pack years: 12.50    Types: Cigarettes    Quit date: 1982    Years since quittin.3   Smokeless Tobacco Never Used     Social History     Substance and Sexual Activity   Alcohol Use Yes    Alcohol/week: 1.0 standard drink    Types: 1 Glasses of wine per week    Comment: 1/WK     No Known Allergies    Current Outpatient Medications   Medication Sig    famotidine (PEPCID) 20 mg tablet Take 20 mg by mouth two (2) times a day.  valACYclovir (VALTREX) 500 mg tablet Take 1 Tablet by mouth daily.  olmesartan-hydroCHLOROthiazide (BENICAR HCT) 20-12.5 mg per tablet Take 1 tablet by mouth once daily    allopurinoL (ZYLOPRIM) 300 mg tablet Take 1 Tablet by mouth daily.  furosemide (LASIX) 20 mg tablet TAKE 1 TABLET BY MOUTH ONCE DAILY    fluticasone (Flonase Sensimist) 27.5 mcg/actuation nasal spray 2 Sprays by Both Nostrils route daily.  fexofenadine (Allegra Allergy) 180 mg tablet Take 360 mg by mouth daily.  amoxicillin (AMOXIL) 500 mg capsule Take 2,000 mg by mouth as needed (PRIOR TO DENTAL APPT).  anastrozole (ARIMIDEX) 1 mg tablet Take 1 mg by mouth daily. Indications: hormone receptor positive breast cancer (Patient taking differently: Take 1 mg by mouth daily. PT TAKES WITH DINNER  Indications: hormone receptor positive breast cancer)    MILK THISTLE PO Take 175 mg by mouth daily.  OTHER 1 Tablet daily. Indications: AM/PM MENOPAUSE FORMULA 1 TAB QHS    TURMERIC ROOT EXTRACT PO Take 200 mg by mouth daily.     psyllium (METAMUCIL) powd Take  by mouth nightly.  magnesium 250 mg tab Take 1 Tablet by mouth nightly.  docosanol (ABREVA) 10 % topical cream Apply  to affected area as needed.  biotin 5,000 mcg TbDi Take 1 Tablet by mouth daily.  cholecalciferol, VITAMIN D3, (VITAMIN D3) 5,000 unit tab tablet Take  by mouth daily.  omeprazole (PRILOSEC OTC) 20 mg tablet Take 20 mg by mouth as needed.  predniSONE (STERAPRED DS) 10 mg dose pack Take 1 Tablet by mouth See Admin Instructions. See administration instruction per 10mg dose pack (Patient not taking: Reported on 4/19/2022)    naloxone [facility]) 4 mg/actuation nasal spray Use 1 spray intranasally, then discard. Repeat with new spray every 2 min as needed for opioid overdose symptoms, alternating nostrils. Indications: decrease in rate & depth of breathing due to opioid drug, opioid overdose (Patient not taking: Reported on 3/24/2022)    Dexlansoprazole (DEXILANT) 60 mg CpDB Take 60 mg by mouth as needed. (Patient not taking: Reported on 3/24/2022)     No current facility-administered medications for this visit.        Felicia Erickson MD  [facility] [place]  [address] [facility]  [address]

## 2022-04-25 RX ORDER — FUROSEMIDE 20 MG/1
TABLET ORAL
Qty: 90 TABLET | Refills: 1 | Status: SHIPPED | OUTPATIENT
Start: 2022-04-25 | End: 2022-04-27

## 2022-04-25 NOTE — TELEPHONE ENCOUNTER
Requested Prescriptions     Signed Prescriptions Disp Refills    furosemide (LASIX) 20 mg tablet 90 Tablet 1     Sig: TAKE 1 TABLET BY MOUTH ONCE DAILY     Authorizing Provider: Cyndie Smith     Ordering User: Citlali Gordon     Verbal order per Dominga Herrera NP. Future Appointments   Date Time Provider [place]   6/7/2022  1:30 PM Everton Armendariz NP [facility] BS AMB   6/9/2022 10:45 AM [address] [place] [address]   6/22/2022 10:00 AM Mary Ellen Hughes MD [facility] BS AMB   6/22/2022 11:00 AM DEXA SCAN [facility] BS AMB   6/22/2022  3:15 PM Melodie Escalante  [facility] BS AMB   8/29/2022 10:00 AM Pascual Villanueva NP [facility] BS AMB

## 2022-06-07 ENCOUNTER — OFFICE VISIT (OUTPATIENT)
Dept: SURGERY | Age: 77
End: 2022-06-07
Payer: COMMERCIAL

## 2022-06-07 VITALS
SYSTOLIC BLOOD PRESSURE: 138 MMHG | DIASTOLIC BLOOD PRESSURE: 68 MMHG | BODY MASS INDEX: 32.43 KG/M2 | HEIGHT: 68 IN | WEIGHT: 214 LBS

## 2022-06-07 DIAGNOSIS — Z98.890 S/P LUMPECTOMY OF BREAST: ICD-10-CM

## 2022-06-07 DIAGNOSIS — C50.912 INVASIVE DUCTAL CARCINOMA OF LEFT BREAST (HCC): Primary | ICD-10-CM

## 2022-06-07 DIAGNOSIS — Z92.3 S/P RADIATION THERAPY: ICD-10-CM

## 2022-06-07 DIAGNOSIS — Z85.3 HISTORY OF BREAST CANCER IN FEMALE: ICD-10-CM

## 2022-06-07 PROCEDURE — 99213 OFFICE O/P EST LOW 20 MIN: CPT | Performed by: NURSE PRACTITIONER

## 2022-06-07 PROCEDURE — 1123F ACP DISCUSS/DSCN MKR DOCD: CPT | Performed by: NURSE PRACTITIONER

## 2022-06-07 NOTE — PROGRESS NOTES
HISTORY OF PRESENT ILLNESS  Noris Arroyo is a 78 y.o. female. HPI  Established patient presents for follow-up to LEFT breast cancer.  Denies breast mass, skin changes, nipple discharge and pain.          Breast history -   Referring - Dr. Natalya Bowman  08/04/20: LEFT breast bx. PATH: IDC, 5 mm in greatest linear dimension extending to core biopsy tip, histologic grade 2, ER+(%)/ND+(%)/HER2-. Clinical stage 1. MammaPrint: High risk, luminal type B, -0.123.  11/03/20: LEFT oncoplastic reduction and SLNBx, and RIGHT breast reduction - Dr. Bull Griffin and Dr. Jennifer Ball. · LEFT: IDC, 14 x 13 x 12mm, overall grade 2, negative margins. DCIS present with negative margins. · Pathologic stage: pT1c, pN0.  · RIGHT: Breast tissue with stromal fibrosis and focal papillary apocrine metaplasia. Benign skin and subcutaneous soft tissue. No in-situ or invasive carcinoma identified. 1/6/21 - started adjuvant TC chemotherapy - 4 cycles - Dr. Silvia Quigley  5/2021 - completed XRT - Chrissie Mcardle   6/2021 - started anastrozole - Dr. Silvia Quigley           Family history -  Maternal aunt had colon cancer. OB History    No obstetric history on file.       Obstetric Comments   Menarche 17, LMP age 42, # of children 1, age of 4st delivery 23, Hysterectomy/oophorectomy yes partial/yes, Breast bx no, history of breast feeding no, BCP yes, Hormone therapy yes               Past Surgical History:   Procedure Laterality Date    HX BREAST LUMPECTOMY Bilateral 11/3/2020    LEFT BREAST REDUCTION LUMPECTOMY WITH ULTRASOUND, LEFT BREAST SENTINEL NODE BIOPSY, LEFT BREAST RECONSTRUCTION, RIGHT BREAST REDUCTION performed by yK Baird MD [facility]    HX BREAST REDUCTION Bilateral 2020    HX COLONOSCOPY      HX HYSTERECTOMY      42years old    HX KNEE ARTHROSCOPY Right     knee,    HX ORTHOPAEDIC Right     BUNIONECTOMY, HAMMERTOE REPAIR    HX TONSILLECTOMY             NABEEL Results (most recent):  [facility]    [facility] 3D CARRIE W MAMMO BI DX INCL CAD    Narrative  STUDY:  Bilateral Digital Diagnostic Mammogram including 3-D Tomosynthesis    INDICATION:  Left breast carcinoma, post lumpectomy and bilateral breast  reductions in November 2020. COMPARISON:  [id number]. BREAST COMPOSITION: The breast tissue is heterogeneously dense (51 - 75%  glandular), which could obscure detection of small masses. FINDINGS: Right  digital diagnostic mammography and tomography was performed,  and is interpreted in conjunction with a computer assisted detection (CAD)  system. Post bilateral breast reductions. Skin and trabecular thickening in the  left breast is likely treatment-related. There is at least one oil cyst in the  right breast, along the nipple line, in the middle third, on CC view. Multiple,  additional, lobulated masses in the central right breast probably correspond to  similar findings seen in the posterior third of the central right breast on CC  screening views from 2014 and 2016. No suspicious mass or calcification. Ultrasound of the right breast was performed by the radiologist and ultrasound  technologist. Lobulated hypoechoic areas at 9:00 and 2:00 correspond to the  mammographic findings. Impression  1. Probable postoperative change in the right breast.  2. No suspicious mass or calcification the left breast.  3. BI-RADS Assessment Category 3: Probably benign finding. 4. Recommendation: 6 month follow-up right diagnostic tomography and ultrasound. ROS    Physical Exam  Constitutional:       Appearance: Normal appearance. Chest:   Breasts:      Right: No mass, nipple discharge, skin change, tenderness, axillary adenopathy or supraclavicular adenopathy. Left: No mass, nipple discharge, skin change, tenderness, axillary adenopathy or supraclavicular adenopathy.          Musculoskeletal:      Comments: FROM - UE x 2   Lymphadenopathy:      Upper Body:      Right upper body: No supraclavicular or axillary adenopathy. Left upper body: No supraclavicular or axillary adenopathy. Neurological:      Mental Status: She is alert. Psychiatric:         Attention and Perception: Attention normal.         Mood and Affect: Mood normal.         Speech: Speech normal.         Behavior: Behavior normal.         Visit Vitals  /68   Ht 5' 8\" (1.727 m)   Wt 214 lb (97.1 kg)   BMI 32.54 kg/m²         ASSESSMENT and PLAN    ICD-10-CM ICD-9-CM    1. 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 S/P lumpectomy of breast  Z98.890 V45.89    3. S/P radiation therapy  Z92.3 V66.1    4. History of breast cancer in female  Z85.3 V10.3           Normal exam with no evidence of breast malignancy. Reviewed normal post treatment changes. Mammogram in 12/2021 - RIGHT breast follow-up in 6 months - scheduled for this week. Continues on AI and has follow-up with Dr. Morris Garcia.       RTC in 6 months or sooner PRN.  She is comfortable with this plan.  All questions answered and she stated understanding.              Total time spent for this patient - 20 minutes.

## 2022-06-09 ENCOUNTER — HOSPITAL ENCOUNTER (OUTPATIENT)
Dept: MAMMOGRAPHY | Age: 77
Discharge: HOME | End: 2022-06-09
Attending: NURSE PRACTITIONER
Payer: COMMERCIAL

## 2022-06-09 DIAGNOSIS — R92.8 ABNORMAL FINDING ON BREAST IMAGING: ICD-10-CM

## 2022-06-09 PROCEDURE — 77061 BREAST TOMOSYNTHESIS UNI: CPT

## 2022-06-22 ENCOUNTER — OFFICE VISIT (OUTPATIENT)
Dept: INTERNAL MEDICINE CLINIC | Age: 77
End: 2022-06-22
Payer: COMMERCIAL

## 2022-06-22 VITALS
BODY MASS INDEX: 32.67 KG/M2 | OXYGEN SATURATION: 97 % | HEIGHT: 68 IN | DIASTOLIC BLOOD PRESSURE: 72 MMHG | SYSTOLIC BLOOD PRESSURE: 128 MMHG | HEART RATE: 66 BPM | TEMPERATURE: 98.7 F | RESPIRATION RATE: 18 BRPM | WEIGHT: 215.6 LBS

## 2022-06-22 DIAGNOSIS — R73.09 ELEVATED HEMOGLOBIN A1C: ICD-10-CM

## 2022-06-22 DIAGNOSIS — E55.9 VITAMIN D DEFICIENCY: ICD-10-CM

## 2022-06-22 DIAGNOSIS — M10.071 IDIOPATHIC GOUT INVOLVING TOE OF RIGHT FOOT, UNSPECIFIED CHRONICITY: ICD-10-CM

## 2022-06-22 DIAGNOSIS — M65.332 TRIGGER MIDDLE FINGER OF LEFT HAND: ICD-10-CM

## 2022-06-22 DIAGNOSIS — K21.9 GASTROESOPHAGEAL REFLUX DISEASE WITHOUT ESOPHAGITIS: ICD-10-CM

## 2022-06-22 DIAGNOSIS — M81.0 AGE-RELATED OSTEOPOROSIS WITHOUT CURRENT PATHOLOGICAL FRACTURE: ICD-10-CM

## 2022-06-22 DIAGNOSIS — C50.912 INVASIVE DUCTAL CARCINOMA OF LEFT BREAST (HCC): ICD-10-CM

## 2022-06-22 DIAGNOSIS — I10 ESSENTIAL HYPERTENSION: Primary | ICD-10-CM

## 2022-06-22 DIAGNOSIS — E66.9 OBESITY (BMI 30-39.9): ICD-10-CM

## 2022-06-22 LAB
25(OH)D3 SERPL-MCNC: 79.9 NG/ML (ref 30–100)
ALBUMIN SERPL-MCNC: 4.1 G/DL (ref 3.5–5)
ALBUMIN/GLOB SERPL: 1 {RATIO} (ref 1.1–2.2)
ALP SERPL-CCNC: 72 U/L (ref 45–117)
ALT SERPL-CCNC: 15 U/L (ref 12–78)
ANION GAP SERPL CALC-SCNC: 5 MMOL/L (ref 5–15)
APPEARANCE UR: CLEAR
AST SERPL-CCNC: 10 U/L (ref 15–37)
BASOPHILS # BLD: 0.1 K/UL (ref 0–0.1)
BASOPHILS NFR BLD: 1 % (ref 0–1)
BILIRUB SERPL-MCNC: 0.6 MG/DL (ref 0.2–1)
BILIRUB UR QL: NEGATIVE
BUN SERPL-MCNC: 25 MG/DL (ref 6–20)
BUN/CREAT SERPL: 27 (ref 12–20)
CALCIUM SERPL-MCNC: 10 MG/DL (ref 8.5–10.1)
CHLORIDE SERPL-SCNC: 103 MMOL/L (ref 97–108)
CHOLEST SERPL-MCNC: 174 MG/DL
CO2 SERPL-SCNC: 30 MMOL/L (ref 21–32)
COLOR UR: NORMAL
CREAT SERPL-MCNC: 0.93 MG/DL (ref 0.55–1.02)
DIFFERENTIAL METHOD BLD: NORMAL
EOSINOPHIL # BLD: 0.2 K/UL (ref 0–0.4)
EOSINOPHIL NFR BLD: 4 % (ref 0–7)
ERYTHROCYTE [DISTWIDTH] IN BLOOD BY AUTOMATED COUNT: 13.7 % (ref 11.5–14.5)
EST. AVERAGE GLUCOSE BLD GHB EST-MCNC: 114 MG/DL
GLOBULIN SER CALC-MCNC: 4 G/DL (ref 2–4)
GLUCOSE SERPL-MCNC: 100 MG/DL (ref 65–100)
GLUCOSE UR STRIP.AUTO-MCNC: NEGATIVE MG/DL
HBA1C MFR BLD: 5.6 % (ref 4–5.6)
HCT VFR BLD AUTO: 40.8 % (ref 35–47)
HDLC SERPL-MCNC: 44 MG/DL
HDLC SERPL: 4 {RATIO} (ref 0–5)
HGB BLD-MCNC: 13 G/DL (ref 11.5–16)
HGB UR QL STRIP: NEGATIVE
IMM GRANULOCYTES # BLD AUTO: 0 K/UL (ref 0–0.04)
IMM GRANULOCYTES NFR BLD AUTO: 0 % (ref 0–0.5)
KETONES UR QL STRIP.AUTO: NEGATIVE MG/DL
LDLC SERPL CALC-MCNC: 101.6 MG/DL (ref 0–100)
LEUKOCYTE ESTERASE UR QL STRIP.AUTO: NEGATIVE
LYMPHOCYTES # BLD: 1.3 K/UL (ref 0.8–3.5)
LYMPHOCYTES NFR BLD: 25 % (ref 12–49)
MCH RBC QN AUTO: 29.5 PG (ref 26–34)
MCHC RBC AUTO-ENTMCNC: 31.9 G/DL (ref 30–36.5)
MCV RBC AUTO: 92.7 FL (ref 80–99)
MONOCYTES # BLD: 0.6 K/UL (ref 0–1)
MONOCYTES NFR BLD: 11 % (ref 5–13)
NEUTS SEG # BLD: 3 K/UL (ref 1.8–8)
NEUTS SEG NFR BLD: 59 % (ref 32–75)
NITRITE UR QL STRIP.AUTO: NEGATIVE
NRBC # BLD: 0 K/UL (ref 0–0.01)
NRBC BLD-RTO: 0 PER 100 WBC
PH UR STRIP: 6 [PH] (ref 5–8)
PLATELET # BLD AUTO: 244 K/UL (ref 150–400)
PMV BLD AUTO: 11.1 FL (ref 8.9–12.9)
POTASSIUM SERPL-SCNC: 3.9 MMOL/L (ref 3.5–5.1)
PROT SERPL-MCNC: 8.1 G/DL (ref 6.4–8.2)
PROT UR STRIP-MCNC: NEGATIVE MG/DL
RBC # BLD AUTO: 4.4 M/UL (ref 3.8–5.2)
SODIUM SERPL-SCNC: 138 MMOL/L (ref 136–145)
SP GR UR REFRACTOMETRY: 1.02 (ref 1–1.03)
TRIGL SERPL-MCNC: 142 MG/DL (ref ?–150)
URATE SERPL-MCNC: 8.4 MG/DL (ref 2.6–6)
UROBILINOGEN UR QL STRIP.AUTO: 0.2 EU/DL (ref 0.2–1)
VLDLC SERPL CALC-MCNC: 28.4 MG/DL
WBC # BLD AUTO: 5.1 K/UL (ref 3.6–11)

## 2022-06-22 PROCEDURE — 1123F ACP DISCUSS/DSCN MKR DOCD: CPT | Performed by: INTERNAL MEDICINE

## 2022-06-22 PROCEDURE — 99214 OFFICE O/P EST MOD 30 MIN: CPT | Performed by: INTERNAL MEDICINE

## 2022-06-22 RX ORDER — CARBOXYMETHYLCELLULOSE/CITRIC 0.75 G
CAPSULE ORAL
Status: CANCELLED | OUTPATIENT
Start: 2022-06-22

## 2022-06-22 RX ORDER — CARBOXYMETHYLCELLULOSE/CITRIC 0.75 G
3 CAPSULE ORAL
Qty: 180 CAPSULE | Refills: 2 | Status: SHIPPED | OUTPATIENT
Start: 2022-06-22

## 2022-06-22 NOTE — PROGRESS NOTES
Emerson Mccrary is a 78 y.o. female presenting for Follow Up Chronic Condition (6 mo fu) and Hand Pain (L)  . 1. Have you been to the ER, urgent care clinic since your last visit? Hospitalized since your last visit? No    2. Have you seen or consulted any other health care providers outside of the [address] [place] [place] [address] since your last visit? Include any pap smears or colon screening. [facility]    Fall Risk Assessment, last 12 mths 4/19/2022   Able to walk? Yes   Fall in past 12 months? 0   Do you feel unsteady? 0   Are you worried about falling 0         Abuse Screening Questionnaire 6/4/2020   Do you ever feel afraid of your partner? N   Are you in a relationship with someone who physically or mentally threatens you? N   Is it safe for you to go home?  Y       3 most recent PHQ Screens 4/19/2022   Little interest or pleasure in doing things Not at all   Feeling down, depressed, irritable, or hopeless Not at all   Total Score PHQ 2 0       Medications Discontinued During This Encounter   Medication Reason    amoxicillin (AMOXIL) 500 mg capsule LIST CLEANUP    Dexlansoprazole (DEXILANT) 60 mg CpDB LIST CLEANUP    docosanol (ABREVA) 10 % topical cream LIST CLEANUP    allopurinoL (ZYLOPRIM) 300 mg tablet LIST CLEANUP    naloxone (NARCAN) 4 mg/actuation nasal spray LIST CLEANUP    predniSONE (STERAPRED DS) 10 mg dose pack LIST CLEANUP

## 2022-06-22 NOTE — PROGRESS NOTES
Martin Palacio is a 78 y.o. female and presents with Follow Up Chronic Condition (6 mo fu) and Hand Pain (L)  . Subjective:  Mrs. Karin Devine presents today for follow-up of several problems including GERD, impaired glucose tolerance, history of hepatitis C, history of breast cancer of the left breast status postlumpectomy with reconstruction chemo and radiation, hypertension. On follow-up with hepatology she was told that she has fatty liver based on her scan. Her cholesterol profile has been rather excellent with a normal triglyceride and excellent HDL cholesterol. Her LDL cholesterol has been mildly elevated. We briefly discussed following a diet low in saturated fat. Complains of discomfort and feeling that her third finger on her left hand will get locked at times and has become uncomfortable. She has no chest pain, palpitations, PND, orthopnea, or pedal edema. She has had a change in her bowel habits. She does however describe some abdominal discomfort that sharp and comes and goes intermittently. Past Medical History:   Diagnosis Date    Acute diverticulitis 8/18/2017    Allergic rhinitis 8/18/2017    Arthritis 8/18/2017    Arthritis of right knee 11/25/2019    Back pain, thoracic 8/18/2017    Cancer ([facility] [address] 08/2020    LEFT BREAST , LUMPECTOMY WITH RECONSTRUCTION, CHEMO, RADIATION    Cataract, left 8/18/2017    Cataract, right 8/18/2017    Chronic obstructive pulmonary disease ([address]     Cold sore 8/18/2017    COVID-19 vaccine series completed 2/23/21, 3/19/21    PFIZER    Elevated hemoglobin A1c 8/18/2017    Fatigue 8/18/2017    GERD (gastroesophageal reflux disease) 8/18/2017    Heart murmur 8/18/2017    Hepatitis C 8/18/2017    TREATED    Herpes zoster infection of thoracic region 8/18/2017    Hyperlipidemia LDL goal <100 8/18/2017    Hypertension     Menopause     Hysterectomy-42years old    Murmur     Obesity (BMI 30-39. 9) 8/18/2017    Osteopenia 8/18/2017    Other ill-defined conditions(799.89)     hep c    Post-menopausal 8/18/2017    Posterior auricular pain of right ear 8/18/2017    Preop examination 8/18/2017    Valvular heart disease     mitral valve prolapse    Vertigo, benign positional 8/18/2017     Past Surgical History:   Procedure Laterality Date    HX BREAST LUMPECTOMY Bilateral 11/3/2020    LEFT BREAST REDUCTION LUMPECTOMY WITH ULTRASOUND, LEFT BREAST SENTINEL NODE BIOPSY, LEFT BREAST RECONSTRUCTION, RIGHT BREAST REDUCTION performed [facility] Kwabena Monahan [facility]    HX BREAST REDUCTION Bilateral 2020    HX COLONOSCOPY      HX HYSTERECTOMY      42years old   Brunetta Nipper HX KNEE ARTHROSCOPY Right     knee,    HX ORTHOPAEDIC Right     BUNIONECTOMY, HAMMERTOE REPAIR    HX TONSILLECTOMY       No Known Allergies  Current Outpatient Medications   Medication Sig Dispense Refill    carboxymethylcellulose-citric (Plenity) 0.75 gram cap Take 3 Capsules by mouth Before breakfast and dinner. 180 Capsule 2    furosemide (LASIX) 20 mg tablet Take 1 tablet by mouth once daily 90 Tablet 1    famotidine (PEPCID) 20 mg tablet Take 20 mg by mouth two (2) times a day.  valACYclovir (VALTREX) 500 mg tablet Take 1 Tablet by mouth daily. 30 Tablet 3    olmesartan-hydroCHLOROthiazide (BENICAR HCT) 20-12.5 mg per tablet Take 1 tablet by mouth once daily 90 Tablet 1    fluticasone (Flonase Sensimist) 27.5 mcg/actuation nasal spray 2 Sprays by Both Nostrils route daily.  fexofenadine (Allegra Allergy) 180 mg tablet Take 360 mg by mouth daily.  anastrozole (ARIMIDEX) 1 mg tablet Take 1 mg by mouth daily. Indications: hormone receptor positive breast cancer (Patient taking differently: Take 1 mg by mouth daily. PT TAKES WITH DINNER  Indications: hormone receptor positive breast cancer) 90 Tab 3    MILK THISTLE PO Take 175 mg by mouth daily.  OTHER 1 Tablet daily.  Indications: AM/PM MENOPAUSE FORMULA 1 TAB QHS      TURMERIC ROOT EXTRACT PO Take 200 mg by mouth daily.  psyllium (METAMUCIL) powd Take  by mouth nightly.  magnesium 250 mg tab Take 1 Tablet by mouth nightly.  biotin 5,000 mcg TbDi Take 1 Tablet by mouth daily.  cholecalciferol, VITAMIN D3, (VITAMIN D3) 5,000 unit tab tablet Take  by mouth daily.  omeprazole (PRILOSEC OTC) 20 mg tablet Take 20 mg by mouth as needed. Social History     Socioeconomic History    Marital status: SINGLE   Tobacco Use    Smoking status: Former Smoker     Packs/day: 0.50     Years: 25.00     Pack years: 12.50     Types: Cigarettes     Quit date: 1982     Years since quittin.4    Smokeless tobacco: Never Used   Vaping Use    Vaping Use: Never used   Substance and Sexual Activity    Alcohol use: Yes     Alcohol/week: 1.0 standard drink     Types: 1 Glasses of wine per week     Comment: 1/WK    Drug use: No    Sexual activity: Not Currently     Partners: Male     Birth control/protection: None     Family History   Problem Relation Age of Onset    Hypertension Mother     Diabetes Mother     Kidney Disease Mother     OSTEOARTHRITIS Mother     Heart Disease Mother     Heart Disease Father     OSTEOARTHRITIS Father     Hypertension Father     Colon Cancer Maternal Grandmother     No Known Problems Sister     Anesth Problems Neg Hx        Review of Systems  Constitutional:  negative for fevers, chills, anorexia and weight loss  Eyes:    negative for visual disturbance and irritation  ENT:    negative for tinnitus,sore throat,nasal congestion,ear pains. hoarseness  Respiratory:     negative for cough, hemoptysis, dyspnea,wheezing  CV:    negative for chest pain, palpitations, lower extremity edema  GI:    negative for nausea, vomiting, diarrhea, ,melena  Endo:               negative for polyuria,polydipsia,polyphagia,heat intolerance  Genitourinary : negative for frequency, dysuria and hematuria  Integumentary: negative for rash and pruritus  Hematologic:   negative for easy bruising and gum/nose bleeding  Musculoskel:  negative for myalgias, , back pain, muscle weakness  Neurological:   negative for headaches, dizziness, vertigo, memory problems and gait   Behavl/Psych:  negative for feelings of anxiety, depression, mood changes  ROS otherwise negative      Objective:  Visit Vitals  /72 (BP 1 Location: Left upper arm, BP Patient Position: Sitting, BP Cuff Size: Adult)   Pulse 66   Temp 98.7 °F (37.1 °C) (Oral)   Resp 18   Ht 5' 8\" (1.727 m)   Wt 215 lb 9.6 oz (97.8 kg)   SpO2 97%   BMI 32.78 kg/m²     Physical Exam:   General appearance - alert, well appearing, and in no distress  Mental status - alert, oriented to person, place, and time  EYE-EMMY, EOMI, fundi normal, corneas normal, no foreign bodies  ENT-ENT exam normal, no neck nodes or sinus tenderness  Nose - normal and patent, no erythema, discharge or polyps  Mouth - mucous membranes moist, pharynx normal without lesions  Neck - supple, no significant adenopathy   Chest - clear to auscultation, no wheezes, rales or rhonchi, symmetric air entry   Heart - normal rate, regular rhythm, normal S1, S2, no murmurs, rubs, clicks or gallops   Abdomen - soft, nontender, nondistended, no masses or organomegaly  Lymph- no adenopathy palpable  Ext-peripheral pulses normal, no pedal edema, no clubbing or cyanosis  Skin-Warm and dry. no hyperpigmentation, vitiligo, or suspicious lesions  Neuro -alert, oriented, normal speech, no focal findings or movement disorder noted      Assessment/Plan:  Diagnoses and all orders for this visit:    1. Essential hypertension  -     LIPID PANEL; Future  -     METABOLIC PANEL, COMPREHENSIVE; Future  -     URINALYSIS W/ RFLX MICROSCOPIC; Future    2. Gastroesophageal reflux disease without esophagitis    3. Age-related osteoporosis without current pathological fracture    4. Elevated hemoglobin A1c  -     HEMOGLOBIN A1C WITH EAG; Future    5.  Vitamin D deficiency  -     VITAMIN D, 25 HYDROXY; Future    6. 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 Invasive ductal carcinoma of left breast (HCC)  -     CBC WITH AUTOMATED DIFF; Future    8. Trigger middle finger of left hand  -     REFERRAL TO ORTHOPEDICS    9. Obesity (BMI 30-39.9)  -     carboxymethylcellulose-citric (Plenity) 0.75 gram cap; Take 3 Capsules by mouth Before breakfast and dinner. ICD-10-CM ICD-9-CM    1. Essential hypertension  I10 401.9 LIPID PANEL      METABOLIC PANEL, COMPREHENSIVE      URINALYSIS W/ RFLX MICROSCOPIC      URINALYSIS W/ RFLX MICROSCOPIC      METABOLIC PANEL, COMPREHENSIVE      LIPID PANEL   2. Gastroesophageal reflux disease without esophagitis  K21.9 530.81    3. Age-related osteoporosis without current pathological fracture  M81.0 733.01    4. Elevated hemoglobin A1c  R73.09 790.29 HEMOGLOBIN A1C WITH EAG      HEMOGLOBIN A1C WITH EAG   5. Vitamin D deficiency  E55.9 268.9 VITAMIN D, 25 HYDROXY      VITAMIN D, 25 HYDROXY   6. 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 Invasive ductal carcinoma of left breast (HCC)  C50.912 174.9 CBC WITH AUTOMATED DIFF      CBC WITH AUTOMATED DIFF   8. Trigger middle finger of left hand  M65.332 727.03 REFERRAL TO ORTHOPEDICS   9. Obesity (BMI 30-39. 9)  E66.9 278.00 carboxymethylcellulose-citric (Plenity) 0.75 gram cap     Plan:    Continue current medical regimen as outlined above. Patient's BMI is above normal range and she would benefit from weight loss. This would also help with fatty liver. We discussed plenity for weight loss. Follow-up and Dispositions    · Return in about 6 months (around 12/22/2022) for follow up. I have reviewed with the patient details of the assessment and plan and all questions were answered.  Relevent patient education was performed. Verbal and/or written instructions (see AVS) provided. The most recent lab findings were reviewed with the patient. Plan was discussed with patient who verbally expressed understanding. An After Visit Summary was printed and given to the patient.     Jany Sanchez MD

## 2022-06-23 ENCOUNTER — OFFICE VISIT (OUTPATIENT)
Dept: ONCOLOGY | Age: 77
End: 2022-06-23
Payer: COMMERCIAL

## 2022-06-23 VITALS
HEART RATE: 77 BPM | DIASTOLIC BLOOD PRESSURE: 57 MMHG | BODY MASS INDEX: 32.61 KG/M2 | SYSTOLIC BLOOD PRESSURE: 104 MMHG | HEIGHT: 68 IN | OXYGEN SATURATION: 96 % | WEIGHT: 215.2 LBS | TEMPERATURE: 96.1 F

## 2022-06-23 DIAGNOSIS — C50.912 INVASIVE DUCTAL CARCINOMA OF LEFT BREAST (HCC): Primary | ICD-10-CM

## 2022-06-23 DIAGNOSIS — Z98.890 H/O BILATERAL BREAST REDUCTION SURGERY: ICD-10-CM

## 2022-06-23 DIAGNOSIS — I10 ESSENTIAL HYPERTENSION: ICD-10-CM

## 2022-06-23 DIAGNOSIS — R23.2 HOT FLASHES RELATED TO AROMATASE INHIBITOR THERAPY: ICD-10-CM

## 2022-06-23 DIAGNOSIS — Z79.811 USE OF ANASTROZOLE (ARIMIDEX): ICD-10-CM

## 2022-06-23 DIAGNOSIS — J44.9 CHRONIC OBSTRUCTIVE PULMONARY DISEASE, UNSPECIFIED COPD TYPE (HCC): ICD-10-CM

## 2022-06-23 DIAGNOSIS — I36.1 NON-RHEUMATIC TRICUSPID VALVE INSUFFICIENCY: ICD-10-CM

## 2022-06-23 DIAGNOSIS — T45.1X5A HOT FLASHES RELATED TO AROMATASE INHIBITOR THERAPY: ICD-10-CM

## 2022-06-23 DIAGNOSIS — Z85.3 PERSONAL HISTORY OF BREAST CANCER: ICD-10-CM

## 2022-06-23 DIAGNOSIS — K21.9 GASTROESOPHAGEAL REFLUX DISEASE WITHOUT ESOPHAGITIS: ICD-10-CM

## 2022-06-23 DIAGNOSIS — Z98.890 HISTORY OF LUMPECTOMY: ICD-10-CM

## 2022-06-23 DIAGNOSIS — Z96.651 S/P TKR (TOTAL KNEE REPLACEMENT), RIGHT: ICD-10-CM

## 2022-06-23 DIAGNOSIS — Z86.19 HISTORY OF HEPATITIS C: ICD-10-CM

## 2022-06-23 PROBLEM — Z09 CHEMOTHERAPY FOLLOW-UP EXAMINATION: Status: RESOLVED | Noted: 2021-01-27 | Resolved: 2022-06-23

## 2022-06-23 PROCEDURE — 99213 OFFICE O/P EST LOW 20 MIN: CPT | Performed by: INTERNAL MEDICINE

## 2022-06-23 PROCEDURE — 1123F ACP DISCUSS/DSCN MKR DOCD: CPT | Performed by: INTERNAL MEDICINE

## 2022-06-23 RX ORDER — ANASTROZOLE 1 MG/1
1 TABLET ORAL DAILY
Qty: 90 TABLET | Refills: 3 | Status: SHIPPED | OUTPATIENT
Start: 2022-06-23

## 2022-06-23 NOTE — PROGRESS NOTES
Your vitamin D level remains in normal range. Your urine analysis was clear. Your A1c which is an average of your glucose remains in normal range. Your cholesterol is excellent. Your complete blood count is normal.  Your liver and kidney function are normal.  Your uric acid level is mildly elevated. If you have any recurring episodes of gout I would consider resuming medications to lower your uric acid level.

## 2022-06-23 NOTE — PROGRESS NOTES
[facility] [place] [address] [address], [address], [place]: [phone]  F: [phone]    Reason for Visit:   Jaclyn Gomez is a 78 y.o. female seen today in office for 3 month follow up of Left Breast Cancer on adjuvant Anastrozole. Treatment History:   Per Surgery Note:  · Initially abnormal screening mammo 7/20 on LEFT with small mass  · 08/04/20: LEFT Breast Bx. PATH: IDC, 5 mm in greatest linear dimension extending to core biopsy tip, histologic grade 2, ER+(%)/MT+(%)/HER2-. Clinical stage 1  · MammaPrint: High risk, luminal type B, -0.123  · Breast MRI 8/28/20: 12 mm mass otherwise negative. · 11/03/20: LEFT oncoplastic reduction and SLNBx, and RIGHT breast reduction, with Dr. Erinn Negrete. PATH:  · LEFT: IDC, 14 x 13 x 12 mm, overall grade 2, negative margins. DCIS present with negative margins. Pathologic stage: pT1c, pN0  · RIGHT: Breast tissue with stromal fibrosis and focal papillary apocrine metaplasia. Benign skin and subcutaneous soft tissue. No in-situ or invasive carcinoma identified  · Adjuvant TC chemotherapy 1/6/21 - 3/10/21  · Completed XRT on 5/5/21  · Adjuvant Anastrozole 5/21 - Current     STAGE: T1cN0 ER+ HEr2 negative mammaprint high risk luminal B    History of Present Illness:   Jaclyn Gomez is a 78 y.o. female seen today in office for 3 month follow up of left breast cancer ER+ HER2 negative hx of lumpectomy and bilateral breast reduction on 11/3/20. She started adjuvant hormonal therapy with Anastrozole in 5/21. She reports that she feels well overall today and is tolerating Anastrozole well overall with some mild hot flashes. Her appetite and energy levels are good. She denies fever, chills, mouth sores, cough, SOB, CP, nausea, vomiting, diarrhea, and constipation. She denies pain today. She had a bilateral mammogram in 12/21 with 6 month follow up on RIGHT on 6/9/22 that was negative/good.  She has routine HM and labs with her PCP. She is here alone today. Past Medical History:   Diagnosis Date    Acute diverticulitis 2017    Allergic rhinitis 2017    Arthritis 2017    Arthritis of right knee 2019    Back pain, thoracic 2017    Cancer ([facility] [address] 2020    LEFT BREAST , LUMPECTOMY WITH RECONSTRUCTION, CHEMO, RADIATION    Cataract, left 2017    Cataract, right 2017    Chronic obstructive pulmonary disease ([address]     Cold sore 2017    COVID-19 vaccine series completed 21, 3/19/21    PFIZER    Elevated hemoglobin A1c 2017    Fatigue 2017    GERD (gastroesophageal reflux disease) 2017    Heart murmur 2017    Hepatitis C 2017    TREATED    Herpes zoster infection of thoracic region 2017    Hyperlipidemia LDL goal <100 2017    Hypertension     Menopause     Hysterectomy-42years old    Murmur     Obesity (BMI 30-39. 9) 2017    Osteopenia 2017    Other ill-defined conditions(799.89)     hep c    Post-menopausal 2017    Posterior auricular pain of right ear 2017    Preop examination 2017    Valvular heart disease     mitral valve prolapse    Vertigo, benign positional 2017      Past Surgical History:   Procedure Laterality Date    HX BREAST LUMPECTOMY Bilateral 11/3/2020    LEFT BREAST REDUCTION LUMPECTOMY WITH ULTRASOUND, LEFT BREAST SENTINEL NODE BIOPSY, LEFT BREAST RECONSTRUCTION, RIGHT BREAST REDUCTION performed [facility]    HX BREAST REDUCTION Bilateral     HX COLONOSCOPY      HX HYSTERECTOMY      42years old   Kelley Bales HX KNEE ARTHROSCOPY Right     knee,    HX ORTHOPAEDIC Right     BUNIONECTOMY, HAMMERTOE REPAIR    HX TONSILLECTOMY        Social History     Tobacco Use    Smoking status: Former Smoker     Packs/day: 0.50     Years: 25.00     Pack years: 12.50     Types: Cigarettes     Quit date: 1982     Years since quittin.5    Smokeless tobacco: Never Used   Substance Use Topics    Alcohol use: Yes     Alcohol/week: 1.0 standard drink     Types: 1 Glasses of wine per week     Comment: 1/WK      Family History   Problem Relation Age of Onset    Hypertension Mother     Diabetes Mother     Kidney Disease Mother     OSTEOARTHRITIS Mother     Heart Disease Mother     Heart Disease Father     OSTEOARTHRITIS Father     Hypertension Father     Colon Cancer Maternal Grandmother     No Known Problems Sister     Anesth Problems Neg Hx      Current Outpatient Medications   Medication Sig    anastrozole (ARIMIDEX) 1 mg tablet Take 1 mg by mouth daily. PT TAKES WITH DINNER  Indications: hormone receptor positive breast cancer    carboxymethylcellulose-citric (Plenity) 0.75 gram cap Take 3 Capsules by mouth Before breakfast and dinner.  furosemide (LASIX) 20 mg tablet Take 1 tablet by mouth once daily    famotidine (PEPCID) 20 mg tablet Take 20 mg by mouth two (2) times a day.  valACYclovir (VALTREX) 500 mg tablet Take 1 Tablet by mouth daily.  olmesartan-hydroCHLOROthiazide (BENICAR HCT) 20-12.5 mg per tablet Take 1 tablet by mouth once daily    fluticasone (Flonase Sensimist) 27.5 mcg/actuation nasal spray 2 Sprays by Both Nostrils route daily.  fexofenadine (Allegra Allergy) 180 mg tablet Take 360 mg by mouth daily.  MILK THISTLE PO Take 175 mg by mouth daily.  OTHER 1 Tablet daily. Indications: AM/PM MENOPAUSE FORMULA 1 TAB QHS    TURMERIC ROOT EXTRACT PO Take 200 mg by mouth daily.  psyllium (METAMUCIL) powd Take  by mouth nightly.  magnesium 250 mg tab Take 1 Tablet by mouth nightly.  biotin 5,000 mcg TbDi Take 1 Tablet by mouth daily.  cholecalciferol, VITAMIN D3, (VITAMIN D3) 5,000 unit tab tablet Take  by mouth daily.  omeprazole (PRILOSEC OTC) 20 mg tablet Take 20 mg by mouth as needed. No current facility-administered medications for this visit.       No Known Allergies     Review of Systems:  A complete review of systems was obtained, negative except as described above and as reported on ROS sheet scanned into system. Physical Exam:     Visit Vitals  BP (!) 104/57 (BP 1 Location: Right arm, BP Patient Position: Sitting) Comment: waiting   Pulse 77   Temp (!) 96.1 °F (35.6 °C) (Temporal)   Ht 5' 8\" (1.727 m)   Wt 215 lb 3.2 oz (97.6 kg)   SpO2 96%   BMI 32.72 kg/m²     ECOG PS: 1  General: No distress  Eyes:  Anicteric sclerae  HENT: Atraumatic, wearing a mask  Neck: Supple  Respiratory: Normal respiratory effort, lungs clear  CV: Regular rate and rhythm  GI: Soft, non tender   Ext: No edema  MS: Normal gait and station. Digits without clubbing or cyanosis. Skin: No rashes, ecchymoses, or petechiae. Normal temperature, turgor, and texture. Psych: Alert, oriented, appropriate affect, normal judgment/insight  Breast: Well healed lumpectomy scars on left breast with no palpable masses but dense breast tissue. Bilateral reduction scars well healed   Neuro: Non focal     Results:     Lab Results   Component Value Date/Time    WBC 5.1 06/22/2022 10:53 AM    HGB 13.0 06/22/2022 10:53 AM    HCT 40.8 06/22/2022 10:53 AM    PLATELET 434 68/42/4608 10:53 AM    MCV 92.7 06/22/2022 10:53 AM    ABS.  NEUTROPHILS 3.0 06/22/2022 10:53 AM    HGB (POC) 12.6 09/11/2017 11:03 AM    HCT (POC) 38.2 09/11/2017 11:03 AM     Lab Results   Component Value Date/Time    Sodium 138 06/22/2022 10:53 AM    Potassium 3.9 06/22/2022 10:53 AM    Chloride 103 06/22/2022 10:53 AM    CHLORIDE 104.0 03/28/2018 10:08 AM    CO2 30 06/22/2022 10:53 AM    Glucose 100 06/22/2022 10:53 AM    BUN 25 (H) 06/22/2022 10:53 AM    Creatinine 0.93 06/22/2022 10:53 AM    GFR est AA >60 06/22/2022 10:53 AM    GFR est non-AA 59 (L) 06/22/2022 10:53 AM    Calcium 10.0 06/22/2022 10:53 AM    Sodium (POC) 146.0 (A) 03/28/2018 10:08 AM    Potassium (POC) 4.6 03/28/2018 10:08 AM    Glucose (POC) 95 07/19/2021 07:14 AM    Creatinine (POC) 0.7 03/28/2018 10:08 AM     Lab Results   Component Value Date/Time    Bilirubin, total 0.6 06/22/2022 10:53 AM    ALT (SGPT) 15 06/22/2022 10:53 AM    Alk. phosphatase 72 06/22/2022 10:53 AM    Protein, total 8.1 06/22/2022 10:53 AM    Albumin 4.1 06/22/2022 10:53 AM    Globulin 4.0 06/22/2022 10:53 AM     NABEEL Results (most recent):  Results from Hospital Encounter encounter on 06/09/22    NABEEL 3D CARRIE W MAMMO RT DX INCL CAD    Narrative  STUDY:  RIGHT breast digital diagnostic mammogram    INDICATION: 6 month follow-up for RIGHT breast    COMPARISON:  Prior mammograms December 2, 2021, July 31, 2020 and August 4, 2020. RIGHT breast ultrasound December 2, 2021. Density: The breast tissue is heterogeneously dense, which could obscure  detection of small masses (approximately 51-75% glandular). Views Performed: RIGHT breast digital diagnostic mammograms with tomography. FINDINGS:  RIGHT breast digital diagnostic mammograms with tomography were performed. CAD  was used. There are changes of RIGHT breast reduction mammoplasty. No suspicious  mass or calcifications are identified. There has been no significant interval  change. Impression  1. BI-RADS Category 2 - Benign findings. No specific mammographic evidence of  malignancy in the RIGHT breast. There are changes of reduction mammoplasty. Recommendation:  Continue annual screening mammograms, next due in December 2022. The findings of this exam and the recommendation were directly discussed with  the patient at the time of exam by myself. DEXA Results (most recent):  Results from Hospital Encounter encounter on 06/22/22    DEXA BONE DENSITY STUDY AXIAL    Narrative  Bone Mineral Density    Indication: Osteoporosis, aromatase inhibitor therapy  Age: 78  Sex: Female. Menopause status: Postmenopausal.  Hormone replacement therapy: No    Number of falls in the past year: None.   Risk factors for osteoporosis: Chronic steroid use    Current medication for osteoporosis: None. Comparison: 5/2/2019    Technique: Imaging was performed on the [employer]. World Health Organization  meta-analysis fracture risk calculator (FRAX) analysis was performed for 10 year  fracture risk probability assessment    Excluded sites: L1 and L2 due to spondylosis    Findings:    Femoral Neck: Right  Bone mineral density (gm/cm2): 0.889  % of peak bone mass: 86  % for age matched controls: 90  T-score: -1.1  Z-score: -0.7    Total Hip: Right  Bone mineral density (gm/cm2): 0.898  % of peak bone mass: 89  % for age matched controls: 90  T-score: -0.9  Z-score: -0.8    Lumbar Spine: L3-L4  Bone mineral density (gm/cm2): 1.136  % of peak bone mass: 93  % for age matched controls: 80  T-score: -0.7  Z-score: -0.6    33% Radius:  Left  Bone mineral density (gm/cm2):  0.627  % of peak bone mass:  71  % for age matched controls:  80  T-score:  -2.9  Z-score:  -1.4    Impression  Impression: This patient is osteoporotic using the World Health Organization criteria  As compared to the prior study, there has been a decrease in the bone mineral  density of the lumbar spine of 1.7%, of the right total hip of 1.3%, and no  change in the bone mineral density of the left distal third radius. 10 year probability of major osteoporotic fracture: 14.7%  10 year probability of hip fracture: 6.1%    Recommendations:  Therapy recommendations need to be tailored to each individual patient. Using  the [facility] FRAX absolute fracture algorithm, the  [facility] recommends beginning pharmacological therapy in  postmenopausal women and men over the age of 48 with a 8 year probability of a  hip fracture of >3% OR with the 10 year probability of a major osteoporotic  fracture of >20%. Please reconsider testing based on risk factors.  Currently, Medicare will only  reimburse for a central DXA examination every two years, unless the patient is  on chronic glucocorticoid therapy. Note: Please note that reliable, valid comparisons cannot be made between  studies which have been performed on machines from different manufacturers. If  clinically warranted, a follow up study performed at this site, on the same  unit, would allow the most sensitive assessment of change in bone mineral  density. Records reviewed and summarized above. Pathology report(s) reviewed above. Radiology report(s) reviewed above. Assessment/PLAN:     1) Stage 1 T1cN0 ER+ HER2 negative Left Breast Cancer Mammaprint High Risk Luminal B post Lumpectomy/Bilateral Reduction on 11/3/20  She started adjuvant TC chemotherapy on 1/6/21. She completed 4 cycles of TC on 3/10/21. She completed radiation on 5/5/21. She started adjuvant Anastrozole in 5/21. Patient seen today in office for 3 month follow up. She remains on adjuvant hormonal therapy with Anastrozole. She is tolerating Anastrozole well overall with some mild hot flashes. She clinically stable today, feels well overall. She had a bilateral mammogram on 12/2/21 and follow up on RIGHT on 6/9/22 that was negative/good. She saw [facility] in 12/21 and next follow up is in 12/22. Continue Anastrozole for 5 years then re eval.  Follow up in 3 months. Patient agrees with plan. 2) Hx of Hep C  Cured per patient. Management per [facility]. 3) Heart Valve Problems/HTN  Management per Cardiology. 4) COPD/GERD   Management per PCP. 5) hx Right TKR/CTS  Management per Ortho. 6) Hot Flashes  Likely due to AI. Mild, tolerable per patient. Will continue to monitor. 7) Psychosocial  Mood good, coping well. She continues to work .  support as needed. Call if questions. Follow up in 3 months  I personally saw and evaluated the patient and performed the key components of medical decision making. The history, physical exam, and documentation were performed by Ralph Reyes NP.  I reviewed and verified the above documentation and modified it as needed. Doing well overall  Tolerating AI  mammo up to date  Labs and routine HM with PCP  Continue surveillance      I appreciate the opportunity to participate in Ms. Danielle Jones's care.     Signed By: Roderick Escoto DO

## 2022-06-23 NOTE — PROGRESS NOTES
Bravo Matias is a 78 y.o. female  Chief Complaint   Patient presents with    Follow-up    Breast Cancer     1. Have you been to the ER, urgent care clinic since your last visit? Hospitalized since your last visit? No.    2. Have you seen or consulted any other health care providers outside of the [address] [place] [address] since your last visit? Include any pap smears or colon screening.  Yes, patient went to see Liver Doc Feroz Lu

## 2022-07-06 ENCOUNTER — OFFICE VISIT (OUTPATIENT)
Dept: ORTHOPEDIC SURGERY | Age: 77
End: 2022-07-06
Payer: COMMERCIAL

## 2022-07-06 VITALS — WEIGHT: 210 LBS | HEIGHT: 69 IN | BODY MASS INDEX: 31.1 KG/M2

## 2022-07-06 DIAGNOSIS — G56.01 CARPAL TUNNEL SYNDROME OF RIGHT WRIST: ICD-10-CM

## 2022-07-06 DIAGNOSIS — M79.642 LEFT HAND PAIN: ICD-10-CM

## 2022-07-06 DIAGNOSIS — M19.042 PRIMARY OSTEOARTHRITIS, LEFT HAND: ICD-10-CM

## 2022-07-06 DIAGNOSIS — M65.332 TRIGGER MIDDLE FINGER OF LEFT HAND: Primary | ICD-10-CM

## 2022-07-06 PROCEDURE — 1123F ACP DISCUSS/DSCN MKR DOCD: CPT | Performed by: ORTHOPAEDIC SURGERY

## 2022-07-06 PROCEDURE — 99203 OFFICE O/P NEW LOW 30 MIN: CPT | Performed by: ORTHOPAEDIC SURGERY

## 2022-07-06 RX ORDER — METHYLPREDNISOLONE 4 MG/1
TABLET ORAL
Qty: 1 DOSE PACK | Refills: 0 | Status: SHIPPED | OUTPATIENT
Start: 2022-07-06 | End: 2022-08-22

## 2022-07-06 RX ORDER — SODIUM HYALURONATE INTRA-ARTICULAR SOLN PREF SYR 25 MG/2.5ML 25/2.5 MG/ML
25 SOLUTION PREFILLED SYRINGE INTRAARTICULAR ONCE
Qty: 3 EACH | Refills: 0 | Status: SHIPPED | OUTPATIENT
Start: 2022-07-06 | End: 2022-07-06

## 2022-07-06 NOTE — LETTER
7/6/2022    Patient: Jacquelyn Neil   YOB: 1945   Date of Visit: 7/6/2022     George Messina MD  [facility]    Dear George Messina MD,      Thank you for referring Ms. Everett Wade to [facility] for evaluation. My notes for this consultation are attached. If you have questions, please do not hesitate to call me. I look forward to following your patient along with you.       Sincerely,    Tata Nava MD

## 2022-07-06 NOTE — PATIENT INSTRUCTIONS
Carpal Tunnel Syndrome: Care Instructions  Overview     Carpal tunnel syndrome is numbness, tingling, weakness, and pain in your hand, wrist, and sometimes forearm. It is caused by pressure on the median nerve. This nerve and several tough tissues called tendons run through a space in the wrist. This space is called the carpal tunnel. The repeated hand motions used in work and some hobbies and sports can put pressure on the median nerve. Pregnancy can cause carpal tunnel syndrome. Several conditions, such as diabetes, arthritis, and an underactive thyroid, can also cause it. You may be able to limit an activity or change the way you do it to reduce your symptoms. You also can take other steps to feel better. If your symptoms are mild, 1 to 2 weeks of home treatment are likely to ease your pain. Surgery is needed only if other treatments do not work. Follow-up care is a key part of your treatment and safety. Be sure to make and go to all appointments, and call your doctor if you are having problems. It's also a good idea to know your test results and keep a list of the medicines you take. How can you care for yourself at home? · If possible, stop or reduce the activity that causes your symptoms. If you cannot stop the activity, take frequent breaks to rest and stretch or change hand positions to do a task. Try switching hands, such as when using a computer mouse. · Try to avoid bending or twisting your wrists. · Ask your doctor if you can take an over-the-counter pain medicine, such as acetaminophen (Tylenol), ibuprofen (Advil, Motrin), or naproxen (Aleve). Be safe with medicines. Read and follow all instructions on the label. · If your doctor prescribes corticosteroid medicine to help reduce pain and swelling, take it exactly as prescribed. Call your doctor if you think you are having a problem with your medicine. · Put ice or a cold pack on your wrist for 10 to 20 minutes at a time to ease pain.  Put a thin cloth between the ice and your skin. · If your doctor or your physical or occupational therapist tells you to wear a wrist splint, wear it as directed to keep your wrist in a neutral position. This also eases pressure on your median nerve. · Ask your doctor whether you should have physical or occupational therapy to learn how to do tasks differently. · Try a yoga class to stretch your muscles and build strength in your hands and wrists. Yoga has been shown to ease carpal tunnel symptoms. To prevent carpal tunnel  · When working at a computer, keep your hands and wrists in line with your forearms. Hold your elbows close to your sides. Take a break every 10 to 15 minutes. · Try these exercises:  ? Warm up: Rotate your wrist up, down, and from side to side. Repeat this 4 times. Stretch your fingers far apart, relax them, then stretch them again. Repeat 4 times. Stretch your thumb by pulling it back gently, holding it, and then releasing it. Repeat 4 times. ? Prayer stretch: Start with your palms together in front of your chest just below your chin. Slowly lower your hands toward your waistline while keeping your hands close to your stomach and your palms together until you feel a mild to moderate stretch under your forearms. Hold for 10 to 20 seconds. Repeat 4 times. ? Wrist flexor stretch: Hold your arm in front of you with your palm up. Bend your wrist, pointing your hand toward the floor. With your other hand, gently bend your wrist further until you feel a mild to moderate stretch in your forearm. Hold for 10 to 20 seconds. Repeat 4 times. ? Wrist extensor stretch: Repeat the steps for the wrist flexor stretch, but begin with your extended hand palm down. · Squeeze a rubber exercise ball several times a day to keep your hands and fingers strong. · Avoid holding objects (such as a book) in one position for a long time. When possible, use your whole hand to grasp an object.  Using just the thumb and index finger can put stress on the wrist.  · Do not smoke. It can make this condition worse by reducing blood flow to the median nerve. If you need help quitting, talk to your doctor about stop-smoking programs and medicines. These can increase your chances of quitting for good. When should you call for help? Watch closely for changes in your health, and be sure to contact your doctor if:    · Your pain or other problems do not get better with home care.     · You want more information about physical or occupational therapy.     · You have side effects of your corticosteroid medicine, such as:  ? Weight gain. ? Mood changes. ? Trouble sleeping. ? Bruising easily.     · You have any other problems with your medicine. Where can you learn more? Go to http://www.gray.com/  Enter R432 in the search box to learn more about \"Carpal Tunnel Syndrome: Care Instructions. \"  Current as of: July 1, 2021               Content Version: 13.2  © 2006-2022 [employer]. Care instructions adapted under license by [employer] (which disclaims liability or warranty for this information). If you have questions about a medical condition or this instruction, always ask your healthcare professional. [place] [address] any warranty or liability for your use of this information. Trigger Finger: Care Instructions  Overview  A trigger finger is a finger stuck in a bent position. It happens when the tendon that bends and straightens the thumb or finger can't slide smoothly under the ligaments that hold the tendon against the bones. In most cases, it's caused by a bump (nodule) that forms on the tendon. The bent finger usually straightens out on its own. A trigger finger can be painful. But it normally isn't a serious problem. Trigger fingers seem to occur more in some groups of people. These groups include:  · People who have diabetes or arthritis.   · People who have injured their hands in the past.  · Musicians. · People who  tools often. Rest and exercises may help your finger relax so that it can bend. You may get a corticosteroid shot. This can reduce swelling and pain. Your doctor may put a splint on your finger. It will give your finger some rest. You may need surgery if the finger keeps locking in a bent position. Follow-up care is a key part of your treatment and safety. Be sure to make and go to all appointments, and call your doctor if you are having problems. It's also a good idea to know your test results and keep a list of the medicines you take. How can you care for yourself at home? · If your doctor put a splint on your finger, wear it as directed. Don't take it off until your doctor says you can. · You may need to change your activities to avoid movements that irritate the finger. · Take your medicines exactly as prescribed. Call your doctor if you think you are having a problem with your medicine. · Ask your doctor if you can take an over-the-counter pain medicine, such as acetaminophen (Tylenol), ibuprofen (Advil, Motrin), or naproxen (Aleve). Be safe with medicines. Read and follow all instructions on the label. · If your doctor recommends exercises, do them as directed. When should you call for help? Call your doctor now or seek immediate medical care if:    · Your finger locks in a bent position and will not straighten. Watch closely for changes in your health, and be sure to contact your doctor if:    · You do not get better as expected. Where can you learn more? Go to http://www.Chain.com/  Enter M826 in the search box to learn more about \"Trigger Finger: Care Instructions. \"  Current as of: July 1, 2021               Content Version: 13.2  © 2150-1869 [employer]. Care instructions adapted under license by [employer] (which disclaims liability or warranty for this information). If you have questions about a medical condition or this instruction, always ask your healthcare professional. [place] [address] any warranty or liability for your use of this information.

## 2022-07-06 NOTE — PROGRESS NOTES
HPI: Myles Mercer (: 1945) is a 78 y.o. female, patient, here for evaluation of the following chief complaint(s):    No chief complaint on file. Patient seen today to evaluate her hands. She has complained of soreness and pain with locking involving her left middle finger since the start of the new year. Has been no fall or precipitating event. She can recall having a right trigger thumb injection more than 5 years ago that was successful. However she does also recall that that was painful and she currently does not want to repeat any injections. She has been treated for carpal tunnel syndrome wearing a brace. She had a prior EMG years ago that showed right carpal tunnel but she has denied any left-sided symptoms. She has had a left knee replacement with Dr. Renetta Shaikh. She recovered very well from that procedure. She is now seen for further treatment of her hand pain    Vitals:  Ht 5' 8.5\" (1.74 m)   Wt 210 lb (95.3 kg)   BMI 31.47 kg/m²    Body mass index is 31.47 kg/m². No Known Allergies    Current Outpatient Medications   Medication Sig    methylPREDNISolone (MEDROL DOSEPACK) 4 mg tablet Per dose pack instructions    anastrozole (ARIMIDEX) 1 mg tablet Take 1 mg by mouth daily. PT TAKES WITH DINNER  Indications: hormone receptor positive breast cancer    carboxymethylcellulose-citric (Plenity) 0.75 gram cap Take 3 Capsules by mouth Before breakfast and dinner.  furosemide (LASIX) 20 mg tablet Take 1 tablet by mouth once daily    famotidine (PEPCID) 20 mg tablet Take 20 mg by mouth two (2) times a day.  valACYclovir (VALTREX) 500 mg tablet Take 1 Tablet by mouth daily.  olmesartan-hydroCHLOROthiazide (BENICAR HCT) 20-12.5 mg per tablet Take 1 tablet by mouth once daily    fluticasone (Flonase Sensimist) 27.5 mcg/actuation nasal spray 2 Sprays by Both Nostrils route daily.  fexofenadine (Allegra Allergy) 180 mg tablet Take 360 mg by mouth daily.     MILK THISTLE PO Take 175 mg by mouth daily.  OTHER 1 Tablet daily. Indications: AM/PM MENOPAUSE FORMULA 1 TAB QHS    TURMERIC ROOT EXTRACT PO Take 200 mg by mouth daily.  psyllium (METAMUCIL) powd Take  by mouth nightly.  magnesium 250 mg tab Take 1 Tablet by mouth nightly.  biotin 5,000 mcg TbDi Take 1 Tablet by mouth daily.  cholecalciferol, VITAMIN D3, (VITAMIN D3) 5,000 unit tab tablet Take  by mouth daily.  omeprazole (PRILOSEC OTC) 20 mg tablet Take 20 mg by mouth as needed. No current facility-administered medications for this visit. Past Medical History:   Diagnosis Date    Acute diverticulitis 8/18/2017    Allergic rhinitis 8/18/2017    Arthritis 8/18/2017    Arthritis of right knee 11/25/2019    Back pain, thoracic 8/18/2017    Cancer ([facility] [address] 08/2020    LEFT BREAST , LUMPECTOMY WITH RECONSTRUCTION, CHEMO, RADIATION    Cataract, left 8/18/2017    Cataract, right 8/18/2017    Chronic obstructive pulmonary disease ([facility] [address]     Cold sore 8/18/2017    COVID-19 vaccine series completed 2/23/21, 3/19/21    PFIZER    Elevated hemoglobin A1c 8/18/2017    Fatigue 8/18/2017    GERD (gastroesophageal reflux disease) 8/18/2017    Heart murmur 8/18/2017    Hepatitis C 8/18/2017    TREATED    Herpes zoster infection of thoracic region 8/18/2017    Hyperlipidemia LDL goal <100 8/18/2017    Hypertension     Menopause     Hysterectomy-42years old    Murmur     Obesity (BMI 30-39. 9) 8/18/2017    Osteopenia 8/18/2017    Other ill-defined conditions(799.89)     hep c    Post-menopausal 8/18/2017    Posterior auricular pain of right ear 8/18/2017    Preop examination 8/18/2017    Valvular heart disease     mitral valve prolapse    Vertigo, benign positional 8/18/2017        Past Surgical History:   Procedure Laterality Date    HX BREAST LUMPECTOMY Bilateral 11/3/2020    LEFT BREAST REDUCTION LUMPECTOMY WITH ULTRASOUND, LEFT BREAST SENTINEL NODE BIOPSY, LEFT BREAST RECONSTRUCTION, RIGHT BREAST REDUCTION performed by Villa Justin MD at [address] HX BREAST REDUCTION Bilateral 2020    HX COLONOSCOPY      HX HYSTERECTOMY      42years old   Issac Soares HX KNEE ARTHROSCOPY Right     knee,    HX ORTHOPAEDIC Right     BUNIONECTOMY, HAMMERTOE REPAIR    HX TONSILLECTOMY         Family History   Problem Relation Age of Onset    Hypertension Mother     Diabetes Mother     Kidney Disease Mother     OSTEOARTHRITIS Mother     Heart Disease Mother     Heart Disease Father     OSTEOARTHRITIS Father     Hypertension Father     Colon Cancer Maternal Grandmother     No Known Problems Sister     Anesth Problems Neg Hx         Social History     Tobacco Use    Smoking status: Former Smoker     Packs/day: 0.50     Years: 25.00     Pack years: 12.50     Types: Cigarettes     Quit date: 1982     Years since quittin.5    Smokeless tobacco: Never Used   Vaping Use    Vaping Use: Never used   Substance Use Topics    Alcohol use: Yes     Alcohol/week: 1.0 standard drink     Types: 1 Glasses of wine per week     Comment: 1/WK    Drug use: No        Review of Systems   All other systems reviewed and are negative. Physical Exam    In general the patient has good elbow, forearm, wrist and digital range of motion. She has point tenderness of the left middle finger with clicking and locking. The right wrist has a positive Tinel's Phalen's and nerve compression test.  Negative Finkelstein's. No cyst or mass. Imaging:    No new x-rays today. ASSESSMENT/PLAN:  Below is the assessment and plan developed based on review of pertinent history, physical exam, labs, studies, and medications. Patient examination was consistent with left middle finger stenosing tenosynovitis, right wrist carpal tunnel syndrome and mild hand osteoarthritis with some Heberden nodes but no profound arthritis.   She wants to try a Medrol Dosepak to see what relief this provides but ultimately is in agreement that she would be better treated with surgical management as she does not want to consider any injections. She can undergo a left middle trigger finger release with a right endoscopic carpal tunnel release. She is considering this option and will advise the office in the near future if she like to consider operative treatment. 1. Trigger middle finger of left hand  -     methylPREDNISolone (MEDROL DOSEPACK) 4 mg tablet; Per dose pack instructions, Normal, Disp-1 Dose Pack, R-0  2. Left hand pain  3. Primary osteoarthritis, left hand  4. Carpal tunnel syndrome of right wrist      Return in about 4 weeks (around 8/3/2022). An electronic signature was used to authenticate this note.   -- Ish Uribe MD

## 2022-07-08 ENCOUNTER — TELEPHONE (OUTPATIENT)
Dept: INTERNAL MEDICINE CLINIC | Age: 77
End: 2022-07-08

## 2022-07-08 NOTE — TELEPHONE ENCOUNTER
Lizz from Dr. Isaura Campuzano office called stating they received the labs faxed but need more information.     Requesting:  LFTs  PT  PTT  INR    Fax number is [phone]  Pt will be having surgery on the 7/20/2022

## 2022-07-21 ENCOUNTER — TELEPHONE (OUTPATIENT)
Dept: CARDIOLOGY CLINIC | Age: 77
End: 2022-07-21

## 2022-07-21 NOTE — TELEPHONE ENCOUNTER
LVM requesting a return call to reschedule 9/1/22 appointment with Kathy Joshua NP; Provider out of office.

## 2022-07-29 NOTE — TELEPHONE ENCOUNTER
Future Appointments   Date Time Provider Bhavesh Cross   9/12/2022  1:40 PM Myesha Villanueva NP [facility] AMB   9/26/2022  3:15 PM DO Dilia Mariee N [facility] AMB   12/2/2022 10:15 AM [facility] [address]  [facility]    12/8/2022  1:30 PM Andi Trejo NP [facility] BS AMB   12/22/2022 10:30 AM Paolo Angel MD [facility] BS AMB

## 2022-08-19 DIAGNOSIS — M65.332 TRIGGER MIDDLE FINGER OF LEFT HAND: Primary | ICD-10-CM

## 2022-08-22 ENCOUNTER — OFFICE VISIT (OUTPATIENT)
Dept: INTERNAL MEDICINE CLINIC | Age: 77
End: 2022-08-22
Payer: COMMERCIAL

## 2022-08-22 VITALS
HEART RATE: 88 BPM | DIASTOLIC BLOOD PRESSURE: 64 MMHG | WEIGHT: 212 LBS | BODY MASS INDEX: 31.4 KG/M2 | HEIGHT: 69 IN | RESPIRATION RATE: 17 BRPM | SYSTOLIC BLOOD PRESSURE: 116 MMHG | OXYGEN SATURATION: 97 % | TEMPERATURE: 98.4 F

## 2022-08-22 DIAGNOSIS — Z01.818 PREOP EXAMINATION: Primary | ICD-10-CM

## 2022-08-22 DIAGNOSIS — I10 ESSENTIAL HYPERTENSION: ICD-10-CM

## 2022-08-22 PROCEDURE — 1123F ACP DISCUSS/DSCN MKR DOCD: CPT | Performed by: INTERNAL MEDICINE

## 2022-08-22 PROCEDURE — 93000 ELECTROCARDIOGRAM COMPLETE: CPT | Performed by: INTERNAL MEDICINE

## 2022-08-22 PROCEDURE — 99213 OFFICE O/P EST LOW 20 MIN: CPT | Performed by: INTERNAL MEDICINE

## 2022-08-22 NOTE — PROGRESS NOTES
Mayank James is a 78 y.o. female and presents with Pre-op Exam (Pt needs preop ekg for upcoming hand surgery)  . Subjective:  Mrs. Sheree Wallace is scheduled to undergo left carpal tunnel release with Dr. Lakisha Murrieta on 30 August.  Her history is significant for hypertension for which she remains on olmesartan HCT 20/12.5 mg daily and furosemide 20 mg daily. She has no shortness of breath, chest pain, palpitations, PND, orthopnea, or pedal edema. Past Medical History:   Diagnosis Date    Acute diverticulitis 8/18/2017    Allergic rhinitis 8/18/2017    Arthritis 8/18/2017    Arthritis of right knee 11/25/2019    Back pain, thoracic 8/18/2017    Cancer ([facility] [address] 08/2020    LEFT BREAST , LUMPECTOMY WITH RECONSTRUCTION, CHEMO, RADIATION    Cataract, left 8/18/2017    Cataract, right 8/18/2017    Chronic obstructive pulmonary disease ([facility] [address]     Cold sore 8/18/2017    COVID-19 vaccine series completed 2/23/21, 3/19/21    PFIZER    Elevated hemoglobin A1c 8/18/2017    Fatigue 8/18/2017    GERD (gastroesophageal reflux disease) 8/18/2017    Heart murmur 8/18/2017    Hepatitis C 8/18/2017    TREATED    Herpes zoster infection of thoracic region 8/18/2017    Hyperlipidemia LDL goal <100 8/18/2017    Hypertension     Menopause     Hysterectomy-42years old    Murmur     Obesity (BMI 30-39. 9) 8/18/2017    Osteopenia 8/18/2017    Other ill-defined conditions(799.89)     hep c    Post-menopausal 8/18/2017    Posterior auricular pain of right ear 8/18/2017    Preop examination 8/18/2017    Valvular heart disease     mitral valve prolapse    Vertigo, benign positional 8/18/2017     Past Surgical History:   Procedure Laterality Date    HX BREAST LUMPECTOMY Bilateral 11/3/2020    LEFT BREAST REDUCTION LUMPECTOMY WITH ULTRASOUND, LEFT BREAST SENTINEL NODE BIOPSY, LEFT BREAST RECONSTRUCTION, RIGHT BREAST REDUCTION performed by Froilan Fam MD at [facility] AM[facility]    HX BREAST REDUCTION Bilateral 2020    HX COLONOSCOPY      HX HYSTERECTOMY      42years old    HX KNEE ARTHROSCOPY Right     knee,    HX ORTHOPAEDIC Right     BUNIONECTOMY, HAMMERTOE REPAIR    HX TONSILLECTOMY       No Known Allergies  Current Outpatient Medications   Medication Sig Dispense Refill    olmesartan-hydroCHLOROthiazide (BENICAR HCT) 20-12.5 mg per tablet Take 1 tablet by mouth once daily 90 Tablet 3    anastrozole (ARIMIDEX) 1 mg tablet Take 1 mg by mouth daily. PT TAKES WITH DINNER  Indications: hormone receptor positive breast cancer 90 Tablet 3    carboxymethylcellulose-citric (Plenity) 0.75 gram cap Take 3 Capsules by mouth Before breakfast and dinner. 180 Capsule 2    furosemide (LASIX) 20 mg tablet Take 1 tablet by mouth once daily 90 Tablet 1    famotidine (PEPCID) 20 mg tablet Take 20 mg by mouth two (2) times a day. valACYclovir (VALTREX) 500 mg tablet Take 1 Tablet by mouth daily. 30 Tablet 3    fluticasone (Flonase Sensimist) 27.5 mcg/actuation nasal spray 2 Sprays by Both Nostrils route daily. fexofenadine (ALLEGRA) 180 mg tablet Take 360 mg by mouth daily. MILK THISTLE PO Take 175 mg by mouth daily. OTHER 1 Tablet daily. Indications: AM/PM MENOPAUSE FORMULA 1 TAB QHS      TURMERIC ROOT EXTRACT PO Take 200 mg by mouth daily. psyllium (METAMUCIL) powd Take  by mouth nightly.      magnesium 250 mg tab Take 1 Tablet by mouth nightly. biotin 5,000 mcg TbDi Take 1 Tablet by mouth daily. cholecalciferol (VITAMIN D3) (5000 Units/125 mcg) tab tablet Take  by mouth daily. omeprazole (PRILOSEC OTC) 20 mg tablet Take 20 mg by mouth as needed. Social History     Socioeconomic History    Marital status: SINGLE   Tobacco Use    Smoking status: Former     Packs/day: 0.50     Years: 25.00     Pack years: 12.50     Types: Cigarettes     Quit date: 1982     Years since quittin.6    Smokeless tobacco: Never   Vaping Use    Vaping Use: Never used   Substance and Sexual Activity    Alcohol use:  Yes     Alcohol/week: 1.0 standard drink     Types: 1 Glasses of wine per week     Comment: 1/WK    Drug use: No    Sexual activity: Not Currently     Partners: Male     Birth control/protection: None     Family History   Problem Relation Age of Onset    Hypertension Mother     Diabetes Mother     Kidney Disease Mother     OSTEOARTHRITIS Mother     Heart Disease Mother     Heart Disease Father     OSTEOARTHRITIS Father     Hypertension Father     Colon Cancer Maternal Grandmother     No Known Problems Sister     Anesth Problems Neg Hx        Review of Systems  Constitutional:  negative for fevers, chills, anorexia and weight loss  Eyes:    negative for visual disturbance and irritation  ENT:    negative for tinnitus,sore throat,nasal congestion,ear pains. hoarseness  Respiratory:     negative for cough, hemoptysis, dyspnea,wheezing  CV:    negative for chest pain, palpitations, lower extremity edema  GI:    negative for nausea, vomiting, diarrhea, abdominal pain,melena  Endo:               negative for polyuria,polydipsia,polyphagia,heat intolerance  Genitourinary : negative for frequency, dysuria and hematuria  Integumentary: negative for rash and pruritus  Hematologic:   negative for easy bruising and gum/nose bleeding  Musculoskel:  negative for myalgias, arthralgias, back pain, muscle weakness, joint pain  Neurological:   negative for headaches, dizziness, vertigo, memory problems and gait   Behavl/Psych:  negative for feelings of anxiety, depression, mood changes  ROS otherwise negative      Objective:  Visit Vitals  /64 (BP 1 Location: Left upper arm, BP Patient Position: Sitting, BP Cuff Size: Large adult)   Pulse 88   Temp 98.4 °F (36.9 °C) (Oral)   Resp 17   Ht 5' 8.5\" (1.74 m)   Wt 212 lb (96.2 kg)   SpO2 97%   BMI 31.77 kg/m²     Physical Exam:   General appearance - alert, well appearing, and in no distress  Mental status - alert, oriented to person, place, and time  EYE-EMMY, EOMI, fundi normal, corneas normal, no foreign bodies  ENT-ENT exam normal, no neck nodes or sinus tenderness  Nose - normal and patent, no erythema, discharge or polyps  Mouth - mucous membranes moist, pharynx normal without lesions  Neck - supple, no significant adenopathy   Chest - clear to auscultation, no wheezes, rales or rhonchi, symmetric air entry   Heart - normal rate, regular rhythm, normal S1, S2, no murmurs, rubs, clicks or gallops   Abdomen - soft, nontender, nondistended, no masses or organomegaly  Lymph- no adenopathy palpable  Ext-peripheral pulses normal, no pedal edema, no clubbing or cyanosis  Skin-Warm and dry. no hyperpigmentation, vitiligo, or suspicious lesions  Neuro -alert, oriented, normal speech, no focal findings or movement disorder noted      Assessment/Plan:  Diagnoses and all orders for this visit:    1. Preop examination  -     AMB POC EKG ROUTINE W/ 12 LEADS, INTER & REP    2. Essential hypertension  -     AMB POC EKG ROUTINE W/ 12 LEADS, INTER & REP          ICD-10-CM ICD-9-CM    1. Preop examination  Z01.818 V72.84 AMB POC EKG ROUTINE W/ 12 LEADS, INTER & REP      2. Essential hypertension  I10 401.9 AMB POC EKG ROUTINE W/ 12 LEADS, INTER & REP        Plan:    The patient is low perioperative risk. Her only risk factors are age and history of hypertension. No further restratification is indicated at this time. Recommend proceed with scheduled surgery without further risk stratification. I have reviewed with the patient details of the assessment and plan and all questions were answered. Relevent patient education was performed. Verbal and/or written instructions (see AVS) provided. The most recent lab findings were reviewed with the patient. Plan was discussed with patient who verbally expressed understanding. An After Visit Summary was printed and given to the patient.     Jesse Rock MD

## 2022-08-22 NOTE — PROGRESS NOTES
Chief Complaint   Patient presents with    Pre-op Exam     Pt needs preop ekg for upcoming hand surgery     Visit Vitals  /64 (BP 1 Location: Left upper arm, BP Patient Position: Sitting, BP Cuff Size: Large adult)   Pulse 88   Temp 98.4 °F (36.9 °C) (Oral)   Resp 17   Ht 5' 8.5\" (1.74 m)   Wt 212 lb (96.2 kg)   SpO2 97%   BMI 31.77 kg/m²     1. Have you been to the ER, urgent care clinic since your last visit? Hospitalized since your last visit? No    2. Have you seen or consulted any other health care providers outside of the [address] [place] [address] since your last visit? Include any pap smears or colon screening.  No

## 2022-08-29 DIAGNOSIS — M65.332 TRIGGER MIDDLE FINGER OF LEFT HAND: Primary | ICD-10-CM

## 2022-08-29 RX ORDER — HYDROCODONE BITARTRATE AND ACETAMINOPHEN 5; 325 MG/1; MG/1
1 TABLET ORAL
Qty: 15 TABLET | Refills: 0 | Status: SHIPPED | OUTPATIENT
Start: 2022-08-29 | End: 2022-09-05

## 2022-09-02 DIAGNOSIS — M17.12 PRIMARY OSTEOARTHRITIS OF LEFT KNEE: Primary | ICD-10-CM

## 2022-09-02 RX ORDER — HYALURONATE SODIUM, STABILIZED 88 MG/4 ML
SYRINGE (ML) INTRAARTICULAR
Qty: 3 ML | Refills: 0 | Status: SHIPPED | OUTPATIENT
Start: 2022-09-02

## 2022-09-07 ENCOUNTER — OFFICE VISIT (OUTPATIENT)
Dept: ORTHOPEDIC SURGERY | Age: 77
End: 2022-09-07
Payer: COMMERCIAL

## 2022-09-07 DIAGNOSIS — M65.332 TRIGGER MIDDLE FINGER OF LEFT HAND: Primary | ICD-10-CM

## 2022-09-07 DIAGNOSIS — G56.01 CARPAL TUNNEL SYNDROME OF RIGHT WRIST: ICD-10-CM

## 2022-09-07 DIAGNOSIS — M19.042 PRIMARY OSTEOARTHRITIS, LEFT HAND: ICD-10-CM

## 2022-09-07 DIAGNOSIS — Z98.890 STATUS POST TRIGGER FINGER RELEASE: ICD-10-CM

## 2022-09-07 DIAGNOSIS — M79.642 LEFT HAND PAIN: ICD-10-CM

## 2022-09-07 DIAGNOSIS — M65.311 TRIGGER THUMB OF RIGHT HAND: ICD-10-CM

## 2022-09-07 PROCEDURE — 99024 POSTOP FOLLOW-UP VISIT: CPT | Performed by: ORTHOPAEDIC SURGERY

## 2022-09-07 NOTE — PROGRESS NOTES
HPI: Martita Christian (: 1945) is a 78 y.o. female, patient, here for evaluation of the following chief complaint(s):    No chief complaint on file. Patient seen today to evaluate her hands. She has complained of soreness and pain with locking involving her left middle finger since the start of the new year. Has been no fall or precipitating event. She can recall having a right trigger thumb injection more than 5 years ago that was successful. However she does also recall that that was painful and she currently does not want to repeat any injections. She has been treated for carpal tunnel syndrome wearing a brace. She had a prior EMG years ago that showed right carpal tunnel but she has denied any left-sided symptoms. She has had a left knee replacement with Dr. Elton Talley. She recovered very well from that procedure. She underwent a left middle trigger finger release on 2022. Vitals: There were no vitals taken for this visit. There is no height or weight on file to calculate BMI. No Known Allergies    Current Outpatient Medications   Medication Sig    hyaluronate sodium, stabilized (Monovisc) 88 mg/4 mL syrg injection INJECT IN LEFT KNEE ONCE A WEEK FOR THREE WEEKS    olmesartan-hydroCHLOROthiazide (BENICAR HCT) 20-12.5 mg per tablet Take 1 tablet by mouth once daily    anastrozole (ARIMIDEX) 1 mg tablet Take 1 mg by mouth daily. PT TAKES WITH DINNER  Indications: hormone receptor positive breast cancer    carboxymethylcellulose-citric (Plenity) 0.75 gram cap Take 3 Capsules by mouth Before breakfast and dinner. furosemide (LASIX) 20 mg tablet Take 1 tablet by mouth once daily    famotidine (PEPCID) 20 mg tablet Take 20 mg by mouth two (2) times a day. valACYclovir (VALTREX) 500 mg tablet Take 1 Tablet by mouth daily. fluticasone (Flonase Sensimist) 27.5 mcg/actuation nasal spray 2 Sprays by Both Nostrils route daily.     fexofenadine (ALLEGRA) 180 mg tablet Take 360 mg by mouth daily.    MILK THISTLE PO Take 175 mg by mouth daily. OTHER 1 Tablet daily. Indications: AM/PM MENOPAUSE FORMULA 1 TAB QHS    TURMERIC ROOT EXTRACT PO Take 200 mg by mouth daily. psyllium (METAMUCIL) powd Take  by mouth nightly.    magnesium 250 mg tab Take 1 Tablet by mouth nightly. biotin 5,000 mcg TbDi Take 1 Tablet by mouth daily. cholecalciferol (VITAMIN D3) (5000 Units/125 mcg) tab tablet Take  by mouth daily. omeprazole (PRILOSEC OTC) 20 mg tablet Take 20 mg by mouth as needed. No current facility-administered medications for this visit. Past Medical History:   Diagnosis Date    Acute diverticulitis 8/18/2017    Allergic rhinitis 8/18/2017    Arthritis 8/18/2017    Arthritis of right knee 11/25/2019    Back pain, thoracic 8/18/2017    Cancer ([facility] [address] 08/2020    LEFT BREAST , LUMPECTOMY WITH RECONSTRUCTION, CHEMO, RADIATION    Cataract, left 8/18/2017    Cataract, right 8/18/2017    Chronic obstructive pulmonary disease ([facility] [address]     Cold sore 8/18/2017    COVID-19 vaccine series completed 2/23/21, 3/19/21    PFIZER    Elevated hemoglobin A1c 8/18/2017    Fatigue 8/18/2017    GERD (gastroesophageal reflux disease) 8/18/2017    Heart murmur 8/18/2017    Hepatitis C 8/18/2017    TREATED    Herpes zoster infection of thoracic region 8/18/2017    Hyperlipidemia LDL goal <100 8/18/2017    Hypertension     Menopause     Hysterectomy-42years old    Murmur     Obesity (BMI 30-39. 9) 8/18/2017    Osteopenia 8/18/2017    Other ill-defined conditions(799.89)     hep c    Post-menopausal 8/18/2017    Posterior auricular pain of right ear 8/18/2017    Preop examination 8/18/2017    Valvular heart disease     mitral valve prolapse    Vertigo, benign positional 8/18/2017        Past Surgical History:   Procedure Laterality Date    HX BREAST LUMPECTOMY Bilateral 11/3/2020    LEFT BREAST REDUCTION LUMPECTOMY WITH ULTRASOUND, LEFT BREAST SENTINEL NODE BIOPSY, LEFT BREAST RECONSTRUCTION, RIGHT BREAST REDUCTION performed by Jacquetta Bamberger., MD at [facility] AMBULATORY OR    HX BREAST REDUCTION Bilateral 2020    HX COLONOSCOPY      HX HYSTERECTOMY      42years old    HX KNEE ARTHROSCOPY Right     knee,    HX ORTHOPAEDIC Right     BUNIONECTOMY, HAMMERTOE REPAIR    HX TONSILLECTOMY         Family History   Problem Relation Age of Onset    Hypertension Mother     Diabetes Mother     Kidney Disease Mother     OSTEOARTHRITIS Mother     Heart Disease Mother     Heart Disease Father     OSTEOARTHRITIS Father     Hypertension Father     Colon Cancer Maternal Grandmother     No Known Problems Sister     Anesth Problems Neg Hx         Social History     Tobacco Use    Smoking status: Former     Packs/day: 0.50     Years: 25.00     Pack years: 12.50     Types: Cigarettes     Quit date: 1982     Years since quittin.7    Smokeless tobacco: Never   Vaping Use    Vaping Use: Never used   Substance Use Topics    Alcohol use: Yes     Alcohol/week: 1.0 standard drink     Types: 1 Glasses of wine per week     Comment: 1/WK    Drug use: No        Review of Systems   All other systems reviewed and are negative. Physical Exam    In general the patient has good elbow, forearm, wrist and digital range of motion. She has good range of motion with no further clicking or locking. Working to restore completely extension and flexion but wound is healing well. No locking. Imaging:    No new x-rays today. ASSESSMENT/PLAN:  Below is the assessment and plan developed based on review of pertinent history, physical exam, labs, studies, and medications. Patient examination was consistent with left middle finger stenosing tenosynovitis, right wrist carpal tunnel syndrome and mild hand osteoarthritis with some Heberden nodes but no profound arthritis.   She wants to try a Medrol Dosepak to see what relief this provides but ultimately is in agreement that she would be better treated with surgical management as she does not want to consider any injections. She can undergo a left middle trigger finger release with a right endoscopic carpal tunnel release. She elected to only undergo the left middle trigger finger release on 8/30/2022. Continue with simple wound care, gentle motion and strength. She is giving future consideration for right endoscopic carpal tunnel release and a right trigger thumb release as well. Follow-up in 3 to 4 weeks. 1. Trigger middle finger of left hand  2. Left hand pain  3. Status post trigger finger release  4. Primary osteoarthritis, left hand  5. Carpal tunnel syndrome of right wrist  6. Trigger thumb of right hand      Return in about 4 weeks (around 10/5/2022). An electronic signature was used to authenticate this note.   -- Aj Thompson MD

## 2022-09-12 ENCOUNTER — OFFICE VISIT (OUTPATIENT)
Dept: CARDIOLOGY CLINIC | Age: 77
End: 2022-09-12
Payer: COMMERCIAL

## 2022-09-12 VITALS
BODY MASS INDEX: 32.13 KG/M2 | HEART RATE: 60 BPM | OXYGEN SATURATION: 99 % | SYSTOLIC BLOOD PRESSURE: 110 MMHG | WEIGHT: 212 LBS | HEIGHT: 68 IN | DIASTOLIC BLOOD PRESSURE: 62 MMHG

## 2022-09-12 DIAGNOSIS — Z92.21 HISTORY OF CHEMOTHERAPY: ICD-10-CM

## 2022-09-12 DIAGNOSIS — I10 ESSENTIAL HYPERTENSION: Primary | ICD-10-CM

## 2022-09-12 DIAGNOSIS — I36.1 NON-RHEUMATIC TRICUSPID VALVE INSUFFICIENCY: ICD-10-CM

## 2022-09-12 DIAGNOSIS — I27.20 PULMONARY HTN (HCC): ICD-10-CM

## 2022-09-12 PROCEDURE — 1123F ACP DISCUSS/DSCN MKR DOCD: CPT | Performed by: NURSE PRACTITIONER

## 2022-09-12 PROCEDURE — 99214 OFFICE O/P EST MOD 30 MIN: CPT | Performed by: NURSE PRACTITIONER

## 2022-09-12 NOTE — PROGRESS NOTES
[facility]:   [phone]    Cardiologist: Dr. Joselo Romeo     History of Present Illness:  Ms. Willi Márquez is a 77 yo F with a history of left sided breast cancer status post lumpectomy with chemotherapy and radiation therapy, essential hypertension, mild to moderate tricuspid regurgitation noted by echocardiogram in the past, hepatitis C status post treatment, dyslipidemia. Reviewed her last echo in 4/22 today which showed normal EF, mild TR and stable longitudinal strain. From a symptom standpoint, she denies any exertional chest pain or dyspnea with exertion. She only has some rare palpitations with anxiety but no dizziness or lightheadedness. No LE edema. BP is well controlled. She is 2 years out from her cancer treatment at this point. Assessment and Plan:   1. Tricuspid regurgitation. This is just in the mild range without significant pulmonary hypertension. On her previous echocardiogram, this was in the mild to moderate range. Will plan to repeat her echo again in 1 year and will follow up with me again in 1 year. 2. Essential hypertension. Blood pressure is controlled on benicar HCT. 3. Myxomatous mitral valve. Previously diagnosed with mitral valve prolapse but none present on last echo. 4. Hepatitis C, status post Harvoni and Rivavirn. Followed by hepatology. 5. Left sided breast cancer, status post lumpectomy and radiation and chemotherapy in 2020. Has stable longitudinal strain measurement of -22% on recent echo. Will plan to reassess in 1 year. 6. Dyslipidemia. . Diet controlled.     Echo 4/22: EF 60-65%, longitudinal strain -22.8%, mild TR, PASP 30mmHg  Echo 9/21 - EF 60-65%, longitudinal strain -22.8%, mild to mod TR, RVSP 35mmHg  Echo 9/20 - EF 60-65%, grade 1 dd, myxomatous MV, mod TR, PASP 33mmHg  Echo 9/19 - mod TR, EF normal, PASP 45mmhg  Echo 9/18 - LVEF 55-60%, mod TR, RVSP 32mmhg  Echo 9/17 - LVEF 60 % to 65 %, no WMA, mod-severe TR, RVSP 38mmHg/PASP 25mmHg   Echo 9/16 - LVEF 60 % to 65 %, no WMA, mod-severe TR, mild to mod PAHTN (PASP 40mmHg  Echo 8/15 - EF 60%, mild MR, mod-severe TR  Nuclear stress 8/15 EF 86%, no ischemia    Soc Hx: 2 glasses of wine every other day, no tobacco, no drug use, lives alone, very busy at [employer]  Fam Hx: father passed from bleeding ulcers at age 67 and had end stage CKD and HTN, mother DM and ESRD and living at age 80       She  has a past medical history of Acute diverticulitis (8/18/2017), Allergic rhinitis (8/18/2017), Arthritis (8/18/2017), Arthritis of right knee (11/25/2019), Back pain, thoracic (8/18/2017), Cancer ([facility] [address] (08/2020), Cataract, left (8/18/2017), Cataract, right (8/18/2017), Chronic obstructive pulmonary disease ([facility] Cold sore (8/18/2017), COVID-19 vaccine series completed (2/23/21, 3/19/21), Elevated hemoglobin A1c (8/18/2017), Fatigue (8/18/2017), GERD (gastroesophageal reflux disease) (8/18/2017), Heart murmur (8/18/2017), Hepatitis C (8/18/2017), Herpes zoster infection of thoracic region (8/18/2017), Hyperlipidemia LDL goal <100 (8/18/2017), Hypertension, Menopause, Murmur, Obesity (BMI 30-39.9) (8/18/2017), Osteopenia (8/18/2017), Other ill-defined conditions(799.89), Post-menopausal (8/18/2017), Posterior auricular pain of right ear (8/18/2017), Preop examination (8/18/2017), Valvular heart disease, and Vertigo, benign positional (8/18/2017). She has no past medical history of Difficult intubation or Nausea & vomiting. All other systems negative except as above. PE  Vitals:    09/12/22 1348   BP: 110/62   Pulse: 60   SpO2: 99%   Weight: 212 lb (96.2 kg)   Height: 5' 8\" (1.727 m)      Body mass index is 32.23 kg/m².    General appearance - alert, well appearing, and in no distress  Mental status - affect appropriate to mood  Eyes - sclera anicteric, moist mucous membranes  Neck - supple, no JVD  Chest - clear to auscultation, no wheezes, rales or rhonchi  Heart - normal rate, regular rhythm, normal S1, S2, 1/6 LISANDRO   Abdomen - soft, nontender, nondistended  Neurological - no focal deficit  Extremities - peripheral pulses normal, no pedal edema      Recent Labs:  Lab Results   Component Value Date/Time    Cholesterol, total 174 06/22/2022 10:53 AM    HDL Cholesterol 44 06/22/2022 10:53 AM    LDL, calculated 101.6 (H) 06/22/2022 10:53 AM    Triglyceride 142 06/22/2022 10:53 AM    CHOL/HDL Ratio 4.0 06/22/2022 10:53 AM     Lab Results   Component Value Date/Time    Creatinine (POC) 0.7 03/28/2018 10:08 AM    Creatinine 0.93 06/22/2022 10:53 AM     Lab Results   Component Value Date/Time    BUN 25 (H) 06/22/2022 10:53 AM    BUN 23.0 (A) 03/28/2018 10:08 AM     Lab Results   Component Value Date/Time    Potassium 3.9 06/22/2022 10:53 AM     Lab Results   Component Value Date/Time    Hemoglobin A1c 5.6 06/22/2022 10:53 AM     Lab Results   Component Value Date/Time    HGB (POC) 12.6 09/11/2017 11:03 AM    HGB 13.0 06/22/2022 10:53 AM     Lab Results   Component Value Date/Time    PLATELET 681 56/20/8732 10:53 AM       Reviewed:  Past Medical History:   Diagnosis Date    Acute diverticulitis 8/18/2017    Allergic rhinitis 8/18/2017    Arthritis 8/18/2017    Arthritis of right knee 11/25/2019    Back pain, thoracic 8/18/2017    Cancer ([facility] [address] 08/2020    LEFT BREAST , LUMPECTOMY WITH RECONSTRUCTION, CHEMO, RADIATION    Cataract, left 8/18/2017    Cataract, right 8/18/2017    Chronic obstructive pulmonary disease ([address]     Cold sore 8/18/2017    COVID-19 vaccine series completed 2/23/21, 3/19/21    PFIZER    Elevated hemoglobin A1c 8/18/2017    Fatigue 8/18/2017    GERD (gastroesophageal reflux disease) 8/18/2017    Heart murmur 8/18/2017    Hepatitis C 8/18/2017    TREATED    Herpes zoster infection of thoracic region 8/18/2017    Hyperlipidemia LDL goal <100 8/18/2017    Hypertension     Menopause     Hysterectomy-42years old    Murmur     Obesity (BMI 30-39. 9) 8/18/2017    Osteopenia 8/18/2017    Other ill-defined conditions(799.89)     hep c    Post-menopausal 2017    Posterior auricular pain of right ear 2017    Preop examination 2017    Valvular heart disease     mitral valve prolapse    Vertigo, benign positional 2017     Social History     Tobacco Use   Smoking Status Former    Packs/day: 0.50    Years: 25.00    Pack years: 12.50    Types: Cigarettes    Quit date: 1982    Years since quittin.7   Smokeless Tobacco Never     Social History     Substance and Sexual Activity   Alcohol Use Yes    Alcohol/week: 1.0 standard drink    Types: 1 Glasses of wine per week    Comment: 1/WK     No Known Allergies    Current Outpatient Medications   Medication Sig    hyaluronate sodium, stabilized (Monovisc) 88 mg/4 mL syrg injection INJECT IN LEFT KNEE ONCE A WEEK FOR THREE WEEKS    olmesartan-hydroCHLOROthiazide (BENICAR HCT) 20-12.5 mg per tablet Take 1 tablet by mouth once daily    anastrozole (ARIMIDEX) 1 mg tablet Take 1 mg by mouth daily. PT TAKES WITH DINNER  Indications: hormone receptor positive breast cancer    carboxymethylcellulose-citric (Plenity) 0.75 gram cap Take 3 Capsules by mouth Before breakfast and dinner. furosemide (LASIX) 20 mg tablet Take 1 tablet by mouth once daily    famotidine (PEPCID) 20 mg tablet Take 20 mg by mouth two (2) times a day. valACYclovir (VALTREX) 500 mg tablet Take 1 Tablet by mouth daily. fluticasone (Flonase Sensimist) 27.5 mcg/actuation nasal spray 2 Sprays by Both Nostrils route daily. fexofenadine (ALLEGRA) 180 mg tablet Take 360 mg by mouth daily. MILK THISTLE PO Take 175 mg by mouth daily. OTHER 1 Tablet daily. Indications: AM/PM MENOPAUSE FORMULA 1 TAB QHS    TURMERIC ROOT EXTRACT PO Take 200 mg by mouth daily. psyllium (METAMUCIL) powd Take  by mouth nightly.    magnesium 250 mg tab Take 1 Tablet by mouth nightly. biotin 5,000 mcg TbDi Take 1 Tablet by mouth daily.     cholecalciferol (VITAMIN D3) (5000 Units/125 mcg) tab tablet Take  by mouth daily. omeprazole (PRILOSEC OTC) 20 mg tablet Take 20 mg by mouth as needed. No current facility-administered medications for this visit.        Angelica Garcia NP  [facility] [place]  [address], [address], [address] [place] [address] Avenue

## 2022-09-26 ENCOUNTER — OFFICE VISIT (OUTPATIENT)
Dept: ONCOLOGY | Age: 77
End: 2022-09-26
Payer: COMMERCIAL

## 2022-09-26 VITALS
SYSTOLIC BLOOD PRESSURE: 110 MMHG | HEIGHT: 68 IN | WEIGHT: 211.4 LBS | DIASTOLIC BLOOD PRESSURE: 68 MMHG | OXYGEN SATURATION: 98 % | BODY MASS INDEX: 32.04 KG/M2 | HEART RATE: 77 BPM | TEMPERATURE: 96.9 F

## 2022-09-26 DIAGNOSIS — C50.912 INVASIVE DUCTAL CARCINOMA OF LEFT BREAST (HCC): Primary | ICD-10-CM

## 2022-09-26 DIAGNOSIS — I36.1 NON-RHEUMATIC TRICUSPID VALVE INSUFFICIENCY: ICD-10-CM

## 2022-09-26 DIAGNOSIS — R23.2 HOT FLASHES RELATED TO AROMATASE INHIBITOR THERAPY: ICD-10-CM

## 2022-09-26 DIAGNOSIS — T45.1X5A HOT FLASHES RELATED TO AROMATASE INHIBITOR THERAPY: ICD-10-CM

## 2022-09-26 DIAGNOSIS — Z98.890 HISTORY OF LUMPECTOMY: ICD-10-CM

## 2022-09-26 DIAGNOSIS — Z98.890 H/O BILATERAL BREAST REDUCTION SURGERY: ICD-10-CM

## 2022-09-26 DIAGNOSIS — K21.9 GASTROESOPHAGEAL REFLUX DISEASE WITHOUT ESOPHAGITIS: ICD-10-CM

## 2022-09-26 DIAGNOSIS — Z86.19 HISTORY OF HEPATITIS C: ICD-10-CM

## 2022-09-26 DIAGNOSIS — I10 ESSENTIAL HYPERTENSION: ICD-10-CM

## 2022-09-26 DIAGNOSIS — Z96.651 S/P TKR (TOTAL KNEE REPLACEMENT), RIGHT: ICD-10-CM

## 2022-09-26 DIAGNOSIS — J44.9 CHRONIC OBSTRUCTIVE PULMONARY DISEASE, UNSPECIFIED COPD TYPE (HCC): ICD-10-CM

## 2022-09-26 DIAGNOSIS — Z79.811 USE OF ANASTROZOLE (ARIMIDEX): ICD-10-CM

## 2022-09-26 PROCEDURE — 99213 OFFICE O/P EST LOW 20 MIN: CPT | Performed by: INTERNAL MEDICINE

## 2022-09-26 PROCEDURE — 1123F ACP DISCUSS/DSCN MKR DOCD: CPT | Performed by: INTERNAL MEDICINE

## 2022-09-26 NOTE — PROGRESS NOTES
[facility] [address] [place], [address] [address], [place]: [phone]  F: [phone]    Reason for Visit:   Lisa Martin is a 78 y.o. female seen today in office for 3 month follow up of Left Breast Cancer on adjuvant Anastrozole. Treatment History:   Per Surgery Note:  Initially abnormal screening mammo 7/20 on LEFT with small mass  08/04/20: LEFT Breast Bx. PATH: IDC, 5 mm in greatest linear dimension extending to core biopsy tip, histologic grade 2, ER+(%)/SC+(%)/HER2-. Clinical stage 1  MammaPrint: High risk, luminal type B, -0.123  Breast MRI 8/28/20: 12 mm mass otherwise negative. 11/03/20: LEFT oncoplastic reduction and SLNBx, and RIGHT breast reduction, with Dr. Raj Mejia. PATH:  LEFT: IDC, 14 x 13 x 12 mm, overall grade 2, negative margins. DCIS present with negative margins  Pathologic stage: pT1c, pN0  RIGHT: Breast tissue with stromal fibrosis and focal papillary apocrine metaplasia. Benign skin and subcutaneous soft tissue. No in-situ or invasive carcinoma identified  Adjuvant TC chemotherapy x 4 cycles from 1/6/21 - 3/10/21  Completed XRT on 5/5/21  Adjuvant Anastrozole 5/21 - Current     STAGE: T1cN0 ER+ HEr2 negative mammaprint high risk luminal B    History of Present Illness:   Lisa Martin is a 78 y.o. female seen today in office for 3 month follow up of left breast cancer ER+ HER2 negative hx of lumpectomy and bilateral breast reduction on 11/3/20. She started adjuvant hormonal therapy with Anastrozole in 5/21. She reports that she feels well overall today and is tolerating Anastrozole well overall with some mild hot flashes. Her appetite and energy levels are good. She denies fever, chills, mouth sores, cough, SOB, CP, nausea, vomiting, diarrhea, and constipation. She denies pain today. She had a bilateral mammogram in 12/21 with 6 month follow up on RIGHT on 6/9/22 that was negative/good.  She has routine HM and labs with her PCP. She is here alone today. Past Medical History:   Diagnosis Date    Acute diverticulitis 2017    Allergic rhinitis 2017    Arthritis 2017    Arthritis of right knee 2019    Back pain, thoracic 2017    Cancer ([facility] [address] 2020    LEFT BREAST , LUMPECTOMY WITH RECONSTRUCTION, CHEMO, RADIATION    Cataract, left 2017    Cataract, right 2017    Chronic obstructive pulmonary disease ([address]     Cold sore 2017    COVID-19 vaccine series completed 21, 3/19/21    PFIZER    Elevated hemoglobin A1c 2017    Fatigue 2017    GERD (gastroesophageal reflux disease) 2017    Heart murmur 2017    Hepatitis C 2017    TREATED    Herpes zoster infection of thoracic region 2017    Hyperlipidemia LDL goal <100 2017    Hypertension     Menopause     Hysterectomy-42years old    Murmur     Obesity (BMI 30-39. 9) 2017    Osteopenia 2017    Other ill-defined conditions(799.89)     hep c    Post-menopausal 2017    Posterior auricular pain of right ear 2017    Preop examination 2017    Valvular heart disease     mitral valve prolapse    Vertigo, benign positional 2017      Past Surgical History:   Procedure Laterality Date    HX BREAST LUMPECTOMY Bilateral 11/3/2020    LEFT BREAST REDUCTION LUMPECTOMY WITH ULTRASOUND, LEFT BREAST SENTINEL NODE BIOPSY, LEFT BREAST RECONSTRUCTION, RIGHT BREAST REDUCTION performed by Janice Rubio MD at [facility] AM[facility] OR    HX BREAST REDUCTION Bilateral     HX COLONOSCOPY      HX HYSTERECTOMY      42years old    HX KNEE ARTHROSCOPY Right     knee,    HX ORTHOPAEDIC Right     BUNIONECTOMY, HAMMERTOE REPAIR    HX TONSILLECTOMY        Social History     Tobacco Use    Smoking status: Former     Packs/day: 0.50     Years: 25.00     Pack years: 12.50     Types: Cigarettes     Quit date: 1982     Years since quittin.7    Smokeless tobacco: Never   Substance Use Topics    Alcohol use: Yes     Alcohol/week: 1.0 standard drink     Types: 1 Glasses of wine per week     Comment: 1/WK      Family History   Problem Relation Age of Onset    Hypertension Mother     Diabetes Mother     Kidney Disease Mother     OSTEOARTHRITIS Mother     Heart Disease Mother     Heart Disease Father     OSTEOARTHRITIS Father     Hypertension Father     Colon Cancer Maternal Grandmother     No Known Problems Sister     Anesth Problems Neg Hx      Current Outpatient Medications   Medication Sig    hyaluronate sodium, stabilized (Monovisc) 88 mg/4 mL syrg injection INJECT IN LEFT KNEE ONCE A WEEK FOR THREE WEEKS    olmesartan-hydroCHLOROthiazide (BENICAR HCT) 20-12.5 mg per tablet Take 1 tablet by mouth once daily    anastrozole (ARIMIDEX) 1 mg tablet Take 1 mg by mouth daily. PT TAKES WITH DINNER  Indications: hormone receptor positive breast cancer    carboxymethylcellulose-citric (Plenity) 0.75 gram cap Take 3 Capsules by mouth Before breakfast and dinner. furosemide (LASIX) 20 mg tablet Take 1 tablet by mouth once daily    famotidine (PEPCID) 20 mg tablet Take 20 mg by mouth two (2) times a day. valACYclovir (VALTREX) 500 mg tablet Take 1 Tablet by mouth daily. fluticasone (Flonase Sensimist) 27.5 mcg/actuation nasal spray 2 Sprays by Both Nostrils route daily. fexofenadine (ALLEGRA) 180 mg tablet Take 360 mg by mouth daily. MILK THISTLE PO Take 175 mg by mouth daily. OTHER 1 Tablet daily. Indications: AM/PM MENOPAUSE FORMULA 1 TAB QHS    TURMERIC ROOT EXTRACT PO Take 200 mg by mouth daily. psyllium (METAMUCIL) powd Take  by mouth nightly.    magnesium 250 mg tab Take 1 Tablet by mouth nightly. biotin 5,000 mcg TbDi Take 1 Tablet by mouth daily. cholecalciferol (VITAMIN D3) (5000 Units/125 mcg) tab tablet Take  by mouth daily. omeprazole (PRILOSEC OTC) 20 mg tablet Take 20 mg by mouth as needed. No current facility-administered medications for this visit.       No Known Allergies Review of Systems:  A complete review of systems was obtained, negative except as described above and as reported on ROS sheet scanned into system. Physical Exam:     Visit Vitals  /68 (BP 1 Location: Left upper arm, BP Patient Position: Sitting)   Pulse 77   Temp 96.9 °F (36.1 °C) (Temporal)   Ht 5' 8\" (1.727 m)   Wt 211 lb 6.4 oz (95.9 kg)   SpO2 98%   BMI 32.14 kg/m²     ECOG PS: 1  General: No distress  Eyes:  Anicteric sclerae  HENT: Atraumatic, wearing a mask  Neck: Supple  Respiratory: Normal respiratory effort, lungs clear  CV: Regular rate and rhythm  GI: Soft, non tender   Ext: No edema  MS: Normal gait and station. Digits without clubbing or cyanosis. Skin: No rashes, ecchymoses, or petechiae. Normal temperature, turgor, and texture. Psych: Alert, oriented, appropriate affect, normal judgment/insight  Breast: Well healed lumpectomy scars on left breast with post XRT skin thickening, no palpable masses but dense breast tissue. Bilateral reduction scars well healed   Neuro: Non focal     Results:     Lab Results   Component Value Date/Time    WBC 5.1 06/22/2022 10:53 AM    HGB 13.0 06/22/2022 10:53 AM    HCT 40.8 06/22/2022 10:53 AM    PLATELET 300 06/76/0908 10:53 AM    MCV 92.7 06/22/2022 10:53 AM    ABS.  NEUTROPHILS 3.0 06/22/2022 10:53 AM    HGB (POC) 12.6 09/11/2017 11:03 AM    HCT (POC) 38.2 09/11/2017 11:03 AM     Lab Results   Component Value Date/Time    Sodium 138 06/22/2022 10:53 AM    Potassium 3.9 06/22/2022 10:53 AM    Chloride 103 06/22/2022 10:53 AM    CHLORIDE 104.0 03/28/2018 10:08 AM    CO2 30 06/22/2022 10:53 AM    Glucose 100 06/22/2022 10:53 AM    BUN 25 (H) 06/22/2022 10:53 AM    Creatinine 0.93 06/22/2022 10:53 AM    GFR est AA >60 06/22/2022 10:53 AM    GFR est non-AA 59 (L) 06/22/2022 10:53 AM    Calcium 10.0 06/22/2022 10:53 AM    Sodium (POC) 146.0 (A) 03/28/2018 10:08 AM    Potassium (POC) 4.6 03/28/2018 10:08 AM    Glucose (POC) 95 07/19/2021 07:14 AM Creatinine (POC) 0.7 03/28/2018 10:08 AM     Lab Results   Component Value Date/Time    Bilirubin, total 0.6 06/22/2022 10:53 AM    ALT (SGPT) 15 06/22/2022 10:53 AM    Alk. phosphatase 72 06/22/2022 10:53 AM    Protein, total 8.1 06/22/2022 10:53 AM    Albumin 4.1 06/22/2022 10:53 AM    Globulin 4.0 06/22/2022 10:53 AM     8/4/20  FINAL PATHOLOGIC DIAGNOSIS   Left breast, mass, biopsy:   Invasive mammary carcinoma, ductal NOS. 5 mm in greatest linear dimension extending to core biopsy tip. Histologic grade: Grade 2 (3, 2, 1). Left breast, mass, biopsy, breast biomarkers:   Estrogen Receptor (ER) Status: Positive. Percentage of cells with nuclear positivity:  %. Average intensity of staining: Strong. Progesterone Receptor (PgR) Status: Positive. Percentage of cells with nuclear positivity:  %. Average intensity of staining: Strong. HER-2 by Immunochemistry: Negative (score 1). 11/3/20  FINAL PATHOLOGIC DIAGNOSIS   1. Left axilla, sentinel lymph node #1; dissection:   No carcinoma identified in one lymph node (0/1); (see comment). 2. Left axilla, sentinel lymph node #2; dissection:   No carcinoma identified in one lymph node (0/1); (see comment). 3. Left breast; lumpectomy:   Invasive ductal carcinoma (1.4 x 1.3 x 1.2 cm), histologic grade II/III; (see synoptic report). Focal ductal carcinoma in situ (DCIS), nuclear grade 2, solid type. Previous procedure/biopsy cavity related changes present. All surgical resections margins are negative for in-situ and invasive carcinoma. 4. Left breast, deep lumpectomy margin; excision:   Breast tissue with focal atypical lobular hyperplasia (ALH), not seen at inked specimen margins. No in-situ or invasive carcinoma identified. 5. Left breast tissue; breast reduction:   Breast tissue with stromal fibrosis and focal microcalcifications. Benign skin and subcutaneous soft tissue. One benign lymph node (0/1).    No in-situ or invasive carcinoma identified. 6. Right breast tissue; breast reduction:   Breast tissue with stromal fibrosis and focal papillary apocrine metaplasia. Benign skin and subcutaneous soft tissue. No in-situ or invasive carcinoma identified. INVASIVE CARCINOMA OF THE BREAST: Resection   SPECIMEN   Procedure: Lumpectomy   Specimen Laterality: Left   TUMOR   Histologic Type:  Invasive carcinoma of no special type (ductal)   Glandular (Acinar) / Tubular Differentiation: Score 3   Nuclear Pleomorphism: Score 2-3   Mitotic Rate: Score 1   Overall Grade: Grade 2 (scores of 6 or 7)   Tumor Size: 14 x 13 x 12 mm   Tumor Focality: Single focus of invasive carcinoma   Ductal Carcinoma In Situ (DCIS): Present   Ductal Carcinoma In Situ (DCIS): Negative for extensive   intraductal component (EIC)   Number of Blocks with DCIS: 1   Number of Blocks Examined: 20   Architectural Patterns: Solid   Nuclear Grade: Grade II   Necrosis: Not identified   Lobular Carcinoma In Situ (LCIS): Not identified   Tumor Extent   Skin:   Lymphovascular Invasion: Not identified   Microcalcifications: Present in non-neoplastic tissue   Treatment Effect in the Breast: No known presurgical therapy   MARGINS   Invasive Carcinoma Margins: Uninvolved by invasive carcinoma   Distance from Closest Margin (Millimeters): 5 mm   Closest Margin(s): Medial   DCIS Margins: Uninvolved by DCIS   Distance from Closest Margin (Millimeters): 9 mm   Closest Margin(s): Medial   LYMPH NODES   Regional Lymph Nodes: Uninvolved by tumor cells   Total Number of Lymph Nodes Examined: 3   Number of [place] Nodes Examined: 2   PATHOLOGIC STAGE CLASSIFICATION (pTNM, AJCC 8th Edition)   Primary Tumor (pT): pT1c   Regional Lymph Nodes (pN): pN0   SPECIAL STUDIES   Breast Biomarker Testing Performed on Previous Biopsy: Estrogen   Receptor (ER), Progesterone Receptor (PgR), HER2 (by immunohistochemistry)   Estrogen Receptor (ER) Status: Positive (greater than 10% of cells demonstrate nuclear positivity)   Percentage of Cells with Nuclear Positivity: %   Progesterone Receptor (PgR) Status: Positive   Percentage of Cells with Nuclear Positivity: %   HER2 (by immunohistochemistry): Negative (Score 1+)     NABEEL Results (most recent):  Results from Hospital Encounter encounter on 06/09/22    NABEEL 3D CARRIE W MAMMO RT DX INCL CAD    Narrative  STUDY:  RIGHT breast digital diagnostic mammogram    INDICATION: 6 month follow-up for RIGHT breast    COMPARISON:  Prior mammograms December 2, 2021, July 31, 2020 and August 4, 2020. RIGHT breast ultrasound December 2, 2021. Density: The breast tissue is heterogeneously dense, which could obscure  detection of small masses (approximately 51-75% glandular). Views Performed: RIGHT breast digital diagnostic mammograms with tomography. FINDINGS:  RIGHT breast digital diagnostic mammograms with tomography were performed. CAD  was used. There are changes of RIGHT breast reduction mammoplasty. No suspicious  mass or calcifications are identified. There has been no significant interval  change. Impression  1. BI-RADS Category 2 - Benign findings. No specific mammographic evidence of  malignancy in the RIGHT breast. There are changes of reduction mammoplasty. Recommendation:  Continue annual screening mammograms, next due in December 2022. The findings of this exam and the recommendation were directly discussed with  the patient at the time of exam by myself. DEXA Results (most recent):  Results from Hospital Encounter encounter on 06/22/22    DEXA BONE DENSITY STUDY AXIAL    Narrative  Bone Mineral Density    Indication: Osteoporosis, aromatase inhibitor therapy  Age: 78  Sex: Female. Menopause status: Postmenopausal.  Hormone replacement therapy: No    Number of falls in the past year: None. Risk factors for osteoporosis: Chronic steroid use    Current medication for osteoporosis: None.     Comparison: 5/2/2019    Technique: Imaging was performed on the [employer]. World Health Organization  meta-analysis fracture risk calculator (FRAX) analysis was performed for 10 year  fracture risk probability assessment    Excluded sites: L1 and L2 due to spondylosis    Findings:    Femoral Neck: Right  Bone mineral density (gm/cm2): 0.889  % of peak bone mass: 86  % for age matched controls: 90  T-score: -1.1  Z-score: -0.7    Total Hip: Right  Bone mineral density (gm/cm2): 0.898  % of peak bone mass: 89  % for age matched controls: 90  T-score: -0.9  Z-score: -0.8    Lumbar Spine: L3-L4  Bone mineral density (gm/cm2): 1.136  % of peak bone mass: 93  % for age matched controls: 80  T-score: -0.7  Z-score: -0.6    33% Radius:  Left  Bone mineral density (gm/cm2):  0.627  % of peak bone mass:  71  % for age matched controls:  80  T-score:  -2.9  Z-score:  -1.4    Impression  Impression: This patient is osteoporotic using the World Health Organization criteria  As compared to the prior study, there has been a decrease in the bone mineral  density of the lumbar spine of 1.7%, of the right total hip of 1.3%, and no  change in the bone mineral density of the left distal third radius. 10 year probability of major osteoporotic fracture: 14.7%  10 year probability of hip fracture: 6.1%    Recommendations:  Therapy recommendations need to be tailored to each individual patient. Using  the [facility] FRAX absolute fracture algorithm, the  [address] [place] [address] recommends beginning pharmacological therapy in  postmenopausal women and men over the age of 48 with a 8 year probability of a  hip fracture of >3% OR with the 10 year probability of a major osteoporotic  fracture of >20%. Please reconsider testing based on risk factors. Currently, Medicare will only  reimburse for a central DXA examination every two years, unless the patient is  on chronic glucocorticoid therapy.     Note: Please note that reliable, valid comparisons cannot be made between  studies which have been performed on machines from different manufacturers. If  clinically warranted, a follow up study performed at this site, on the same  unit, would allow the most sensitive assessment of change in bone mineral  density. Records reviewed and summarized above. Pathology report(s) reviewed above. Radiology report(s) reviewed above. Assessment/PLAN:     1) Stage 1 T1cN0 ER+ HER2 negative Left Breast Cancer Mammaprint High Risk Luminal B post Lumpectomy/Bilateral Reduction on 11/3/20  She started adjuvant TC chemotherapy on 1/6/21. She completed 4 cycles of TC on 3/10/21. She completed radiation on 5/5/21. She started adjuvant Anastrozole in 5/21. Patient seen today in office for 3 month follow up. She remains on adjuvant hormonal therapy with Anastrozole. She is tolerating Anastrozole well overall with some mild hot flashes. She clinically stable today, feels well overall - nothing new or different. She had a bilateral mammogram on 12/2/21 and follow up on RIGHT on 6/9/22 that was negative/good. Yearly bilateral dx mammogram is scheduled for 12/2/22. She will see Beast Surgery again on 12/8/22. Continue Anastrozole for 5 years then re eval.  Follow up in 6 months. Patient agrees with plan. 2) Hx of Hep C  Cured per patient. Management per [facility]. 3) Heart Valve Problems/HTN  Management per Cardiology. 4) COPD/GERD   Management per PCP. 5) Hx Right TKR/CTS  Management per Ortho. 6) Hot Flashes  Likely due to AI. Mild, stable, unchanged, and tolerable per patient. Will continue to monitor. 7) Psychosocial  Mood good, coping well. She continues to work . SW support as needed. She is here alone today. Call if questions. Follow up in 6 months, seeing Breast Surgery in 3 months. I personally saw and evaluated the patient and performed the key components of medical decision making. The history, physical exam, and documentation were performed by Eleazar Escalante NP. I reviewed and verified the above documentation and modified it as needed. Doing well overall  Tolerating AI  mammo up to date  Labs and routine HM with PCP  Continue surveillance      I appreciate the opportunity to participate in Ms. Danielle Jones's care.     Signed By: Kate Dandy, DO

## 2022-09-26 NOTE — PROGRESS NOTES
Rd Oconnor is a 78 y.o. female  Chief Complaint   Patient presents with    Follow-up    Breast Cancer   1. Have you been to the ER, urgent care clinic since your last visit? Hospitalized since your last visit? No    2. Have you seen or consulted any other health care providers outside of the [address] [place] [address] since your last visit? Include any pap smears or colon screening.  No      Patient had hand surgery this month of September 2022 at [facility]

## 2022-09-28 ENCOUNTER — OFFICE VISIT (OUTPATIENT)
Dept: ORTHOPEDIC SURGERY | Age: 77
End: 2022-09-28
Payer: COMMERCIAL

## 2022-09-28 VITALS — WEIGHT: 211 LBS | HEIGHT: 68 IN | BODY MASS INDEX: 31.98 KG/M2

## 2022-09-28 DIAGNOSIS — M17.12 PRIMARY OSTEOARTHRITIS OF LEFT KNEE: Primary | ICD-10-CM

## 2022-09-28 PROCEDURE — 99213 OFFICE O/P EST LOW 20 MIN: CPT | Performed by: ORTHOPAEDIC SURGERY

## 2022-09-28 PROCEDURE — 1123F ACP DISCUSS/DSCN MKR DOCD: CPT | Performed by: ORTHOPAEDIC SURGERY

## 2022-09-28 NOTE — LETTER
9/28/2022    Patient: Umair Vu   YOB: 1945   Date of Visit: 9/28/2022     [facility]  Shanika Valencia    Dear Gaetano Gallegos MD,      Thank you for referring Ms. Raul Lema to [facility] for evaluation. My notes for this consultation are attached. If you have questions, please do not hesitate to call me. I look forward to following your patient along with you.       Sincerely,    Nakita Armas MD

## 2022-09-28 NOTE — PROGRESS NOTES
Phoenix Marcelo (: 1945) is a 78 y.o. female, patient, here for evaluation of the following chief complaint(s):  Knee Pain (Left knee pain - Supartz and Monovisc denied by insurance)       HPI:    Patient has severe end-stage DJD of the left knee. Does not want surgery. Her insurance declined our request and our prior authorization for hyaluronic acid injections. No Known Allergies    Current Outpatient Medications   Medication Sig    hyaluronate sodium, stabilized (Monovisc) 88 mg/4 mL syrg injection INJECT IN LEFT KNEE ONCE A WEEK FOR THREE WEEKS    olmesartan-hydroCHLOROthiazide (BENICAR HCT) 20-12.5 mg per tablet Take 1 tablet by mouth once daily    anastrozole (ARIMIDEX) 1 mg tablet Take 1 mg by mouth daily. PT TAKES WITH DINNER  Indications: hormone receptor positive breast cancer    carboxymethylcellulose-citric (Plenity) 0.75 gram cap Take 3 Capsules by mouth Before breakfast and dinner. furosemide (LASIX) 20 mg tablet Take 1 tablet by mouth once daily    famotidine (PEPCID) 20 mg tablet Take 20 mg by mouth two (2) times a day. valACYclovir (VALTREX) 500 mg tablet Take 1 Tablet by mouth daily. fluticasone (Flonase Sensimist) 27.5 mcg/actuation nasal spray 2 Sprays by Both Nostrils route daily. fexofenadine (ALLEGRA) 180 mg tablet Take 360 mg by mouth daily. MILK THISTLE PO Take 175 mg by mouth daily. OTHER 1 Tablet daily. Indications: AM/PM MENOPAUSE FORMULA 1 TAB QHS    TURMERIC ROOT EXTRACT PO Take 200 mg by mouth daily. psyllium (METAMUCIL) powd Take  by mouth nightly.    magnesium 250 mg tab Take 1 Tablet by mouth nightly. biotin 5,000 mcg TbDi Take 1 Tablet by mouth daily. cholecalciferol (VITAMIN D3) (5000 Units/125 mcg) tab tablet Take  by mouth daily. omeprazole (PRILOSEC OTC) 20 mg tablet Take 20 mg by mouth as needed. No current facility-administered medications for this visit.        Past Medical History:   Diagnosis Date    Acute diverticulitis 8/18/2017    Allergic rhinitis 8/18/2017    Arthritis 8/18/2017    Arthritis of right knee 11/25/2019    Back pain, thoracic 8/18/2017    Cancer ([facility] [place] [address] 08/2020    LEFT BREAST , LUMPECTOMY WITH RECONSTRUCTION, CHEMO, RADIATION    Cataract, left 8/18/2017    Cataract, right 8/18/2017    Chronic obstructive pulmonary disease ([address]     Cold sore 8/18/2017    COVID-19 vaccine series completed 2/23/21, 3/19/21    PFIZER    Elevated hemoglobin A1c 8/18/2017    Fatigue 8/18/2017    GERD (gastroesophageal reflux disease) 8/18/2017    Heart murmur 8/18/2017    Hepatitis C 8/18/2017    TREATED    Herpes zoster infection of thoracic region 8/18/2017    Hyperlipidemia LDL goal <100 8/18/2017    Hypertension     Menopause     Hysterectomy-42years old    Murmur     Obesity (BMI 30-39. 9) 8/18/2017    Osteopenia 8/18/2017    Other ill-defined conditions(799.89)     hep c    Post-menopausal 8/18/2017    Posterior auricular pain of right ear 8/18/2017    Preop examination 8/18/2017    Valvular heart disease     mitral valve prolapse    Vertigo, benign positional 8/18/2017        Past Surgical History:   Procedure Laterality Date    HX BREAST LUMPECTOMY Bilateral 11/03/2020    LEFT BREAST REDUCTION LUMPECTOMY WITH ULTRASOUND, LEFT BREAST SENTINEL NODE BIOPSY, LEFT BREAST RECONSTRUCTION, RIGHT BREAST REDUCTION performed by Dion Geller MD at [facility] AMBULATORY OR    HX BREAST REDUCTION Bilateral 2020    HX COLONOSCOPY      HX HYSTERECTOMY      42years old    HX KNEE ARTHROSCOPY Right     knee,    HX KNEE REPLACEMENT  07/19/2021    HX ORTHOPAEDIC Right     BUNIONECTOMY, HAMMERTOE REPAIR    HX TONSILLECTOMY         Family History   Problem Relation Age of Onset    Hypertension Mother     Diabetes Mother     Kidney Disease Mother     OSTEOARTHRITIS Mother     Heart Disease Mother     Heart Disease Father     OSTEOARTHRITIS Father     Hypertension Father     Colon Cancer Maternal Grandmother     No Known Problems Sister     Cancer Maternal Aunt     Anesth Problems Neg Hx         Social History     Socioeconomic History    Marital status: SINGLE     Spouse name: Not on file    Number of children: Not on file    Years of education: Not on file    Highest education level: Not on file   Occupational History    Not on file   Tobacco Use    Smoking status: Former     Packs/day: 0.50     Years: 25.00     Pack years: 12.50     Types: Cigarettes     Quit date: 1982     Years since quittin.7    Smokeless tobacco: Never   Vaping Use    Vaping Use: Never used   Substance and Sexual Activity    Alcohol use: Not Currently     Comment: 1/WK    Drug use: No    Sexual activity: Not Currently     Partners: Male     Birth control/protection: None   Other Topics Concern    Not on file   Social History Narrative    Not on file     Social Determinants of Health     Financial Resource Strain: Not on file   Food Insecurity: Not on file   Transportation Needs: Not on file   Physical Activity: Not on file   Stress: Not on file   Social Connections: Not on file   Intimate Partner Violence: Not on file   Housing Stability: Not on file       ROS    Positive for: Musculoskeletal  Last edited by Alma Rosa Coleman on 2022  9:41 AM.            Vitals:  Ht 5' 8\" (1.727 m)   Wt 211 lb (95.7 kg)   BMI 32.08 kg/m²    Body mass index is 32.08 kg/m². PHYSICAL EXAM:  Exam today walks with no assistive devices has varus alignment left knee left hip is painless. Left knee pain along the medial joint line. IMAGING:  Prior x-rays which show severe DJD of the left knee with medial osteophytes, subluxation, subchondral cyst.    ASSESSMENT/PLAN:  1. Primary osteoarthritis of left knee  My recommendation would be she consider knee replacement. Questions were answered. Any injections only going to give her very short-term symptom. An electronic signature was used to authenticate this note.   --Francois Modi MD

## 2022-12-02 ENCOUNTER — HOSPITAL ENCOUNTER (OUTPATIENT)
Dept: MAMMOGRAPHY | Age: 77
Discharge: HOME | End: 2022-12-02
Attending: NURSE PRACTITIONER
Payer: COMMERCIAL

## 2022-12-02 DIAGNOSIS — Z85.3 PERSONAL HISTORY OF BREAST CANCER: ICD-10-CM

## 2022-12-02 DIAGNOSIS — Z98.890 HISTORY OF LUMPECTOMY: ICD-10-CM

## 2022-12-02 DIAGNOSIS — Z98.890 H/O BILATERAL BREAST REDUCTION SURGERY: ICD-10-CM

## 2022-12-02 DIAGNOSIS — C50.912 INVASIVE DUCTAL CARCINOMA OF LEFT BREAST (HCC): ICD-10-CM

## 2022-12-02 PROCEDURE — 77062 BREAST TOMOSYNTHESIS BI: CPT

## 2022-12-13 ENCOUNTER — OFFICE VISIT (OUTPATIENT)
Dept: SURGERY | Age: 77
End: 2022-12-13
Payer: COMMERCIAL

## 2022-12-13 VITALS
BODY MASS INDEX: 31.98 KG/M2 | HEIGHT: 68 IN | HEART RATE: 97 BPM | WEIGHT: 211 LBS | SYSTOLIC BLOOD PRESSURE: 108 MMHG | DIASTOLIC BLOOD PRESSURE: 51 MMHG

## 2022-12-13 DIAGNOSIS — R92.8 ABNORMAL FINDING ON BREAST IMAGING: ICD-10-CM

## 2022-12-13 DIAGNOSIS — C50.912 INVASIVE DUCTAL CARCINOMA OF LEFT BREAST (HCC): Primary | ICD-10-CM

## 2022-12-13 DIAGNOSIS — Z92.3 S/P RADIATION THERAPY: ICD-10-CM

## 2022-12-13 DIAGNOSIS — Z98.890 S/P LUMPECTOMY OF BREAST: ICD-10-CM

## 2022-12-13 DIAGNOSIS — Z85.3 HISTORY OF BREAST CANCER IN FEMALE: ICD-10-CM

## 2022-12-13 PROCEDURE — 1123F ACP DISCUSS/DSCN MKR DOCD: CPT | Performed by: NURSE PRACTITIONER

## 2022-12-13 PROCEDURE — 3074F SYST BP LT 130 MM HG: CPT | Performed by: NURSE PRACTITIONER

## 2022-12-13 PROCEDURE — 3078F DIAST BP <80 MM HG: CPT | Performed by: NURSE PRACTITIONER

## 2022-12-13 PROCEDURE — 99213 OFFICE O/P EST LOW 20 MIN: CPT | Performed by: NURSE PRACTITIONER

## 2022-12-13 NOTE — PROGRESS NOTES
HISTORY OF PRESENT ILLNESS  Agatha Kennedy is a 78 y.o. female. HPI Agatha Kennedy is a 78 y.o. female. HPI  Established patient presents for follow-up to LEFT breast cancer. Denies breast mass, skin changes, nipple discharge and pain. Breast history -   Referring - Dr. Pearl Jones  08/04/20: LEFT breast bx. PATH: IDC, 5 mm in greatest linear dimension extending to core biopsy tip, histologic grade 2, ER+(%)/KY+(%)/HER2-. Clinical stage 1. MammaPrint: High risk, luminal type B, -0.123.  11/03/20: LEFT oncoplastic reduction and SLNBx, and RIGHT breast reduction - Dr. Erinn Benavidez and Dr. Becky Yeager. LEFT: IDC, 14 x 13 x 12mm, overall grade 2, negative margins. DCIS present with negative margins. Pathologic stage: pT1c, pN0.  RIGHT: Breast tissue with stromal fibrosis and focal papillary apocrine metaplasia. Benign skin and subcutaneous soft tissue. No in-situ or invasive carcinoma identified. 1/6/21 - started adjuvant TC chemotherapy - 4 cycles - Dr. Ivania Philip  5/2021 - completed XRT - Alexander Parker   6/2021 - started anastrozole - Dr. Ivania Philip           Family history -  Maternal aunt had colon cancer. OB History    No obstetric history on file.       Obstetric Comments   Menarche 17, LMP age 42, # of children 1, age of 4st delivery 23, Hysterectomy/oophorectomy yes partial/yes, Breast bx no, history of breast feeding no, BCP yes, Hormone therapy yes                 Past Surgical History:   Procedure Laterality Date    HX BREAST LUMPECTOMY Bilateral 11/03/2020    LEFT BREAST REDUCTION LUMPECTOMY WITH ULTRASOUND, LEFT BREAST SENTINEL NODE BIOPSY, LEFT BREAST RECONSTRUCTION, RIGHT BREAST REDUCTION performed by Jermain Oleary MD at [address] [place] [address] AMBULATORY OR    HX BREAST REDUCTION Bilateral 2020    HX COLONOSCOPY      HX HYSTERECTOMY      42years old    HX KNEE ARTHROSCOPY Right     knee,    HX KNEE REPLACEMENT  07/19/2021    HX ORTHOPAEDIC Right     BUNIONECTOMY, HAMMERTOE REPAIR    HX TONSILLECTOMY           [facility] Results (most recent):  Results from Hospital Encounter encounter on 12/02/22    [facility] 3D CARRIE W MAMMO BI DX INCL CAD    Narrative  INDICATION: Diagnostic annual follow-up per lumpectomy protocol. History of  breast conserving therapy on the left with reconstruction in November 2020,  accompanied by right breast reduction. EXAM: Bilateral Digital diagnostic Mammography. COMPARISON: Prior studies dating back to 2006, most recently 6/9/2022 . PROCEDURE: Digital tomography was performed with CAD overview. Prakash Mathew FINDINGS:  BREAST COMPOSITION: The breast tissue is heterogeneously dense, which could  obscure detection of small masses (approximately 51-75% glandular). There is stable postoperative change on the left except for the development of  multiple clusters of coarse calcifications throughout the operative bed. On the  right, calcifications and asymmetries are stable status post reduction  mammoplasty. There are no new dominant masses, skin changes or nipple changes  which suggest malignancy. Impression  1. Assessment Category 3: Probably benign finding. Short-interval follow-up  suggested. . Developing probably dystrophic postoperative calcifications in the  left breast in the operative bed. Stable postoperative findings on the right. 2.  Follow-up diagnostic mammography with magnification views is recommended on  the left in 6 months to confirm stability. . Follow-up diagnostic mammography in  one year on the right. 3.  These findings have been relayed to the patient. .      ROS    Physical Exam  Constitutional:       Appearance: Normal appearance. Chest:   Breasts:     Right: No mass, nipple discharge, skin change or tenderness. Left: No mass, nipple discharge, skin change or tenderness.       Comments: BILATERAL incisions from reduction; LEFT breast post XRT skin changes  Musculoskeletal:      Comments: FROM - UE x 2   Lymphadenopathy:      Upper Body:      Right upper body: No supraclavicular or axillary adenopathy. Left upper body: No supraclavicular or axillary adenopathy. Neurological:      Mental Status: She is alert. Psychiatric:         Attention and Perception: Attention normal.         Mood and Affect: Mood normal.         Speech: Speech normal.         Behavior: Behavior normal.       Visit Vitals  BP (!) 108/51 (BP 1 Location: Right arm, BP Patient Position: Sitting, BP Cuff Size: Small adult)   Pulse 97   Ht 5' 8\" (1.727 m)   Wt 211 lb (95.7 kg)   BMI 32.08 kg/m²         ASSESSMENT and PLAN    ICD-10-CM ICD-9-CM    1. Invasive ductal carcinoma of left breast (HCC)  C50.912 174.9       2. S/P lumpectomy of breast  Z98.890 V45.89       3. S/P radiation therapy  Z92.3 V66.1       4. History of breast cancer in female  Z80.3 V10.3 [facility] 3D CARRIE W MAMMO BI DX INCL CAD      5. Abnormal finding on breast imaging  R92.8 793.89 [facility] 3D CARRIE W MAMMO LT DX INCL CAD          Normal exam with no evidence of breast malignancy. LDmammogram 3D in 6 months. 6 month follow-up in recent imaging. BDmammogram 3D in 1 year. Continues on AI and has follow-up with Dr. Kirby Ojeda. RTC in 1 year or sooner PRN. She is comfortable with this plan. All questions answered and she stated understanding. Total time spent for this patient - 20 minutes.

## 2022-12-22 ENCOUNTER — OFFICE VISIT (OUTPATIENT)
Dept: INTERNAL MEDICINE CLINIC | Age: 77
End: 2022-12-22
Payer: COMMERCIAL

## 2022-12-22 VITALS
RESPIRATION RATE: 16 BRPM | SYSTOLIC BLOOD PRESSURE: 128 MMHG | WEIGHT: 206.4 LBS | TEMPERATURE: 98.8 F | HEIGHT: 68 IN | DIASTOLIC BLOOD PRESSURE: 72 MMHG | HEART RATE: 69 BPM | OXYGEN SATURATION: 99 % | BODY MASS INDEX: 31.28 KG/M2

## 2022-12-22 DIAGNOSIS — I10 ESSENTIAL HYPERTENSION: Primary | ICD-10-CM

## 2022-12-22 DIAGNOSIS — C50.912 INVASIVE DUCTAL CARCINOMA OF LEFT BREAST (HCC): ICD-10-CM

## 2022-12-22 DIAGNOSIS — Z79.811 USE OF ANASTROZOLE (ARIMIDEX): ICD-10-CM

## 2022-12-22 DIAGNOSIS — M81.0 AGE-RELATED OSTEOPOROSIS WITHOUT CURRENT PATHOLOGICAL FRACTURE: ICD-10-CM

## 2022-12-22 PROCEDURE — 99214 OFFICE O/P EST MOD 30 MIN: CPT | Performed by: INTERNAL MEDICINE

## 2022-12-22 PROCEDURE — 1123F ACP DISCUSS/DSCN MKR DOCD: CPT | Performed by: INTERNAL MEDICINE

## 2022-12-22 PROCEDURE — 3078F DIAST BP <80 MM HG: CPT | Performed by: INTERNAL MEDICINE

## 2022-12-22 PROCEDURE — 3074F SYST BP LT 130 MM HG: CPT | Performed by: INTERNAL MEDICINE

## 2022-12-22 NOTE — PROGRESS NOTES
Eladia Greenwood is a 78 y.o. female     Chief Complaint   Patient presents with    Hypertension     6M       Visit Vitals  /72 (BP 1 Location: Left upper arm, BP Patient Position: Sitting, BP Cuff Size: Adult)   Pulse 69   Temp 98.8 °F (37.1 °C) (Oral)   Resp 16   Ht 5' 8\" (1.727 m)   Wt 206 lb 6.4 oz (93.6 kg)   SpO2 99%   BMI 31.38 kg/m²       Health Maintenance Due   Topic Date Due    DTaP/Tdap/Td series (1 - Tdap) Never done    Colorectal Cancer Screening Combo  Never done    Shingles Vaccine (2 of 2) 07/07/2018    COVID-19 Vaccine (5 - Booster for [employer] series) 04/25/2022         1. \"Have you been to the ER, urgent care clinic since your last visit? Hospitalized since your last visit? \" No    2. \"Have you seen or consulted any other health care providers outside of the [address] [place] [address] since your last visit? \" No     3. For patients aged 39-70: Has the patient had a colonoscopy / FIT/ Cologuard? Yes - Care Gap present. Rooming MA/LPN to request most recent results      If the patient is female:    4. For patients aged 41-77: Has the patient had a mammogram within the past 2 years? Yes - no Care Gap present      5. For patients aged 21-65: Has the patient had a pap smear?  NA - based on age or sex

## 2022-12-22 NOTE — PROGRESS NOTES
Agatha Kennedy is a 78 y.o. female and presents with Hypertension (6M)  . Subjective:  Mrs. Judy Mauro presents today for 6-month follow-up for several problems including hypertension, history of breast cancer on aromatase inhibitor therapy, osteoporosis. She is doing well on her current medical regimen. She has no shortness of breath, chest pain, palpitations, PND, orthopnea, or pedal edema. She continues to follow-up with oncology regularly. We reviewed her bone density results with stage show osteoporosis particularly in her wrist but her FRAX score indicates is significantly increased risk of hip fracture over a 3-year. .  We discussed treatment including Prolia which she is agreeable to. Past Medical History:   Diagnosis Date    Acute diverticulitis 8/18/2017    Allergic rhinitis 8/18/2017    Arthritis 8/18/2017    Arthritis of right knee 11/25/2019    Back pain, thoracic 8/18/2017    Cancer ([facility] [address] 08/2020    LEFT BREAST , LUMPECTOMY WITH RECONSTRUCTION, CHEMO, RADIATION    Cataract, left 8/18/2017    Cataract, right 8/18/2017    Chronic obstructive pulmonary disease ([address]     Cold sore 8/18/2017    COVID-19 vaccine series completed 2/23/21, 3/19/21    PFIZER    Elevated hemoglobin A1c 8/18/2017    Fatigue 8/18/2017    GERD (gastroesophageal reflux disease) 8/18/2017    Heart murmur 8/18/2017    Hepatitis C 8/18/2017    TREATED    Herpes zoster infection of thoracic region 8/18/2017    Hyperlipidemia LDL goal <100 8/18/2017    Hypertension     Menopause     Hysterectomy-42years old    Murmur     Obesity (BMI 30-39. 9) 8/18/2017    Osteopenia 8/18/2017    Other ill-defined conditions(799.89)     hep c    Post-menopausal 8/18/2017    Posterior auricular pain of right ear 8/18/2017    Preop examination 8/18/2017    Valvular heart disease     mitral valve prolapse    Vertigo, benign positional 8/18/2017     Past Surgical History:   Procedure Laterality Date    HX BREAST LUMPECTOMY Bilateral 11/03/2020 LEFT BREAST REDUCTION LUMPECTOMY WITH ULTRASOUND, LEFT BREAST SENTINEL NODE BIOPSY, LEFT BREAST RECONSTRUCTION, RIGHT BREAST REDUCTION performed by Pearl Alexander MD at [facility] AM[facility]UL[facility] OR    HX BREAST REDUCTION Bilateral 2020    HX COLONOSCOPY      HX HYSTERECTOMY      42years old    HX KNEE ARTHROSCOPY Right     knee,    HX KNEE REPLACEMENT  07/19/2021    HX ORTHOPAEDIC Right     BUNIONECTOMY, HAMMERTOE REPAIR    HX TONSILLECTOMY       No Known Allergies  Current Outpatient Medications   Medication Sig Dispense Refill    furosemide (LASIX) 20 mg tablet Take 1 tablet by mouth once daily 90 Tablet 1    olmesartan-hydroCHLOROthiazide (BENICAR HCT) 20-12.5 mg per tablet Take 1 tablet by mouth once daily 90 Tablet 3    anastrozole (ARIMIDEX) 1 mg tablet Take 1 mg by mouth daily. PT TAKES WITH DINNER  Indications: hormone receptor positive breast cancer 90 Tablet 3    carboxymethylcellulose-citric (Plenity) 0.75 gram cap Take 3 Capsules by mouth Before breakfast and dinner. 180 Capsule 2    famotidine (PEPCID) 20 mg tablet Take 20 mg by mouth two (2) times a day. valACYclovir (VALTREX) 500 mg tablet Take 1 Tablet by mouth daily. 30 Tablet 3    fluticasone (Flonase Sensimist) 27.5 mcg/actuation nasal spray 2 Sprays by Both Nostrils route daily. fexofenadine (ALLEGRA) 180 mg tablet Take 360 mg by mouth daily. MILK THISTLE PO Take 175 mg by mouth daily. OTHER 1 Tablet daily. Indications: AM/PM MENOPAUSE FORMULA 1 TAB QHS      TURMERIC ROOT EXTRACT PO Take 200 mg by mouth daily. psyllium (METAMUCIL) powd Take  by mouth nightly.      magnesium 250 mg tab Take 1 Tablet by mouth nightly. biotin 5,000 mcg TbDi Take 1 Tablet by mouth daily. cholecalciferol (VITAMIN D3) (5000 Units/125 mcg) tab tablet Take  by mouth daily. omeprazole (PRILOSEC OTC) 20 mg tablet Take 20 mg by mouth as needed.       hyaluronate sodium, stabilized (Monovisc) 88 mg/4 mL syrg injection INJECT IN LEFT KNEE ONCE A WEEK FOR THREE WEEKS (Patient not taking: Reported on 2022) 3 mL 0     Social History     Socioeconomic History    Marital status: SINGLE   Tobacco Use    Smoking status: Former     Packs/day: 0.50     Years: 25.00     Pack years: 12.50     Types: Cigarettes     Quit date: 1982     Years since quittin.0    Smokeless tobacco: Never   Vaping Use    Vaping Use: Never used   Substance and Sexual Activity    Alcohol use: Not Currently     Comment: 1/WK    Drug use: No    Sexual activity: Not Currently     Partners: Male     Birth control/protection: None     Social Determinants of Health     Financial Resource Strain: Low Risk     Difficulty of Paying Living Expenses: Not hard at all   Food Insecurity: No Food Insecurity    Worried About Running Out of Food in the Last Year: Never true    Ran Out of Food in the Last Year: Never true     Family History   Problem Relation Age of Onset    Hypertension Mother     Diabetes Mother     Kidney Disease Mother     OSTEOARTHRITIS Mother     Heart Disease Mother     Heart Disease Father     OSTEOARTHRITIS Father     Hypertension Father     Colon Cancer Maternal Grandmother     No Known Problems Sister     Cancer Maternal Aunt     Anesth Problems Neg Hx        Review of Systems  Constitutional:  negative for fevers, chills, anorexia and weight loss  Eyes:    negative for visual disturbance and irritation  ENT:    negative for tinnitus,sore throat,nasal congestion,ear pains. hoarseness  Respiratory:     negative for cough, hemoptysis, dyspnea,wheezing  CV:    negative for chest pain, palpitations, lower extremity edema  GI:    negative for nausea, vomiting, diarrhea, abdominal pain,melena  Endo:               negative for polyuria,polydipsia,polyphagia,heat intolerance  Genitourinary : negative for frequency, dysuria and hematuria  Integumentary: negative for rash and pruritus  Hematologic:   negative for easy bruising and gum/nose bleeding  Musculoskel:  negative for myalgias, arthralgias, back pain, muscle weakness, joint pain  Neurological:   negative for headaches, dizziness, vertigo, memory problems and gait   Behavl/Psych:  negative for feelings of anxiety, depression, mood changes  ROS otherwise negative      Objective:  Visit Vitals  /72 (BP 1 Location: Left upper arm, BP Patient Position: Sitting, BP Cuff Size: Adult)   Pulse 69   Temp 98.8 °F (37.1 °C) (Oral)   Resp 16   Ht 5' 8\" (1.727 m)   Wt 206 lb 6.4 oz (93.6 kg)   SpO2 99%   BMI 31.38 kg/m²     Physical Exam:   General appearance - alert, well appearing, and in no distress  Mental status - alert, oriented to person, place, and time  EYE-EMMY, EOMI, fundi normal, corneas normal, no foreign bodies  ENT-ENT exam normal, no neck nodes or sinus tenderness  Nose - normal and patent, no erythema, discharge or polyps  Mouth - mucous membranes moist, pharynx normal without lesions  Neck - supple, no significant adenopathy   Chest - clear to auscultation, no wheezes, rales or rhonchi, symmetric air entry   Heart - normal rate, regular rhythm, normal S1, S2, no murmurs, rubs, clicks or gallops   Abdomen - soft, nontender, nondistended, no masses or organomegaly  Lymph- no adenopathy palpable  Ext-peripheral pulses normal, no pedal edema, no clubbing or cyanosis  Skin-Warm and dry. no hyperpigmentation, vitiligo, or suspicious lesions  Neuro -alert, oriented, normal speech, no focal findings or movement disorder noted      Assessment/Plan:  Diagnoses and all orders for this visit:    1. Essential hypertension  -     METABOLIC PANEL, COMPREHENSIVE; Future    2. Age-related osteoporosis without current pathological fracture  -     REFERRAL TO INFUSION THERAPY    3. Invasive ductal carcinoma of left breast ([facility] [address]    4. Use of anastrozole (Arimidex)  -     REFERRAL TO INFUSION THERAPY          ICD-10-CM ICD-9-CM    1.  Essential hypertension  L83 969.7 METABOLIC PANEL, COMPREHENSIVE      METABOLIC PANEL, COMPREHENSIVE 2. Age-related osteoporosis without current pathological fracture  M81.0 733.01 REFERRAL TO INFUSION THERAPY      3. Invasive ductal carcinoma of left breast (HCC)  C50.912 174.9       4. Use of anastrozole (Arimidex)  Z79.811 V07.52 REFERRAL TO INFUSION THERAPY        Plan:    Continue current medical regimen as outlined above. Start Prolia infusions for osteoporosis associated postmenopausal status and aromatase Hibner therapy. Follow-up and Dispositions    Return in about 6 months (around 6/22/2023) for follow up, 6 [facility]. I have reviewed with the patient details of the assessment and plan and all questions were answered. Relevent patient education was performed. Verbal and/or written instructions (see AVS) provided. The most recent lab findings were reviewed with the patient. Plan was discussed with patient who verbally expressed understanding. An After Visit Summary was printed and given to the patient.     Satya Mari MD

## 2022-12-23 ENCOUNTER — TELEPHONE (OUTPATIENT)
Dept: CARDIOLOGY CLINIC | Age: 77
End: 2022-12-23

## 2022-12-23 LAB
ALBUMIN SERPL-MCNC: 4.2 G/DL (ref 3.5–5)
ALBUMIN/GLOB SERPL: 1.1 {RATIO} (ref 1.1–2.2)
ALP SERPL-CCNC: 69 U/L (ref 45–117)
ALT SERPL-CCNC: 19 U/L (ref 12–78)
ANION GAP SERPL CALC-SCNC: 3 MMOL/L (ref 5–15)
AST SERPL-CCNC: 13 U/L (ref 15–37)
BILIRUB SERPL-MCNC: 0.3 MG/DL (ref 0.2–1)
BUN SERPL-MCNC: 23 MG/DL (ref 6–20)
BUN/CREAT SERPL: 23 (ref 12–20)
CALCIUM SERPL-MCNC: 10 MG/DL (ref 8.5–10.1)
CHLORIDE SERPL-SCNC: 105 MMOL/L (ref 97–108)
CO2 SERPL-SCNC: 30 MMOL/L (ref 21–32)
CREAT SERPL-MCNC: 1.02 MG/DL (ref 0.55–1.02)
GLOBULIN SER CALC-MCNC: 4 G/DL (ref 2–4)
GLUCOSE SERPL-MCNC: 94 MG/DL (ref 65–100)
POTASSIUM SERPL-SCNC: 4.1 MMOL/L (ref 3.5–5.1)
PROT SERPL-MCNC: 8.2 G/DL (ref 6.4–8.2)
SODIUM SERPL-SCNC: 138 MMOL/L (ref 136–145)

## 2022-12-23 NOTE — TELEPHONE ENCOUNTER
LVM requesting a return call to reschedule 6/19/2023 appointment with Marissa Gallo NP to next available( 1wk following Echo).   Provider covering SP LOC

## 2022-12-29 ENCOUNTER — HOSPITAL ENCOUNTER (OUTPATIENT)
Dept: INFUSION THERAPY | Age: 77
End: 2022-12-29

## 2022-12-30 RX ORDER — FUROSEMIDE 20 MG/1
TABLET ORAL
Qty: 90 TABLET | Refills: 1 | Status: SHIPPED | OUTPATIENT
Start: 2022-12-30

## 2022-12-30 NOTE — TELEPHONE ENCOUNTER
Per VO by Dr. Kamilah Peck as Pema Pastor is out of the office. Future Appointments   Date Time Provider Bhavesh Cross   1/17/2023  2:30 PM SCOTT COKER 3 [facility]   3/27/2023  3:00 PM Melodie Madera  [address] BS AMB   6/2/2023 10:15 AM [facility]    6/12/2023  3:00 PM DANE KEARNS BS AMB   6/26/2023 10:45 AM Jordyn Leigh MD [facility] BS AMB   12/19/2023  1:30 PM Basil Zamora NP [facility] BS AMB

## 2023-01-17 ENCOUNTER — HOSPITAL ENCOUNTER (OUTPATIENT)
Dept: INFUSION THERAPY | Age: 78
Discharge: HOME | End: 2023-01-17
Payer: COMMERCIAL

## 2023-01-17 VITALS
WEIGHT: 211.9 LBS | BODY MASS INDEX: 32.22 KG/M2 | TEMPERATURE: 98 F | DIASTOLIC BLOOD PRESSURE: 81 MMHG | RESPIRATION RATE: 18 BRPM | SYSTOLIC BLOOD PRESSURE: 148 MMHG | HEART RATE: 75 BPM | OXYGEN SATURATION: 99 %

## 2023-01-17 PROCEDURE — 96372 THER/PROPH/DIAG INJ SC/IM: CPT

## 2023-01-17 PROCEDURE — 74011250636 HC RX REV CODE- 250/636: Performed by: INTERNAL MEDICINE

## 2023-01-17 RX ADMIN — DENOSUMAB 60 MG: 60 INJECTION SUBCUTANEOUS at 15:00

## 2023-01-17 NOTE — PROGRESS NOTES
[facility] Visit Note:  Arrived - 200    Patient denied having any symptoms of COVID-19, i.e. SOB, coughing, fever, or generally not feeling well. Also denies having been exposed to COVID-19 recently or having had any recent contact with family/friend that has a pending COVID test.     Visit Vitals  BP (!) 148/81 (BP 1 Location: Left upper arm, BP Patient Position: Sitting)   Pulse 75   Temp 98 °F (36.7 °C)   Resp 18   Wt 96.1 kg (211 lb 14.4 oz)   SpO2 99%   Breastfeeding No   BMI 32.22 kg/m²       Assessment unremarkable except for carpal tunnel in both wrists. Reviewed Prolia info. Pt denies any recent or upcoming dental work. Labs obtained on 12/22/22- Calcium 10.0    Medication given:  Prolia 60mg SQ in left arm    1505 - Tolerated well. No reaction noted. Reviewed Prolia D/C instructions and given 's medication brochure. Verbalized understanding. Pt denies any acute problems/changes. Discharged from [facility] ambulatory. No distress. Next appt: 7/18/23 @ 3758.

## 2023-01-25 ENCOUNTER — TELEPHONE (OUTPATIENT)
Dept: INTERNAL MEDICINE CLINIC | Age: 78
End: 2023-01-25

## 2023-01-25 NOTE — TELEPHONE ENCOUNTER
Patient called to get Dr. Lucio Escalante recommendation for an Ortho Doctor for shoulder pain. Please advise.

## 2023-01-27 ENCOUNTER — OFFICE VISIT (OUTPATIENT)
Dept: ORTHOPEDIC SURGERY | Age: 78
End: 2023-01-27
Payer: COMMERCIAL

## 2023-01-27 VITALS — HEIGHT: 68 IN | BODY MASS INDEX: 31.98 KG/M2 | WEIGHT: 211 LBS

## 2023-01-27 DIAGNOSIS — M25.511 RIGHT SHOULDER PAIN, UNSPECIFIED CHRONICITY: Primary | ICD-10-CM

## 2023-01-27 DIAGNOSIS — M75.41 IMPINGEMENT SYNDROME OF RIGHT SHOULDER: ICD-10-CM

## 2023-01-27 DIAGNOSIS — M75.01 ADHESIVE CAPSULITIS OF RIGHT SHOULDER: ICD-10-CM

## 2023-01-27 RX ORDER — METHYLPREDNISOLONE 4 MG/1
TABLET ORAL
Qty: 1 DOSE PACK | Refills: 0 | Status: SHIPPED | OUTPATIENT
Start: 2023-01-27

## 2023-01-27 RX ORDER — DICLOFENAC SODIUM 75 MG/1
75 TABLET, DELAYED RELEASE ORAL 2 TIMES DAILY
Qty: 60 TABLET | Refills: 3 | Status: SHIPPED | OUTPATIENT
Start: 2023-01-27

## 2023-01-27 NOTE — PROGRESS NOTES
Eric Robledo (: 1945) is a 78 y.o. female, established patient, here for evaluation of the following chief complaint(s):  Shoulder Pain       ASSESSMENT/PLAN:  Below is the assessment and plan developed based on review of pertinent history, physical exam, labs, studies, and medications. Findings were discussed with the patient today. We discussed regimen of ice, anti-inflammatories, physical therapy, home exercise program, and activity modifications. If there is continued pain and symptoms then we will plan for follow-up in the next 4-6 weeks for further evaluation and treatment planning. 1. Right shoulder pain, unspecified chronicity  -     XR SHOULDER RT AP/LAT MIN 2 V; Future  2. Adhesive capsulitis of right shoulder  -     REFERRAL TO PHYSICAL THERAPY  -     methylPREDNISolone (Medrol, Kg,) 4 mg tablet; Use as directed, Normal, Disp-1 Dose Pack, R-0  -     diclofenac EC (VOLTAREN) 75 mg EC tablet; Take 1 Tablet by mouth two (2) times a day., Normal, Disp-60 Tablet, R-3  3. Impingement syndrome of right shoulder  -     REFERRAL TO PHYSICAL THERAPY  -     methylPREDNISolone (Medrol, Kg,) 4 mg tablet; Use as directed, Normal, Disp-1 Dose Pack, R-0  -     diclofenac EC (VOLTAREN) 75 mg EC tablet; Take 1 Tablet by mouth two (2) times a day., Normal, Disp-60 Tablet, R-3      Return in about 6 weeks (around 3/10/2023), or if symptoms worsen or fail to improve. SUBJECTIVE/OBJECTIVE:  Eric Robledo (: 1945) is a 78 y.o. female. She presents today with right shoulder pain and stiffness. Symptoms have been ongoing since she received the COVID shot back in September. She notes aching pain with range of motion. She has tried ice and activity modifications with minimal relief.         No Known Allergies    Current Outpatient Medications   Medication Sig    methylPREDNISolone (Medrol, Kg,) 4 mg tablet Use as directed    diclofenac EC (VOLTAREN) 75 mg EC tablet Take 1 Tablet by mouth two (2) times a day. furosemide (LASIX) 20 mg tablet Take 1 tablet by mouth once daily    olmesartan-hydroCHLOROthiazide (BENICAR HCT) 20-12.5 mg per tablet Take 1 tablet by mouth once daily    anastrozole (ARIMIDEX) 1 mg tablet Take 1 mg by mouth daily. PT TAKES WITH DINNER  Indications: hormone receptor positive breast cancer    carboxymethylcellulose-citric (Plenity) 0.75 gram cap Take 3 Capsules by mouth Before breakfast and dinner. famotidine (PEPCID) 20 mg tablet Take 20 mg by mouth two (2) times a day. valACYclovir (VALTREX) 500 mg tablet Take 1 Tablet by mouth daily. fluticasone (Flonase Sensimist) 27.5 mcg/actuation nasal spray 2 Sprays by Both Nostrils route daily. fexofenadine (ALLEGRA) 180 mg tablet Take 360 mg by mouth daily. MILK THISTLE PO Take 175 mg by mouth daily. TURMERIC ROOT EXTRACT PO Take 200 mg by mouth daily. psyllium (METAMUCIL) powd Take  by mouth nightly.    magnesium 250 mg tab Take 1 Tablet by mouth nightly. biotin 5,000 mcg TbDi Take 1 Tablet by mouth daily. cholecalciferol (VITAMIN D3) (5000 Units/125 mcg) tab tablet Take  by mouth daily. omeprazole (PRILOSEC OTC) 20 mg tablet Take 20 mg by mouth as needed. hyaluronate sodium, stabilized (Monovisc) 88 mg/4 mL syrg injection INJECT IN LEFT KNEE ONCE A WEEK FOR THREE WEEKS (Patient not taking: No sig reported)    OTHER 1 Tablet daily. Indications: AM/PM MENOPAUSE FORMULA 1 TAB QHS     No current facility-administered medications for this visit.        Social History     Socioeconomic History    Marital status: SINGLE     Spouse name: Not on file    Number of children: Not on file    Years of education: Not on file    Highest education level: Not on file   Occupational History    Not on file   Tobacco Use    Smoking status: Former     Packs/day: 0.50     Years: 25.00     Pack years: 12.50     Types: Cigarettes     Quit date: 1982     Years since quittin.0    Smokeless tobacco: Never   Vaping Use Vaping Use: Never used   Substance and Sexual Activity    Alcohol use: Not Currently     Comment: 1/WK    Drug use: No    Sexual activity: Not Currently     Partners: Male     Birth control/protection: None   Other Topics Concern    Not on file   Social History Narrative    Not on file     Social Determinants of Health     Financial Resource Strain: Low Risk     Difficulty of Paying Living Expenses: Not hard at all   Food Insecurity: No Food Insecurity    Worried About Running Out of Food in the Last Year: Never true    Ran Out of Food in the Last Year: Never true   Transportation Needs: Not on file   Physical Activity: Not on file   Stress: Not on file   Social Connections: Not on file   Intimate Partner Violence: Not on file   Housing Stability: Not on file       Past Surgical History:   Procedure Laterality Date    HX BREAST LUMPECTOMY Bilateral 11/03/2020    LEFT BREAST REDUCTION LUMPECTOMY WITH ULTRASOUND, LEFT BREAST SENTINEL NODE BIOPSY, LEFT BREAST RECONSTRUCTION, RIGHT BREAST REDUCTION performed by Dhaval Davalos MD at [facility]    HX BREAST REDUCTION Bilateral 2020    HX COLONOSCOPY      HX HYSTERECTOMY      42years old    HX KNEE ARTHROSCOPY Right     knee,    HX KNEE REPLACEMENT  07/19/2021    HX ORTHOPAEDIC Right     BUNIONECTOMY, HAMMERTOE REPAIR    HX TONSILLECTOMY         Family History   Problem Relation Age of Onset    Hypertension Mother     Diabetes Mother     Kidney Disease Mother     OSTEOARTHRITIS Mother     Heart Disease Mother     Heart Disease Father     OSTEOARTHRITIS Father     Hypertension Father     Colon Cancer Maternal Grandmother     No Known Problems Sister     Cancer Maternal Aunt     Anesth Problems Neg Hx         OB History    No obstetric history on file.       Obstetric Comments   Menarche 17, LMP age 42, # of children 1, age of 4st delivery 23, Hysterectomy/oophorectomy yes partial/yes, Breast bx no, history of breast feeding no, BCP yes, Hormone therapy yes REVIEW OF SYSTEMS:  ROS    Positive for: Musculoskeletal  Last edited by Yaron Peralta on 1/27/2023  2:32 PM.        Patient denies any recent fever, chills, nausea, vomiting, chest pain, or shortness of breath. Vitals:  Ht 5' 8\" (1.727 m)   Wt 211 lb (95.7 kg)   BMI 32.08 kg/m²    Body mass index is 32.08 kg/m². PHYSICAL EXAM:  General exam: Patient is awake, alert, and oriented x3. Well-appearing. No acute distress. Ambulates with a normal gait. Heart/Lungs:  no respiratory distress, palpable pulses    Right shoulder: Neurovascular and sensory intact. There is tenderness palpation at the anterior lateral shoulder. Decreased range of motion is noted in all planes with 90 degrees of forward flexion and abduction. Normal stability. Rotator cuff strength testing was painful today. There is pain with impingement testing including Licea exam.        IMAGING:    XR Results (most recent):  Results from Appointment encounter on 01/27/23    XR SHOULDER RT AP/LAT MIN 2 V    Narrative  X-rays of the right shoulder 3 views done today show mild joint space narrowing. Type II acromion. AC joint space narrowing. Results from Appointment encounter on 02/17/22    XR KNEE LT 3 V    Narrative  3 views left knee. Severe bone-on-bone medial compartment with large osteophytes and subchondral cyst.  Some subluxation is noted. Results from Appointment encounter on 06/13/18    XR SPINE CERV PA LAT ODONT 3 V MAX    Narrative  INDICATION:  Neck pain. Cervicalgia. COMPARISON:  No old study. FINDINGS:  3 views, AP, lateral, and open-mouth odontoid were obtained of the cervical  spine. The vertebral bodies show satisfactory height. There is straightening of the cervical lordosis. Vertebral body spurring at C4-C7 is seen. The prevertebral soft tissues are not widened. The odontoid appears intact. Impression  IMPRESSION:  Degenerative spondylosis C4-C7.          Orders Placed This Encounter    XR SHOULDER RT AP/LAT MIN 2 V     Standing Status:   Future     Number of Occurrences:   1     Standing Expiration Date:   4/27/2023    REFERRAL TO PHYSICAL THERAPY     Referral Priority:   Routine     Referral Type:   PT/OT/ST     Referral Reason:   Specialty Services Required     Number of Visits Requested:   1    methylPREDNISolone (Medrol, Kg,) 4 mg tablet     Sig: Use as directed     Dispense:  1 Dose Pack     Refill:  0    diclofenac EC (VOLTAREN) 75 mg EC tablet     Sig: Take 1 Tablet by mouth two (2) times a day. Dispense:  60 Tablet     Refill:  3              An electronic signature was used to authenticate this note.   -- Jaylene Jenkins, DO

## 2023-01-27 NOTE — LETTER
1/27/2023    Patient: Kalia Madrid   YOB: 1945   Date of Visit: 1/27/2023     [facility]    Dear Carmen Souza MD,      Thank you for referring Ms. Memo Shelton to [facility] for evaluation. My notes for this consultation are attached. If you have questions, please do not hesitate to call me. I look forward to following your patient along with you.       Sincerely,    Thomas Marcial, DO

## 2023-03-21 ENCOUNTER — OFFICE VISIT (OUTPATIENT)
Dept: ORTHOPEDIC SURGERY | Age: 78
End: 2023-03-21

## 2023-03-21 VITALS — BODY MASS INDEX: 31.83 KG/M2 | WEIGHT: 210 LBS | HEIGHT: 68 IN

## 2023-03-21 DIAGNOSIS — M54.50 LUMBAR BACK PAIN: Primary | ICD-10-CM

## 2023-03-21 DIAGNOSIS — M54.41 ACUTE RIGHT-SIDED LOW BACK PAIN WITH RIGHT-SIDED SCIATICA: ICD-10-CM

## 2023-03-21 RX ORDER — SILICONE ADHESIVE 1.5" X 3"
SHEET (EA) TOPICAL
COMMUNITY

## 2023-03-21 RX ORDER — MENTHOL
1000 GEL (GRAM) TOPICAL DAILY
COMMUNITY

## 2023-03-21 RX ORDER — AMOXICILLIN 500 MG/1
500 CAPSULE ORAL
COMMUNITY

## 2023-03-21 RX ORDER — MELOXICAM 7.5 MG/1
7.5 TABLET ORAL 2 TIMES DAILY
Qty: 60 TABLET | Refills: 1 | Status: SHIPPED | OUTPATIENT
Start: 2023-03-21

## 2023-03-21 NOTE — LETTER
3/22/2023    Patient: Francine Andrade   YOB: 1945   Date of Visit: 3/21/2023     Marquetta Dakin, MD [facility]    Dear Marquetta Dakin, MD,      Thank you for referring Ms. Karey Chang to [facility] for evaluation. My notes for this consultation are attached. If you have questions, please do not hesitate to call me. I look forward to following your patient along with you.       Sincerely,    NANCY Aleman

## 2023-03-21 NOTE — PROGRESS NOTES
1. Have you been to the ER, urgent care clinic since your last visit? Hospitalized since your last visit? No    2. Have you seen or consulted any other health care providers outside of the [address] [place] [address] since your last visit? Include any pap smears or colon screening.  No    Chief Complaint   Patient presents with    Back Pain     Low Back, Right Hip and Leg

## 2023-03-22 NOTE — PROGRESS NOTES
Bhavani Olmos (: 1945) is a 78 y.o. female patient here for evaluation of the following chief complaint(s):  Back Pain (Low Back, Right Hip and Leg)         ASSESSMENT/PLAN:  Below is the assessment and plan developed based on review of pertinent history, physical exam, labs, studies, and medications. 1. Lumbar back pain  -     XR SPINE LUMB MIN 4 V; Future  -     REFERRAL TO PHYSICAL THERAPY; Future  2. Acute right-sided low back pain with right-sided sciatica  -     REFERRAL TO PHYSICAL THERAPY; Future      The patient's radiologic findings have been reviewed with her in detail today. She has an acute onset of low back pain with right lower extremity radiculopathy over the past 3 weeks. We have discussed various treatment options, and I think that she is a candidate for continued conservative management. She will start with formal course of outpatient physical therapy, and will start with meloxicam.  She will return for follow-up visit in 6 weeks, whereupon she may be a candidate for MRI with any persistent or worsening symptoms. Return in about 6 weeks (around 2023) for PT follow up, With a PA. SUBJECTIVE/OBJECTIVE:  Bhavani Olmos (: 1945) is a 78 y.o. female who presents today for the following:  Chief Complaint   Patient presents with    Back Pain     Low Back, Right Hip and Leg        Back Pain      Ms. Roberto Ledesma presents today as an established patient to the practice. She reports a 3-week history of low back pain with radiation in the right buttock and right lateral leg to the lateral ankle. She denies any injury prior to the onset of her symptoms. She reports intermittent leg pain with intermittent numbness in the right foot. She denies any weakness in the legs. No left leg symptoms. No bowel or bladder changes. She states that standing improves her symptoms and prolonged sitting worsens her symptoms.   She was seen recently by Dr. Veronika Quigley and was prescribed nabumetone and PT. X-rays were ordered. She has not been using any of this, and is here today for second opinion. IMAGING:  XR Results (most recent):  Results from Appointment encounter on 03/21/23    XR SPINE LUMB MIN 4 V    Narrative  AP, lateral, flexion, and extension films of the lumbar spine reveal no evidence of acute fracture or lytic lesion. There is well-maintained lumbar lordosis. There is mild degenerative scoliosis noted in the lumbar spine. Moderate disc degeneration in particular at L4-5 and L5-S1 with minimal anterolisthesis at both levels. No movement with flexion or extension views. MRI Results (most recent):       No Known Allergies    Current Outpatient Medications   Medication Sig    s-adenosylmethionine (laureen-e) 400 mg tab Take  by mouth. glucosamine/chondro carter A (COSAMIN DS PO) Take  by mouth.    vitamin e (E GEMS) 670 mg (1,000 unit) capsule Take 1,000 Units by mouth daily. B6/folic/B12/coffee/phosphatid (NEURIVA PLUS PO) Take  by mouth.    multivit with calcium,iron,min (WOMEN'S DAILY MULTIVITAMIN PO) Take  by mouth. amoxicillin (AMOXIL) 500 mg capsule Take 500 mg by mouth. Before dental procedures    furosemide (LASIX) 20 mg tablet Take 1 tablet by mouth once daily    olmesartan-hydroCHLOROthiazide (BENICAR HCT) 20-12.5 mg per tablet Take 1 tablet by mouth once daily    famotidine (PEPCID) 20 mg tablet Take 20 mg by mouth two (2) times a day. valACYclovir (VALTREX) 500 mg tablet Take 1 Tablet by mouth daily. MILK THISTLE PO Take 175 mg by mouth daily. OTHER 1 Tablet daily. Indications: AM/PM MENOPAUSE FORMULA 1 TAB QHS    TURMERIC ROOT EXTRACT PO Take 200 mg by mouth daily. psyllium (METAMUCIL) powd Take  by mouth nightly. biotin 5,000 mcg TbDi Take 1 Tablet by mouth daily. cholecalciferol (VITAMIN D3) (5000 Units/125 mcg) tab tablet Take  by mouth daily. meloxicam (MOBIC) 7.5 mg tablet Take 1 Tablet by mouth two (2) times a day.  (Patient not taking: Reported on 3/21/2023)    methylPREDNISolone (Medrol, Kg,) 4 mg tablet Use as directed    diclofenac EC (VOLTAREN) 75 mg EC tablet Take 1 Tablet by mouth two (2) times a day. (Patient not taking: Reported on 3/21/2023)    hyaluronate sodium, stabilized (Monovisc) 88 mg/4 mL syrg injection INJECT IN LEFT KNEE ONCE A WEEK FOR THREE WEEKS (Patient not taking: No sig reported)    anastrozole (ARIMIDEX) 1 mg tablet Take 1 mg by mouth daily. PT TAKES WITH DINNER  Indications: hormone receptor positive breast cancer (Patient not taking: Reported on 3/21/2023)    carboxymethylcellulose-citric (Plenity) 0.75 gram cap Take 3 Capsules by mouth Before breakfast and dinner. (Patient not taking: Reported on 3/21/2023)    fluticasone (Flonase Sensimist) 27.5 mcg/actuation nasal spray 2 Sprays by Both Nostrils route daily. (Patient not taking: Reported on 3/21/2023)    fexofenadine (ALLEGRA) 180 mg tablet Take 360 mg by mouth daily. (Patient not taking: Reported on 3/21/2023)    magnesium 250 mg tab Take 1 Tablet by mouth nightly. omeprazole (PRILOSEC OTC) 20 mg tablet Take 20 mg by mouth as needed. (Patient not taking: Reported on 3/21/2023)     No current facility-administered medications for this visit.        Past Medical History:   Diagnosis Date    Acute diverticulitis 8/18/2017    Allergic rhinitis 8/18/2017    Arthritis 8/18/2017    Arthritis of right knee 11/25/2019    Back pain, thoracic 8/18/2017    Cancer ([facility] [address] 08/2020    LEFT BREAST , LUMPECTOMY WITH RECONSTRUCTION, CHEMO, RADIATION    Cataract, left 8/18/2017    Cataract, right 8/18/2017    Chronic obstructive pulmonary disease ([address]     Cold sore 8/18/2017    COVID-19 vaccine series completed 2/23/21, 3/19/21    PFIZER    Elevated hemoglobin A1c 8/18/2017    Fatigue 8/18/2017    GERD (gastroesophageal reflux disease) 8/18/2017    Heart murmur 8/18/2017    Hepatitis C 8/18/2017    TREATED    Herpes zoster infection of thoracic region 8/18/2017    Hyperlipidemia LDL goal <100 2017    Hypertension     Menopause     Hysterectomy-42years old    Murmur     Obesity (BMI 30-39. 9) 2017    Osteopenia 2017    Other ill-defined conditions(799.89)     hep c    Post-menopausal 2017    Posterior auricular pain of right ear 2017    Preop examination 2017    Valvular heart disease     mitral valve prolapse    Vertigo, benign positional 2017        Past Surgical History:   Procedure Laterality Date    HX BREAST LUMPECTOMY Bilateral 2020    LEFT BREAST REDUCTION LUMPECTOMY WITH ULTRASOUND, LEFT BREAST SENTINEL NODE BIOPSY, LEFT BREAST RECONSTRUCTION, RIGHT BREAST REDUCTION performed by Quentin Pina MD at [address] [place] [address] AMBULATORY OR    HX BREAST REDUCTION Bilateral     HX COLONOSCOPY      HX HYSTERECTOMY      42years old    HX KNEE ARTHROSCOPY Right     knee,    HX KNEE REPLACEMENT  2021    HX ORTHOPAEDIC Right     BUNIONECTOMY, HAMMERTOE REPAIR    HX TONSILLECTOMY         Family History   Problem Relation Age of Onset    Hypertension Mother     Diabetes Mother     Kidney Disease Mother     OSTEOARTHRITIS Mother     Heart Disease Mother     Heart Disease Father     OSTEOARTHRITIS Father     Hypertension Father     Colon Cancer Maternal Grandmother     No Known Problems Sister     Cancer Maternal Aunt     Anesth Problems Neg Hx         Social History     Tobacco Use    Smoking status: Former     Packs/day: 0.50     Years: 25.00     Pack years: 12.50     Types: Cigarettes     Quit date: 1982     Years since quittin.2    Smokeless tobacco: Never   Substance Use Topics    Alcohol use: Not Currently     Comment: 1/WK        Review of Systems   Constitutional: Negative. Respiratory: Negative. Cardiovascular: Negative. Gastrointestinal: Negative. Endocrine: Negative. Genitourinary: Negative. Musculoskeletal:  Positive for back pain and myalgias. Skin: Negative. Allergic/Immunologic: Negative.     Hematological: Negative. Psychiatric/Behavioral: Negative. All other systems reviewed and are negative. No flowsheet data found. Vitals:  Ht 5' 8\" (1.727 m)   Wt 210 lb (95.3 kg)   BMI 31.93 kg/m²    Body mass index is 31.93 kg/m². Physical Exam    Neurologic  Sensory  Light Touch - Intact - Globally. Overall Assessment of Muscle Strength and Tone reveals  Lower Extremities - Right Iliopsoas - 5/5. Left Iliopsoas - 5/5. Right Tibialis Anterior - 5/5. Left Tibialis Anterior - 5/5. Right Gastroc-Soleus - 5/5. Left Gastroc-Soleus - 5/5. Right EHL - 5/5. Left EHL - 5/5. General Assessment of Reflexes  Right Ankle - Clonus is not present. Left Ankle - Clonus is not present. Reflexes (Dermatomes)  2/2 Normal - Left Achilles (L5-S2), Left Knee (L2-4), Right Achilles (L5-S2) and Right Knee (L2-4). Musculoskeletal  Global Assessment  Examination of related systems reveals - well-developed, well-nourished, in no acute distress, alert and oriented x 3. Gait and Station - normal gait and station and normal posture. Right Lower Extremity - normal strength and tone, normal range of motion without pain and no instability, subluxation or laxity. Left Lower Extremity - normal strength and tone, normal range of motion without pain and no instability, subluxation or laxity. Spine/Ribs/Pelvis  Cervical Spine - Examination of the cervical spine reveals - no tenderness to palpation, no pain, no swelling, edema or erythema, normal cervical spine movements and normal sensation. Thoracic (Dorsal) Spine - Examination of the thoracic spine reveals - no tenderness over thoracic vertebrae, no pain, normal sensation and normal thoracic spine movements. Lumbosacral Spine - Examination of the lumbosacral spine reveals - no known fractures or deformities. Inspection and Palpation - Tenderness - moderate. Assessment of pain reveals the following findings - The pain is characterized as - moderate.  Location - pain refers to lower back bilaterally. ROJM - Trunk Extension - 15 degrees. Lumbar Spine Flexion - 35 °. Lumbosacral Spine - Functional Testing - Babinski Test negative, Prone Knee Bending Test negative, Slump Test negative, Straight Leg Raising Test negative. Dr. Jessica Pacheco was available for immediate consult during this encounter. An electronic signature was used to authenticate this note.   -- NANCY Liu

## 2023-03-27 ENCOUNTER — OFFICE VISIT (OUTPATIENT)
Dept: ONCOLOGY | Age: 78
End: 2023-03-27
Payer: COMMERCIAL

## 2023-03-27 VITALS
TEMPERATURE: 98.1 F | BODY MASS INDEX: 32.23 KG/M2 | HEART RATE: 82 BPM | OXYGEN SATURATION: 97 % | DIASTOLIC BLOOD PRESSURE: 68 MMHG | RESPIRATION RATE: 18 BRPM | WEIGHT: 212 LBS | SYSTOLIC BLOOD PRESSURE: 121 MMHG

## 2023-03-27 DIAGNOSIS — Z98.890 HISTORY OF LUMPECTOMY: ICD-10-CM

## 2023-03-27 DIAGNOSIS — Z79.811 USE OF ANASTROZOLE (ARIMIDEX): ICD-10-CM

## 2023-03-27 DIAGNOSIS — J44.9 CHRONIC OBSTRUCTIVE PULMONARY DISEASE, UNSPECIFIED COPD TYPE (HCC): ICD-10-CM

## 2023-03-27 DIAGNOSIS — I10 ESSENTIAL HYPERTENSION: ICD-10-CM

## 2023-03-27 DIAGNOSIS — K21.9 GASTROESOPHAGEAL REFLUX DISEASE WITHOUT ESOPHAGITIS: ICD-10-CM

## 2023-03-27 DIAGNOSIS — Z98.890 H/O BILATERAL BREAST REDUCTION SURGERY: ICD-10-CM

## 2023-03-27 DIAGNOSIS — C50.912 INVASIVE DUCTAL CARCINOMA OF LEFT BREAST (HCC): Primary | ICD-10-CM

## 2023-03-27 DIAGNOSIS — I36.1 NON-RHEUMATIC TRICUSPID VALVE INSUFFICIENCY: ICD-10-CM

## 2023-03-27 DIAGNOSIS — Z96.651 S/P TKR (TOTAL KNEE REPLACEMENT), RIGHT: ICD-10-CM

## 2023-03-27 DIAGNOSIS — R92.2 DENSE BREAST TISSUE ON MAMMOGRAM: ICD-10-CM

## 2023-03-27 DIAGNOSIS — Z86.19 HISTORY OF HEPATITIS C: ICD-10-CM

## 2023-03-27 PROCEDURE — 3078F DIAST BP <80 MM HG: CPT | Performed by: INTERNAL MEDICINE

## 2023-03-27 PROCEDURE — 3074F SYST BP LT 130 MM HG: CPT | Performed by: INTERNAL MEDICINE

## 2023-03-27 PROCEDURE — 1123F ACP DISCUSS/DSCN MKR DOCD: CPT | Performed by: INTERNAL MEDICINE

## 2023-03-27 PROCEDURE — 99213 OFFICE O/P EST LOW 20 MIN: CPT | Performed by: INTERNAL MEDICINE

## 2023-03-27 RX ORDER — ANASTROZOLE 1 MG/1
1 TABLET ORAL DAILY
Qty: 90 TABLET | Refills: 3 | Status: SHIPPED | OUTPATIENT
Start: 2023-03-27

## 2023-03-27 NOTE — PROGRESS NOTES
Cancer [facility] at [address], [id number] [place] [address] Road, [place]: [phone]  F: [phone]    Reason for Visit:   Ban Green is a 78 y.o. female seen today in office for follow up of Left Breast Cancer on adjuvant Anastrozole. Treatment History:   Per Surgery Note:  Initially abnormal screening mammo 7/20 on LEFT with small mass  08/04/20: LEFT Breast Bx. PATH: IDC, 5 mm in greatest linear dimension extending to core biopsy tip, histologic grade 2, ER+(%)/ND+(%)/HER2-. Clinical stage 1  MammaPrint: High risk, luminal type B, -0.123  Breast MRI 8/28/20: 12 mm mass otherwise negative. 11/03/20: LEFT oncoplastic reduction and SLNBx, and RIGHT breast reduction, with Dr. Sergo Galvez. PATH:  LEFT: IDC, 14 x 13 x 12 mm, overall grade 2, negative margins. DCIS present with negative margins  Pathologic stage: pT1c, pN0  RIGHT: Breast tissue with stromal fibrosis and focal papillary apocrine metaplasia. Benign skin and subcutaneous soft tissue. No in-situ or invasive carcinoma identified  Adjuvant TC chemotherapy x 4 cycles from 1/6/21 - 3/10/21  Completed XRT on 5/5/21  Adjuvant Anastrozole 5/21 - Current     STAGE: T1cN0 ER+ HEr2 negative mammaprint high risk luminal B    History of Present Illness:   Ban Green is a 78 y.o. female seen today in office for 3 month follow up of left breast cancer ER+ HER2 negative hx of lumpectomy and bilateral breast reduction on 11/3/20. She started adjuvant hormonal therapy with Anastrozole in 5/21. She reports that she feels well overall today and is tolerating Anastrozole well overall with some mild hot flashes. Her appetite and energy levels are good. She denies fever, chills, mouth sores, cough, SOB, CP, nausea, vomiting, diarrhea, and constipation. She denies pain today but has occasional left breast zingers.  She had a bilateral mammogram on 12/2/22 that was negative/good with probably post op changes, 6 month follow up on LEFT is scheduled for 6/3/23. She has routine HM and labs with her PCP. She is here alone today. Past Medical History:   Diagnosis Date    Acute diverticulitis 8/18/2017    Allergic rhinitis 8/18/2017    Arthritis 8/18/2017    Arthritis of right knee 11/25/2019    Back pain, thoracic 8/18/2017    Cancer ([facility] [address] 08/2020    LEFT BREAST , LUMPECTOMY WITH RECONSTRUCTION, CHEMO, RADIATION    Cataract, left 8/18/2017    Cataract, right 8/18/2017    Chronic obstructive pulmonary disease ([address]     Cold sore 8/18/2017    COVID-19 vaccine series completed 2/23/21, 3/19/21    PFIZER    Elevated hemoglobin A1c 8/18/2017    Fatigue 8/18/2017    GERD (gastroesophageal reflux disease) 8/18/2017    Heart murmur 8/18/2017    Hepatitis C 8/18/2017    TREATED    Herpes zoster infection of thoracic region 8/18/2017    Hyperlipidemia LDL goal <100 8/18/2017    Hypertension     Menopause     Hysterectomy-42years old    Murmur     Obesity (BMI 30-39. 9) 8/18/2017    Osteopenia 8/18/2017    Other ill-defined conditions(799.89)     hep c    Post-menopausal 8/18/2017    Posterior auricular pain of right ear 8/18/2017    Preop examination 8/18/2017    Valvular heart disease     mitral valve prolapse    Vertigo, benign positional 8/18/2017      Past Surgical History:   Procedure Laterality Date    HX BREAST LUMPECTOMY Bilateral 11/03/2020    LEFT BREAST REDUCTION LUMPECTOMY WITH ULTRASOUND, LEFT BREAST SENTINEL NODE BIOPSY, LEFT BREAST RECONSTRUCTION, RIGHT BREAST REDUCTION performed by Hansel Marie MD at [facility]    HX BREAST REDUCTION Bilateral 2020    HX COLONOSCOPY      HX HYSTERECTOMY      42years old    HX KNEE ARTHROSCOPY Right     knee,    HX KNEE REPLACEMENT  07/19/2021    HX ORTHOPAEDIC Right     BUNIONECTOMY, HAMMERTOE REPAIR    HX TONSILLECTOMY        Social History     Tobacco Use    Smoking status: Former     Packs/day: 0.50     Years: 25.00     Pack years: 12.50     Types: Cigarettes     Quit date: 1982     Years since quittin.2    Smokeless tobacco: Never   Substance Use Topics    Alcohol use: Not Currently     Comment: 1/WK      Family History   Problem Relation Age of Onset    Hypertension Mother     Diabetes Mother     Kidney Disease Mother     OSTEOARTHRITIS Mother     Heart Disease Mother     Heart Disease Father     OSTEOARTHRITIS Father     Hypertension Father     Colon Cancer Maternal Grandmother     No Known Problems Sister     Cancer Maternal Aunt     Anesth Problems Neg Hx      Current Outpatient Medications   Medication Sig    anastrozole (ARIMIDEX) 1 mg tablet Take 1 mg by mouth daily. PT TAKES WITH DINNER  Indications: hormone receptor positive breast cancer    meloxicam (MOBIC) 7.5 mg tablet Take 1 Tablet by mouth two (2) times a day.    s-adenosylmethionine (laureen-e) 400 mg tab Take  by mouth. glucosamine/chondro carter A (COSAMIN DS PO) Take  by mouth.    vitamin e (E GEMS) 670 mg (1,000 unit) capsule Take 1,000 Units by mouth daily. B6/folic/B12/coffee/phosphatid (NEURIVA PLUS PO) Take  by mouth.    multivit with calcium,iron,min (WOMEN'S DAILY MULTIVITAMIN PO) Take  by mouth. amoxicillin (AMOXIL) 500 mg capsule Take 500 mg by mouth. Before dental procedures    diclofenac EC (VOLTAREN) 75 mg EC tablet Take 1 Tablet by mouth two (2) times a day. furosemide (LASIX) 20 mg tablet Take 1 tablet by mouth once daily    hyaluronate sodium, stabilized (Monovisc) 88 mg/4 mL syrg injection INJECT IN LEFT KNEE ONCE A WEEK FOR THREE WEEKS    olmesartan-hydroCHLOROthiazide (BENICAR HCT) 20-12.5 mg per tablet Take 1 tablet by mouth once daily    carboxymethylcellulose-citric (Plenity) 0.75 gram cap Take 3 Capsules by mouth Before breakfast and dinner. famotidine (PEPCID) 20 mg tablet Take 20 mg by mouth two (2) times a day. valACYclovir (VALTREX) 500 mg tablet Take 1 Tablet by mouth daily.     fluticasone (Flonase Sensimist) 27.5 mcg/actuation nasal spray 2 Sprays by Both Nostrils route daily. fexofenadine (ALLEGRA) 180 mg tablet Take 360 mg by mouth daily. MILK THISTLE PO Take 175 mg by mouth daily. OTHER 1 Tablet daily. Indications: AM/PM MENOPAUSE FORMULA 1 TAB QHS    TURMERIC ROOT EXTRACT PO Take 200 mg by mouth daily. psyllium (METAMUCIL) powd Take  by mouth nightly. biotin 5,000 mcg TbDi Take 1 Tablet by mouth daily. cholecalciferol (VITAMIN D3) (5000 Units/125 mcg) tab tablet Take  by mouth daily. omeprazole (PRILOSEC OTC) 20 mg tablet Take 20 mg by mouth as needed. No current facility-administered medications for this visit. No Known Allergies     Review of Systems:  A complete review of systems was obtained, negative except as described above and as reported on ROS sheet scanned into system. Physical Exam:     Visit Vitals  /68 (BP 1 Location: Right arm, BP Patient Position: Sitting)   Pulse 82   Temp 98.1 °F (36.7 °C)   Resp 18   Wt 212 lb (96.2 kg)   SpO2 97%   BMI 32.23 kg/m²     ECOG PS: 0-1  General: No distress  Eyes:  Anicteric sclerae  HENT: Atraumatic, wearing a mask  Neck: Supple  Respiratory: Normal respiratory effort, lungs clear  CV: Regular rate and rhythm  GI: Soft, non tender   Ext: No edema  MS: Normal gait and station. Digits without clubbing or cyanosis. Skin: No rashes, ecchymoses, or petechiae. Normal temperature, turgor, and texture  Psych: Alert, oriented, appropriate affect, normal judgment/insight  Breast: Well healed lumpectomy scars on left breast with post XRT skin thickening, no definite palpable masses but dense breast tissue noted. Bilateral reduction scars well healed   Neuro: Non focal     Results:     Lab Results   Component Value Date/Time    WBC 5.1 06/22/2022 10:53 AM    HGB 13.0 06/22/2022 10:53 AM    HCT 40.8 06/22/2022 10:53 AM    PLATELET 101 78/71/0473 10:53 AM    MCV 92.7 06/22/2022 10:53 AM    ABS.  NEUTROPHILS 3.0 06/22/2022 10:53 AM    HGB (POC) 12.6 09/11/2017 11:03 AM    HCT (POC) 38.2 09/11/2017 11:03 AM     Lab Results   Component Value Date/Time    Sodium 138 12/22/2022 11:35 AM    Potassium 4.1 12/22/2022 11:35 AM    Chloride 105 12/22/2022 11:35 AM    CHLORIDE 104.0 03/28/2018 10:08 AM    CO2 30 12/22/2022 11:35 AM    Glucose 94 12/22/2022 11:35 AM    BUN 23 (H) 12/22/2022 11:35 AM    Creatinine 1.02 12/22/2022 11:35 AM    GFR est AA >60 06/22/2022 10:53 AM    GFR est non-AA 59 (L) 06/22/2022 10:53 AM    Calcium 10.0 12/22/2022 11:35 AM    Sodium (POC) 146.0 (A) 03/28/2018 10:08 AM    Potassium (POC) 4.6 03/28/2018 10:08 AM    Glucose (POC) 95 07/19/2021 07:14 AM    Creatinine (POC) 0.7 03/28/2018 10:08 AM     Lab Results   Component Value Date/Time    Bilirubin, total 0.3 12/22/2022 11:35 AM    ALT (SGPT) 19 12/22/2022 11:35 AM    Alk. phosphatase 69 12/22/2022 11:35 AM    Protein, total 8.2 12/22/2022 11:35 AM    Albumin 4.2 12/22/2022 11:35 AM    Globulin 4.0 12/22/2022 11:35 AM     8/4/20  FINAL PATHOLOGIC DIAGNOSIS   Left breast, mass, biopsy:   Invasive mammary carcinoma, ductal NOS. 5 mm in greatest linear dimension extending to core biopsy tip. Histologic grade: Grade 2 (3, 2, 1). Left breast, mass, biopsy, breast biomarkers:   Estrogen Receptor (ER) Status: Positive. Percentage of cells with nuclear positivity:  %. Average intensity of staining: Strong. Progesterone Receptor (PgR) Status: Positive. Percentage of cells with nuclear positivity:  %. Average intensity of staining: Strong. HER-2 by Immunochemistry: Negative (score 1). 11/3/20  FINAL PATHOLOGIC DIAGNOSIS   1. Left axilla, sentinel lymph node #1; dissection:   No carcinoma identified in one lymph node (0/1); (see comment). 2. Left axilla, sentinel lymph node #2; dissection:   No carcinoma identified in one lymph node (0/1); (see comment). 3. Left breast; lumpectomy:   Invasive ductal carcinoma (1.4 x 1.3 x 1.2 cm), histologic grade II/III; (see synoptic report).    Focal ductal carcinoma in situ (DCIS), nuclear grade 2, solid type. Previous procedure/biopsy cavity related changes present. All surgical resections margins are negative for in-situ and invasive carcinoma. 4. Left breast, deep lumpectomy margin; excision:   Breast tissue with focal atypical lobular hyperplasia (ALH), not seen at inked specimen margins. No in-situ or invasive carcinoma identified. 5. Left breast tissue; breast reduction:   Breast tissue with stromal fibrosis and focal microcalcifications. Benign skin and subcutaneous soft tissue. One benign lymph node (0/1). No in-situ or invasive carcinoma identified. 6. Right breast tissue; breast reduction:   Breast tissue with stromal fibrosis and focal papillary apocrine metaplasia. Benign skin and subcutaneous soft tissue. No in-situ or invasive carcinoma identified. INVASIVE CARCINOMA OF THE BREAST: Resection   SPECIMEN   Procedure: Lumpectomy   Specimen Laterality: Left   TUMOR   Histologic Type:  Invasive carcinoma of no special type (ductal)   Glandular (Acinar) / Tubular Differentiation: Score 3   Nuclear Pleomorphism: Score 2-3   Mitotic Rate: Score 1   Overall Grade: Grade 2 (scores of 6 or 7)   Tumor Size: 14 x 13 x 12 mm   Tumor Focality: Single focus of invasive carcinoma   Ductal Carcinoma In Situ (DCIS): Present   Ductal Carcinoma In Situ (DCIS): Negative for extensive   intraductal component (EIC)   Number of Blocks with DCIS: 1   Number of Blocks Examined: 20   Architectural Patterns: Solid   Nuclear Grade: Grade II   Necrosis: Not identified   Lobular Carcinoma In Situ (LCIS): Not identified   Tumor Extent   Skin:   Lymphovascular Invasion: Not identified   Microcalcifications: Present in non-neoplastic tissue   Treatment Effect in the Breast: No known presurgical therapy   MARGINS   Invasive Carcinoma Margins: Uninvolved by invasive carcinoma   Distance from Closest Margin (Millimeters): 5 mm   Closest Margin(s): Medial   DCIS Margins: Uninvolved by DCIS   Distance from Closest Margin (Millimeters): 9 mm   Closest Margin(s): Medial   LYMPH NODES   Regional Lymph Nodes: Uninvolved by tumor cells   Total Number of Lymph Nodes Examined: 3   Number of [place] Nodes Examined: 2   PATHOLOGIC STAGE CLASSIFICATION (pTNM, AJCC 8th Edition)   Primary Tumor (pT): pT1c   Regional Lymph Nodes (pN): pN0   SPECIAL STUDIES   Breast Biomarker Testing Performed on Previous Biopsy: Estrogen   Receptor (ER), Progesterone Receptor (PgR), HER2 (by immunohistochemistry)   Estrogen Receptor (ER) Status: Positive (greater than 10% of cells demonstrate nuclear positivity)   Percentage of Cells with Nuclear Positivity: %   Progesterone Receptor (PgR) Status: Positive   Percentage of Cells with Nuclear Positivity: %   HER2 (by immunohistochemistry): Negative (Score 1+)     [facility] Results (most recent):  Results from Hospital Encounter encounter on 12/02/22    [facility] 3D CARRIE W MAMMO BI DX INCL CAD    Narrative  INDICATION: Diagnostic annual follow-up per lumpectomy protocol. History of  breast conserving therapy on the left with reconstruction in November 2020,  accompanied by right breast reduction. EXAM: Bilateral Digital diagnostic Mammography. COMPARISON: Prior studies dating back to 2006, most recently 6/9/2022 . PROCEDURE: Digital tomography was performed with CAD overview. Ariel Best FINDINGS:  BREAST COMPOSITION: The breast tissue is heterogeneously dense, which could  obscure detection of small masses (approximately 51-75% glandular). There is stable postoperative change on the left except for the development of  multiple clusters of coarse calcifications throughout the operative bed. On the  right, calcifications and asymmetries are stable status post reduction  mammoplasty. There are no new dominant masses, skin changes or nipple changes  which suggest malignancy. Impression  1. Assessment Category 3: Probably benign finding. Short-interval follow-up  suggested. Ariel Best Developing probably dystrophic postoperative calcifications in the  left breast in the operative bed. Stable postoperative findings on the right. 2.  Follow-up diagnostic mammography with magnification views is recommended on  the left in 6 months to confirm stability. . Follow-up diagnostic mammography in  one year on the right. 3.  These findings have been relayed to the patient. Zahraaharlan Doll DEXA Results (most recent):  Results from Hospital Encounter encounter on 06/22/22    DEXA BONE DENSITY STUDY AXIAL    Narrative  Bone Mineral Density    Indication: Osteoporosis, aromatase inhibitor therapy  Age: 78  Sex: Female. Menopause status: Postmenopausal.  Hormone replacement therapy: No    Number of falls in the past year: None. Risk factors for osteoporosis: Chronic steroid use    Current medication for osteoporosis: None. Comparison: 5/2/2019    Technique: Imaging was performed on the [employer]. World Health Organization  meta-analysis fracture risk calculator (FRAX) analysis was performed for 10 year  fracture risk probability assessment    Excluded sites: L1 and L2 due to spondylosis    Findings:    Femoral Neck: Right  Bone mineral density (gm/cm2): 0.889  % of peak bone mass: 86  % for age matched controls: 90  T-score: -1.1  Z-score: -0.7    Total Hip: Right  Bone mineral density (gm/cm2): 0.898  % of peak bone mass: 89  % for age matched controls: 90  T-score: -0.9  Z-score: -0.8    Lumbar Spine: L3-L4  Bone mineral density (gm/cm2): 1.136  % of peak bone mass: 93  % for age matched controls: 80  T-score: -0.7  Z-score: -0.6    33% Radius:  Left  Bone mineral density (gm/cm2):  0.627  % of peak bone mass:  71  % for age matched controls:  80  T-score:  -2.9  Z-score:  -1.4    Impression  Impression:     This patient is osteoporotic using the World Health Organization criteria  As compared to the prior study, there has been a decrease in the bone mineral  density of the lumbar spine of 1.7%, of the right total hip of 1.3%, and no  change in the bone mineral density of the left distal third radius. 10 year probability of major osteoporotic fracture: 14.7%  10 year probability of hip fracture: 6.1%    Recommendations:  Therapy recommendations need to be tailored to each individual patient. Using  the [address] [facility]) FRAX absolute fracture algorithm, the  [address] [place] [address] recommends beginning pharmacological therapy in  postmenopausal women and men over the age of 48 with a 8 year probability of a  hip fracture of >3% OR with the 10 year probability of a major osteoporotic  fracture of >20%. Please reconsider testing based on risk factors. Currently, Medicare will only  reimburse for a central DXA examination every two years, unless the patient is  on chronic glucocorticoid therapy. Note: Please note that reliable, valid comparisons cannot be made between  studies which have been performed on machines from different manufacturers. If  clinically warranted, a follow up study performed at this site, on the same  unit, would allow the most sensitive assessment of change in bone mineral  density. Records reviewed and summarized above. Pathology report(s) reviewed above. Radiology report(s) reviewed above. Assessment/PLAN:     1) Stage 1 T1cN0 ER+ HER2 negative Left Breast Cancer Mammaprint High Risk Luminal B post Lumpectomy/Bilateral Reduction on 11/3/20  She started adjuvant TC chemotherapy on 1/6/21. She completed 4 cycles of TC on 3/10/21. She completed radiation on 5/5/21. She started adjuvant Anastrozole in 5/21. Patient seen today in office for 6 month follow up. She remains on adjuvant hormonal therapy with Anastrozole. She is tolerating Anastrozole well overall with some mild hot flashes. She clinically stable today, feels well overall - nothing new or different. She had a bilateral mammogram on 12/2/22 and will have follow up on LEFT on 6/3/23.   Discussed Breast MRI today due to hx of breast tissue and dense breast tissue on mammogram.  Patient would like to proceed with Breast MRI - ordered. She will see [facility] again on 12/19/23. Continue Anastrozole for 5 years then re eval.  Follow up in 6 months. Patient agrees with plan. 2) Hx of Hep C  Cured per patient. Management per [facility] [place]. 3) Heart Valve Problems/HTN  Management per Cardiology. 4) COPD/GERD   Management per PCP. 5) Hx Right TKR/CTS  Management per Ortho. 6) Hot Flashes  Likely due to AI. Mild, stable, unchanged, and tolerable per patient. Will continue to monitor. 7) Psychosocial  Mood good, coping well. She continues to work . SW/NN support as needed. She is here alone today. Call if questions. Follow up in 6 months. I have personally performed a face to face diagnostic evaluation on this patient. I have reviewed and agree with care plan today. My findings are as follows:  Breast cancer on adjuvant AI  Tolerating well. Mammo with 6 mo fu on both sides  Has dense breast tissue and discussed doing MRI and pt wants to do / ordered  Labs and routine HM with PCP  Continue adjuvant hormonal therapy to 5 years then re eval      I appreciate the opportunity to participate in Ms. Danielle Jones's care.     Signed By: Ana Lilia King DO

## 2023-03-27 NOTE — LETTER
3/27/2023    Patient: Nelida Joshi   YOB: 1945   Date of Visit: 3/27/2023     MD Dimas [address]  [address] [address]  Via In [address], CRNA  [address]  Via In Basket    Dear MD Mesfin Arias CRNA,      Thank you for referring Ms. Margi Leary to [address] [place] [address] for evaluation. My notes for this consultation are attached. If you have questions, please do not hesitate to call me. I look forward to following your patient along with you.       Sincerely,    Olivier Helms, DO

## 2023-03-27 NOTE — PROGRESS NOTES
Eladio Cain is a 78 y.o. female    Chief Complaint   Patient presents with    Follow-up     Left Breast Cancer on adjuvant Anastrozole       1. Have you been to the ER, urgent care clinic since your last visit? Hospitalized since your last visit? No    2. Have you seen or consulted any other health care providers outside of the [address] [place] [address] since your last visit? Include any pap smears or colon screening.  No

## 2023-03-29 ENCOUNTER — HOSPITAL ENCOUNTER (OUTPATIENT)
Dept: PHYSICAL THERAPY | Age: 78
Discharge: HOME | End: 2023-03-29
Payer: COMMERCIAL

## 2023-03-29 PROCEDURE — 97110 THERAPEUTIC EXERCISES: CPT | Performed by: PHYSICAL THERAPIST

## 2023-03-29 PROCEDURE — 97162 PT EVAL MOD COMPLEX 30 MIN: CPT | Performed by: PHYSICAL THERAPIST

## 2023-03-29 NOTE — PROGRESS NOTES
PT INITIAL EVALUATION NOTE - [facility] 2-15    Patient Name: Aniket Cohn  Date:3/29/2023  : 1945  [x]  Patient  Verified  Payor: BLUE CROSS / Plan: Luly Puente [id number] PPO / Product Type: PPO /    In time: 2:56pm  Out time: 3:45pm  Total Treatment Time (min): 49  Total Timed Codes (min): 25  1:1 Treatment Time ( only): 25   Visit #: 1     Treatment Area: Lumbago with sciatica, right side [M54.41]  Low back pain, unspecified [M54.50]    SUBJECTIVE  Pain Level (0-10 scale): 6 (0-10/10)  Any medication changes, allergies to medications, adverse drug reactions, diagnosis change, or new procedure performed?: [] No    [x] Yes (see summary sheet for update)  Subjective:    X-rays on 3/21/23 show: 'There is well-maintained lumbar lordosis. There is mild degenerative scoliosis noted in the lumbar spine. Moderate disc degeneration in particular at L4-5 and L5-S1 with minimal anterolisthesis at both levels. No movement with flexion or extension views. \"    It started about 5-6 weeks ago. It will radiate down the right leg to the foot with numbness. It will be irritated every morning, but it will feel better after sitting with heating pad for 10-15 minutes. She sleeps on her side, but hasn't tried a pillow betwe=en the knees. She does sleep at an incline (adjustable bed). Sitting for awhile irritates, but a cushion helps. She uses a recumbant bike at the gym for 30-45min, about 5 miles. She sometimes does sit ups (machine with 30lbs) and the elliptical. The symptoms down the leg are usually just in the morning. She's had a right hammer toe surgery, right TKR. OBJECTIVE/EXAMINATION  Posture:  scapular protraction bilaterally  Other Observations:  SLS: R= 3s: L= 2s  Gait and Functional Mobility:  minimal trunk rotation; straight toes  Palpation: lumbar spinous processes; right lateral hip        Lumbar AROM:          R  L    Flexion    90%      Extension   Limited, p!  Down right leg      Side Bending   limited  limited    Rotation   Limited, p!   limited        LOWER QUARTER   MUSCLE STRENGTH  KEY       R  L  0 - No Contraction  L1, L2 Psoas  4  4  1 - Trace   L3 Quads  4  4  2 - Poor   L4 Tib Ant  4  4  3 - Fair    L5 EHL  4  4  4 - Good   S1 Peroneals  4  4  5 - Normal   S2 Hams  4  4    Flexibility: tight hip flexors and calves  Mobility Assessment: hypomobile lumbar and hip IR on right more than left      MMT:               HIP ER: 3-/5 on left; 3+/5 on right              HIP Abd: 3/5 bilaterally  Neurological: Reflexes / Sensations: nt  Special Tests:          SLR: + on right side        Long Sit: left leg longer due to scoliosis         25 min Therapeutic Exercise:  [x] See flow sheet :   Rationale: increase ROM, increase strength, and improve coordination to improve the patients ability to perform daily activities.           With   [x] TE   [] TA   [] Neuro   [] SC   [] other: Patient Education: [x] Review HEP    [x] Progressed/Changed HEP based on:   [x] positioning   [x] body mechanics   [] transfers   [] heat/ice application    [] other:      Other Objective/Functional Measures: FOTO Functional Measure: 47/100                         Pain Level (0-10 scale) post treatment: 6    ASSESSMENT/Changes in Function:     [x]  See Plan of [facility], PT 3/29/2023

## 2023-03-29 NOTE — THERAPY EVALUATION
[facility]  [address] ([address]), [address] Angelique Mccarthy  Phone: [phone] Fax: [phone]     Plan of Care/Statement of Necessity for Physical Therapy Services  2-15    Patient name: Kaylan Martines  : 1945  Provider#: [id number]  Referral source: NANCY Gonzalez      Medical/Treatment Diagnosis: Lumbago with sciatica, right side [M54.41]  Low back pain, unspecified [M54.50]     Prior Hospitalization: see medical history     Comorbidities: arthritis, back pain, BMI over 30, cancer, COPD, GI disease, HTN, osteoporosis  Prior Level of Function: 20 min of exercise 1-2x/wk  Medications: Verified on Patient Summary List  Start of Care: 3/29/23      Onset Date: 2023   The Plan of Care and following information is based on the information from the initial evaluation. Assessment/ key information: The patient presents with a chief complaint of lumbar/glute pain that radiates down the back of her right leg to her foot, consistent with lumbar radiculopathy/sciatica. Recent x-rays on 3/21/23 show: 'There is well-maintained lumbar lordosis. There is mild degenerative scoliosis noted in the lumbar spine. Moderate disc degeneration in particular at L4-5 and L5-S1 with minimal anterolisthesis at both levels. No movement with flexion or extension views. \" The scoliosis has also created a left longer leg, altering her gait and weightbearing. She also sleeps on a slight incline on her side, which may also be contributing to morning discomfort (this was thoroughly discussed). Her general hypomobility with rotation and decreased hip/glute/core strength exacerbates her symptoms.  The patient will benefit from guided therapeutic interventions such as therex for strengthening and neuromuscular re-education, manual techniques for joint mobility and soft tissue extensibility, and modalities for pain management in order to improve functional mobility with daily activities. Evaluation Complexity History HIGH Complexity :3+ comorbidities / personal factors will impact the outcome/ POC ; Examination MEDIUM Complexity : 3 Standardized tests and measures addressing body structure, function, activity limitation and / or participation in recreation  ;Presentation MEDIUM Complexity : Evolving with changing characteristics  ; Clinical Decision Making MEDIUM Complexity : FOTO score of 26-74  Overall Complexity Rating: MEDIUM    Problem List: pain affecting function, decrease ROM, decrease strength, edema affecting function, impaired gait/ balance, decrease ADL/ functional abilitiies, decrease activity tolerance, decrease flexibility/ joint mobility, and decrease transfer abilities   Treatment Plan may include any combination of the following: Therapeutic exercise, Neuromuscular reeducation, Manual therapy, Therapeutic activity, Self care/home management, Electric stim unattended , Gait training, Mechanical traction, Electric stim attended, Needle insertion w/o injection (1 or 2 muscles), and Needle insertion w/o injection (3+ muscles)  Patient / Family readiness to learn indicated by: asking questions, trying to perform skills, and interest  Persons(s) to be included in education: patient (P)  Barriers to Learning/Limitations: None  Patient Goal (s): eliminate pain  Patient Self Reported Health Status: good  Rehabilitation Potential: good    Short Term Goals: To be accomplished in 2 treatments:                         1.) The patient will be independent with their HEP consistently for at least one week. Long Term Goals: To be accomplished in 24 treatments:                         1.) The patient will have at most 6/10 pain with daily activities. 2.) The patient will be able to sit for at least 30min without having to change positions due to pain.                          3.) The patient will improve their FOTO score from 47 to at least 52 to show improvements in functional mobility. 4.) The patient will self report at least a 20% improvement in her symptoms since the start of therapy. 5.) The patient will have at least one morning without significant sciatica pain. Frequency / Duration: Patient to be seen 2 times per week for 24 treatments. Patient/ Caregiver education and instruction: self care, activity modification, and exercises    [x]  Plan of care has been reviewed with COY Horn, PT 3/29/2023     ________________________________________________________________________    I certify that the above Therapy Services are being furnished while the patient is under my care. I agree with the treatment plan and certify that this therapy is necessary.     [de-identified] Signature:____________________  Date:____________Time: _________         NANCY Hylton

## 2023-04-21 DIAGNOSIS — R92.8 ABNORMAL FINDING ON BREAST IMAGING: Primary | ICD-10-CM

## 2023-04-22 DIAGNOSIS — R92.8 ABNORMAL FINDING ON BREAST IMAGING: Primary | ICD-10-CM

## 2023-04-22 DIAGNOSIS — R92.8 ABNORMAL MAMMOGRAM OF LEFT BREAST: Primary | ICD-10-CM

## 2023-04-23 DIAGNOSIS — Z85.3 HISTORY OF BREAST CANCER IN FEMALE: Primary | ICD-10-CM

## 2023-05-02 ENCOUNTER — HOSPITAL ENCOUNTER (OUTPATIENT)
Dept: MRI IMAGING | Age: 78
Discharge: HOME | End: 2023-05-02
Attending: NURSE PRACTITIONER
Payer: COMMERCIAL

## 2023-05-02 VITALS — WEIGHT: 212 LBS | BODY MASS INDEX: 32.23 KG/M2

## 2023-05-02 DIAGNOSIS — C50.912 INVASIVE DUCTAL CARCINOMA OF LEFT BREAST (HCC): ICD-10-CM

## 2023-05-02 DIAGNOSIS — R92.2 DENSE BREAST TISSUE ON MAMMOGRAM: ICD-10-CM

## 2023-05-02 PROCEDURE — 77049 MRI BREAST C-+ W/CAD BI: CPT

## 2023-05-02 PROCEDURE — A9576 INJ PROHANCE MULTIPACK: HCPCS

## 2023-05-02 PROCEDURE — 74011000250 HC RX REV CODE- 250: Performed by: NURSE PRACTITIONER

## 2023-05-02 PROCEDURE — 74011250636 HC RX REV CODE- 250/636

## 2023-05-02 PROCEDURE — 74011000258 HC RX REV CODE- 258: Performed by: NURSE PRACTITIONER

## 2023-05-02 RX ORDER — SODIUM CHLORIDE 0.9 % (FLUSH) 0.9 %
10 SYRINGE (ML) INJECTION ONCE
Status: COMPLETED | OUTPATIENT
Start: 2023-05-02 | End: 2023-05-02

## 2023-05-02 RX ADMIN — SODIUM CHLORIDE, PRESERVATIVE FREE 10 ML: 5 INJECTION INTRAVENOUS at 13:54

## 2023-05-02 RX ADMIN — SODIUM CHLORIDE 100 ML: 9 INJECTION, SOLUTION INTRAVENOUS at 13:54

## 2023-05-02 RX ADMIN — GADOTERIDOL 20 ML: 279.3 INJECTION, SOLUTION INTRAVENOUS at 13:54

## 2023-05-03 DIAGNOSIS — C50.912 MALIGNANT NEOPLASM OF LEFT FEMALE BREAST, UNSPECIFIED ESTROGEN RECEPTOR STATUS, UNSPECIFIED SITE OF BREAST (HCC): Primary | ICD-10-CM

## 2023-06-02 ENCOUNTER — HOSPITAL ENCOUNTER (OUTPATIENT)
Facility: HOSPITAL | Age: 78
Discharge: HOME | End: 2023-06-02
Payer: COMMERCIAL

## 2023-06-02 DIAGNOSIS — Z85.3 PERSONAL HISTORY OF BREAST CANCER: Primary | ICD-10-CM

## 2023-06-02 DIAGNOSIS — R92.1 BREAST CALCIFICATION SEEN ON MAMMOGRAM: ICD-10-CM

## 2023-06-02 DIAGNOSIS — R92.8 ABNORMAL MAMMOGRAM OF LEFT BREAST: ICD-10-CM

## 2023-06-02 PROCEDURE — G0279 TOMOSYNTHESIS, MAMMO: HCPCS

## 2023-06-07 NOTE — RESULT ENCOUNTER NOTE
Call to patient, ID verified X 2. Provider message re recent L mammogram relayed, bilateral due in 12/23. Understanding verbalized. States already scheduled bilateral mammogram for 123/2023.

## 2023-06-19 ENCOUNTER — TELEPHONE (OUTPATIENT)
Age: 78
End: 2023-06-19

## 2023-06-19 NOTE — TELEPHONE ENCOUNTER
----- Message from TJ Rubin NP sent at 6/16/2023 10:21 AM EDT -----  Echo showed normal heart pumping strength,  mild-moderate valvular disease. Pls have pt follow up with Adry Sanchez, next avail      Spoke with patient. 2 patient identifiers used. Reviewed result note above with patient. Patient verbalized understanding and scheduled for next available.      Future Appointments   Date Time Provider Skyler Nielsen   6/26/2023 10:45 AM MD GALINDO Pantoja BS AMB   6/28/2023  2:40 PM TJ Haro NP AMB   7/18/2023  2:30 PM Parveen Lai [address]   10/2/2023 11:30 AM Koby Crockett,  [address] BS AMB   12/6/2023 12:30 PM [address] [place] [address] [address]   12/19/2023  1:30 PM [facility]oguera [facility]

## 2023-06-24 SDOH — HEALTH STABILITY: PHYSICAL HEALTH: ON AVERAGE, HOW MANY MINUTES DO YOU ENGAGE IN EXERCISE AT THIS LEVEL?: 0 MIN

## 2023-06-24 SDOH — ECONOMIC STABILITY: FOOD INSECURITY: WITHIN THE PAST 12 MONTHS, YOU WORRIED THAT YOUR FOOD WOULD RUN OUT BEFORE YOU GOT MONEY TO BUY MORE.: NEVER TRUE

## 2023-06-24 SDOH — ECONOMIC STABILITY: TRANSPORTATION INSECURITY
IN THE PAST 12 MONTHS, HAS LACK OF TRANSPORTATION KEPT YOU FROM MEETINGS, WORK, OR FROM GETTING THINGS NEEDED FOR DAILY LIVING?: NO

## 2023-06-24 SDOH — ECONOMIC STABILITY: HOUSING INSECURITY
IN THE LAST 12 MONTHS, WAS THERE A TIME WHEN YOU DID NOT HAVE A STEADY PLACE TO SLEEP OR SLEPT IN A SHELTER (INCLUDING NOW)?: NO

## 2023-06-24 SDOH — ECONOMIC STABILITY: INCOME INSECURITY: HOW HARD IS IT FOR YOU TO PAY FOR THE VERY BASICS LIKE FOOD, HOUSING, MEDICAL CARE, AND HEATING?: NOT HARD AT ALL

## 2023-06-24 SDOH — HEALTH STABILITY: PHYSICAL HEALTH: ON AVERAGE, HOW MANY DAYS PER WEEK DO YOU ENGAGE IN MODERATE TO STRENUOUS EXERCISE (LIKE A BRISK WALK)?: 0 DAYS

## 2023-06-24 SDOH — ECONOMIC STABILITY: FOOD INSECURITY: WITHIN THE PAST 12 MONTHS, THE FOOD YOU BOUGHT JUST DIDN'T LAST AND YOU DIDN'T HAVE MONEY TO GET MORE.: NEVER TRUE

## 2023-06-24 ASSESSMENT — LIFESTYLE VARIABLES
HOW MANY STANDARD DRINKS CONTAINING ALCOHOL DO YOU HAVE ON A TYPICAL DAY: 0
HOW MANY STANDARD DRINKS CONTAINING ALCOHOL DO YOU HAVE ON A TYPICAL DAY: PATIENT DOES NOT DRINK
HOW OFTEN DO YOU HAVE A DRINK CONTAINING ALCOHOL: 1
HOW OFTEN DO YOU HAVE A DRINK CONTAINING ALCOHOL: NEVER
HOW OFTEN DO YOU HAVE SIX OR MORE DRINKS ON ONE OCCASION: 1

## 2023-06-24 ASSESSMENT — PATIENT HEALTH QUESTIONNAIRE - PHQ9
1. LITTLE INTEREST OR PLEASURE IN DOING THINGS: 0
SUM OF ALL RESPONSES TO PHQ QUESTIONS 1-9: 0
SUM OF ALL RESPONSES TO PHQ9 QUESTIONS 1 & 2: 0
2. FEELING DOWN, DEPRESSED OR HOPELESS: 0
SUM OF ALL RESPONSES TO PHQ QUESTIONS 1-9: 0

## 2023-06-26 ENCOUNTER — OFFICE VISIT (OUTPATIENT)
Facility: CLINIC | Age: 78
End: 2023-06-26
Payer: COMMERCIAL

## 2023-06-26 VITALS
WEIGHT: 215.4 LBS | RESPIRATION RATE: 20 BRPM | HEIGHT: 68 IN | BODY MASS INDEX: 32.64 KG/M2 | TEMPERATURE: 98.2 F | HEART RATE: 72 BPM | DIASTOLIC BLOOD PRESSURE: 78 MMHG | SYSTOLIC BLOOD PRESSURE: 118 MMHG | OXYGEN SATURATION: 98 %

## 2023-06-26 DIAGNOSIS — I10 ESSENTIAL HYPERTENSION: Primary | ICD-10-CM

## 2023-06-26 DIAGNOSIS — Z00.00 MEDICARE ANNUAL WELLNESS VISIT, SUBSEQUENT: ICD-10-CM

## 2023-06-26 DIAGNOSIS — E55.9 VITAMIN D DEFICIENCY, UNSPECIFIED: ICD-10-CM

## 2023-06-26 DIAGNOSIS — I27.20 PULMONARY HYPERTENSION, UNSPECIFIED (HCC): ICD-10-CM

## 2023-06-26 DIAGNOSIS — M81.0 AGE-RELATED OSTEOPOROSIS WITHOUT CURRENT PATHOLOGICAL FRACTURE: ICD-10-CM

## 2023-06-26 DIAGNOSIS — Z79.811 USE OF ANASTROZOLE (ARIMIDEX): ICD-10-CM

## 2023-06-26 DIAGNOSIS — R73.09 ELEVATED HEMOGLOBIN A1C: ICD-10-CM

## 2023-06-26 DIAGNOSIS — K21.9 GASTROESOPHAGEAL REFLUX DISEASE WITHOUT ESOPHAGITIS: ICD-10-CM

## 2023-06-26 DIAGNOSIS — G62.9 NEUROPATHY: ICD-10-CM

## 2023-06-26 DIAGNOSIS — E53.8 B12 DEFICIENCY: ICD-10-CM

## 2023-06-26 DIAGNOSIS — E78.5 HYPERLIPIDEMIA LDL GOAL <100: ICD-10-CM

## 2023-06-26 PROCEDURE — 3074F SYST BP LT 130 MM HG: CPT | Performed by: INTERNAL MEDICINE

## 2023-06-26 PROCEDURE — G0439 PPPS, SUBSEQ VISIT: HCPCS | Performed by: INTERNAL MEDICINE

## 2023-06-26 PROCEDURE — 3078F DIAST BP <80 MM HG: CPT | Performed by: INTERNAL MEDICINE

## 2023-06-26 PROCEDURE — 1123F ACP DISCUSS/DSCN MKR DOCD: CPT | Performed by: INTERNAL MEDICINE

## 2023-06-27 LAB
25(OH)D3 SERPL-MCNC: 60.8 NG/ML (ref 30–100)
CHOLEST SERPL-MCNC: 200 MG/DL
EST. AVERAGE GLUCOSE BLD GHB EST-MCNC: 97 MG/DL
FOLATE SERPL-MCNC: 40.1 NG/ML (ref 5–21)
HBA1C MFR BLD: 5 % (ref 4–5.6)
HDLC SERPL-MCNC: 45 MG/DL
HDLC SERPL: 4.4 (ref 0–5)
LDLC SERPL CALC-MCNC: 118.2 MG/DL (ref 0–100)
TRIGL SERPL-MCNC: 184 MG/DL
VIT B12 SERPL-MCNC: 1463 PG/ML (ref 193–986)
VLDLC SERPL CALC-MCNC: 36.8 MG/DL

## 2023-06-28 ENCOUNTER — OFFICE VISIT (OUTPATIENT)
Age: 78
End: 2023-06-28
Payer: COMMERCIAL

## 2023-06-28 VITALS
WEIGHT: 214.2 LBS | OXYGEN SATURATION: 97 % | BODY MASS INDEX: 32.46 KG/M2 | HEIGHT: 68 IN | DIASTOLIC BLOOD PRESSURE: 68 MMHG | SYSTOLIC BLOOD PRESSURE: 98 MMHG | HEART RATE: 74 BPM

## 2023-06-28 DIAGNOSIS — E78.2 MIXED HYPERLIPIDEMIA: ICD-10-CM

## 2023-06-28 DIAGNOSIS — Z92.21 HISTORY OF CHEMOTHERAPY: ICD-10-CM

## 2023-06-28 DIAGNOSIS — I36.1 NON-RHEUMATIC TRICUSPID VALVE INSUFFICIENCY: ICD-10-CM

## 2023-06-28 DIAGNOSIS — I10 ESSENTIAL (PRIMARY) HYPERTENSION: Primary | ICD-10-CM

## 2023-06-28 LAB
APPEARANCE UR: ABNORMAL
BACTERIA URNS QL MICRO: ABNORMAL /HPF
BILIRUB UR QL: ABNORMAL
COLOR UR: ABNORMAL
EPITH CASTS URNS QL MICRO: ABNORMAL /LPF (ref 0–5)
GLUCOSE UR STRIP.AUTO-MCNC: ABNORMAL MG/DL
HGB UR QL STRIP: ABNORMAL
HYALINE CASTS URNS QL MICRO: ABNORMAL /LPF (ref 0–5)
KETONES UR QL STRIP.AUTO: ABNORMAL MG/DL
LEUKOCYTE ESTERASE UR QL STRIP.AUTO: ABNORMAL
NITRITE UR QL STRIP.AUTO: ABNORMAL
PH UR STRIP: ABNORMAL (ref 5–8)
PROT UR STRIP-MCNC: ABNORMAL MG/DL
RBC #/AREA URNS HPF: ABNORMAL /HPF (ref 0–5)
SP GR UR REFRACTOMETRY: ABNORMAL (ref 1–1.03)
UROBILINOGEN UR QL STRIP.AUTO: ABNORMAL EU/DL (ref 0.2–1)
WBC URNS QL MICRO: ABNORMAL /HPF (ref 0–4)

## 2023-06-28 PROCEDURE — 3078F DIAST BP <80 MM HG: CPT | Performed by: NURSE PRACTITIONER

## 2023-06-28 PROCEDURE — 1123F ACP DISCUSS/DSCN MKR DOCD: CPT | Performed by: NURSE PRACTITIONER

## 2023-06-28 PROCEDURE — 99214 OFFICE O/P EST MOD 30 MIN: CPT | Performed by: NURSE PRACTITIONER

## 2023-06-28 PROCEDURE — 3074F SYST BP LT 130 MM HG: CPT | Performed by: NURSE PRACTITIONER

## 2023-06-28 RX ORDER — MULTIVIT WITH MINERALS/LUTEIN
1000 TABLET ORAL DAILY
COMMUNITY

## 2023-06-28 RX ORDER — FUROSEMIDE 20 MG/1
20 TABLET ORAL DAILY
Qty: 90 TABLET | Refills: 1 | Status: SHIPPED | OUTPATIENT
Start: 2023-06-28

## 2023-06-28 RX ORDER — OLMESARTAN MEDOXOMIL AND HYDROCHLOROTHIAZIDE 20/12.5 20; 12.5 MG/1; MG/1
1 TABLET ORAL DAILY
Qty: 90 TABLET | Refills: 3 | Status: SHIPPED | OUTPATIENT
Start: 2023-06-28

## 2023-06-28 RX ORDER — OLMESARTAN MEDOXOMIL AND HYDROCHLOROTHIAZIDE 20/12.5 20; 12.5 MG/1; MG/1
TABLET ORAL
Qty: 90 TABLET | Refills: 3 | Status: SHIPPED | OUTPATIENT
Start: 2023-06-28 | End: 2023-06-28 | Stop reason: SDUPTHER

## 2023-06-28 ASSESSMENT — PATIENT HEALTH QUESTIONNAIRE - PHQ9
SUM OF ALL RESPONSES TO PHQ QUESTIONS 1-9: 0
SUM OF ALL RESPONSES TO PHQ9 QUESTIONS 1 & 2: 0
1. LITTLE INTEREST OR PLEASURE IN DOING THINGS: 0
2. FEELING DOWN, DEPRESSED OR HOPELESS: 0
SUM OF ALL RESPONSES TO PHQ QUESTIONS 1-9: 0

## 2023-06-28 NOTE — TELEPHONE ENCOUNTER
Last Refill: 7-25-22  Last Visit: 6/26/2023   Next Visit: 12-28-23    Requested Prescriptions     Pending Prescriptions Disp Refills    olmesartan-hydroCHLOROthiazide (BENICAR HCT) 20-12.5 MG per tablet [Pharmacy Med Name: Olmesartan Medoxomil-HCTZ 20-12.5 MG Oral Tablet] 90 tablet 0     Sig: Take 1 tablet by mouth once daily

## 2023-06-29 LAB — METHYLMALONATE SERPL-SCNC: 152 NMOL/L (ref 0–378)

## 2023-07-03 ENCOUNTER — NURSE ONLY (OUTPATIENT)
Facility: CLINIC | Age: 78
End: 2023-07-03

## 2023-07-03 DIAGNOSIS — I10 ESSENTIAL HYPERTENSION: ICD-10-CM

## 2023-07-03 LAB
APPEARANCE UR: CLEAR
BACTERIA URNS QL MICRO: NEGATIVE /HPF
BILIRUB UR QL: NEGATIVE
COLOR UR: ABNORMAL
EPITH CASTS URNS QL MICRO: ABNORMAL /LPF
GLUCOSE UR STRIP.AUTO-MCNC: NEGATIVE MG/DL
HGB UR QL STRIP: NEGATIVE
HYALINE CASTS URNS QL MICRO: ABNORMAL /LPF (ref 0–5)
KETONES UR QL STRIP.AUTO: NEGATIVE MG/DL
LEUKOCYTE ESTERASE UR QL STRIP.AUTO: ABNORMAL
NITRITE UR QL STRIP.AUTO: NEGATIVE
PH UR STRIP: 5.5 (ref 5–8)
PROT UR STRIP-MCNC: NEGATIVE MG/DL
RBC #/AREA URNS HPF: ABNORMAL /HPF (ref 0–5)
SP GR UR REFRACTOMETRY: 1.02 (ref 1–1.03)
UROBILINOGEN UR QL STRIP.AUTO: 0.2 EU/DL (ref 0.2–1)
WBC URNS QL MICRO: ABNORMAL /HPF (ref 0–4)

## 2023-07-06 DIAGNOSIS — M81.0 AGE-RELATED OSTEOPOROSIS WITHOUT CURRENT PATHOLOGICAL FRACTURE: Primary | ICD-10-CM

## 2023-07-06 RX ORDER — DIPHENHYDRAMINE HYDROCHLORIDE 50 MG/ML
50 INJECTION INTRAMUSCULAR; INTRAVENOUS
OUTPATIENT
Start: 2023-07-18

## 2023-07-06 RX ORDER — ONDANSETRON 2 MG/ML
8 INJECTION INTRAMUSCULAR; INTRAVENOUS
OUTPATIENT
Start: 2023-07-18

## 2023-07-06 RX ORDER — FAMOTIDINE 10 MG/ML
20 INJECTION, SOLUTION INTRAVENOUS
OUTPATIENT
Start: 2023-07-18

## 2023-07-06 RX ORDER — ACETAMINOPHEN 325 MG/1
650 TABLET ORAL
OUTPATIENT
Start: 2023-07-18

## 2023-07-06 RX ORDER — EPINEPHRINE 1 MG/ML
0.3 INJECTION, SOLUTION, CONCENTRATE INTRAVENOUS PRN
OUTPATIENT
Start: 2023-07-18

## 2023-07-06 RX ORDER — SODIUM CHLORIDE 9 MG/ML
INJECTION, SOLUTION INTRAVENOUS CONTINUOUS
OUTPATIENT
Start: 2023-07-18

## 2023-07-06 RX ORDER — ALBUTEROL SULFATE 90 UG/1
4 AEROSOL, METERED RESPIRATORY (INHALATION) PRN
OUTPATIENT
Start: 2023-07-18

## 2023-07-18 ENCOUNTER — APPOINTMENT (OUTPATIENT)
Dept: INFUSION THERAPY | Age: 78
End: 2023-07-18

## 2023-07-18 ENCOUNTER — OFFICE VISIT (OUTPATIENT)
Age: 78
End: 2023-07-18

## 2023-07-18 VITALS
BODY MASS INDEX: 32.69 KG/M2 | HEART RATE: 93 BPM | RESPIRATION RATE: 16 BRPM | SYSTOLIC BLOOD PRESSURE: 102 MMHG | DIASTOLIC BLOOD PRESSURE: 68 MMHG | OXYGEN SATURATION: 98 % | WEIGHT: 215 LBS | TEMPERATURE: 98.1 F

## 2023-07-18 DIAGNOSIS — R05.1 ACUTE COUGH: ICD-10-CM

## 2023-07-18 DIAGNOSIS — H66.91 ACUTE BACTERIAL OTITIS MEDIA, RIGHT: Primary | ICD-10-CM

## 2023-07-18 RX ORDER — AMOXICILLIN 875 MG/1
875 TABLET, COATED ORAL 2 TIMES DAILY
Qty: 20 TABLET | Refills: 0 | Status: SHIPPED | OUTPATIENT
Start: 2023-07-18 | End: 2023-07-28

## 2023-07-18 RX ORDER — BENZONATATE 100 MG/1
100 CAPSULE ORAL 3 TIMES DAILY PRN
Qty: 30 CAPSULE | Refills: 0 | Status: SHIPPED | OUTPATIENT
Start: 2023-07-18 | End: 2023-07-28

## 2023-07-18 ASSESSMENT — ENCOUNTER SYMPTOMS: COUGH: 1

## 2023-07-18 NOTE — PROGRESS NOTES
Chief Complaint   Patient presents with    New Patient    Cough     Patient c/o cough, congestion ear ringing and head ache started friday         Cough  Associated symptoms include ear pain. Pertinent negatives include no chills or fever. Patient here with cough, congestion, and headache that started on Friday  Reports that yesterday noticed ear pain as well in her right ear but changes to her hearing  Denies fever/chills/CP/SOB/nausea/vomiting/dizziness/lightheadedness    Past Medical History:   Diagnosis Date    Acute diverticulitis 8/18/2017    Allergic rhinitis 8/18/2017    Arthritis 8/18/2017    Arthritis of right knee 11/25/2019    Back pain, thoracic 8/18/2017    Breast cancer ([address]) 08/04/2020    Left    Cancer ([address]) 08/2020    LEFT BREAST , LUMPECTOMY WITH RECONSTRUCTION, CHEMO, RADIATION    Cataract, left 8/18/2017    Cataract, right 8/18/2017    Chronic obstructive pulmonary disease ([address])     Cold sore 8/18/2017    COVID-19 vaccine series completed 2/23/21, 3/19/21    PFIZER    Elevated hemoglobin A1c 8/18/2017    Fatigue 8/18/2017    GERD (gastroesophageal reflux disease) 8/18/2017    Heart murmur 8/18/2017    Hepatitis C 8/18/2017    TREATED    Herpes zoster infection of thoracic region 8/18/2017    Hyperlipidemia LDL goal <100 8/18/2017    Hypertension     Menopause     Hysterectomy-42years old    Murmur     Obesity (BMI 30-39. 9) 8/18/2017    Osteopenia 8/18/2017    Other ill-defined conditions(799.89)     hep c    Post-menopausal 8/18/2017    Posterior auricular pain of right ear 8/18/2017    Preop examination 8/18/2017    Valvular heart disease     mitral valve prolapse    Vertigo, benign positional 8/18/2017       Past Surgical History:   Procedure Laterality Date    BREAST LUMPECTOMY Bilateral 11/03/2020    LEFT BREAST REDUCTION LUMPECTOMY WITH ULTRASOUND, LEFT BREAST SENTINEL NODE BIOPSY, LEFT BREAST RECONSTRUCTION, RIGHT BREAST REDUCTION performed by Kenya Rivas MD at [facility]

## 2023-07-22 ENCOUNTER — OFFICE VISIT (OUTPATIENT)
Age: 78
End: 2023-07-22

## 2023-07-22 VITALS
SYSTOLIC BLOOD PRESSURE: 125 MMHG | WEIGHT: 214.1 LBS | TEMPERATURE: 98.7 F | HEART RATE: 76 BPM | DIASTOLIC BLOOD PRESSURE: 71 MMHG | RESPIRATION RATE: 18 BRPM | OXYGEN SATURATION: 96 % | BODY MASS INDEX: 32.55 KG/M2

## 2023-07-22 DIAGNOSIS — H92.01 OTALGIA, RIGHT: ICD-10-CM

## 2023-07-22 DIAGNOSIS — J06.9 VIRAL UPPER RESPIRATORY ILLNESS: Primary | ICD-10-CM

## 2023-07-22 LAB
Lab: NORMAL
QC PASS/FAIL: NORMAL
SARS-COV-2, POC: NORMAL

## 2023-07-22 RX ORDER — TRIAMCINOLONE ACETONIDE 55 UG/1
2 SPRAY, METERED NASAL DAILY
Qty: 1 EACH | Refills: 0 | Status: SHIPPED | OUTPATIENT
Start: 2023-07-22

## 2023-07-22 NOTE — PROGRESS NOTES
Subjective: (As above and below)     The patient/guardian gave verbal consent to treat. Chief Complaint   Patient presents with    Cough     C/o cough,nasal congestion, Right ear infection isn't better since patient last OV, headache x 2-3 days. Godfrey Andrew is a 70 y.o. female who presents for evaluation of : nasal congestion and right ear pain. Symptom onset this past week. Was seen here 4 days ago treated with amoxicillin for right ear pain. Some improvement but ear pain not resolved. Also has some lingering cough. No worse. Denies SOB, fevers or any new symptoms    Review of Systems    Review of Systems - negative except as listed above    Reviewed PmHx, RxHx, FmHx, SocHx, AllgHx and updated in chart.   Family History   Problem Relation Age of Onset    No Known Problems Sister     Heart Disease Father     Osteoarthritis Father     Heart Disease Mother     Colon Cancer Maternal Grandmother     Hypertension Father     Osteoarthritis Mother     Kidney Disease Mother     Diabetes Mother     Hypertension Mother     Anesth Problems Neg Hx     Cancer Maternal Aunt        Past Medical History:   Diagnosis Date    Acute diverticulitis 8/18/2017    Allergic rhinitis 8/18/2017    Arthritis 8/18/2017    Arthritis of right knee 11/25/2019    Back pain, thoracic 8/18/2017    Breast cancer ([address]) 08/04/2020    Left    Cancer ([address]) 08/2020    LEFT BREAST , LUMPECTOMY WITH RECONSTRUCTION, CHEMO, RADIATION    Cataract, left 8/18/2017    Cataract, right 8/18/2017    Chronic obstructive pulmonary disease ([address])     Cold sore 8/18/2017    COVID-19 vaccine series completed 2/23/21, 3/19/21    PFIZER    Elevated hemoglobin A1c 8/18/2017    Fatigue 8/18/2017    GERD (gastroesophageal reflux disease) 8/18/2017    Heart murmur 8/18/2017    Hepatitis C 8/18/2017    TREATED    Herpes zoster infection of thoracic region 8/18/2017    Hyperlipidemia LDL goal <100 8/18/2017    Hypertension     Menopause     Hysterectomy-46 years

## 2023-07-28 ENCOUNTER — OFFICE VISIT (OUTPATIENT)
Facility: CLINIC | Age: 78
End: 2023-07-28
Payer: COMMERCIAL

## 2023-07-28 VITALS
HEART RATE: 93 BPM | WEIGHT: 213 LBS | BODY MASS INDEX: 32.28 KG/M2 | RESPIRATION RATE: 20 BRPM | OXYGEN SATURATION: 98 % | HEIGHT: 68 IN | TEMPERATURE: 98.3 F | DIASTOLIC BLOOD PRESSURE: 70 MMHG | SYSTOLIC BLOOD PRESSURE: 122 MMHG

## 2023-07-28 DIAGNOSIS — H73.011 BULLOUS MYRINGITIS OF RIGHT EAR: ICD-10-CM

## 2023-07-28 DIAGNOSIS — J40 BRONCHITIS: Primary | ICD-10-CM

## 2023-07-28 PROCEDURE — 99213 OFFICE O/P EST LOW 20 MIN: CPT | Performed by: INTERNAL MEDICINE

## 2023-07-28 PROCEDURE — 3078F DIAST BP <80 MM HG: CPT | Performed by: INTERNAL MEDICINE

## 2023-07-28 PROCEDURE — 3074F SYST BP LT 130 MM HG: CPT | Performed by: INTERNAL MEDICINE

## 2023-07-28 PROCEDURE — 1123F ACP DISCUSS/DSCN MKR DOCD: CPT | Performed by: INTERNAL MEDICINE

## 2023-07-28 RX ORDER — PREDNISONE 10 MG/1
TABLET ORAL
Qty: 21 EACH | Refills: 0 | Status: SHIPPED | OUTPATIENT
Start: 2023-07-28 | End: 2023-08-09

## 2023-07-28 RX ORDER — DOXYCYCLINE HYCLATE 100 MG
100 TABLET ORAL 2 TIMES DAILY
Qty: 20 TABLET | Refills: 0 | Status: SHIPPED | OUTPATIENT
Start: 2023-07-28 | End: 2023-08-07

## 2023-07-28 NOTE — PROGRESS NOTES
Javier Lange is a 70 y.o. female presenting for Cough and Ear Fullness (R)  . 1. Have you been to the ER, urgent care clinic since your last visit? Hospitalized since your last visit? Urgent Care 7-18-23 & 7-22-23    2. Have you seen or consulted any other health care providers outside of the [address] [place] [address] since your last visit? Include any pap smears or colon screening.  No

## 2023-08-03 ENCOUNTER — HOSPITAL ENCOUNTER (OUTPATIENT)
Facility: HOSPITAL | Age: 78
Setting detail: INFUSION SERIES
End: 2023-08-03
Payer: COMMERCIAL

## 2023-08-03 VITALS
WEIGHT: 212 LBS | HEART RATE: 78 BPM | SYSTOLIC BLOOD PRESSURE: 136 MMHG | DIASTOLIC BLOOD PRESSURE: 72 MMHG | BODY MASS INDEX: 32.23 KG/M2 | RESPIRATION RATE: 18 BRPM | OXYGEN SATURATION: 100 % | TEMPERATURE: 97.4 F

## 2023-08-03 DIAGNOSIS — M81.0 AGE-RELATED OSTEOPOROSIS WITHOUT CURRENT PATHOLOGICAL FRACTURE: Primary | ICD-10-CM

## 2023-08-03 PROCEDURE — 6360000002 HC RX W HCPCS: Performed by: INTERNAL MEDICINE

## 2023-08-03 PROCEDURE — 96372 THER/PROPH/DIAG INJ SC/IM: CPT

## 2023-08-03 RX ORDER — EPINEPHRINE 1 MG/ML
0.3 INJECTION, SOLUTION, CONCENTRATE INTRAVENOUS PRN
OUTPATIENT
Start: 2024-01-28

## 2023-08-03 RX ORDER — ONDANSETRON 2 MG/ML
8 INJECTION INTRAMUSCULAR; INTRAVENOUS
OUTPATIENT
Start: 2024-01-28

## 2023-08-03 RX ORDER — SODIUM CHLORIDE 9 MG/ML
INJECTION, SOLUTION INTRAVENOUS CONTINUOUS
OUTPATIENT
Start: 2024-01-28

## 2023-08-03 RX ORDER — ACETAMINOPHEN 325 MG/1
650 TABLET ORAL
OUTPATIENT
Start: 2024-01-28

## 2023-08-03 RX ORDER — DIPHENHYDRAMINE HYDROCHLORIDE 50 MG/ML
50 INJECTION INTRAMUSCULAR; INTRAVENOUS
OUTPATIENT
Start: 2024-01-28

## 2023-08-03 RX ORDER — ALBUTEROL SULFATE 90 UG/1
4 AEROSOL, METERED RESPIRATORY (INHALATION) PRN
OUTPATIENT
Start: 2024-01-28

## 2023-08-03 RX ADMIN — DENOSUMAB 60 MG: 60 INJECTION SUBCUTANEOUS at 15:08

## 2023-08-08 NOTE — PROGRESS NOTES
Vielka Campos is a 70 y.o. female and presents with Cough and Ear Fullness (R)  . Subjective:  Mrs. Fili Santoro presents today with complaint of cough and fullness of her ear. Symptoms have been present for the past couple of weeks. She was seen at urgent care and placed on medications but her symptoms have not improved. She has had no fever or chills. Cough is productive of scant amount of sputum which has been discolored. She denies any pleuritic chest pain or shortness of breath. Past Medical History:   Diagnosis Date    Acute diverticulitis 8/18/2017    Allergic rhinitis 8/18/2017    Arthritis 8/18/2017    Arthritis of right knee 11/25/2019    Back pain, thoracic 8/18/2017    Breast cancer ([address]) 08/04/2020    Left    Cancer ([address]) 08/2020    LEFT BREAST , LUMPECTOMY WITH RECONSTRUCTION, CHEMO, RADIATION    Cataract, left 8/18/2017    Cataract, right 8/18/2017    Chronic obstructive pulmonary disease ([address])     Cold sore 8/18/2017    COVID-19 vaccine series completed 2/23/21, 3/19/21    PFIZER    Elevated hemoglobin A1c 8/18/2017    Fatigue 8/18/2017    GERD (gastroesophageal reflux disease) 8/18/2017    Heart murmur 8/18/2017    Hepatitis C 8/18/2017    TREATED    Herpes zoster infection of thoracic region 8/18/2017    Hyperlipidemia LDL goal <100 8/18/2017    Hypertension     Menopause     Hysterectomy-42years old    Murmur     Obesity (BMI 30-39. 9) 8/18/2017    Osteopenia 8/18/2017    Other ill-defined conditions(799.89)     hep c    Post-menopausal 8/18/2017    Posterior auricular pain of right ear 8/18/2017    Preop examination 8/18/2017    Valvular heart disease     mitral valve prolapse    Vertigo, benign positional 8/18/2017     Past Surgical History:   Procedure Laterality Date    BREAST LUMPECTOMY Bilateral 11/03/2020    LEFT BREAST REDUCTION LUMPECTOMY WITH ULTRASOUND, LEFT BREAST SENTINEL NODE BIOPSY, LEFT BREAST RECONSTRUCTION, RIGHT BREAST REDUCTION performed by Ivett Paulson MD

## 2023-08-09 ENCOUNTER — OFFICE VISIT (OUTPATIENT)
Facility: CLINIC | Age: 78
End: 2023-08-09
Payer: COMMERCIAL

## 2023-08-09 VITALS
WEIGHT: 212 LBS | OXYGEN SATURATION: 97 % | RESPIRATION RATE: 20 BRPM | DIASTOLIC BLOOD PRESSURE: 68 MMHG | TEMPERATURE: 98.3 F | BODY MASS INDEX: 32.13 KG/M2 | HEART RATE: 81 BPM | SYSTOLIC BLOOD PRESSURE: 124 MMHG | HEIGHT: 68 IN

## 2023-08-09 DIAGNOSIS — J30.2 SEASONAL ALLERGIC RHINITIS, UNSPECIFIED TRIGGER: ICD-10-CM

## 2023-08-09 DIAGNOSIS — G62.9 NEUROPATHY: ICD-10-CM

## 2023-08-09 DIAGNOSIS — H69.81 DYSFUNCTION OF RIGHT EUSTACHIAN TUBE: Primary | ICD-10-CM

## 2023-08-09 PROCEDURE — 3078F DIAST BP <80 MM HG: CPT | Performed by: INTERNAL MEDICINE

## 2023-08-09 PROCEDURE — 3074F SYST BP LT 130 MM HG: CPT | Performed by: INTERNAL MEDICINE

## 2023-08-09 PROCEDURE — 1123F ACP DISCUSS/DSCN MKR DOCD: CPT | Performed by: INTERNAL MEDICINE

## 2023-08-09 PROCEDURE — 99213 OFFICE O/P EST LOW 20 MIN: CPT | Performed by: INTERNAL MEDICINE

## 2023-08-09 NOTE — PROGRESS NOTES
Tresea Dakin is a 70 y.o. female presenting for Ear Fullness (R) and Back Pain  . 1. Have you been to the ER, urgent care clinic since your last visit? Hospitalized since your last visit? No    2. Have you seen or consulted any other health care providers outside of the [address] [place] [address] since your last visit? Include any pap smears or colon screening.  No

## 2023-08-09 NOTE — PROGRESS NOTES
Jamilah Mathews is a 70 y.o. female and presents with Ear Fullness (R) and Back Pain  . Subjective:  Mrs. Israel Aparicio presents today for follow-up with decreased hearing acuity and fullness in her right ear. She has also noted a cough and is concerned that her recent upper respiratory infection is returning. She been seen in urgent care and had received an antibiotic but her symptoms had not improved. When she was evaluated by me a couple of weeks ago she had a significant cough productive of sputum and significant changes in the right ear consistent with bullous myringitis. This was concerning for mycoplasma infection. She did respond to a course of doxycycline and a prednisone taper. Her symptoms have almost completely resolved however she has a slight residual cough with postnasal drainage as well as fullness in the right ear. She has no pain or discomfort associated with this. She has no fever or chills. She has no pleuritic chest pain or shortness of breath. She also notes continued numbness in her feet. She had labs done recently including B12, folate, and methylmalonic acid all of which were normal.  She does not have diabetes. 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     Past Medical History:   Diagnosis Date    Acute diverticulitis 8/18/2017    Allergic rhinitis 8/18/2017    Arthritis 8/18/2017    Arthritis of right knee 11/25/2019    Back pain, thoracic 8/18/2017    Breast cancer ([address]) 08/04/2020    Left    Cancer ([address]) 08/2020    LEFT BREAST , LUMPECTOMY WITH RECONSTRUCTION, CHEMO, RADIATION    Cataract, left 8/18/2017    Cataract, right 8/18/2017    Chronic obstructive pulmonary disease ([address])     Cold sore 8/18/2017    COVID-19 vaccine series completed 2/23/21, 3/19/21    PFIZER    Elevated hemoglobin A1c 8/18/2017    Fatigue 8/18/2017    GERD (gastroesophageal reflux disease) 8/18/2017    Heart murmur 8/18/2017    Hepatitis C 8/18/2017    TREATED    Herpes

## 2023-10-01 NOTE — PROGRESS NOTES
[facility] at [address], [facility], [address] [place], [place] [address]way: [phone]  F: [phone]    Reason for Visit:   Joselito Mtz is a 70 y.o. female who is seen for fu    Treatment History:   Per Surgery Note:    Initially abnormal screening mammo 7/20 on LEFT with small mass    08/04/20: LEFT Breast Bx. PATH: IDC, 5 mm in greatest linear dimension extending to core biopsy tip, histologic grade 2, ER+(%)/KS+(%)/HER2-. Clinical  stage 1    MammaPrint: High risk, luminal type B, -0.123    Breast MRI 8/28/20: 12 mm mass otherwise negative. 11/03/20: LEFT oncoplastic reduction and SLNBx, and RIGHT breast reduction, with Dr. Gale Gutierrez. PATH:    LEFT: IDC, 14 x 13 x 12 mm, overall grade 2, negative margins. DCIS present with negative margins    Pathologic stage: pT1c, pN0    RIGHT: Breast tissue with stromal fibrosis and focal papillary apocrine metaplasia. Benign skin and subcutaneous soft tissue. No in-situ or  invasive carcinoma identified    Adjuvant TC chemotherapy x 4 cycles from 1/6/21 - 3/10/21    Completed XRT on 5/5/21    Adjuvant Anastrozole 5/21 - Current       STAGE: T1cN0 ER+ HEr2 negative mammaprint high risk luminal B    History of Present Illness:     Pt seen today for office fu breast cancer . ER+ HER2 negative hx of lumpectomy and bilateral breast reduction on  11/3/20. She started adjuvant hormonal therapy with Anastrozole in 5/21. feels well overall today and is tolerating Anastrozole well overall with some mild hot flashes. Still dealing with neuropathy since chemo. More in feet.    No fevers/ chills/ chest pain/ SOB/ nausea/ vomiting/diarrhea/ pain/fatigue      Past Medical History:   Diagnosis Date    Acute diverticulitis 8/18/2017    Allergic rhinitis 8/18/2017    Arthritis 8/18/2017    Arthritis of right knee 11/25/2019    Back pain, thoracic 8/18/2017    Breast cancer ([address]) 08/04/2020    Left    Cancer ([address]) 08/2020    LEFT

## 2023-10-02 ENCOUNTER — OFFICE VISIT (OUTPATIENT)
Age: 78
End: 2023-10-02
Payer: COMMERCIAL

## 2023-10-02 VITALS
WEIGHT: 220 LBS | HEART RATE: 80 BPM | DIASTOLIC BLOOD PRESSURE: 73 MMHG | OXYGEN SATURATION: 93 % | BODY MASS INDEX: 33.45 KG/M2 | RESPIRATION RATE: 18 BRPM | TEMPERATURE: 98.8 F | SYSTOLIC BLOOD PRESSURE: 129 MMHG

## 2023-10-02 DIAGNOSIS — Z98.890 H/O BILATERAL BREAST REDUCTION SURGERY: ICD-10-CM

## 2023-10-02 DIAGNOSIS — Z79.811 USE OF ANASTROZOLE (ARIMIDEX): ICD-10-CM

## 2023-10-02 DIAGNOSIS — C50.912 INVASIVE DUCTAL CARCINOMA OF LEFT BREAST (HCC): Primary | ICD-10-CM

## 2023-10-02 DIAGNOSIS — G62.9 NEUROPATHY: ICD-10-CM

## 2023-10-02 DIAGNOSIS — Z98.890 HISTORY OF LUMPECTOMY: ICD-10-CM

## 2023-10-02 PROCEDURE — 99213 OFFICE O/P EST LOW 20 MIN: CPT | Performed by: INTERNAL MEDICINE

## 2023-10-02 PROCEDURE — 1123F ACP DISCUSS/DSCN MKR DOCD: CPT | Performed by: INTERNAL MEDICINE

## 2023-10-02 PROCEDURE — 3074F SYST BP LT 130 MM HG: CPT | Performed by: INTERNAL MEDICINE

## 2023-10-02 PROCEDURE — 3078F DIAST BP <80 MM HG: CPT | Performed by: INTERNAL MEDICINE

## 2023-10-02 NOTE — PROGRESS NOTES
Nia Ly is a 70 y.o. female    Chief Complaint   Patient presents with    Follow-up     Left Breast Cancer        1. Have you been to the ER, urgent care clinic since your last visit? Hospitalized since your last visit? No    2. Have you seen or consulted any other health care providers outside of the [address] [place] [address] Avenue since your last visit? Include any pap smears or colon screening.  No

## 2023-12-06 ENCOUNTER — HOSPITAL ENCOUNTER (OUTPATIENT)
Facility: HOSPITAL | Age: 78
Discharge: HOME | End: 2023-12-09
Payer: COMMERCIAL

## 2023-12-06 DIAGNOSIS — Z85.3 PERSONAL HISTORY OF BREAST CANCER: ICD-10-CM

## 2023-12-06 DIAGNOSIS — R92.1 BREAST CALCIFICATION SEEN ON MAMMOGRAM: ICD-10-CM

## 2023-12-06 PROCEDURE — G0279 TOMOSYNTHESIS, MAMMO: HCPCS

## 2023-12-28 ENCOUNTER — OFFICE VISIT (OUTPATIENT)
Facility: CLINIC | Age: 78
End: 2023-12-28
Payer: COMMERCIAL

## 2023-12-28 VITALS
DIASTOLIC BLOOD PRESSURE: 80 MMHG | TEMPERATURE: 98.2 F | OXYGEN SATURATION: 98 % | RESPIRATION RATE: 20 BRPM | HEART RATE: 69 BPM | HEIGHT: 68 IN | WEIGHT: 220.8 LBS | SYSTOLIC BLOOD PRESSURE: 120 MMHG | BODY MASS INDEX: 33.46 KG/M2

## 2023-12-28 DIAGNOSIS — G62.9 NEUROPATHY: ICD-10-CM

## 2023-12-28 DIAGNOSIS — I10 ESSENTIAL HYPERTENSION: Primary | ICD-10-CM

## 2023-12-28 DIAGNOSIS — E78.5 DYSLIPIDEMIA: ICD-10-CM

## 2023-12-28 DIAGNOSIS — M81.0 AGE-RELATED OSTEOPOROSIS WITHOUT CURRENT PATHOLOGICAL FRACTURE: ICD-10-CM

## 2023-12-28 DIAGNOSIS — K21.9 GASTROESOPHAGEAL REFLUX DISEASE WITHOUT ESOPHAGITIS: ICD-10-CM

## 2023-12-28 DIAGNOSIS — I27.20 PULMONARY HYPERTENSION, UNSPECIFIED (HCC): ICD-10-CM

## 2023-12-28 PROCEDURE — 99213 OFFICE O/P EST LOW 20 MIN: CPT | Performed by: INTERNAL MEDICINE

## 2023-12-28 PROCEDURE — 1123F ACP DISCUSS/DSCN MKR DOCD: CPT | Performed by: INTERNAL MEDICINE

## 2023-12-28 PROCEDURE — 3074F SYST BP LT 130 MM HG: CPT | Performed by: INTERNAL MEDICINE

## 2023-12-28 PROCEDURE — 3079F DIAST BP 80-89 MM HG: CPT | Performed by: INTERNAL MEDICINE

## 2023-12-28 RX ORDER — TIRZEPATIDE 2.5 MG/.5ML
INJECTION, SOLUTION SUBCUTANEOUS
COMMUNITY
Start: 2023-12-26

## 2023-12-28 NOTE — PROGRESS NOTES
Linda Alexandre is a 70 y.o. female and presents with 6 Month Follow-Up, Nasal Congestion, and Cough  . Subjective:  Mrs. Shantelle Muñiz presents today for 6-month follow-up for several problems including hypertension, pulmonary hypertension, GERD, history of breast cancer, history of elevated A1c. She is doing well on her current medical regimen. She had labs done recently at [facility] with hep otology and her labs were stable. Her ultrasound of her liver shows parenchymal liver disease which is stable. She has no shortness of breath, chest pain, palpitations, PND, orthopnea, or pedal edema. She notes that she has had a cold over the past few days. She denies fever. She has mild congestion and a slightly sore throat. She is not taking medication for this currently. She has a slight nonproductive cough. Past Medical History:   Diagnosis Date    Acute diverticulitis 8/18/2017    Allergic rhinitis 8/18/2017    Arthritis 8/18/2017    Arthritis of right knee 11/25/2019    Back pain, thoracic 8/18/2017    Breast cancer ([address]) 08/04/2020    Left    Cancer ([address]) 08/2020    LEFT BREAST , LUMPECTOMY WITH RECONSTRUCTION, CHEMO, RADIATION    Cataract, left 8/18/2017    Cataract, right 8/18/2017    Chronic obstructive pulmonary disease ([address])     Cold sore 8/18/2017    COVID-19 vaccine series completed 2/23/21, 3/19/21    PFIZER    Elevated hemoglobin A1c 8/18/2017    Fatigue 8/18/2017    GERD (gastroesophageal reflux disease) 8/18/2017    Heart murmur 8/18/2017    Hepatitis C 8/18/2017    TREATED    Herpes zoster infection of thoracic region 8/18/2017    Hyperlipidemia LDL goal <100 8/18/2017    Hypertension     Menopause     Hysterectomy-42years old    Murmur     Obesity (BMI 30-39. 9) 8/18/2017    Osteopenia 8/18/2017    Other ill-defined conditions(799.89)     hep c    Post-menopausal 8/18/2017    Posterior auricular pain of right ear 8/18/2017    Preop examination 8/18/2017    Valvular heart disease     mitral valve

## 2024-01-10 ENCOUNTER — TELEPHONE (OUTPATIENT)
Age: 79
End: 2024-01-10

## 2024-01-10 NOTE — TELEPHONE ENCOUNTER
Dr. Donna Canada Office   Acupuncture Nursing Note  [phone]  Fax [phone]     Name:  Myah Ivey  YOB: 1945    Received acupuncture referral for Myah Ivey from Dr. Ofelia Martin.  Patient has history of breast cancer and is experiencing neuropathy.    This nurse called patient and scheduled patient for acupuncture appointment, 1/24/24 at 2:30pm.      PATRICK MaloneN, RN, PN  Clinical Referral Navigator

## 2024-01-16 ENCOUNTER — OFFICE VISIT (OUTPATIENT)
Age: 79
End: 2024-01-16
Payer: COMMERCIAL

## 2024-01-16 VITALS
HEIGHT: 68 IN | WEIGHT: 220.2 LBS | HEART RATE: 92 BPM | DIASTOLIC BLOOD PRESSURE: 76 MMHG | OXYGEN SATURATION: 95 % | BODY MASS INDEX: 33.37 KG/M2 | SYSTOLIC BLOOD PRESSURE: 128 MMHG

## 2024-01-16 DIAGNOSIS — Z85.3 HISTORY OF BREAST CANCER: ICD-10-CM

## 2024-01-16 DIAGNOSIS — I36.1 NONRHEUMATIC TRICUSPID VALVE REGURGITATION: Primary | ICD-10-CM

## 2024-01-16 DIAGNOSIS — I10 ESSENTIAL HYPERTENSION: ICD-10-CM

## 2024-01-16 PROCEDURE — 3074F SYST BP LT 130 MM HG: CPT | Performed by: INTERNAL MEDICINE

## 2024-01-16 PROCEDURE — 93000 ELECTROCARDIOGRAM COMPLETE: CPT | Performed by: INTERNAL MEDICINE

## 2024-01-16 PROCEDURE — 3078F DIAST BP <80 MM HG: CPT | Performed by: INTERNAL MEDICINE

## 2024-01-16 PROCEDURE — 99214 OFFICE O/P EST MOD 30 MIN: CPT | Performed by: INTERNAL MEDICINE

## 2024-01-16 PROCEDURE — 1123F ACP DISCUSS/DSCN MKR DOCD: CPT | Performed by: INTERNAL MEDICINE

## 2024-01-16 ASSESSMENT — PATIENT HEALTH QUESTIONNAIRE - PHQ9
2. FEELING DOWN, DEPRESSED OR HOPELESS: 0
SUM OF ALL RESPONSES TO PHQ QUESTIONS 1-9: 0
SUM OF ALL RESPONSES TO PHQ QUESTIONS 1-9: 0
1. LITTLE INTEREST OR PLEASURE IN DOING THINGS: 0
SUM OF ALL RESPONSES TO PHQ QUESTIONS 1-9: 0
SUM OF ALL RESPONSES TO PHQ9 QUESTIONS 1 & 2: 0
SUM OF ALL RESPONSES TO PHQ QUESTIONS 1-9: 0

## 2024-01-16 NOTE — PROGRESS NOTES
[address]: [place]  [phone]    History of Present Illness:  Ms. Ivey is a 77 yo F followed in the past by Dr. Byers/Mavis with a history of left sided breast cancer status post lumpectomy with chemotherapy and radiation therapy, essential hypertension, mild to moderate tricuspid regurgitation noted by echocardiogram in the past, most recent echocardiogram this week demonstrated just mild tricuspid regurgitation, hepatitis C status post treatment, dyslipidemia.      Since her last visit overall she has been doing okay.  She denies any exertional chest pain.  She has some baseline shortness of breath that is unchanged.  No significant palpitations.  However, she does have a watch that has been flagging elevated heart rate.  This occurred yesterday and her heart rate was in the 120s when she was not doing anything particular, lasting a few minutes.  This has been occurring approximately once a week. She is compensated on exam with clear lungs and no lower extremity edema.  Her echo last year demonstrated preserved LV function with moderate tricuspid regurgitation and mild pulmonary hypertension.      Assessment and Plan:   1. Tricuspid regurgitation.  Continues stable and compensated.  This has been in the moderate range. Will have her follow up with same day echo in six months with our NP Mavis.  2. Elevated heart rate.  She is asymptomatic with these.  We did talk about possible heart monitor if she develops symptoms or if this became more frequent.  3. Essential hypertension.  Blood pressure controlled.  4. Myxomatous mitral valve.  Previously mitral valve prolapse, but that resolved.  5. Hepatitis C, status post [facility].  Followed by hepatology.  6. Left-sided breast cancer, status post lumpectomy, radiation and chemotherapy.  7. Dyslipidemia.    Echo 9/21 - EF 60-65%, normal strain, mild to mod TR, RVSP 35mmHg  Echo 9/20 - EF 60-65%, grade 1 dd, myxomatous MV, mod TR, PASP 33mmHg  Echo 9/19 - mod

## 2024-01-17 NOTE — PROGRESS NOTES
History of Present Illness:   Since her last visit overall she has been doing well.  She denies any exertional chest pain.  Breathing has been stable.  No significant palpitations. However, she does have a watch that tracks her heart rate and notes that approximately once a week she will have an episode where it will alarm that her heart rate is elevated.  This occurred yesterday when she was not doing anything in particular and it showed her heart rate was in the 120s.  It lasted a few minutes.   She otherwise has some baseline shortness of breath that is unchanged.  No exertional chest pain.  She denies any major changes in health. She is compensated on exam with clear lungs and no lower extremity edema.  Her echo last year demonstrated preserved LV function and moderate tricuspid regurgitation with mild pulmonary hypertension.      Assessment and Plan:   1. Tricuspid regurgitation.  Continues stable and compensated.  Will have her follow up with same day echo in six months with our NP Mavis.  2. Essential hypertension.  Blood pressure controlled.  3. Myxomatous mitral valve.  Previously mitral valve prolapse, but this resolved.  4. [facility]  Followed by hepatology.  5. Left sided breast cancer, status post lumpectomy, radiation and chemotherapy.  6. Dyslipidemia.  7. Elevated heart rate.  This is asymptomatic and infrequent.  We talked about possible cardiac monitor if she develops symptoms or if this was occurring more frequently.

## 2024-01-18 ENCOUNTER — TELEPHONE (OUTPATIENT)
Age: 79
End: 2024-01-18

## 2024-01-18 NOTE — TELEPHONE ENCOUNTER
Called patient to advise/confirm upcoming Acupuncture appt with Dr. Canada on 1/24/2024 at 2:30  at [facility]. Left message

## 2024-01-19 RX ORDER — FUROSEMIDE 20 MG/1
20 TABLET ORAL DAILY
Qty: 90 TABLET | Refills: 3 | Status: SHIPPED | OUTPATIENT
Start: 2024-01-19

## 2024-01-19 NOTE — TELEPHONE ENCOUNTER
Requested Prescriptions     Signed Prescriptions Disp Refills    furosemide (LASIX) 20 MG tablet 90 tablet 3     Sig: Take 1 tablet by mouth once daily     Authorizing Provider: ANNA RAMOS     Ordering User: ARNOLD CLEMENTS MD    Future Appointments   Date Time Provider Department Center   1/24/2024  2:30 PM Donna Canada MD PCSCUL [facility]   2/1/2024  3:00 PM [facility] 2 [facility]   4/10/2024 11:00 AM [facility]   7/8/2024 10:00 AM RENEE Juarez MD PCAM [facility]   7/16/2024  9:00 AM [facility] CRISTINA ECHO 1 SONAL [facility]   7/16/2024  9:40 AM Anna Ramos, APRN - NP SONAL [facility]   8/1/2024  3:00 PM [facility] 2 [facility]   1/30/2025  3:00 PM [facility] 3 [facility]

## 2024-01-24 ENCOUNTER — OFFICE VISIT (OUTPATIENT)
Age: 79
End: 2024-01-24

## 2024-01-24 DIAGNOSIS — G89.29 CHRONIC MIDLINE LOW BACK PAIN WITH RIGHT-SIDED SCIATICA: ICD-10-CM

## 2024-01-24 DIAGNOSIS — G62.9 NEUROPATHY: Primary | ICD-10-CM

## 2024-01-24 DIAGNOSIS — M54.41 CHRONIC MIDLINE LOW BACK PAIN WITH RIGHT-SIDED SCIATICA: ICD-10-CM

## 2024-01-24 NOTE — PROGRESS NOTES
Palliative Medicine and Integrative Medicine Acupuncture Note    Date Current Visit: 1/24/2024 VISIT 1   Date Initial Visit: 1/24/24  Oncologist: Dr Martin    Summary: CIPN vs neuropathy of other etiology, lower back pain     Subjective:     Myah Ivey is a 78 y.o. female with hx incl L breast cancer dx 8/2020 s/p lumpectomy and b/l breast reduction 11/2020, then adjuvant TC chemotherapy completed 3/2021 and radiation completed 5/2021. Has then since been on anastrazole     Pt did not have neuropathy right after chemo, but noticed ~ one year after completion. B/l feet are numb, sometimes has pins/needles in hands. Sees her PCP on regular basis and states her labs are stable (reviewed 2023 labs) and does not have hx of DM. Does not take medication for neuropathy. States it has been the same since onset.     Also has low back pain- axial w/ radicular sx down RLE. Used to see Dr Guadarrama for epidural steroid injections before he moved to [place]. Going to have another ETTA tmrw at [facility]- usually gets benefit.     Has not had acupuncture before, but has a friend that does it for her low back pain.     Social: Pt born and raised in [place]. She has a son, sister and niece who live locally.       Past Medical History:   Past Medical History:   Diagnosis Date    Acute diverticulitis 8/18/2017    Allergic rhinitis 8/18/2017    Arthritis 8/18/2017    Arthritis of right knee 11/25/2019    Back pain, thoracic 8/18/2017    Breast cancer (HCC) 08/04/2020    Left    Cancer (HCC) 08/2020    LEFT BREAST , LUMPECTOMY WITH RECONSTRUCTION, CHEMO, RADIATION    Cataract, left 8/18/2017    Cataract, right 8/18/2017    Chronic obstructive pulmonary disease (HCC)     Cold sore 8/18/2017    COVID-19 vaccine series completed 2/23/21, 3/19/21    PFIZER    Elevated hemoglobin A1c 8/18/2017    Fatigue 8/18/2017    GERD (gastroesophageal reflux disease) 8/18/2017    Heart murmur 8/18/2017    Hepatitis C 8/18/2017    TREATED    Herpes

## 2024-02-01 ENCOUNTER — HOSPITAL ENCOUNTER (OUTPATIENT)
Facility: HOSPITAL | Age: 79
Setting detail: INFUSION SERIES
End: 2024-02-01

## 2024-02-20 ENCOUNTER — TELEPHONE (OUTPATIENT)
Age: 79
End: 2024-02-20

## 2024-02-20 NOTE — TELEPHONE ENCOUNTER
Called patient to advise/confirm upcoming Acupuncture appt with Dr. Canada on 2/21/2024 at 12:00  at [facility].  Spoke with patient and confirmed appointment.

## 2024-02-21 ENCOUNTER — OFFICE VISIT (OUTPATIENT)
Age: 79
End: 2024-02-21

## 2024-02-21 DIAGNOSIS — T45.1X5A PERIPHERAL NEUROPATHY DUE TO CHEMOTHERAPY (HCC): Primary | ICD-10-CM

## 2024-02-21 DIAGNOSIS — G62.0 PERIPHERAL NEUROPATHY DUE TO CHEMOTHERAPY (HCC): Primary | ICD-10-CM

## 2024-02-21 NOTE — PROGRESS NOTES
Palliative Medicine and Integrative Medicine Acupuncture Note    Date Current Visit: 2/21/2024 VISIT 2   Date Initial Visit: 1/24/24  Oncologist: Dr Martin    Summary: CIPN vs neuropathy of other etiology, lower back pain     Subjective:     Myah Ivey is a 78 y.o. female with hx incl L breast cancer dx 8/2020 s/p lumpectomy and b/l breast reduction 11/2020, then adjuvant TC chemotherapy completed 3/2021 and radiation completed 5/2021. Has then since been on anastrazole     Pt did not have neuropathy right after chemo, but noticed ~ one year after completion. B/l feet are numb, sometimes has pins/needles in hands. Sees her PCP on regular basis and states her labs are stable (reviewed 2023 labs) and does not have hx of DM. Does not take medication for neuropathy. States it has been the same since onset.     Also has low back pain- axial w/ radicular sx down RLE. Used to see Dr Guadarrama for epidural steroid injections before he moved to [place]. Going to have another ETTA tmrw at [facility]- usually gets benefit.     Has not had acupuncture before, but has a friend that does it for her low back pain.     Social: Pt born and raised in [place]. She has a son, sister and niece who live locally.     INTERVAL HX  ~~~~~~~~~~  2/21/24- Pt uncertain if had any benefit from first session- feet remain numb. Low back pain improved from ETTA she had at [facility]. Going to [facility] w/ friend from [employer] and tour group in March!    Past Medical History:   Past Medical History:   Diagnosis Date    Acute diverticulitis 8/18/2017    Allergic rhinitis 8/18/2017    Arthritis 8/18/2017    Arthritis of right knee 11/25/2019    Back pain, thoracic 8/18/2017    Breast cancer (HCC) 08/04/2020    Left    Cancer (HCC) 08/2020    LEFT BREAST , LUMPECTOMY WITH RECONSTRUCTION, CHEMO, RADIATION    Cataract, left 8/18/2017    Cataract, right 8/18/2017    Chronic obstructive pulmonary disease (HCC)     Cold sore 8/18/2017    COVID-19 vaccine series

## 2024-03-10 NOTE — LETTER
9/7/2022    Patient: Yung Archibald   YOB: 1945   Date of Visit: 9/7/2022     Sherlie Saint, MD [facility]    Dear Sherlie Saint, MD,      Thank you for referring Ms. Park Martin to [facility] for evaluation. My notes for this consultation are attached. If you have questions, please do not hesitate to call me. I look forward to following your patient along with you.       Sincerely,    Chasity Mistry MD [employer]

## 2024-04-01 ENCOUNTER — TELEPHONE (OUTPATIENT)
Age: 79
End: 2024-04-01

## 2024-04-01 NOTE — TELEPHONE ENCOUNTER
Called patient to advise/confirm upcoming Acupuncture appt with Dr. Canada on 4/3/2024 at 12:00  at [facility]. Left a message.

## 2024-04-03 ENCOUNTER — TELEPHONE (OUTPATIENT)
Age: 79
End: 2024-04-03

## 2024-04-12 ENCOUNTER — TELEPHONE (OUTPATIENT)
Age: 79
End: 2024-04-12

## 2024-04-12 NOTE — TELEPHONE ENCOUNTER
Called patient to advise/confirm upcoming Acupuncture appt with Dr. Canada on 04/17/24 at 2:30  at [facility].  Got voicemail. Left message.

## 2024-04-15 ENCOUNTER — TRANSCRIBE ORDERS (OUTPATIENT)
Facility: HOSPITAL | Age: 79
End: 2024-04-15

## 2024-04-15 DIAGNOSIS — Z12.31 VISIT FOR SCREENING MAMMOGRAM: Primary | ICD-10-CM

## 2024-04-17 ENCOUNTER — OFFICE VISIT (OUTPATIENT)
Age: 79
End: 2024-04-17

## 2024-04-17 DIAGNOSIS — G62.0 PERIPHERAL NEUROPATHY DUE TO CHEMOTHERAPY (HCC): Primary | ICD-10-CM

## 2024-04-17 DIAGNOSIS — T45.1X5A PERIPHERAL NEUROPATHY DUE TO CHEMOTHERAPY (HCC): Primary | ICD-10-CM

## 2024-04-17 NOTE — PROGRESS NOTES
Palliative Medicine and Integrative Medicine Acupuncture Note    Date Current Visit: 4/17/2024 VISIT 3   Date Initial Visit: 1/24/24  Oncologist: Dr Martin    Summary: CIPN vs neuropathy of other etiology, lower back pain     Subjective:     Myah Ivey is a 78 y.o. female with hx incl L breast cancer dx 8/2020 s/p lumpectomy and b/l breast reduction 11/2020, then adjuvant TC chemotherapy completed 3/2021 and radiation completed 5/2021. Has then since been on anastrazole     Pt did not have neuropathy right after chemo, but noticed ~ one year after completion. B/l feet are numb, sometimes has pins/needles in hands. Sees her PCP on regular basis and states her labs are stable (reviewed 2023 labs) and does not have hx of DM. Does not take medication for neuropathy. States it has been the same since onset.     Also has low back pain- axial w/ radicular sx down RLE. Used to see Dr Guadarrama for epidural steroid injections before he moved to [place]. Going to have another ETTA tmrw at [facility]- usually gets benefit.     Has not had acupuncture before, but has a friend that does it for her low back pain.     Social: Pt born and raised in [place]. She has a son, sister and niece who live locally.     INTERVAL HX  ~~~~~~~~~~    4/17/24- Does not feel that she has much benefit from either session, had to cancel a visit so more spaced out- but should have noticed benefit if she is going to get any from acupuncture by now. Back pain controlled, cont w/ ETTA. Able to do all ADLs, sleep- neuropathy not interfering. Had a wonderful time in [place]!    2/21/24- Pt uncertain if had any benefit from first session- feet remain numb. Low back pain improved from ETTA she had at [facility]. Going to [place] w/ friend from [employer] and tour group in March!    Past Medical History:   Past Medical History:   Diagnosis Date    Acute diverticulitis 8/18/2017    Allergic rhinitis 8/18/2017    Arthritis 8/18/2017    Arthritis of right knee

## 2024-04-22 DIAGNOSIS — C50.912 INVASIVE DUCTAL CARCINOMA OF LEFT BREAST (HCC): Primary | ICD-10-CM

## 2024-04-22 RX ORDER — ANASTROZOLE 1 MG/1
TABLET ORAL
Qty: 90 TABLET | Refills: 3 | Status: SHIPPED | OUTPATIENT
Start: 2024-04-22

## 2024-04-22 NOTE — TELEPHONE ENCOUNTER
Oral Hormone therapy     Myah Ivey is a  78 y.o.female  diagnosed with breast cancer . Ms. Ivey is being treated with Anastrozole.     Medication name: Anastrozole    Dose:  1 mg   Frequency: daily    Ordering provider: Ofelia Oropeza DO  Start date: 5/2021        Last OV 10/2/23  Next OV 5/9/24    Refill for Anastrozole sent to pharmacy on file       Rylee Mercado, MIKAELD, BCOP, BCPS    For Pharmacy Admin Tracking Only    Program: Medical Group  CPA in place:  Yes  Recommendation Provided To: Patient/Caregiver: 1 via Telephone  Intervention Detail: Refill(s) Provided  Intervention Accepted By: Patient/Caregiver: 1    Time Spent (min): 10

## 2024-04-24 ENCOUNTER — HOSPITAL ENCOUNTER (OUTPATIENT)
Facility: HOSPITAL | Age: 79
Discharge: HOME | End: 2024-04-27

## 2024-04-24 DIAGNOSIS — Z12.31 VISIT FOR SCREENING MAMMOGRAM: ICD-10-CM

## 2024-05-01 ENCOUNTER — TELEPHONE (OUTPATIENT)
Age: 79
End: 2024-05-01

## 2024-05-01 NOTE — TELEPHONE ENCOUNTER
Arabella from [employer] called in about Mrs. Ivey's orders. She is saying that she needs a Diagnostic Mammogram w/ Breast US vs what we ordered for her. New orders will need to be sent (?)

## 2024-05-09 ENCOUNTER — OFFICE VISIT (OUTPATIENT)
Age: 79
End: 2024-05-09
Payer: COMMERCIAL

## 2024-05-09 VITALS
RESPIRATION RATE: 18 BRPM | BODY MASS INDEX: 33.45 KG/M2 | OXYGEN SATURATION: 97 % | TEMPERATURE: 96.9 F | HEART RATE: 75 BPM | SYSTOLIC BLOOD PRESSURE: 115 MMHG | WEIGHT: 220 LBS | DIASTOLIC BLOOD PRESSURE: 68 MMHG

## 2024-05-09 DIAGNOSIS — G62.9 NEUROPATHY: ICD-10-CM

## 2024-05-09 DIAGNOSIS — R92.333 HETEROGENEOUSLY DENSE TISSUE OF BOTH BREASTS ON MAMMOGRAPHY: ICD-10-CM

## 2024-05-09 DIAGNOSIS — Z98.890 H/O BILATERAL BREAST REDUCTION SURGERY: ICD-10-CM

## 2024-05-09 DIAGNOSIS — Z79.811 USE OF ANASTROZOLE (ARIMIDEX): ICD-10-CM

## 2024-05-09 DIAGNOSIS — C50.912 INVASIVE DUCTAL CARCINOMA OF LEFT BREAST (HCC): Primary | ICD-10-CM

## 2024-05-09 DIAGNOSIS — Z98.890 HISTORY OF LUMPECTOMY: ICD-10-CM

## 2024-05-09 DIAGNOSIS — I10 ESSENTIAL (PRIMARY) HYPERTENSION: ICD-10-CM

## 2024-05-09 DIAGNOSIS — Z79.811 LONG TERM (CURRENT) USE OF AROMATASE INHIBITORS: ICD-10-CM

## 2024-05-09 PROCEDURE — 99213 OFFICE O/P EST LOW 20 MIN: CPT | Performed by: INTERNAL MEDICINE

## 2024-05-09 PROCEDURE — 1123F ACP DISCUSS/DSCN MKR DOCD: CPT | Performed by: INTERNAL MEDICINE

## 2024-05-09 PROCEDURE — 3078F DIAST BP <80 MM HG: CPT | Performed by: INTERNAL MEDICINE

## 2024-05-09 PROCEDURE — 3074F SYST BP LT 130 MM HG: CPT | Performed by: INTERNAL MEDICINE

## 2024-05-09 NOTE — PROGRESS NOTES
Myah Ivey is a 78 y.o. female    Chief Complaint   Patient presents with    Follow-up     Breast Cancer       1. Have you been to the ER, urgent care clinic since your last visit?  Hospitalized since your last visit?No    2. Have you seen or consulted any other health care providers outside of the [facility] since your last visit?  Include any pap smears or colon screening. No      
nondistended  MS: Normal gait and station. Digits without clubbing or cyanosis.  Skin: No rashes, ecchymoses, or petechiae. Normal temperature, turgor, and texture.  Psych: Alert, oriented, appropriate affect, normal judgment/insight  Neuro neuropathy in feet mostly  Breast no masses palpable b/l     Results:     Lab Results   Component Value Date/Time    WBC 5.1 06/22/2022 10:53 AM    HGB 13.0 06/22/2022 10:53 AM    HCT 40.8 06/22/2022 10:53 AM     06/22/2022 10:53 AM    MCV 92.7 06/22/2022 10:53 AM     Lab Results   Component Value Date/Time     12/22/2022 11:35 AM    K 4.1 12/22/2022 11:35 AM     12/22/2022 11:35 AM    CO2 30 12/22/2022 11:35 AM    BUN 23 12/22/2022 11:35 AM    GFRAA >60 06/22/2022 10:53 AM     Lab Results   Component Value Date/Time    ALT 19 12/22/2022 11:35 AM    GLOB 4.0 12/22/2022 11:35 AM     Mammogram Result (most recent):  [facility] DENIS DIGITAL DIAGNOSTIC BILATERAL 12/06/2023    Narrative  INDICATION:  Personal history of malignant neoplasm of breast; Mammographic  calcification found on diagnostic imaging of breast. Personal history of  malignant neoplasm of breast / Reason for exam:->Hx of breast cancer, yearly  mammogram/6 month follow up of calcifications.  COMPARISON: Mammogram(s), most recent, 6/2/2023.  .  COMPOSITION:  The breasts are heterogeneously dense, which may obscure small masses.  .  TECHNIQUE:  Standard 2D, CC and MLO views of each breast as well as spot magnification views  of the left breast were performed with digital technique and subjected to  computer-aided detection. Tomosynthesis of each breast was performed in CC and  MLO projections.  .  FINDINGS:  Postsurgical and posttreatment changes in the left breast.  Postreduction changes in the right breast.  No new visualized mass, suspicious microcalcification or architectural  distortion.  .    Impression  1. Postsurgical and posttreatment changes in the left breast.  BI-RADS Category 2 - Benign

## 2024-05-15 ENCOUNTER — TELEPHONE (OUTPATIENT)
Age: 79
End: 2024-05-15

## 2024-05-15 NOTE — TELEPHONE ENCOUNTER
Patient called with swollen ankles, patient would like a call back from the nurse.     Patient #[phone]

## 2024-05-16 ENCOUNTER — TELEPHONE (OUTPATIENT)
Age: 79
End: 2024-05-16

## 2024-05-16 DIAGNOSIS — R60.0 LOWER EXTREMITY EDEMA: ICD-10-CM

## 2024-05-16 DIAGNOSIS — I36.1 NONRHEUMATIC TRICUSPID (VALVE) INSUFFICIENCY: Primary | ICD-10-CM

## 2024-05-16 RX ORDER — BUMETANIDE 0.5 MG/1
0.5 TABLET ORAL DAILY
Qty: 45 TABLET | Refills: 1 | Status: SHIPPED | OUTPATIENT
Start: 2024-05-16

## 2024-05-16 NOTE — TELEPHONE ENCOUNTER
Patient called to follow up with the nurse about what medication Dr. Carlton wll replace furosemide with.     Patient# [phone]

## 2024-05-16 NOTE — TELEPHONE ENCOUNTER
Telephone call made to patient. Two patient identifiers verified.   The patient states that she picked up her refill of furosemide and it was a different look and size of pill. The packaging from the pharmacy states the pill is the same medication but may appear different. I let her know that it sounds like they got it from a different  and to go back to the pharmacy and see if she can return it and figure out how to get the old furosemide from another [facility] pharmacy. She will call me back and let me know the results if she can't. At that point, will see if Dr. Carlton wants to make any other recommendations.

## 2024-05-16 NOTE — TELEPHONE ENCOUNTER
Patient called and left message in regards to making her mammogram appt.  She stated that the order needed to be for the Diagnostic Mammogram.  Please put in request, so she can get this schedule.

## 2024-05-16 NOTE — TELEPHONE ENCOUNTER
Bumex 0.5mg per day if can't get the lasix she wants. Will need to check a bmp in about 1wk if she makes the change

## 2024-05-17 DIAGNOSIS — Z85.3 PERSONAL HISTORY OF BREAST CANCER: Primary | ICD-10-CM

## 2024-05-17 NOTE — TELEPHONE ENCOUNTER
3D bilateral dx mammogram ordered - not due until 12/24. The incorrect order in system was not ordered by me (entered under my name by Vianey Doss). Please let them know.

## 2024-05-17 NOTE — TELEPHONE ENCOUNTER
FU call to [facility], [phone], spoke with Mackenzie. Patient fully ID verified.    Updated CORINA that order correction related to prior call of 5/1/24 was not completed correctly, Provider has placed new order and patient can now be scheduled for her 12/24 appt.  Mackenzie verbalized understanding and stated she would contact patient today.  Update to Provider.

## 2024-05-23 NOTE — PROGRESS NOTES
Payton Abarca is a 70 y.o. female\  Chief Complaint   Patient presents with    Chemotherapy    Breast Cancer     1. Have you been to the ER, urgent care clinic since your last visit? Hospitalized since your last visit? No.    2. Have you seen or consulted any other health care providers outside of the [address] [place] [address] since your last visit? Include any pap smears or colon screening.  Yes, patient saw her foot doctor Krystal Velasquez Summary of Hospitalization:  Abhi Friedman is a 80 year old male with history of hypertension, hyperlipidemia, sleep apnea on CPAP, former smoker (quit 20 years ago) GERD, recent NSTEMI s/p PCI left circumflex proximal lesion x 1 GUSTAVO, mid to distal circumflex lesion x 1 GUSTAVO, LCX into LPDA treated with GUSTAVO guided by IVUS on 5/19/2024.  Patient was discharged on 5/20/2024.  On the day of the discharge also he was feeling short of breath but was not hypoxic.  He was at home for a day and this morning presented with worsening shortness of breath.  CTA chest showed bilateral pulmonary emboli with right heart strain.  Patient was started on heparin infusion and transferred to [facility] ICU for higher level of care.  Case discussed with interventional radiologist who agreed to proceed with mechanical thrombectomy considering patient is at very high risk of bleeding being on aspirin and Plavix in addition to anticoagulation.     Following admission he did undergo thrombectomy without issues, continued on heparin drip.      24 hour summary:  5/23/2024- Much improved, on room air; hemodynamics borderline, but MAP > 65. Ambulating and working with therapy. Case reviewed with cardiology- stop ASA when Eliquis started.     Transfer to floor. Wean O2.     Visit Vitals  /62   Pulse (!) 58   Temp 98.2 °F (36.8 °C) (Oral)   Resp 16   Ht 6' (1.829 m)   Wt 109 kg (240 lb 4.8 oz)   SpO2 92%   BMI 32.59 kg/m²     Lines:  No ackerman catheter or central line present    Antibiotics:  None    ICU Diagnosis/Plan:    Acute pulmonary embolism   S/p thrombectomy on 5/22   Heparin drip--> Eliquis   Wean o2      Recent cardic cath with stent   Aspirin- stop   Plavix- continue   Lopressor- continue   Statin continue      Acute hypoxemic respiratory failure   Wean fi02 as able    MAINTENANCE THERAPIES  DVT prophylaxis:  Heparin drip per protocol-> Eliquis; SCDs  GI prophylaxis: Protonix  Nutrition:  Advancing as  tolerated  Sedation Holiday: Patient is not sedated, no sedation holiday required  Plan developed as per intensivist.    Bipin Owen PA-C  1:47 PM  5/23/2024  [facility] ICU    I have personally examined the patient and reviewed all pertinent data. Eval and treatment time was 15 minutes.  This does not include time spent in procedures and teaching.  The patient's cares and treatment plan were discussed with Patient/Family, Nursing, and Intensivist.    Charge none

## 2024-05-28 DIAGNOSIS — R60.0 LOWER EXTREMITY EDEMA: ICD-10-CM

## 2024-05-28 DIAGNOSIS — I36.1 NONRHEUMATIC TRICUSPID (VALVE) INSUFFICIENCY: ICD-10-CM

## 2024-05-28 LAB
ANION GAP SERPL CALC-SCNC: 4 MMOL/L (ref 5–15)
BUN SERPL-MCNC: 29 MG/DL (ref 6–20)
BUN/CREAT SERPL: 25 (ref 12–20)
CALCIUM SERPL-MCNC: 9.8 MG/DL (ref 8.5–10.1)
CHLORIDE SERPL-SCNC: 105 MMOL/L (ref 97–108)
CO2 SERPL-SCNC: 31 MMOL/L (ref 21–32)
CREAT SERPL-MCNC: 1.15 MG/DL (ref 0.55–1.02)
GLUCOSE SERPL-MCNC: 107 MG/DL (ref 65–100)
POTASSIUM SERPL-SCNC: 3.8 MMOL/L (ref 3.5–5.1)
SODIUM SERPL-SCNC: 140 MMOL/L (ref 136–145)

## 2024-05-29 ENCOUNTER — TELEPHONE (OUTPATIENT)
Age: 79
End: 2024-05-29

## 2024-05-29 NOTE — TELEPHONE ENCOUNTER
----- Message from Hemanth Carlton MD sent at 5/28/2024  7:16 PM EDT -----  Please let pt know labs were overall unchanged. Cr 1.02 to 1.15 is not significant; please reassure her. thx

## 2024-07-08 PROBLEM — J43.1 PANLOBULAR EMPHYSEMA (HCC): Status: ACTIVE | Noted: 2024-07-08

## 2024-07-08 PROBLEM — M85.80 OSTEOPENIA: Status: RESOLVED | Noted: 2017-08-18 | Resolved: 2024-07-08

## 2024-07-10 SDOH — ECONOMIC STABILITY: FOOD INSECURITY: WITHIN THE PAST 12 MONTHS, YOU WORRIED THAT YOUR FOOD WOULD RUN OUT BEFORE YOU GOT MONEY TO BUY MORE.: NEVER TRUE

## 2024-07-10 SDOH — ECONOMIC STABILITY: FOOD INSECURITY: WITHIN THE PAST 12 MONTHS, THE FOOD YOU BOUGHT JUST DIDN'T LAST AND YOU DIDN'T HAVE MONEY TO GET MORE.: NEVER TRUE

## 2024-07-10 SDOH — ECONOMIC STABILITY: INCOME INSECURITY: HOW HARD IS IT FOR YOU TO PAY FOR THE VERY BASICS LIKE FOOD, HOUSING, MEDICAL CARE, AND HEATING?: NOT HARD AT ALL

## 2024-07-11 ENCOUNTER — OFFICE VISIT (OUTPATIENT)
Facility: CLINIC | Age: 79
End: 2024-07-11
Payer: COMMERCIAL

## 2024-07-11 VITALS
WEIGHT: 214.8 LBS | DIASTOLIC BLOOD PRESSURE: 68 MMHG | BODY MASS INDEX: 32.55 KG/M2 | HEIGHT: 68 IN | TEMPERATURE: 97.9 F | OXYGEN SATURATION: 100 % | HEART RATE: 63 BPM | SYSTOLIC BLOOD PRESSURE: 126 MMHG | RESPIRATION RATE: 18 BRPM

## 2024-07-11 DIAGNOSIS — I27.20 PULMONARY HYPERTENSION, UNSPECIFIED (HCC): ICD-10-CM

## 2024-07-11 DIAGNOSIS — Z79.811 USE OF ANASTROZOLE (ARIMIDEX): ICD-10-CM

## 2024-07-11 DIAGNOSIS — K21.9 GASTROESOPHAGEAL REFLUX DISEASE WITHOUT ESOPHAGITIS: ICD-10-CM

## 2024-07-11 DIAGNOSIS — E78.5 DYSLIPIDEMIA: ICD-10-CM

## 2024-07-11 DIAGNOSIS — M81.0 AGE-RELATED OSTEOPOROSIS WITHOUT CURRENT PATHOLOGICAL FRACTURE: ICD-10-CM

## 2024-07-11 DIAGNOSIS — C50.912 INVASIVE DUCTAL CARCINOMA OF LEFT BREAST (HCC): ICD-10-CM

## 2024-07-11 DIAGNOSIS — R73.09 ELEVATED HEMOGLOBIN A1C: ICD-10-CM

## 2024-07-11 DIAGNOSIS — I10 ESSENTIAL HYPERTENSION: Primary | ICD-10-CM

## 2024-07-11 PROCEDURE — 1123F ACP DISCUSS/DSCN MKR DOCD: CPT | Performed by: INTERNAL MEDICINE

## 2024-07-11 PROCEDURE — 3074F SYST BP LT 130 MM HG: CPT | Performed by: INTERNAL MEDICINE

## 2024-07-11 PROCEDURE — 99214 OFFICE O/P EST MOD 30 MIN: CPT | Performed by: INTERNAL MEDICINE

## 2024-07-11 PROCEDURE — 3078F DIAST BP <80 MM HG: CPT | Performed by: INTERNAL MEDICINE

## 2024-07-11 NOTE — PROGRESS NOTES
Myah Ivey is a 78 y.o. female     Chief Complaint   Patient presents with    6 Month Follow-Up    Annual Exam       /68 (Site: Left Upper Arm, Position: Sitting, Cuff Size: Medium Adult)   Pulse 63   Temp 97.9 °F (36.6 °C) (Oral)   Resp 18   Ht 1.727 m (5' 8\")   Wt 97.4 kg (214 lb 12.8 oz)   SpO2 100%   BMI 32.66 kg/m²     Health Maintenance Due   Topic Date Due    DTaP/Tdap/Td vaccine (1 - Tdap) Never done    Shingles vaccine (2 of 2) 07/07/2018    COVID-19 Vaccine (5 - 2023-24 season) 09/01/2023         \"Have you been to the ER, urgent care clinic since your last visit?  Hospitalized since your last visit?\"    NO    “Have you seen or consulted any other health care providers outside of [facility] since your last visit?”    YES - When: approximately 2  weeks ago.  Where and Why:  ( [facility]                     
intolerance  Genitourinary : negative for frequency, dysuria and hematuria  Integumentary: negative for rash and pruritus  Hematologic:   negative for easy bruising and gum/nose bleeding  Musculoskel:  negative for myalgias, arthralgias, back pain, muscle weakness  Neurological:   negative for headaches, dizziness, vertigo, memory problems and gait   Behavl/Psych:  negative for feelings of anxiety, depression, mood changes  ROS otherwise negative      Objective:  /68 (Site: Left Upper Arm, Position: Sitting, Cuff Size: Medium Adult)   Pulse 63   Temp 97.9 °F (36.6 °C) (Oral)   Resp 18   Ht 1.727 m (5' 8\")   Wt 97.4 kg (214 lb 12.8 oz)   SpO2 100%   BMI 32.66 kg/m²   Physical Exam:   General appearance - alert, well appearing, and in no distress  Mental status - alert, oriented to person, place, and time  EYE-TEA, EOMI, fundi normal, corneas normal, no foreign bodies  ENT-ENT exam normal, no neck nodes or sinus tenderness  Nose - normal and patent, no erythema, discharge or polyps  Mouth - mucous membranes moist, pharynx normal without lesions  Neck - supple, no significant adenopathy   Chest - clear to auscultation, no wheezes, rales or rhonchi, symmetric air entry   Heart - normal rate, regular rhythm, normal S1, S2, no murmurs, rubs, clicks or gallops   Abdomen - soft, nontender, nondistended, no masses or organomegaly  Lymph- no adenopathy palpable  Ext-peripheral pulses normal, no pedal edema, no clubbing or cyanosis  Skin-Warm and dry. no hyperpigmentation, vitiligo, or suspicious lesions  Neuro -alert, oriented, normal speech, no focal findings or movement disorder noted      Assessment/Plan:  Myah was seen today for 6 month follow-up and annual exam.    Diagnoses and all orders for this visit:    Essential hypertension    Pulmonary hypertension, unspecified (HCC)    Gastroesophageal reflux disease without esophagitis    Age-related osteoporosis without current pathological

## 2024-07-15 RX ORDER — OLMESARTAN MEDOXOMIL AND HYDROCHLOROTHIAZIDE 20/12.5 20; 12.5 MG/1; MG/1
1 TABLET ORAL DAILY
Qty: 90 TABLET | Refills: 3 | Status: SHIPPED | OUTPATIENT
Start: 2024-07-15 | End: 2024-07-16 | Stop reason: SDUPTHER

## 2024-07-15 NOTE — TELEPHONE ENCOUNTER
Requested Prescriptions     Signed Prescriptions Disp Refills    olmesartan-hydroCHLOROthiazide (BENICAR HCT) 20-12.5 MG per tablet 90 tablet 3     Sig: Take 1 tablet by mouth once daily     Authorizing Provider: ANNA RAMOS     Ordering User: ARNOLD CLEMENTS MD    Future Appointments   Date Time Provider Department Center   7/16/2024  9:00 AM BS EVANS ECHO 1 SONAL [facility]   7/16/2024  9:40 AM Anna Ramos, APRN - NP SONAL [facility]   11/20/2024 11:00 [facility]   12/11/2024 12:50 PM [facility] 5 SMHRMAM [facility]   1/20/2025  9:30 AM RENEE Juarez MD [facility] AMB

## 2024-07-15 NOTE — PROGRESS NOTES
[facility] CARDIOLOGY  [phone]    Cardiologist: Dr. Hemanth Carlton    History of Present Illness:  Ms. Ivey is a 78 yo F with a history of left sided breast cancer status post lumpectomy with chemotherapy and radiation therapy, essential hypertension, mild to moderate tricuspid regurgitation noted by echocardiogram in the past, most recent echocardiogram this week demonstrated just mild tricuspid regurgitation, hepatitis C status post treatment, dyslipidemia.      Since her last visit overall she has been doing okay.  She denies any exertional chest pain.  She has some baseline shortness of breath that is unchanged.  No significant palpitations.  LE edema is doing well on bumex.  BP is low today, she has noticed some low readings recently.  Denies any dizziness or lightheadedness.  Preliminary echo results today show TR remains moderate, normal EF, mild pulmonary hypertension.      Assessment and Plan:   1. Tricuspid regurgitation.  Stable by echo today.  Remains in the moderate range. Will have her follow up with Dr. Carlton again in 6 months.  2. Essential hypertension.  Blood pressure is running low, advised her to reduce her benicar HCT to 1/2 tab daily and to monitor BP at home, advised her to call the office if SBP is consistently >140mmHg.   3.  CKD stage 3. Last creatinine 1.1 in 5/24  4. Myxomatous mitral valve.  Previously mitral valve prolapse, but that resolved.  5. Hepatitis C, status post Harvoni.  Followed by hepatology at [facility].  6. Left-sided breast cancer in 2020, status post lumpectomy, radiation and chemotherapy. Has residual neuropathy.  7. Dyslipidemia. Followed by PCP     Echo 6/23 - EF 66%, normal strain, mild MR, mod TR, RVSP 48mmhg  Echo 9/21 - EF 60-65%, normal strain, mild to mod TR, RVSP 35mmHg  Echo 9/20 - EF 60-65%, grade 1 dd, myxomatous MV, mod TR, PASP 33mmHg  Echo 9/19 - mod TR, EF normal, PASP 45mmhg  Echo 9/18 - LVEF 55-60%, mod TR, RVSP 32mmhg  Echo 9/17 - LVEF 60 % to 65 %, no

## 2024-07-16 ENCOUNTER — OFFICE VISIT (OUTPATIENT)
Age: 79
End: 2024-07-16
Payer: COMMERCIAL

## 2024-07-16 VITALS
DIASTOLIC BLOOD PRESSURE: 58 MMHG | SYSTOLIC BLOOD PRESSURE: 96 MMHG | WEIGHT: 217 LBS | OXYGEN SATURATION: 97 % | BODY MASS INDEX: 32.89 KG/M2 | HEART RATE: 80 BPM | HEIGHT: 68 IN

## 2024-07-16 DIAGNOSIS — Z85.3 HISTORY OF BREAST CANCER: ICD-10-CM

## 2024-07-16 DIAGNOSIS — I10 ESSENTIAL HYPERTENSION: Primary | ICD-10-CM

## 2024-07-16 DIAGNOSIS — N18.31 CHRONIC KIDNEY DISEASE, STAGE 3A (HCC): ICD-10-CM

## 2024-07-16 DIAGNOSIS — I36.1 NONRHEUMATIC TRICUSPID VALVE REGURGITATION: ICD-10-CM

## 2024-07-16 PROCEDURE — 99214 OFFICE O/P EST MOD 30 MIN: CPT | Performed by: NURSE PRACTITIONER

## 2024-07-16 PROCEDURE — 3074F SYST BP LT 130 MM HG: CPT | Performed by: NURSE PRACTITIONER

## 2024-07-16 PROCEDURE — 1123F ACP DISCUSS/DSCN MKR DOCD: CPT | Performed by: NURSE PRACTITIONER

## 2024-07-16 PROCEDURE — 3078F DIAST BP <80 MM HG: CPT | Performed by: NURSE PRACTITIONER

## 2024-07-16 RX ORDER — OLMESARTAN MEDOXOMIL AND HYDROCHLOROTHIAZIDE 20/12.5 20; 12.5 MG/1; MG/1
0.5 TABLET ORAL DAILY
Qty: 45 TABLET | Refills: 1 | Status: SHIPPED | OUTPATIENT
Start: 2024-07-16

## 2024-07-16 RX ORDER — EPINEPHRINE 0.3 MG/.3ML
INJECTION SUBCUTANEOUS
COMMUNITY
Start: 2024-03-21

## 2024-07-16 ASSESSMENT — PATIENT HEALTH QUESTIONNAIRE - PHQ9
SUM OF ALL RESPONSES TO PHQ9 QUESTIONS 1 & 2: 0
SUM OF ALL RESPONSES TO PHQ QUESTIONS 1-9: 0
1. LITTLE INTEREST OR PLEASURE IN DOING THINGS: NOT AT ALL
SUM OF ALL RESPONSES TO PHQ QUESTIONS 1-9: 0
2. FEELING DOWN, DEPRESSED OR HOPELESS: NOT AT ALL

## 2024-07-16 NOTE — PATIENT INSTRUCTIONS
Please reduce your benicar HCT to 1/2 tablet daily  Please monitor BP at home and call the office if your systolic blood pressure is consistently > 140mmHg

## 2024-08-02 ENCOUNTER — TELEPHONE (OUTPATIENT)
Facility: CLINIC | Age: 79
End: 2024-08-02

## 2024-08-05 ENCOUNTER — OFFICE VISIT (OUTPATIENT)
Facility: CLINIC | Age: 79
End: 2024-08-05
Payer: COMMERCIAL

## 2024-08-05 VITALS
WEIGHT: 219.2 LBS | TEMPERATURE: 98.7 F | DIASTOLIC BLOOD PRESSURE: 70 MMHG | RESPIRATION RATE: 18 BRPM | SYSTOLIC BLOOD PRESSURE: 116 MMHG | BODY MASS INDEX: 33.22 KG/M2 | HEART RATE: 77 BPM | HEIGHT: 68 IN | OXYGEN SATURATION: 98 %

## 2024-08-05 DIAGNOSIS — R05.3 CHRONIC COUGH: Primary | ICD-10-CM

## 2024-08-05 PROCEDURE — 3078F DIAST BP <80 MM HG: CPT | Performed by: INTERNAL MEDICINE

## 2024-08-05 PROCEDURE — 3074F SYST BP LT 130 MM HG: CPT | Performed by: INTERNAL MEDICINE

## 2024-08-05 PROCEDURE — 1123F ACP DISCUSS/DSCN MKR DOCD: CPT | Performed by: INTERNAL MEDICINE

## 2024-08-05 PROCEDURE — 99213 OFFICE O/P EST LOW 20 MIN: CPT | Performed by: INTERNAL MEDICINE

## 2024-08-05 RX ORDER — AZITHROMYCIN 250 MG/1
TABLET, FILM COATED ORAL
Qty: 6 TABLET | Refills: 0 | Status: SHIPPED | OUTPATIENT
Start: 2024-08-05 | End: 2024-08-15

## 2024-08-05 RX ORDER — ALBUTEROL SULFATE 90 UG/1
2 AEROSOL, METERED RESPIRATORY (INHALATION) 4 TIMES DAILY PRN
Qty: 18 G | Refills: 0 | Status: SHIPPED | OUTPATIENT
Start: 2024-08-05

## 2024-08-05 NOTE — PROGRESS NOTES
Myah Ivey is a 79 y.o. female     Chief Complaint   Patient presents with    Cough       /70 (Site: Left Upper Arm, Position: Sitting, Cuff Size: Medium Adult)   Pulse 77   Temp 98.7 °F (37.1 °C) (Oral)   Resp 18   Ht 1.727 m (5' 8\")   Wt 99.4 kg (219 lb 3.2 oz)   SpO2 98%   BMI 33.33 kg/m²     Health Maintenance Due   Topic Date Due    DTaP/Tdap/Td vaccine (1 - Tdap) Never done    Shingles vaccine (2 of 2) 07/07/2018    COVID-19 Vaccine (5 - 2023-24 season) 09/01/2023    Flu vaccine (1) 08/01/2024         \"Have you been to the ER, urgent care clinic since your last visit?  Hospitalized since your last visit?\"    NO    “Have you seen or consulted any other health care providers outside of [facility] since your last visit?”    NO

## 2024-08-05 NOTE — PROGRESS NOTES
Myah Ivey is a 79 y.o. female and presents with Cough  .    Subjective:  Mrs. Ivey presents today with complaint of a cough.  The cough has been persistent for the past month or so.  She has a history of mild COPD.  She is treated for allergies and receives allergy injections.  She has a history of reflux and remains on Pepcid daily.  She does state when lying supine she will have increasing cough.  She has had some wheezing on occasion as well.  She denies any shortness of breath or pleuritic chest pain.  Her cough is mostly nonproductive at this time.    Past Medical History:   Diagnosis Date    Acute diverticulitis 8/18/2017    Allergic rhinitis 8/18/2017    Arthritis 8/18/2017    Arthritis of right knee 11/25/2019    Back pain, thoracic 8/18/2017    Breast cancer (HCC) 08/04/2020    Left    Cancer (HCC) 08/2020    LEFT BREAST , LUMPECTOMY WITH RECONSTRUCTION, CHEMO, RADIATION    Cataract, left 8/18/2017    Cataract, right 8/18/2017    Chronic obstructive pulmonary disease (HCC)     Cold sore 8/18/2017    COVID-19 vaccine series completed 2/23/21, 3/19/21    PFIZER    Elevated hemoglobin A1c 8/18/2017    Fatigue 8/18/2017    GERD (gastroesophageal reflux disease) 8/18/2017    Heart murmur 8/18/2017    Hepatitis C 8/18/2017    TREATED    Herpes zoster infection of thoracic region 8/18/2017    Hyperlipidemia LDL goal <100 8/18/2017    Hypertension     Menopause     Hysterectomy-46 years old    Murmur     Obesity (BMI 30-39.9) 8/18/2017    Osteopenia 8/18/2017    Other ill-defined conditions(799.89)     hep c    Post-menopausal 8/18/2017    Posterior auricular pain of right ear 8/18/2017    Preop examination 8/18/2017    Valvular heart disease     mitral valve prolapse    Vertigo, benign positional 8/18/2017     Past Surgical History:   Procedure Laterality Date    BREAST LUMPECTOMY Bilateral 11/03/2020    LEFT BREAST REDUCTION LUMPECTOMY WITH ULTRASOUND, LEFT BREAST SENTINEL NODE BIOPSY, LEFT BREAST

## 2024-08-12 ENCOUNTER — TELEPHONE (OUTPATIENT)
Age: 79
End: 2024-08-12

## 2024-08-12 DIAGNOSIS — I36.1 NONRHEUMATIC TRICUSPID (VALVE) INSUFFICIENCY: ICD-10-CM

## 2024-08-12 DIAGNOSIS — R60.0 LOWER EXTREMITY EDEMA: ICD-10-CM

## 2024-08-12 RX ORDER — BUMETANIDE 0.5 MG/1
0.5 TABLET ORAL DAILY
Qty: 45 TABLET | Refills: 3 | Status: SHIPPED | OUTPATIENT
Start: 2024-08-12

## 2024-08-12 NOTE — TELEPHONE ENCOUNTER
Received a request for records from [facility]. Faxed records to [phone]. Received fax confirmation receipt.

## 2024-08-12 NOTE — TELEPHONE ENCOUNTER
Requested Prescriptions     Signed Prescriptions Disp Refills    bumetanide (BUMEX) 0.5 MG tablet 45 tablet 3     Sig: Take 1 tablet by mouth once daily     Authorizing Provider: PRASHANTH SHELTON     Ordering User: ARNOLD CLEMENTS MD    Future Appointments   Date Time Provider Department Center   8/22/2024 10:30 AM Todd Keith MD [facility]   11/20/2024 11:00 [facility]   12/11/2024 12:50 PM [facility] 5 [facility]   1/16/2025 11:40 AM Prashanth Shelton MD [facility]   1/20/2025  9:30 AM RENEE Juarez MD [facility] DEP

## 2024-08-15 ENCOUNTER — TELEPHONE (OUTPATIENT)
Age: 79
End: 2024-08-15

## 2024-08-15 NOTE — TELEPHONE ENCOUNTER
[id number]    Call to pt, ID verified x2.  Updated pt that provider recommends to schedule breast MRI, supplied contact to BS scheduling #[phone].     Pt requesting to reschedule 11/20/24 OV to a later time that day or for another day/time. If not able to move appt time down. Will forward to scheduling to contact pt in regards to rescheduling OV. Understanding verbalized of all, pt appreciative of call.

## 2024-08-16 NOTE — TELEPHONE ENCOUNTER
[id number]  Call to pt, ID verified x2.    Advised pt that provider recommends to move up MRI to now or soon and fine to keep mammo in Dec. Discussed OV does not need to be after mammo/MRI. Pt stated she will call BS scheduling to move up MRI, notified pt that scheduling will be in contact to change OV time. Understanding verbalized of all, pt appreciative of call.

## 2024-09-06 ENCOUNTER — TELEPHONE (OUTPATIENT)
Age: 79
End: 2024-09-06

## 2024-09-06 DIAGNOSIS — F40.240 CLAUSTROPHOBIA: ICD-10-CM

## 2024-09-06 DIAGNOSIS — R45.89 ANXIETY ABOUT HEALTH: Primary | ICD-10-CM

## 2024-09-06 RX ORDER — LORAZEPAM 0.5 MG/1
TABLET ORAL
Qty: 2 TABLET | Refills: 0 | Status: SHIPPED | OUTPATIENT
Start: 2024-09-06 | End: 2024-10-04

## 2024-09-06 NOTE — TELEPHONE ENCOUNTER
Ativan 0.5 mg PO with instructions to take 30-60 minutes prior to MRI (may take additional dose if needed) #2, NR sent to pharmacy on file. Please let her know.

## 2024-09-06 NOTE — TELEPHONE ENCOUNTER
Call to patient, ID verified X 2, [phone].  Provider message re Ativan pre meds for MRI scan on 9/10/24 relayed, understanding verbalized including recommendation to have  if taking Ativan.

## 2024-09-06 NOTE — TELEPHONE ENCOUNTER
Pt called stating she is getting an MRI next week on Tuesday and  requested a relaxer RX Lorazepam as pt will be driven to scan and needs something to relax her     Cb # [phone]

## 2024-09-10 ENCOUNTER — HOSPITAL ENCOUNTER (OUTPATIENT)
Age: 79
Discharge: HOME | End: 2024-09-13
Payer: COMMERCIAL

## 2024-09-10 DIAGNOSIS — Z98.890 HISTORY OF LUMPECTOMY: ICD-10-CM

## 2024-09-10 DIAGNOSIS — Z79.811 USE OF ANASTROZOLE (ARIMIDEX): ICD-10-CM

## 2024-09-10 DIAGNOSIS — Z98.890 H/O BILATERAL BREAST REDUCTION SURGERY: ICD-10-CM

## 2024-09-10 DIAGNOSIS — C50.912 INVASIVE DUCTAL CARCINOMA OF LEFT BREAST (HCC): ICD-10-CM

## 2024-09-10 DIAGNOSIS — R92.333 HETEROGENEOUSLY DENSE TISSUE OF BOTH BREASTS ON MAMMOGRAPHY: ICD-10-CM

## 2024-09-10 PROCEDURE — 6360000004 HC RX CONTRAST MEDICATION: Performed by: SURGERY

## 2024-09-10 PROCEDURE — C8908 MRI W/O FOL W/CONT, BREAST,: HCPCS

## 2024-09-10 PROCEDURE — A9585 GADOBUTROL INJECTION: HCPCS | Performed by: SURGERY

## 2024-09-10 RX ORDER — GADOBUTROL 604.72 MG/ML
10 INJECTION INTRAVENOUS
Status: DISCONTINUED | OUTPATIENT
Start: 2024-09-10 | End: 2024-09-10

## 2024-09-10 RX ORDER — GADOBUTROL 604.72 MG/ML
10 INJECTION INTRAVENOUS ONCE
Status: COMPLETED | OUTPATIENT
Start: 2024-09-10 | End: 2024-09-10

## 2024-09-10 RX ADMIN — GADOBUTROL 10 ML: 604.72 INJECTION INTRAVENOUS at 09:58

## 2024-11-22 ENCOUNTER — TELEPHONE (OUTPATIENT)
Age: 79
End: 2024-11-22

## 2024-11-25 ENCOUNTER — OFFICE VISIT (OUTPATIENT)
Age: 79
End: 2024-11-25
Payer: COMMERCIAL

## 2024-11-25 VITALS
RESPIRATION RATE: 18 BRPM | TEMPERATURE: 98.1 F | OXYGEN SATURATION: 96 % | DIASTOLIC BLOOD PRESSURE: 64 MMHG | WEIGHT: 220 LBS | BODY MASS INDEX: 33.45 KG/M2 | SYSTOLIC BLOOD PRESSURE: 99 MMHG | HEART RATE: 60 BPM

## 2024-11-25 DIAGNOSIS — Z98.890 HISTORY OF LUMPECTOMY: ICD-10-CM

## 2024-11-25 DIAGNOSIS — Z98.890 H/O BILATERAL BREAST REDUCTION SURGERY: ICD-10-CM

## 2024-11-25 DIAGNOSIS — C50.912 INVASIVE DUCTAL CARCINOMA OF LEFT BREAST (HCC): Primary | ICD-10-CM

## 2024-11-25 DIAGNOSIS — Z79.811 LONG TERM (CURRENT) USE OF AROMATASE INHIBITORS: ICD-10-CM

## 2024-11-25 DIAGNOSIS — Z79.811 USE OF ANASTROZOLE (ARIMIDEX): ICD-10-CM

## 2024-11-25 PROCEDURE — 3078F DIAST BP <80 MM HG: CPT | Performed by: INTERNAL MEDICINE

## 2024-11-25 PROCEDURE — 99213 OFFICE O/P EST LOW 20 MIN: CPT | Performed by: INTERNAL MEDICINE

## 2024-11-25 PROCEDURE — 3074F SYST BP LT 130 MM HG: CPT | Performed by: INTERNAL MEDICINE

## 2024-11-25 PROCEDURE — 1123F ACP DISCUSS/DSCN MKR DOCD: CPT | Performed by: INTERNAL MEDICINE

## 2024-11-25 RX ORDER — ANASTROZOLE 1 MG/1
1 TABLET ORAL DAILY
Qty: 90 TABLET | Refills: 3 | Status: SHIPPED | OUTPATIENT
Start: 2024-11-25

## 2024-11-25 NOTE — PROGRESS NOTES
Myah Ivey is a 79 y.o. female    Chief Complaint   Patient presents with    Follow-up     Breast Cancer     1. Have you been to the ER, urgent care clinic since your last visit?  Hospitalized since your last visit?No    2. Have you seen or consulted any other health care providers outside of the [facility] since your last visit?  Include any pap smears or colon screening. Yes, Podiatrist

## 2024-11-25 NOTE — PROGRESS NOTES
[facility] [place] at [facility]  [address], [address] [place] [id number]  W: [phone]  F: [phone]    Reason for Visit:   Myah Ivey is a 79 y.o. female who is seen for fu    Treatment History:   Per Surgery Note:    Initially abnormal screening mammo 7/20 on LEFT with small mass    08/04/20: LEFT Breast Bx. PATH: IDC, 5 mm in greatest linear dimension extending to core biopsy tip, histologic grade 2, ER+(%)/NC+(%)/HER2-. Clinical  stage 1    MammaPrint: High risk, luminal type B, -0.123    Breast MRI 8/28/20: 12 mm mass otherwise negative.     11/03/20: LEFT oncoplastic reduction and SLNBx, and RIGHT breast reduction, with Dr. Allen Gibson. PATH:    LEFT: IDC, 14 x 13 x 12 mm, overall grade 2, negative margins. DCIS present with negative margins    Pathologic stage: pT1c, pN0    RIGHT: Breast tissue with stromal fibrosis and focal papillary apocrine metaplasia. Benign skin and subcutaneous soft tissue. No in-situ or  invasive carcinoma identified    Adjuvant TC chemotherapy x 4 cycles from 1/6/21 - 3/10/21    Completed XRT on 5/5/21    Adjuvant Anastrozole 5/21 - Current       STAGE: T1cN0 ER+ HEr2 negative mammaprint high risk luminal B    History of Present Illness:     Pt seen today for office 6 mo fu breast cancer ER+ HER2 negative hx of lumpectomy and bilateral breast reduction on  11/3/20.   She started adjuvant hormonal therapy with Anastrozole in 5/21.    feels well overall today and is tolerating Anastrozole well overall with some mild hot flashes.   Did annual shelter feeding.   No fevers/ chills/ chest pain/ SOB/ nausea/ vomiting/diarrhea/ pain/fatigue      Past Medical History:   Diagnosis Date    Acute diverticulitis 8/18/2017    Allergic rhinitis 8/18/2017    Arthritis 8/18/2017    Arthritis of right knee 11/25/2019    Back pain, thoracic 8/18/2017    Breast cancer (HCC) 08/04/2020    Left    Cancer (HCC) 08/2020    LEFT BREAST , LUMPECTOMY WITH

## 2024-12-11 ENCOUNTER — HOSPITAL ENCOUNTER (OUTPATIENT)
Facility: HOSPITAL | Age: 79
Discharge: HOME | End: 2024-12-14
Payer: COMMERCIAL

## 2024-12-11 VITALS — BODY MASS INDEX: 32.58 KG/M2 | HEIGHT: 68 IN | WEIGHT: 215 LBS

## 2024-12-11 DIAGNOSIS — Z85.3 PERSONAL HISTORY OF BREAST CANCER: ICD-10-CM

## 2024-12-11 PROCEDURE — G0279 TOMOSYNTHESIS, MAMMO: HCPCS

## 2025-01-13 DIAGNOSIS — I36.1 NONRHEUMATIC TRICUSPID (VALVE) INSUFFICIENCY: ICD-10-CM

## 2025-01-13 DIAGNOSIS — R60.0 LOWER EXTREMITY EDEMA: ICD-10-CM

## 2025-01-13 RX ORDER — BUMETANIDE 0.5 MG/1
0.5 TABLET ORAL DAILY
Qty: 45 TABLET | Refills: 0 | Status: SHIPPED | OUTPATIENT
Start: 2025-01-13

## 2025-01-13 NOTE — TELEPHONE ENCOUNTER
Requested Prescriptions     Signed Prescriptions Disp Refills    bumetanide (BUMEX) 0.5 MG tablet 45 tablet 0     Sig: Take 1 tablet by mouth once daily     Authorizing Provider: PRASHANTH SHELTON     Ordering User: ARNOLD CLEMENTS MD    Future Appointments   Date Time Provider Department Center   1/16/2025 11:40 AM Prashanth Shelton MD [facility]   1/20/2025  9:30 AM RENEE Juarez MD [facility]   6/2/2025  3:00 PM Ofelia Martin DO [facility] AMB

## 2025-01-16 ENCOUNTER — OFFICE VISIT (OUTPATIENT)
Age: 80
End: 2025-01-16

## 2025-01-16 VITALS
OXYGEN SATURATION: 97 % | BODY MASS INDEX: 32.58 KG/M2 | SYSTOLIC BLOOD PRESSURE: 120 MMHG | HEART RATE: 88 BPM | HEIGHT: 68 IN | WEIGHT: 215 LBS | DIASTOLIC BLOOD PRESSURE: 64 MMHG

## 2025-01-16 DIAGNOSIS — Z85.3 HISTORY OF BREAST CANCER: ICD-10-CM

## 2025-01-16 DIAGNOSIS — I36.1 NONRHEUMATIC TRICUSPID (VALVE) INSUFFICIENCY: Primary | ICD-10-CM

## 2025-01-16 DIAGNOSIS — E78.2 MIXED HYPERLIPIDEMIA: ICD-10-CM

## 2025-01-16 DIAGNOSIS — I10 ESSENTIAL HYPERTENSION: ICD-10-CM

## 2025-01-16 ASSESSMENT — PATIENT HEALTH QUESTIONNAIRE - PHQ9
SUM OF ALL RESPONSES TO PHQ QUESTIONS 1-9: 0
SUM OF ALL RESPONSES TO PHQ9 QUESTIONS 1 & 2: 0
SUM OF ALL RESPONSES TO PHQ QUESTIONS 1-9: 0
1. LITTLE INTEREST OR PLEASURE IN DOING THINGS: NOT AT ALL
2. FEELING DOWN, DEPRESSED OR HOPELESS: NOT AT ALL

## 2025-01-16 NOTE — PROGRESS NOTES
[place]: [place]  [phone]    History of Present Illness:  Ms. Ivey is a 80 yo F followed in the past by Dr. Byers/Mavis with a history of left sided breast cancer status post lumpectomy with chemotherapy and radiation therapy, essential hypertension, mild to moderate tricuspid regurgitation noted by echocardiogram in the past, hepatitis C status post treatment, dyslipidemia.      Since her last visit, overall she has been doing okay.  She has been having various orthopedic hand surgeries and is going to have one on her foot upcoming.  She denies any exertional chest pain.  Breathing has been stable.  No significant palpitations. She is compensated on exam with clear lungs and no lower extremity edema.  She is now taking Bumex as a diuretic. Echo last year demonstrated normal LV function with just mild tricuspid regurgitation and this has been in the moderate range at times.    Assessment and Plan:  1. Tricuspid regurgitation.  It has been in the mild-to-moderate range and will have her follow up with same-day echo in 6 months.  2. Essential hypertension.  Blood pressure is controlled.  3. Myxomatous mitral valve.  Previously prolapsed, but that resolved.  4. Hepatitis C, status post Harvoni.  Followed by Hepatology.  5. Left-sided breast cancer status post lumpectomy, radiation and chemotherapy.  Followed by Oncology and has been stable.  6. Dyslipidemia.        Echo 9/21 - EF 60-65%, normal strain, mild to mod TR, RVSP 35mmHg  Echo 9/20 - EF 60-65%, grade 1 dd, myxomatous MV, mod TR, PASP 33mmHg  Echo 9/19 - mod TR, EF normal, PASP 45mmhg  Echo 9/18 - LVEF 55-60%, mod TR, RVSP 32mmhg  Echo 9/17 - LVEF 60 % to 65 %, no WMA, mod-severe TR, RVSP 38mmHg/PASP 25mmHg   Echo 9/16 - LVEF 60 % to 65 %, no WMA, mod-severe TR, mild to mod PAHTN (PASP 40mmHg  Echo 8/15 - EF 60%, mild MR, mod-severe TR  Nuclear stress 8/15 EF 86%, no ischemia    Soc Hx: 2 glasses of wine every other day, no tobacco, no drug

## 2025-01-20 ENCOUNTER — OFFICE VISIT (OUTPATIENT)
Facility: CLINIC | Age: 80
End: 2025-01-20
Payer: COMMERCIAL

## 2025-01-20 VITALS
HEART RATE: 68 BPM | RESPIRATION RATE: 20 BRPM | OXYGEN SATURATION: 98 % | SYSTOLIC BLOOD PRESSURE: 130 MMHG | WEIGHT: 213.6 LBS | DIASTOLIC BLOOD PRESSURE: 58 MMHG | TEMPERATURE: 98.6 F | BODY MASS INDEX: 32.37 KG/M2 | HEIGHT: 68 IN

## 2025-01-20 DIAGNOSIS — N18.31 CHRONIC KIDNEY DISEASE, STAGE 3A (HCC): ICD-10-CM

## 2025-01-20 DIAGNOSIS — R73.09 ELEVATED HEMOGLOBIN A1C: ICD-10-CM

## 2025-01-20 DIAGNOSIS — C50.912 INVASIVE DUCTAL CARCINOMA OF LEFT BREAST (HCC): ICD-10-CM

## 2025-01-20 DIAGNOSIS — M81.0 AGE-RELATED OSTEOPOROSIS WITHOUT CURRENT PATHOLOGICAL FRACTURE: ICD-10-CM

## 2025-01-20 DIAGNOSIS — E78.5 DYSLIPIDEMIA: ICD-10-CM

## 2025-01-20 DIAGNOSIS — I10 ESSENTIAL HYPERTENSION: ICD-10-CM

## 2025-01-20 DIAGNOSIS — Z00.00 ENCOUNTER FOR WELL ADULT EXAM WITHOUT ABNORMAL FINDINGS: ICD-10-CM

## 2025-01-20 DIAGNOSIS — Z00.00 ANNUAL PHYSICAL EXAM: Primary | ICD-10-CM

## 2025-01-20 DIAGNOSIS — I27.20 PULMONARY HYPERTENSION, UNSPECIFIED (HCC): ICD-10-CM

## 2025-01-20 LAB
25(OH)D3 SERPL-MCNC: 83.2 NG/ML (ref 30–100)
ALBUMIN SERPL-MCNC: 4 G/DL (ref 3.5–5)
ALBUMIN/GLOB SERPL: 1 (ref 1.1–2.2)
ALP SERPL-CCNC: 72 U/L (ref 45–117)
ALT SERPL-CCNC: 16 U/L (ref 12–78)
ANION GAP SERPL CALC-SCNC: 7 MMOL/L (ref 2–12)
APPEARANCE UR: ABNORMAL
AST SERPL-CCNC: 18 U/L (ref 15–37)
BACTERIA URNS QL MICRO: ABNORMAL /HPF
BASOPHILS # BLD: 0.05 K/UL (ref 0–0.1)
BASOPHILS NFR BLD: 0.9 % (ref 0–1)
BILIRUB SERPL-MCNC: 0.4 MG/DL (ref 0.2–1)
BILIRUB UR QL: NEGATIVE
BUN SERPL-MCNC: 20 MG/DL (ref 6–20)
BUN/CREAT SERPL: 20 (ref 12–20)
CALCIUM SERPL-MCNC: 9.7 MG/DL (ref 8.5–10.1)
CHLORIDE SERPL-SCNC: 105 MMOL/L (ref 97–108)
CHOLEST SERPL-MCNC: 173 MG/DL
CO2 SERPL-SCNC: 28 MMOL/L (ref 21–32)
COLOR UR: ABNORMAL
CREAT SERPL-MCNC: 0.98 MG/DL (ref 0.55–1.02)
DIFFERENTIAL METHOD BLD: NORMAL
EOSINOPHIL # BLD: 0.4 K/UL (ref 0–0.4)
EOSINOPHIL NFR BLD: 6.9 % (ref 0–7)
EPITH CASTS URNS QL MICRO: ABNORMAL /LPF
ERYTHROCYTE [DISTWIDTH] IN BLOOD BY AUTOMATED COUNT: 13.9 % (ref 11.5–14.5)
EST. AVERAGE GLUCOSE BLD GHB EST-MCNC: 111 MG/DL
GLOBULIN SER CALC-MCNC: 3.9 G/DL (ref 2–4)
GLUCOSE SERPL-MCNC: 95 MG/DL (ref 65–100)
GLUCOSE UR STRIP.AUTO-MCNC: NEGATIVE MG/DL
HBA1C MFR BLD: 5.5 % (ref 4–5.6)
HCT VFR BLD AUTO: 36.6 % (ref 35–47)
HDLC SERPL-MCNC: 48 MG/DL
HDLC SERPL: 3.6 (ref 0–5)
HGB BLD-MCNC: 11.6 G/DL (ref 11.5–16)
HGB UR QL STRIP: NEGATIVE
HYALINE CASTS URNS QL MICRO: ABNORMAL /LPF (ref 0–5)
IMM GRANULOCYTES # BLD AUTO: 0.01 K/UL (ref 0–0.04)
IMM GRANULOCYTES NFR BLD AUTO: 0.2 % (ref 0–0.5)
KETONES UR QL STRIP.AUTO: NEGATIVE MG/DL
LDLC SERPL CALC-MCNC: 109.8 MG/DL (ref 0–100)
LEUKOCYTE ESTERASE UR QL STRIP.AUTO: ABNORMAL
LYMPHOCYTES # BLD: 1.24 K/UL (ref 0.8–3.5)
LYMPHOCYTES NFR BLD: 21.3 % (ref 12–49)
MCH RBC QN AUTO: 28.6 PG (ref 26–34)
MCHC RBC AUTO-ENTMCNC: 31.7 G/DL (ref 30–36.5)
MCV RBC AUTO: 90.4 FL (ref 80–99)
MONOCYTES # BLD: 0.67 K/UL (ref 0–1)
MONOCYTES NFR BLD: 11.5 % (ref 5–13)
NEUTS SEG # BLD: 3.44 K/UL (ref 1.8–8)
NEUTS SEG NFR BLD: 59.2 % (ref 32–75)
NITRITE UR QL STRIP.AUTO: NEGATIVE
NRBC # BLD: 0 K/UL (ref 0–0.01)
NRBC BLD-RTO: 0 PER 100 WBC
PH UR STRIP: 6.5 (ref 5–8)
PLATELET # BLD AUTO: 234 K/UL (ref 150–400)
PMV BLD AUTO: 11.5 FL (ref 8.9–12.9)
POTASSIUM SERPL-SCNC: 3.8 MMOL/L (ref 3.5–5.1)
PROT SERPL-MCNC: 7.9 G/DL (ref 6.4–8.2)
PROT UR STRIP-MCNC: ABNORMAL MG/DL
RBC # BLD AUTO: 4.05 M/UL (ref 3.8–5.2)
RBC #/AREA URNS HPF: ABNORMAL /HPF (ref 0–5)
SODIUM SERPL-SCNC: 140 MMOL/L (ref 136–145)
SP GR UR REFRACTOMETRY: 1.02 (ref 1–1.03)
TRIGL SERPL-MCNC: 76 MG/DL
UROBILINOGEN UR QL STRIP.AUTO: 0.2 EU/DL (ref 0.2–1)
VLDLC SERPL CALC-MCNC: 15.2 MG/DL
WBC # BLD AUTO: 5.8 K/UL (ref 3.6–11)
WBC URNS QL MICRO: ABNORMAL /HPF (ref 0–4)

## 2025-01-20 PROCEDURE — 99397 PER PM REEVAL EST PAT 65+ YR: CPT | Performed by: INTERNAL MEDICINE

## 2025-01-20 PROCEDURE — 3075F SYST BP GE 130 - 139MM HG: CPT | Performed by: INTERNAL MEDICINE

## 2025-01-20 PROCEDURE — 3078F DIAST BP <80 MM HG: CPT | Performed by: INTERNAL MEDICINE

## 2025-01-20 NOTE — PATIENT INSTRUCTIONS
Well Visit, Over 65: Care Instructions  Well visits can help you stay healthy. Your doctor has checked your overall health and may have suggested ways to take good care of yourself. Your doctor also may have recommended tests. You can help prevent illness with healthy eating, good sleep, vaccinations, regular exercise, and other steps.    Get the tests that you and your doctor decide on. Depending on your age and risks, examples might include hearing tests as well as screening for colon, breast, and lung cancer. Screening helps find diseases before any symptoms appear.   Eat healthy foods. Choose fruits, vegetables, whole grains, lean protein, and low-fat dairy foods. Limit saturated fat, and reduce salt.     Limit alcohol. Men should have no more than 2 drinks a day. Women should have no more than 1. For some people, no alcohol is the best choice.   Exercise. It can help prevent falls. Get at least 30 minutes of exercise on most days of the week. Walking, yoga, and zunilda chi can be good choices.     Reach and stay at your healthy weight. This will lower your risk for many health problems.   Take care of your mental health. Try to stay connected with friends, family, and community, and find ways to manage stress.     If you're feeling depressed or hopeless, talk to someone. A counselor can help. If you don't have a counselor, talk to your doctor.   Talk to your doctor if you think you may have a problem with alcohol or drug use. This includes prescription medicines and illegal drugs.     Avoid tobacco and nicotine: Don't smoke, vape, or chew. If you need help quitting, talk to your doctor.   Practice safer sex. Getting tested, using condoms or dental dams, and limiting sex partners can help prevent STIs.     Make an advance directive. This is a legal way to tell your family and doctor what you want to happen at the end of your life or when you can't speak for yourself.   Prevent problems where you can. Protect

## 2025-01-20 NOTE — PROGRESS NOTES
.  \"Have you been to the ER, urgent care clinic since your last visit?  Hospitalized since your last visit?\"    NO    “Have you seen or consulted any other health care providers outside our system since your last visit?”    NO            
negative for tinnitus,sore throat,nasal congestion,ear pains.hoarseness  Respiratory:     negative for cough, hemoptysis, dyspnea,wheezing  CV:    negative for chest pain, palpitations, lower extremity edema  GI:    negative for nausea, vomiting, diarrhea, abdominal pain,melena  Endo:               negative for polyuria,polydipsia,polyphagia,heat intolerance  Genitourinary : negative for frequency, dysuria and hematuria  Integumentary: negative for rash and pruritus  Hematologic:   negative for easy bruising and gum/nose bleeding  Musculoskel:  negative for myalgias, back pain, muscle weakness  Neurological:   negative for headaches, dizziness, vertigo, memory problems and gait   Behavl/Psych:  negative for feelings of anxiety, depression, mood changes  ROS otherwise negative      Objective:  BP (!) 130/58   Pulse 68   Temp 98.6 °F (37 °C)   Resp 20   Ht 1.727 m (5' 7.99\")   Wt 96.9 kg (213 lb 9.6 oz)   SpO2 98%   BMI 32.49 kg/m²   Physical Exam:   General appearance - alert, well appearing, and in no distress  Mental status - alert, oriented to person, place, and time  EYE-TEA, EOMI, fundi normal, corneas normal, no foreign bodies  ENT-ENT exam normal, no neck nodes or sinus tenderness  Nose - normal and patent, no erythema, discharge or polyps  Mouth - mucous membranes moist, pharynx normal without lesions  Neck - supple, no significant adenopathy   Chest - clear to auscultation, no wheezes, rales or rhonchi, symmetric air entry   Heart - normal rate, regular rhythm, normal S1, S2, no murmurs, rubs, clicks or gallops   Abdomen - soft, nontender, nondistended, no masses or organomegaly  Lymph- no adenopathy palpable  Ext-peripheral pulses normal, no pedal edema, no clubbing or cyanosis  Skin-Warm and dry. no hyperpigmentation, vitiligo, or suspicious lesions  Neuro -alert, oriented, normal speech, no focal findings or movement disorder noted      Assessment/Plan:  Myah was seen today for follow-up and

## 2025-01-27 ENCOUNTER — TELEPHONE (OUTPATIENT)
Facility: CLINIC | Age: 80
End: 2025-01-27

## 2025-01-27 NOTE — TELEPHONE ENCOUNTER
Patient called  in following up on her most recent visit with Dr. Juarez. Advised that in her visit they discussed her A1C being elevated and the possible addition of Metformin. Patient checking in to see if Dr. Juarez was planning on calling this medication in for her? Patient also sent a my chart message regarding this.

## 2025-02-05 ENCOUNTER — TELEPHONE (OUTPATIENT)
Age: 80
End: 2025-02-05

## 2025-02-05 NOTE — TELEPHONE ENCOUNTER
Gibran from [facility] called in stating that they are needing an cardiac clearance letter sent over for patient right foot surgery on 02/11/25.       Att: Gibran [facility] fax #~ [phone]

## 2025-02-10 ENCOUNTER — TELEMEDICINE (OUTPATIENT)
Facility: CLINIC | Age: 80
End: 2025-02-10
Payer: COMMERCIAL

## 2025-02-10 DIAGNOSIS — R73.02 IGT (IMPAIRED GLUCOSE TOLERANCE): Primary | ICD-10-CM

## 2025-02-10 PROCEDURE — 1123F ACP DISCUSS/DSCN MKR DOCD: CPT | Performed by: INTERNAL MEDICINE

## 2025-02-10 PROCEDURE — 99213 OFFICE O/P EST LOW 20 MIN: CPT | Performed by: INTERNAL MEDICINE

## 2025-02-10 RX ORDER — METFORMIN HYDROCHLORIDE 500 MG/1
500 TABLET, EXTENDED RELEASE ORAL
Qty: 90 TABLET | Refills: 1 | Status: SHIPPED | OUTPATIENT
Start: 2025-02-10

## 2025-02-10 SDOH — ECONOMIC STABILITY: FOOD INSECURITY: WITHIN THE PAST 12 MONTHS, YOU WORRIED THAT YOUR FOOD WOULD RUN OUT BEFORE YOU GOT MONEY TO BUY MORE.: NEVER TRUE

## 2025-02-10 SDOH — ECONOMIC STABILITY: FOOD INSECURITY: WITHIN THE PAST 12 MONTHS, THE FOOD YOU BOUGHT JUST DIDN'T LAST AND YOU DIDN'T HAVE MONEY TO GET MORE.: NEVER TRUE

## 2025-02-10 SDOH — ECONOMIC STABILITY: INCOME INSECURITY: IN THE LAST 12 MONTHS, WAS THERE A TIME WHEN YOU WERE NOT ABLE TO PAY THE MORTGAGE OR RENT ON TIME?: NO

## 2025-02-10 SDOH — ECONOMIC STABILITY: FOOD INSECURITY

## 2025-02-10 SDOH — ECONOMIC STABILITY: TRANSPORTATION INSECURITY
IN THE PAST 12 MONTHS, HAS THE LACK OF TRANSPORTATION KEPT YOU FROM MEDICAL APPOINTMENTS OR FROM GETTING MEDICATIONS?: NO

## 2025-02-10 NOTE — PROGRESS NOTES
Myah Ivey is a 79 y.o. female and presents with discuss labs & medications  .    Subjective:  Mrs. Ivey was seen today for a virtual appointment to discuss medications and labs.  Her glucose is 95 and her A1c has increased from 5% to 5.5%.  She is concerned about developing diabetes.  She has been monitoring her diet closely.  Her renal function is improved.  Her cholesterol profile is excellent.  Past Medical History:   Diagnosis Date    Acute diverticulitis 8/18/2017    Allergic rhinitis 8/18/2017    Arthritis 8/18/2017    Arthritis of right knee 11/25/2019    Back pain, thoracic 8/18/2017    Breast cancer (HCC) 08/04/2020    Left    Cancer (HCC) 08/2020    LEFT BREAST , LUMPECTOMY WITH RECONSTRUCTION, CHEMO, RADIATION    Cataract, left 8/18/2017    Cataract, right 8/18/2017    Chronic obstructive pulmonary disease (HCC)     Cold sore 8/18/2017    COVID-19 vaccine series completed 2/23/21, 3/19/21    PFIZER    Elevated hemoglobin A1c 8/18/2017    Fatigue 8/18/2017    GERD (gastroesophageal reflux disease) 8/18/2017    Heart murmur 8/18/2017    Hepatitis C 8/18/2017    TREATED    Herpes zoster infection of thoracic region 8/18/2017    Hyperlipidemia LDL goal <100 8/18/2017    Hypertension     Menopause     Hysterectomy-46 years old    Murmur     Obesity (BMI 30-39.9) 8/18/2017    Osteopenia 8/18/2017    Other ill-defined conditions(799.89)     hep c    Post-menopausal 8/18/2017    Posterior auricular pain of right ear 8/18/2017    Preop examination 8/18/2017    Valvular heart disease     mitral valve prolapse    Vertigo, benign positional 8/18/2017     Past Surgical History:   Procedure Laterality Date    BREAST LUMPECTOMY Bilateral 11/03/2020    LEFT BREAST REDUCTION LUMPECTOMY WITH ULTRASOUND, LEFT BREAST SENTINEL NODE BIOPSY, LEFT BREAST RECONSTRUCTION, RIGHT BREAST REDUCTION performed by Keron Lin Jr., MD at [facility] AMBULATORY OR    BREAST REDUCTION SURGERY Bilateral 2020    COLONOSCOPY

## 2025-02-10 NOTE — PROGRESS NOTES
Myah Ivey is a 79 y.o. female     Chief Complaint   Patient presents with    discuss labs & medications       There were no vitals taken for this visit.    Health Maintenance Due   Topic Date Due    COVID-19 Vaccine (7 - 2024-25 season) 09/01/2024         \"Have you been to the ER, urgent care clinic since your last visit?  Hospitalized since your last visit?\"    NO    “Have you seen or consulted any other health care providers outside of [facility] since your last visit?”    NO

## 2025-03-23 DIAGNOSIS — I36.1 NONRHEUMATIC TRICUSPID (VALVE) INSUFFICIENCY: ICD-10-CM

## 2025-03-23 DIAGNOSIS — R60.0 LOWER EXTREMITY EDEMA: ICD-10-CM

## 2025-03-24 RX ORDER — BUMETANIDE 0.5 MG/1
0.5 TABLET ORAL DAILY
Qty: 45 TABLET | Refills: 1 | Status: SHIPPED | OUTPATIENT
Start: 2025-03-24

## 2025-03-24 NOTE — TELEPHONE ENCOUNTER
Requested Prescriptions     Signed Prescriptions Disp Refills    bumetanide (BUMEX) 0.5 MG tablet 45 tablet 1     Sig: Take 1 tablet by mouth once daily     Authorizing Provider: PRASHANTH SHELTON     Ordering User: ARNOLD CLMEENTS MD    Future Appointments   Date Time Provider Department Center   6/2/2025  3:00 PM Ofelia Lamas DO [facility]   7/16/2025 11:00 AM PATRICK EVANS ECHO 1 [facility]   7/16/2025 11:40 AM Prashanth Shelton MD [facility]   7/21/2025  9:45 AM RENEE Juarez MD [facility] DEP

## 2025-04-20 DIAGNOSIS — C50.912 INVASIVE DUCTAL CARCINOMA OF LEFT BREAST (HCC): ICD-10-CM

## 2025-04-21 RX ORDER — ANASTROZOLE 1 MG/1
TABLET ORAL
Qty: 90 TABLET | Refills: 0 | Status: SHIPPED | OUTPATIENT
Start: 2025-04-21

## 2025-05-02 ENCOUNTER — TELEPHONE (OUTPATIENT)
Age: 80
End: 2025-05-02

## 2025-05-02 NOTE — TELEPHONE ENCOUNTER
Received request for cardiac clearance from [facility]  for colonoscopy .Cardiac Clearance faxed [phone]. Confirmation fax receipt received.

## 2025-05-02 NOTE — TELEPHONE ENCOUNTER
Patient calling regarding cardiac clearance for colonoscopy.. Procedure not yet scheduled.      Dr. Vesta Lloyd    [facility],   Phone number [phone]  Fax number [phone]    Patient can be reached at [phone].

## 2025-05-15 PROBLEM — J44.9 CHRONIC OBSTRUCTIVE PULMONARY DISEASE (HCC): Status: ACTIVE | Noted: 2025-05-15

## 2025-05-15 PROBLEM — I10 ESSENTIAL (PRIMARY) HYPERTENSION: Status: ACTIVE | Noted: 2025-05-15

## 2025-05-15 PROBLEM — Z96.651 HISTORY OF RIGHT KNEE JOINT REPLACEMENT: Status: ACTIVE | Noted: 2025-05-15

## 2025-06-02 ENCOUNTER — OFFICE VISIT (OUTPATIENT)
Age: 80
End: 2025-06-02
Payer: COMMERCIAL

## 2025-06-02 VITALS
OXYGEN SATURATION: 97 % | WEIGHT: 220 LBS | TEMPERATURE: 98.1 F | HEART RATE: 91 BPM | RESPIRATION RATE: 16 BRPM | HEIGHT: 68 IN | SYSTOLIC BLOOD PRESSURE: 114 MMHG | BODY MASS INDEX: 33.34 KG/M2 | DIASTOLIC BLOOD PRESSURE: 71 MMHG

## 2025-06-02 DIAGNOSIS — Z96.651 HISTORY OF RIGHT KNEE JOINT REPLACEMENT: ICD-10-CM

## 2025-06-02 DIAGNOSIS — T45.1X5A HOT FLASHES RELATED TO AROMATASE INHIBITOR THERAPY: ICD-10-CM

## 2025-06-02 DIAGNOSIS — Z86.19 HISTORY OF HEPATITIS C: ICD-10-CM

## 2025-06-02 DIAGNOSIS — Z79.811 USE OF ANASTROZOLE (ARIMIDEX): ICD-10-CM

## 2025-06-02 DIAGNOSIS — R23.2 HOT FLASHES RELATED TO AROMATASE INHIBITOR THERAPY: ICD-10-CM

## 2025-06-02 DIAGNOSIS — Z98.890 HISTORY OF LUMPECTOMY: ICD-10-CM

## 2025-06-02 DIAGNOSIS — K21.9 GASTROESOPHAGEAL REFLUX DISEASE WITHOUT ESOPHAGITIS: ICD-10-CM

## 2025-06-02 DIAGNOSIS — Z85.3 PERSONAL HISTORY OF BREAST CANCER: ICD-10-CM

## 2025-06-02 DIAGNOSIS — J44.9 CHRONIC OBSTRUCTIVE PULMONARY DISEASE, UNSPECIFIED COPD TYPE (HCC): ICD-10-CM

## 2025-06-02 DIAGNOSIS — I10 ESSENTIAL (PRIMARY) HYPERTENSION: ICD-10-CM

## 2025-06-02 DIAGNOSIS — C50.912 INVASIVE DUCTAL CARCINOMA OF LEFT BREAST (HCC): Primary | ICD-10-CM

## 2025-06-02 DIAGNOSIS — Z98.890 H/O BILATERAL BREAST REDUCTION SURGERY: ICD-10-CM

## 2025-06-02 PROCEDURE — 1123F ACP DISCUSS/DSCN MKR DOCD: CPT | Performed by: NURSE PRACTITIONER

## 2025-06-02 PROCEDURE — 3078F DIAST BP <80 MM HG: CPT | Performed by: NURSE PRACTITIONER

## 2025-06-02 PROCEDURE — 3074F SYST BP LT 130 MM HG: CPT | Performed by: NURSE PRACTITIONER

## 2025-06-02 PROCEDURE — 99213 OFFICE O/P EST LOW 20 MIN: CPT | Performed by: NURSE PRACTITIONER

## 2025-06-02 RX ORDER — ANASTROZOLE 1 MG/1
1 TABLET ORAL DAILY
Qty: 90 TABLET | Refills: 2 | Status: SHIPPED | OUTPATIENT
Start: 2025-06-02

## 2025-06-02 ASSESSMENT — PATIENT HEALTH QUESTIONNAIRE - PHQ9
SUM OF ALL RESPONSES TO PHQ QUESTIONS 1-9: 0
SUM OF ALL RESPONSES TO PHQ QUESTIONS 1-9: 0
1. LITTLE INTEREST OR PLEASURE IN DOING THINGS: NOT AT ALL
SUM OF ALL RESPONSES TO PHQ QUESTIONS 1-9: 0
SUM OF ALL RESPONSES TO PHQ QUESTIONS 1-9: 0
2. FEELING DOWN, DEPRESSED OR HOPELESS: NOT AT ALL

## 2025-06-02 NOTE — PROGRESS NOTES
Identified pt with two pt identifiers(name and ). Reviewed record in preparation for visit and have obtained necessary documentation. All patient medications has been reviewed.    Chief Complaint   Patient presents with    6 Month Follow-Up         Vitals:    25 1510   BP: 114/71   BP Site: Left Upper Arm   Patient Position: Sitting   BP Cuff Size: Medium Adult   Pulse: 91   Resp: 16   Temp: 98.1 °F (36.7 °C)   TempSrc: Temporal   SpO2: 97%   Weight: 99.8 kg (220 lb)   Height: 1.727 m (5' 7.99\")      .  \"Have you been to the ER, urgent care clinic since your last visit?  Hospitalized since your last visit?\"    no    “Have you seen or consulted any other health care providers outside our system since your last visit?”    no          
diverticulitis 8/18/2017    Allergic rhinitis 8/18/2017    Arthritis 8/18/2017    Arthritis of right knee 11/25/2019    Back pain, thoracic 8/18/2017    Breast cancer (HCC) 08/04/2020    Left    Cancer (HCC) 08/2020    LEFT BREAST , LUMPECTOMY WITH RECONSTRUCTION, CHEMO, RADIATION    Cataract, left 8/18/2017    Cataract, right 8/18/2017    Chronic obstructive pulmonary disease (HCC)     Cold sore 8/18/2017    COVID-19 vaccine series completed 2/23/21, 3/19/21    PFIZER    Elevated hemoglobin A1c 8/18/2017    Fatigue 8/18/2017    GERD (gastroesophageal reflux disease) 8/18/2017    Heart murmur 8/18/2017    Hepatitis C 8/18/2017    TREATED    Herpes zoster infection of thoracic region 8/18/2017    Hyperlipidemia LDL goal <100 8/18/2017    Hypertension     Menopause     Hysterectomy-46 years old    Murmur     Obesity (BMI 30-39.9) 8/18/2017    Osteopenia 8/18/2017    Other ill-defined conditions(799.89)     hep c    Post-menopausal 8/18/2017    Posterior auricular pain of right ear 8/18/2017    Preop examination 8/18/2017    Valvular heart disease     mitral valve prolapse    Vertigo, benign positional 8/18/2017      Past Surgical History:   Procedure Laterality Date    BREAST LUMPECTOMY Bilateral 11/03/2020    LEFT BREAST REDUCTION LUMPECTOMY WITH ULTRASOUND, LEFT BREAST SENTINEL NODE BIOPSY, LEFT BREAST RECONSTRUCTION, RIGHT BREAST REDUCTION performed by Keron Lin Jr., MD at [facility] AMBULATORY OR    BREAST REDUCTION SURGERY Bilateral 2020    COLONOSCOPY      HYSTERECTOMY (CERVIX STATUS UNKNOWN)      46 years old    KNEE ARTHROSCOPY Right     knee,    ORTHOPEDIC SURGERY Right     BUNIONECTOMY, HAMMERTOE REPAIR    TONSILLECTOMY      TOTAL KNEE ARTHROPLASTY  07/19/2021    US BREAST BIOPSY W LOC DEVICE 1ST LESION LEFT Left 8/4/2020    US BREAST NEEDLE BIOPSY LEFT 8/4/2020 [facility] RAD MAMMO      Social History     Tobacco Use    Smoking status: Former     Current packs/day: 0.00     Average packs/day: 0.5 packs/day for

## 2025-06-22 DIAGNOSIS — I36.1 NONRHEUMATIC TRICUSPID (VALVE) INSUFFICIENCY: ICD-10-CM

## 2025-06-22 DIAGNOSIS — R60.0 LOWER EXTREMITY EDEMA: ICD-10-CM

## 2025-06-23 RX ORDER — BUMETANIDE 0.5 MG/1
0.5 TABLET ORAL DAILY
Qty: 45 TABLET | Refills: 2 | Status: SHIPPED | OUTPATIENT
Start: 2025-06-23

## 2025-06-23 NOTE — TELEPHONE ENCOUNTER
Requested Prescriptions     Signed Prescriptions Disp Refills    bumetanide (BUMEX) 0.5 MG tablet 45 tablet 2     Sig: Take 1 tablet by mouth once daily     Authorizing Provider: PRASHANTH SHELTON     Ordering User: ARNOLD CLEMENTS MD    Future Appointments   Date Time Provider Department Center   7/21/2025  9:45 AM RENEE Juarez MD [facility]   8/11/2025 10:00 AM BSC EVANS ECHO 3 [facility] AMB   8/11/2025 11:00 AM Prashanth Shelton MD [facility] AMB   12/3/2025  2:00 PM Rylee Dunn, APRN - NP [facility] AMB

## 2025-07-09 RX ORDER — OLMESARTAN MEDOXOMIL AND HYDROCHLOROTHIAZIDE 20/12.5 20; 12.5 MG/1; MG/1
0.5 TABLET ORAL DAILY
Qty: 45 TABLET | Refills: 1 | Status: SHIPPED | OUTPATIENT
Start: 2025-07-09 | End: 2025-07-09

## 2025-07-09 RX ORDER — OLMESARTAN MEDOXOMIL AND HYDROCHLOROTHIAZIDE 20/12.5 20; 12.5 MG/1; MG/1
0.5 TABLET ORAL DAILY
Qty: 45 TABLET | Refills: 1 | Status: SHIPPED | OUTPATIENT
Start: 2025-07-09

## 2025-07-09 NOTE — TELEPHONE ENCOUNTER
Requested Prescriptions     Signed Prescriptions Disp Refills    olmesartan-hydroCHLOROthiazide (BENICAR HCT) 20-12.5 MG per tablet 45 tablet 1     Sig: Take 0.5 tablets by mouth daily     Authorizing Provider: PRASHANTH SHELTON     Ordering User: ARNOLD CLEMENTS MD    Future Appointments   Date Time Provider Department Center   7/21/2025  9:45 AM RENEE Juarez MD [facility]   8/11/2025 10:00 AM [facility] ECHO 3 [facility]   8/11/2025 11:00 AM Prashanth Shelton MD [facility]   12/3/2025  2:00 PM Rylee Dunn, APRN - NP [facility] AMB

## 2025-07-21 ENCOUNTER — OFFICE VISIT (OUTPATIENT)
Facility: CLINIC | Age: 80
End: 2025-07-21
Payer: COMMERCIAL

## 2025-07-21 VITALS
HEIGHT: 68 IN | TEMPERATURE: 97.6 F | WEIGHT: 219.6 LBS | RESPIRATION RATE: 19 BRPM | BODY MASS INDEX: 33.28 KG/M2 | DIASTOLIC BLOOD PRESSURE: 60 MMHG | SYSTOLIC BLOOD PRESSURE: 122 MMHG | HEART RATE: 73 BPM | OXYGEN SATURATION: 95 %

## 2025-07-21 DIAGNOSIS — N18.31 CHRONIC KIDNEY DISEASE, STAGE 3A (HCC): ICD-10-CM

## 2025-07-21 DIAGNOSIS — R73.09 ELEVATED HEMOGLOBIN A1C: ICD-10-CM

## 2025-07-21 DIAGNOSIS — E78.5 HYPERLIPIDEMIA LDL GOAL <100: ICD-10-CM

## 2025-07-21 DIAGNOSIS — M81.0 AGE-RELATED OSTEOPOROSIS WITHOUT CURRENT PATHOLOGICAL FRACTURE: ICD-10-CM

## 2025-07-21 DIAGNOSIS — I10 ESSENTIAL HYPERTENSION: Primary | ICD-10-CM

## 2025-07-21 DIAGNOSIS — K21.9 GASTROESOPHAGEAL REFLUX DISEASE WITHOUT ESOPHAGITIS: ICD-10-CM

## 2025-07-21 DIAGNOSIS — Z79.811 USE OF ANASTROZOLE (ARIMIDEX): ICD-10-CM

## 2025-07-21 DIAGNOSIS — I27.20 PULMONARY HYPERTENSION, UNSPECIFIED (HCC): ICD-10-CM

## 2025-07-21 PROCEDURE — 1123F ACP DISCUSS/DSCN MKR DOCD: CPT | Performed by: INTERNAL MEDICINE

## 2025-07-21 PROCEDURE — 3074F SYST BP LT 130 MM HG: CPT | Performed by: INTERNAL MEDICINE

## 2025-07-21 PROCEDURE — 3078F DIAST BP <80 MM HG: CPT | Performed by: INTERNAL MEDICINE

## 2025-07-21 PROCEDURE — 99214 OFFICE O/P EST MOD 30 MIN: CPT | Performed by: INTERNAL MEDICINE

## 2025-07-21 NOTE — PROGRESS NOTES
Myah Ivey is a 80 y.o. female     Chief Complaint   Patient presents with    6 Month Follow-Up       /60 (BP Site: Left Upper Arm, Patient Position: Sitting, BP Cuff Size: Large Adult)   Pulse 73   Temp 97.6 °F (36.4 °C) (Temporal)   Resp 19   Ht 1.727 m (5' 7.99\")   Wt 99.6 kg (219 lb 9.6 oz)   SpO2 95%   BMI 33.40 kg/m²     Health Maintenance Due   Topic Date Due    COVID-19 Vaccine (7 - 2024-25 season) 09/01/2024         \"Have you been to the ER, urgent care clinic since your last visit?  Hospitalized since your last visit?\"    NO    “Have you seen or consulted any other health care providers outside of [facility] since your last visit?”    NO                   
(PRILOSEC OTC) 20 MG tablet Take 1 tablet by mouth as needed      valACYclovir (VALTREX) 500 MG tablet Take 1 tablet by mouth as needed       No current facility-administered medications for this visit.     Social History     Socioeconomic History    Marital status: Single   Tobacco Use    Smoking status: Former     Current packs/day: 0.00     Average packs/day: 0.5 packs/day for 20.0 years (10.0 ttl pk-yrs)     Types: Cigarettes     Start date: 1962     Quit date: 1982     Years since quittin.5     Passive exposure: Never    Smokeless tobacco: Never   Vaping Use    Vaping status: Never Used   Substance and Sexual Activity    Alcohol use: Not Currently    Drug use: No    Sexual activity: Not Currently     Birth control/protection: None     Social Drivers of Health     Financial Resource Strain: Low Risk  (7/10/2024)    Overall Financial Resource Strain (CARDIA)     Difficulty of Paying Living Expenses: Not hard at all   Food Insecurity: No Food Insecurity (2/10/2025)    Hunger Vital Sign     Worried About Running Out of Food in the Last Year: Never true     Ran Out of Food in the Last Year: Never true   Transportation Needs: No Transportation Needs (2/10/2025)    PRAPARE - Transportation     Lack of Transportation (Medical): No     Lack of Transportation (Non-Medical): No   Physical Activity: Inactive (2023)    Exercise Vital Sign     Days of Exercise per Week: 0 days     Minutes of Exercise per Session: 0 min   Housing Stability: Low Risk  (2/10/2025)    Housing Stability Vital Sign     Unable to Pay for Housing in the Last Year: No     Number of Times Moved in the Last Year: 0     Homeless in the Last Year: No     Family History   Problem Relation Age of Onset    No Known Problems Sister     Heart Disease Father     Osteoarthritis Father     Hypertension Father     Heart Disease Mother     Osteoarthritis Mother     Kidney Disease Mother     Diabetes Mother     Hypertension Mother     Colon Cancer

## 2025-07-24 LAB
25(OH)D3 SERPL-MCNC: 75.7 NG/ML (ref 30–100)
ALBUMIN SERPL-MCNC: 4 G/DL (ref 3.5–5)
ALBUMIN/GLOB SERPL: 0.9 (ref 1.1–2.2)
ALP SERPL-CCNC: 70 U/L (ref 45–117)
ALT SERPL-CCNC: 28 U/L (ref 12–78)
ANION GAP SERPL CALC-SCNC: 8 MMOL/L (ref 2–12)
APPEARANCE UR: CLEAR
AST SERPL-CCNC: 20 U/L (ref 15–37)
BACTERIA URNS QL MICRO: NEGATIVE /HPF
BASOPHILS # BLD: 0.06 K/UL (ref 0–0.1)
BASOPHILS NFR BLD: 1.1 % (ref 0–1)
BILIRUB SERPL-MCNC: 0.6 MG/DL (ref 0.2–1)
BILIRUB UR QL: NEGATIVE
BUN SERPL-MCNC: 23 MG/DL (ref 6–20)
BUN/CREAT SERPL: 21 (ref 12–20)
CALCIUM SERPL-MCNC: 9.8 MG/DL (ref 8.5–10.1)
CHLORIDE SERPL-SCNC: 104 MMOL/L (ref 97–108)
CHOLEST SERPL-MCNC: 196 MG/DL
CO2 SERPL-SCNC: 29 MMOL/L (ref 21–32)
COLOR UR: ABNORMAL
CREAT SERPL-MCNC: 1.12 MG/DL (ref 0.55–1.02)
DIFFERENTIAL METHOD BLD: ABNORMAL
EOSINOPHIL # BLD: 0.18 K/UL (ref 0–0.4)
EOSINOPHIL NFR BLD: 3.4 % (ref 0–7)
EPITH CASTS URNS QL MICRO: ABNORMAL /LPF
ERYTHROCYTE [DISTWIDTH] IN BLOOD BY AUTOMATED COUNT: 14.1 % (ref 11.5–14.5)
EST. AVERAGE GLUCOSE BLD GHB EST-MCNC: 111 MG/DL
GLOBULIN SER CALC-MCNC: 4.5 G/DL (ref 2–4)
GLUCOSE SERPL-MCNC: 101 MG/DL (ref 65–100)
GLUCOSE UR STRIP.AUTO-MCNC: NEGATIVE MG/DL
HBA1C MFR BLD: 5.5 % (ref 4–5.6)
HCT VFR BLD AUTO: 39.3 % (ref 35–47)
HDLC SERPL-MCNC: 44 MG/DL
HDLC SERPL: 4.5 (ref 0–5)
HGB BLD-MCNC: 12.1 G/DL (ref 11.5–16)
HGB UR QL STRIP: NEGATIVE
IMM GRANULOCYTES # BLD AUTO: 0.01 K/UL (ref 0–0.04)
IMM GRANULOCYTES NFR BLD AUTO: 0.2 % (ref 0–0.5)
KETONES UR QL STRIP.AUTO: NEGATIVE MG/DL
LDLC SERPL CALC-MCNC: 127.8 MG/DL (ref 0–100)
LEUKOCYTE ESTERASE UR QL STRIP.AUTO: ABNORMAL
LYMPHOCYTES # BLD: 1.09 K/UL (ref 0.8–3.5)
LYMPHOCYTES NFR BLD: 20.8 % (ref 12–49)
MCH RBC QN AUTO: 28.5 PG (ref 26–34)
MCHC RBC AUTO-ENTMCNC: 30.8 G/DL (ref 30–36.5)
MCV RBC AUTO: 92.5 FL (ref 80–99)
MONOCYTES # BLD: 0.53 K/UL (ref 0–1)
MONOCYTES NFR BLD: 10.1 % (ref 5–13)
NEUTS SEG # BLD: 3.37 K/UL (ref 1.8–8)
NEUTS SEG NFR BLD: 64.4 % (ref 32–75)
NITRITE UR QL STRIP.AUTO: NEGATIVE
NRBC # BLD: 0 K/UL (ref 0–0.01)
NRBC BLD-RTO: 0 PER 100 WBC
PH UR STRIP: 6 (ref 5–8)
PLATELET # BLD AUTO: 238 K/UL (ref 150–400)
PMV BLD AUTO: 11.3 FL (ref 8.9–12.9)
POTASSIUM SERPL-SCNC: 4 MMOL/L (ref 3.5–5.1)
PROT SERPL-MCNC: 8.5 G/DL (ref 6.4–8.2)
PROT UR STRIP-MCNC: NEGATIVE MG/DL
RBC # BLD AUTO: 4.25 M/UL (ref 3.8–5.2)
RBC #/AREA URNS HPF: ABNORMAL /HPF (ref 0–5)
SODIUM SERPL-SCNC: 141 MMOL/L (ref 136–145)
SP GR UR REFRACTOMETRY: 1.02 (ref 1–1.03)
TRIGL SERPL-MCNC: 121 MG/DL
UROBILINOGEN UR QL STRIP.AUTO: 0.2 EU/DL (ref 0.2–1)
VLDLC SERPL CALC-MCNC: 24.2 MG/DL
WBC # BLD AUTO: 5.2 K/UL (ref 3.6–11)
WBC URNS QL MICRO: ABNORMAL /HPF (ref 0–4)

## 2025-08-11 ENCOUNTER — OFFICE VISIT (OUTPATIENT)
Age: 80
End: 2025-08-11
Payer: COMMERCIAL

## 2025-08-11 VITALS
SYSTOLIC BLOOD PRESSURE: 114 MMHG | DIASTOLIC BLOOD PRESSURE: 56 MMHG | OXYGEN SATURATION: 99 % | HEIGHT: 67 IN | HEART RATE: 61 BPM | BODY MASS INDEX: 35.16 KG/M2 | WEIGHT: 224 LBS

## 2025-08-11 DIAGNOSIS — I10 BENIGN ESSENTIAL HTN: ICD-10-CM

## 2025-08-11 DIAGNOSIS — I36.1 NONRHEUMATIC TRICUSPID VALVE REGURGITATION: Primary | ICD-10-CM

## 2025-08-11 DIAGNOSIS — I34.0 NONRHEUMATIC MITRAL VALVE REGURGITATION: ICD-10-CM

## 2025-08-11 DIAGNOSIS — E78.2 MIXED HYPERLIPIDEMIA: ICD-10-CM

## 2025-08-11 PROCEDURE — 99214 OFFICE O/P EST MOD 30 MIN: CPT | Performed by: INTERNAL MEDICINE

## 2025-08-11 PROCEDURE — 3078F DIAST BP <80 MM HG: CPT | Performed by: INTERNAL MEDICINE

## 2025-08-11 PROCEDURE — 1123F ACP DISCUSS/DSCN MKR DOCD: CPT | Performed by: INTERNAL MEDICINE

## 2025-08-11 PROCEDURE — 3074F SYST BP LT 130 MM HG: CPT | Performed by: INTERNAL MEDICINE

## 2025-08-11 PROCEDURE — G2211 COMPLEX E/M VISIT ADD ON: HCPCS | Performed by: INTERNAL MEDICINE

## 2025-08-11 ASSESSMENT — LIFESTYLE VARIABLES
HOW OFTEN DO YOU HAVE A DRINK CONTAINING ALCOHOL: NEVER
HOW MANY STANDARD DRINKS CONTAINING ALCOHOL DO YOU HAVE ON A TYPICAL DAY: PATIENT DOES NOT DRINK

## 2025-08-11 ASSESSMENT — PATIENT HEALTH QUESTIONNAIRE - PHQ9
SUM OF ALL RESPONSES TO PHQ QUESTIONS 1-9: 0
2. FEELING DOWN, DEPRESSED OR HOPELESS: NOT AT ALL
SUM OF ALL RESPONSES TO PHQ QUESTIONS 1-9: 0
SUM OF ALL RESPONSES TO PHQ QUESTIONS 1-9: 0
1. LITTLE INTEREST OR PLEASURE IN DOING THINGS: NOT AT ALL
SUM OF ALL RESPONSES TO PHQ QUESTIONS 1-9: 0

## 2025-09-05 ENCOUNTER — TELEPHONE (OUTPATIENT)
Age: 80
End: 2025-09-05

## 2025-09-05 RX ORDER — OLMESARTAN MEDOXOMIL AND HYDROCHLOROTHIAZIDE 20/12.5 20; 12.5 MG/1; MG/1
1 TABLET ORAL DAILY
Qty: 90 TABLET | Refills: 3 | Status: SHIPPED | OUTPATIENT
Start: 2025-09-05

## (undated) DEVICE — CURVED, SMALL JAW, OPEN SEALER/DIVIDER: Brand: LIGASURE

## (undated) DEVICE — STERILE POLYISOPRENE POWDER-FREE SURGICAL GLOVES WITH EMOLLIENT COATING: Brand: PROTEXIS

## (undated) DEVICE — STERILE POLYISOPRENE POWDER-FREE SURGICAL GLOVES: Brand: PROTEXIS

## (undated) DEVICE — ROCKER SWITCH PENCIL BLADE ELECTRODE, HOLSTER: Brand: EDGE

## (undated) DEVICE — SUTURE VCRL SZ 2 L54IN ABSRB UD L65MM TP-1 1/2 CIR J880T

## (undated) DEVICE — REM POLYHESIVE ADULT PATIENT RETURN ELECTRODE: Brand: VALLEYLAB

## (undated) DEVICE — STAPLER SKIN SQ 30 ABSRB STPL DISP INSORB

## (undated) DEVICE — PREP SKN CHLRAPRP APL 26ML STR --

## (undated) DEVICE — Device

## (undated) DEVICE — DRAPE,CHEST,FENES,15X10,STERIL: Brand: MEDLINE

## (undated) DEVICE — BLADE SAW W0.49XL3.15IN THK0.047IN CUT THK0.047IN REPL SAG

## (undated) DEVICE — SUTURE PDS II SZ 2-0 L27IN ABSRB VLT L26MM CT-2 1/2 CIR Z333H

## (undated) DEVICE — DBD-PACK,LAPAROTOMY,2 REINFORCED GOWNS: Brand: MEDLINE

## (undated) DEVICE — ADHESIVE SKIN CLOSURE HI VISC MIC 0.5 CC PREMIERPRO EXOFIN

## (undated) DEVICE — SYR 10ML LUER LOK 1/5ML GRAD --

## (undated) DEVICE — INFECTION CONTROL KIT SYS

## (undated) DEVICE — SUT SLK 2-0SH 30IN BLK --

## (undated) DEVICE — BRA COMPR MAMM SILKY 3XL BGE

## (undated) DEVICE — NEEDLE HYPO 21GA L1.5IN GRN POLYPR HUB S STL REG BVL STR

## (undated) DEVICE — BLADE SAW W073XL276IN THK0031IN CUT THK0036IN REPL SAG

## (undated) DEVICE — TOTAL TRAY, 16FR 10ML SIL FOLEY, URN: Brand: MEDLINE

## (undated) DEVICE — COVER US PRB W5XL96IN LTX W/ GEL

## (undated) DEVICE — SOLUTION IRRIG 1000ML H2O STRL BLT

## (undated) DEVICE — KIT DRN FLAT W/ 100CC EVAC 7MM FULL PERF

## (undated) DEVICE — MASTISOL ADHESIVE LIQ 2/3ML

## (undated) DEVICE — TUBING SUCT L9FT FOR AUTOFUSE INFLTR SYS

## (undated) DEVICE — GARMENT,MEDLINE,DVT,INT,CALF,MED, GEN2: Brand: MEDLINE

## (undated) DEVICE — SOLUTION IV 1000ML 0.9% SOD CHL

## (undated) DEVICE — SOLUTION IRRIG 3000ML 0.9% SOD CHL USP UROMATIC PLAS CONT

## (undated) DEVICE — PADDING CAST SPEC 6INX4YD COT --

## (undated) DEVICE — DRSG POSTOP PRMSL AG 3.5X14IN

## (undated) DEVICE — HANDPIECE SET WITH BONE CLEANING TIP AND SUCTION TUBE: Brand: INTERPULSE

## (undated) DEVICE — SUTURE STRATAFIX SYMMETRIC PDS + SZ 1 L18IN ABSRB VLT L48MM SXPP1A400

## (undated) DEVICE — SPONGE LAP 18X18IN STRL -- 5/PK

## (undated) DEVICE — STAPLER SKIN LEG L3.9MM DIA0.53MM WIDE ROT HD FOR WND CLSR

## (undated) DEVICE — SPONGE GZ W4XL4IN COT 12 PLY TYP VII WVN C FLD DSGN

## (undated) DEVICE — SUTURE MCRYL SZ 2-0 L36IN ABSRB UD L36MM CT-1 1/2 CIR Y945H

## (undated) DEVICE — SUTURE MCRYL SZ 4-0 L27IN ABSRB UD L19MM PS-2 1/2 CIR PRIM Y426H

## (undated) DEVICE — PENCIL SMK EVAC L10FT DIA95MM TBNG NONSTICK W ADPT TO 22MM

## (undated) DEVICE — SYR 20ML LL STRL LF --

## (undated) DEVICE — 4-PORT MANIFOLD: Brand: NEPTUNE 2

## (undated) DEVICE — SMOKE EVACUATION TUBING WITH 8 IN INTEGRAL WAND AND SPONGE GUARD: Brand: BUFFALO FILTER

## (undated) DEVICE — SCRUB DRY SURG EZ SCRUB BRUSH PREOPERATIVE GRN

## (undated) DEVICE — SOLUTION IRRIG 1000ML STRL H2O USP PLAS POUR BTL

## (undated) DEVICE — CONTAINER,SPECIMEN,3OZ,OR STRL: Brand: MEDLINE

## (undated) DEVICE — PADDING CST 6IN STERILE --

## (undated) DEVICE — NEEDLE HYPO 18GA L1.5IN PNK S STL HUB POLYPR SHLD REG BVL

## (undated) DEVICE — SUTURE VCRL SZ 0 L36IN ABSRB VLT L40MM CT 1/2 CIR J358H

## (undated) DEVICE — SURGICAL PROCEDURE PACK BASIN MAJ SET CUST NO CAUT

## (undated) DEVICE — BOWL BNE CEM MIX SPAT CURET SMARTMIX CTS

## (undated) DEVICE — BANDAGE COMPR M W6INXL10YD WHT BGE VELC E MTRX HK AND LOOP

## (undated) DEVICE — HANDLE LT SNAP ON ULT DURABLE LENS FOR TRUMPF ALC DISPOSABLE

## (undated) DEVICE — INTENDED FOR TISSUE SEPARATION, AND OTHER PROCEDURES THAT REQUIRE A SHARP SURGICAL BLADE TO PUNCTURE OR CUT.: Brand: BARD-PARKER ® CARBON RIB-BACK BLADES

## (undated) DEVICE — TTL1LYR 16FR10ML 100%SIL TMPST TR: Brand: MEDLINE

## (undated) DEVICE — GOWN,SIRUS,FABRNF,XL,20/CS: Brand: MEDLINE

## (undated) DEVICE — SOLUTION SURG PREP 26 CC PURPREP

## (undated) DEVICE — HEADLESS TROCHAR PIN 75MM: Brand: ZUK

## (undated) DEVICE — SUTURE VCRL SZ 3-0 L27IN ABSRB UD L26MM SH 1/2 CIR J416H

## (undated) DEVICE — 3M™ IOBAN™ 2 ANTIMICROBIAL INCISE DRAPE 6651EZ: Brand: IOBAN™ 2

## (undated) DEVICE — AEGIS 1" DISK 4MM HOLE, PEEL OPEN: Brand: MEDLINE

## (undated) DEVICE — SUTURE VCRL SZ 2-0 L36IN ABSRB UD L40MM CT 1/2 CIR J957H

## (undated) DEVICE — T4 HOOD

## (undated) DEVICE — TOWEL SURG W17XL27IN STD BLU COT NONFENESTRATED PREWASHED

## (undated) DEVICE — DRAPE,REIN 53X77,STERILE: Brand: MEDLINE

## (undated) DEVICE — TAPE,CLOTH/SILK,CURAD,3"X10YD,LF,40/CS: Brand: CURAD

## (undated) DEVICE — GOWN,NON-REINFORCED,XXL: Brand: MEDLINE

## (undated) DEVICE — DERMABOND SKIN ADH 0.7ML -- DERMABOND ADVANCED 12/BX

## (undated) DEVICE — RESERVOIR,SUCTION,100CC,SILICONE: Brand: MEDLINE

## (undated) DEVICE — INSULATED BLADE ELECTRODE: Brand: EDGE

## (undated) DEVICE — DRAPE,EXTREMITY,89X128,STERILE: Brand: MEDLINE

## (undated) DEVICE — DRAIN SURG W7MMXL20CM SIL FULL PERF HUBLESS FLAT RADPQ STRP

## (undated) DEVICE — BLADE SAW W083XL354IN THK0047IN CUT THK0047IN SAG FLR

## (undated) DEVICE — ZIMMER® STERILE DISPOSABLE TOURNIQUET CUFF WITH PLC, DUAL PORT, SINGLE BLADDER, 34 IN. (86 CM)

## (undated) DEVICE — NEEDLE HYPO 22GA L1.5IN BLK S STL HUB POLYPR SHLD REG BVL